# Patient Record
Sex: FEMALE | Race: WHITE | Employment: UNEMPLOYED | URBAN - METROPOLITAN AREA
[De-identification: names, ages, dates, MRNs, and addresses within clinical notes are randomized per-mention and may not be internally consistent; named-entity substitution may affect disease eponyms.]

---

## 2018-01-27 PROBLEM — F41.9 ANXIETY: Chronic | Status: ACTIVE | Noted: 2018-01-27

## 2018-01-27 PROBLEM — R00.2 PALPITATIONS: Status: ACTIVE | Noted: 2018-01-27

## 2018-01-27 PROBLEM — F32.A DEPRESSION: Chronic | Status: ACTIVE | Noted: 2018-01-27

## 2018-01-27 PROBLEM — R07.9 CHEST PAIN: Status: ACTIVE | Noted: 2018-01-27

## 2018-03-16 ENCOUNTER — CLINICAL DOCUMENTATION (OUTPATIENT)
Dept: CARDIOLOGY CLINIC | Age: 48
End: 2018-03-16

## 2018-03-19 ENCOUNTER — TELEPHONE (OUTPATIENT)
Dept: CARDIOLOGY CLINIC | Age: 48
End: 2018-03-19

## 2018-03-19 DIAGNOSIS — R07.89 ATYPICAL CHEST PAIN: ICD-10-CM

## 2018-03-19 DIAGNOSIS — F41.9 ANXIETY: Chronic | ICD-10-CM

## 2018-03-19 DIAGNOSIS — R00.2 PALPITATIONS: ICD-10-CM

## 2018-03-19 NOTE — TELEPHONE ENCOUNTER
Please let the patient know that I reviewed her 30 day event recorder. She was in sinus rhythm throughout the exam. Her average heart rate was 71 and she had a normal variation in her heart rhythm and rate. She had no pathologic arrhythmias. She did have multiple symptoms but they did not correlate with the speed of her heart or any irregularities. Once again I think that she is very sensitive to what goes on inside her body but this does not mean that there are any pathologies causing her symptoms. No other cardiac workup is being planned at this time. Thanks! DAB    Contacted patient with event monitor results and Dr. Jessa Coates comments. Patient informed me that she has just been diagnosed with Shogren's and she is menopausal and that these do affect her heart. She will keep her planned office visit on 3/23/2018 with Dr. Tanner Miller.

## 2018-03-23 ENCOUNTER — OFFICE VISIT (OUTPATIENT)
Dept: CARDIOLOGY CLINIC | Age: 48
End: 2018-03-23
Payer: COMMERCIAL

## 2018-03-23 VITALS
HEIGHT: 60 IN | HEART RATE: 81 BPM | RESPIRATION RATE: 16 BRPM | BODY MASS INDEX: 36.99 KG/M2 | SYSTOLIC BLOOD PRESSURE: 120 MMHG | DIASTOLIC BLOOD PRESSURE: 82 MMHG | WEIGHT: 188.4 LBS

## 2018-03-23 DIAGNOSIS — R00.2 PALPITATIONS: Primary | ICD-10-CM

## 2018-03-23 PROCEDURE — 99214 OFFICE O/P EST MOD 30 MIN: CPT | Performed by: INTERNAL MEDICINE

## 2018-03-23 PROCEDURE — 1036F TOBACCO NON-USER: CPT | Performed by: INTERNAL MEDICINE

## 2018-03-23 PROCEDURE — G8417 CALC BMI ABV UP PARAM F/U: HCPCS | Performed by: INTERNAL MEDICINE

## 2018-03-23 PROCEDURE — G8427 DOCREV CUR MEDS BY ELIG CLIN: HCPCS | Performed by: INTERNAL MEDICINE

## 2018-03-23 PROCEDURE — 93000 ELECTROCARDIOGRAM COMPLETE: CPT | Performed by: INTERNAL MEDICINE

## 2018-03-23 PROCEDURE — G8484 FLU IMMUNIZE NO ADMIN: HCPCS | Performed by: INTERNAL MEDICINE

## 2018-03-23 RX ORDER — HYDROXYCHLOROQUINE SULFATE 200 MG/1
TABLET, FILM COATED ORAL DAILY
Status: ON HOLD | COMMUNITY
End: 2018-04-18

## 2018-03-26 NOTE — PROGRESS NOTES
Symptoms of palpitations occurred during sinus rhythm. 10. Echocardiogram, 10/27/2017, normal left ventricular size, wall motion and systolic function with ejection fraction of 65%. Physiologic mitral insufficiency. Normal study. 6. Family history:  Multiple family members have diabetes. There is no immediate family member who has early coronary artery disease. 12. 30-day event recorder from 02/12-03/14/2018. No atrial fibrillation, ventricular tachycardia or heart block. Patient did have one episode of possible SVT at a rate of 92 at which time she complained of a skipped beat. She did have multiple other symptoms of chest pain, palpitations, dyspnea, etc when she was in sinus rhythm with mild sinus tachycardia. The monitor was essentially normal for a woman of her age. Review of Systems:  HEENT: negative for acute visual and auditory problems  Constitutional: Did have a syncopal episode brought on by stress, diarrhea, nausea and vomiting at which point she stood up and then passed out briefly - this appeared to be a vasovagal event  Respiratory: negative for cough and hemoptysis  Cardiovascular: Intermittent chest pain and dyspnea without relation to arrhythmias  Gastrointestinal: Does have epigastric pain, nausea and vomiting  Genitourinary: negative for dysuria and hematuria  Derm: negative for rash and skin lesion(s)  Neurological: negative for seizures and tremors  Endocrine: negative for diabetic symptoms including polydipsia and polyuria  Musculoskeletal: negative for CTD  Psychiatric:  Patient does admit to anxiety and depression     The remainder of the review of systems is negative except as noted above. On exam today, she is a well-nourished white female who is awake, alert and oriented. She does seem tearful and anxious. Her pulse is 81 and regular. Her blood pressure is 120/82. She weighs 188 pounds. BMI is 36.8. HEENT:  Normocephalic and atraumatic. EOMI.   Sclerae are

## 2018-03-28 ENCOUNTER — HOSPITAL ENCOUNTER (OUTPATIENT)
Age: 48
Discharge: HOME | End: 2018-03-30

## 2018-03-28 PROCEDURE — 88304 TISSUE EXAM BY PATHOLOGIST: CPT

## 2018-03-31 ENCOUNTER — APPOINTMENT (OUTPATIENT)
Dept: GENERAL RADIOLOGY | Age: 48
End: 2018-03-31
Payer: COMMERCIAL

## 2018-03-31 ENCOUNTER — APPOINTMENT (OUTPATIENT)
Dept: CT IMAGING | Age: 48
End: 2018-03-31
Payer: COMMERCIAL

## 2018-03-31 ENCOUNTER — HOSPITAL ENCOUNTER (EMERGENCY)
Age: 48
Discharge: HOME | End: 2018-04-01
Attending: EMERGENCY MEDICINE
Payer: COMMERCIAL

## 2018-03-31 DIAGNOSIS — G89.18 POST-OPERATIVE PAIN: Primary | ICD-10-CM

## 2018-03-31 LAB
ALBUMIN SERPL-MCNC: 3.9 G/DL (ref 3.5–5.2)
ALP BLD-CCNC: 71 U/L (ref 35–104)
ALT SERPL-CCNC: 79 U/L (ref 0–32)
ANION GAP SERPL CALCULATED.3IONS-SCNC: 11 MMOL/L (ref 7–16)
AST SERPL-CCNC: 62 U/L (ref 0–31)
BACTERIA: NORMAL /HPF
BASOPHILS ABSOLUTE: 0.02 E9/L (ref 0–0.2)
BASOPHILS RELATIVE PERCENT: 0.3 % (ref 0–2)
BILIRUB SERPL-MCNC: 0.3 MG/DL (ref 0–1.2)
BILIRUBIN URINE: NEGATIVE
BLOOD, URINE: NEGATIVE
BUN BLDV-MCNC: 9 MG/DL (ref 6–20)
CALCIUM SERPL-MCNC: 9.1 MG/DL (ref 8.6–10.2)
CHLORIDE BLD-SCNC: 100 MMOL/L (ref 98–107)
CLARITY: CLEAR
CO2: 28 MMOL/L (ref 22–29)
COLOR: YELLOW
CREAT SERPL-MCNC: 0.7 MG/DL (ref 0.5–1)
EOSINOPHILS ABSOLUTE: 0.1 E9/L (ref 0.05–0.5)
EOSINOPHILS RELATIVE PERCENT: 1.5 % (ref 0–6)
GFR AFRICAN AMERICAN: >60
GFR NON-AFRICAN AMERICAN: >60 ML/MIN/1.73
GLUCOSE BLD-MCNC: 113 MG/DL (ref 74–109)
GLUCOSE URINE: NEGATIVE MG/DL
HCT VFR BLD CALC: 38.4 % (ref 34–48)
HEMOGLOBIN: 12.7 G/DL (ref 11.5–15.5)
IMMATURE GRANULOCYTES #: 0.02 E9/L
IMMATURE GRANULOCYTES %: 0.3 % (ref 0–5)
KETONES, URINE: NEGATIVE MG/DL
LACTIC ACID: 1.1 MMOL/L (ref 0.5–2.2)
LEUKOCYTE ESTERASE, URINE: NEGATIVE
LIPASE: 67 U/L (ref 13–60)
LYMPHOCYTES ABSOLUTE: 2 E9/L (ref 1.5–4)
LYMPHOCYTES RELATIVE PERCENT: 30.4 % (ref 20–42)
MCH RBC QN AUTO: 28 PG (ref 26–35)
MCHC RBC AUTO-ENTMCNC: 33.1 % (ref 32–34.5)
MCV RBC AUTO: 84.6 FL (ref 80–99.9)
MONOCYTES ABSOLUTE: 0.5 E9/L (ref 0.1–0.95)
MONOCYTES RELATIVE PERCENT: 7.6 % (ref 2–12)
NEUTROPHILS ABSOLUTE: 3.93 E9/L (ref 1.8–7.3)
NEUTROPHILS RELATIVE PERCENT: 59.9 % (ref 43–80)
NITRITE, URINE: NEGATIVE
PDW BLD-RTO: 14.8 FL (ref 11.5–15)
PH UA: 6.5 (ref 5–9)
PLATELET # BLD: 298 E9/L (ref 130–450)
PMV BLD AUTO: 9 FL (ref 7–12)
POTASSIUM SERPL-SCNC: 3.5 MMOL/L (ref 3.5–5)
PROTEIN UA: NEGATIVE MG/DL
RBC # BLD: 4.54 E12/L (ref 3.5–5.5)
RBC UA: NORMAL /HPF (ref 0–2)
SODIUM BLD-SCNC: 139 MMOL/L (ref 132–146)
SPECIFIC GRAVITY UA: <=1.005 (ref 1–1.03)
TOTAL PROTEIN: 6.6 G/DL (ref 6.4–8.3)
UROBILINOGEN, URINE: 0.2 E.U./DL
WBC # BLD: 6.6 E9/L (ref 4.5–11.5)
WBC UA: NORMAL /HPF (ref 0–5)

## 2018-03-31 PROCEDURE — 83690 ASSAY OF LIPASE: CPT

## 2018-03-31 PROCEDURE — 99284 EMERGENCY DEPT VISIT MOD MDM: CPT

## 2018-03-31 PROCEDURE — 71045 X-RAY EXAM CHEST 1 VIEW: CPT

## 2018-03-31 PROCEDURE — 80053 COMPREHEN METABOLIC PANEL: CPT

## 2018-03-31 PROCEDURE — 96375 TX/PRO/DX INJ NEW DRUG ADDON: CPT

## 2018-03-31 PROCEDURE — 6360000002 HC RX W HCPCS: Performed by: EMERGENCY MEDICINE

## 2018-03-31 PROCEDURE — 2580000003 HC RX 258: Performed by: EMERGENCY MEDICINE

## 2018-03-31 PROCEDURE — 96374 THER/PROPH/DIAG INJ IV PUSH: CPT

## 2018-03-31 PROCEDURE — 85025 COMPLETE CBC W/AUTO DIFF WBC: CPT

## 2018-03-31 PROCEDURE — 83605 ASSAY OF LACTIC ACID: CPT

## 2018-03-31 PROCEDURE — 6360000004 HC RX CONTRAST MEDICATION: Performed by: RADIOLOGY

## 2018-03-31 PROCEDURE — 81001 URINALYSIS AUTO W/SCOPE: CPT

## 2018-03-31 PROCEDURE — 87040 BLOOD CULTURE FOR BACTERIA: CPT

## 2018-03-31 PROCEDURE — 74177 CT ABD & PELVIS W/CONTRAST: CPT

## 2018-03-31 RX ORDER — ONDANSETRON 2 MG/ML
8 INJECTION INTRAMUSCULAR; INTRAVENOUS ONCE
Status: COMPLETED | OUTPATIENT
Start: 2018-03-31 | End: 2018-03-31

## 2018-03-31 RX ORDER — 0.9 % SODIUM CHLORIDE 0.9 %
1000 INTRAVENOUS SOLUTION INTRAVENOUS ONCE
Status: COMPLETED | OUTPATIENT
Start: 2018-03-31 | End: 2018-03-31

## 2018-03-31 RX ORDER — MORPHINE SULFATE 4 MG/ML
4 INJECTION, SOLUTION INTRAMUSCULAR; INTRAVENOUS ONCE
Status: COMPLETED | OUTPATIENT
Start: 2018-03-31 | End: 2018-03-31

## 2018-03-31 RX ADMIN — SODIUM CHLORIDE 1000 ML: 9 INJECTION, SOLUTION INTRAVENOUS at 21:05

## 2018-03-31 RX ADMIN — MORPHINE SULFATE 4 MG: 4 INJECTION, SOLUTION INTRAMUSCULAR; INTRAVENOUS at 23:05

## 2018-03-31 RX ADMIN — IOPAMIDOL 110 ML: 755 INJECTION, SOLUTION INTRAVENOUS at 21:55

## 2018-03-31 RX ADMIN — ONDANSETRON 8 MG: 2 INJECTION, SOLUTION INTRAMUSCULAR; INTRAVENOUS at 21:06

## 2018-03-31 ASSESSMENT — ENCOUNTER SYMPTOMS
ABDOMINAL DISTENTION: 0
EYE DISCHARGE: 0
SORE THROAT: 0
VOMITING: 0
SINUS PRESSURE: 0
WHEEZING: 0
ABDOMINAL PAIN: 1
COUGH: 0
CONSTIPATION: 0
SHORTNESS OF BREATH: 1
BACK PAIN: 0
EYE REDNESS: 0
NAUSEA: 1
EYE PAIN: 0
DIARRHEA: 0
BLOOD IN STOOL: 0

## 2018-03-31 ASSESSMENT — PAIN DESCRIPTION - PAIN TYPE: TYPE: ACUTE PAIN

## 2018-03-31 ASSESSMENT — PAIN DESCRIPTION - FREQUENCY: FREQUENCY: INTERMITTENT

## 2018-03-31 ASSESSMENT — PAIN SCALES - GENERAL
PAINLEVEL_OUTOF10: 8
PAINLEVEL_OUTOF10: 8

## 2018-03-31 ASSESSMENT — PAIN DESCRIPTION - DESCRIPTORS: DESCRIPTORS: ACHING

## 2018-03-31 ASSESSMENT — PAIN DESCRIPTION - LOCATION: LOCATION: ABDOMEN

## 2018-04-01 VITALS
TEMPERATURE: 97.4 F | SYSTOLIC BLOOD PRESSURE: 133 MMHG | HEIGHT: 60 IN | DIASTOLIC BLOOD PRESSURE: 76 MMHG | OXYGEN SATURATION: 100 % | BODY MASS INDEX: 36.91 KG/M2 | WEIGHT: 188 LBS | RESPIRATION RATE: 18 BRPM | HEART RATE: 76 BPM

## 2018-04-01 RX ORDER — ONDANSETRON 4 MG/1
8 TABLET, ORALLY DISINTEGRATING ORAL EVERY 8 HOURS PRN
Qty: 24 TABLET | Refills: 0 | Status: ON HOLD | OUTPATIENT
Start: 2018-04-01 | End: 2018-04-18

## 2018-04-01 ASSESSMENT — PAIN SCALES - GENERAL: PAINLEVEL_OUTOF10: 6

## 2018-04-06 LAB
BLOOD CULTURE, ROUTINE: NORMAL
CULTURE, BLOOD 2: NORMAL

## 2018-04-17 PROBLEM — R10.11 RIGHT UPPER QUADRANT PAIN: Status: ACTIVE | Noted: 2018-04-17

## 2018-04-18 ENCOUNTER — HOSPITAL ENCOUNTER (INPATIENT)
Age: 48
LOS: 1 days | Discharge: HOME | DRG: 861 | End: 2018-04-19
Attending: SURGERY | Admitting: SURGERY
Payer: COMMERCIAL

## 2018-04-18 LAB
ALBUMIN SERPL-MCNC: 3.7 G/DL (ref 3.5–5.2)
ALP BLD-CCNC: 79 U/L (ref 35–104)
ALT SERPL-CCNC: 12 U/L (ref 0–32)
AMYLASE: 48 U/L (ref 20–100)
ANION GAP SERPL CALCULATED.3IONS-SCNC: 10 MMOL/L (ref 7–16)
AST SERPL-CCNC: 10 U/L (ref 0–31)
BASOPHILS ABSOLUTE: 0.02 E9/L (ref 0–0.2)
BASOPHILS RELATIVE PERCENT: 0.4 % (ref 0–2)
BILIRUB SERPL-MCNC: 0.3 MG/DL (ref 0–1.2)
BUN BLDV-MCNC: 8 MG/DL (ref 6–20)
CALCIUM IONIZED: 1.25 MMOL/L (ref 1.15–1.33)
CALCIUM SERPL-MCNC: 9 MG/DL (ref 8.6–10.2)
CHLORIDE BLD-SCNC: 107 MMOL/L (ref 98–107)
CO2: 24 MMOL/L (ref 22–29)
CREAT SERPL-MCNC: 0.7 MG/DL (ref 0.5–1)
D DIMER: 380 NG/ML DDU
EOSINOPHILS ABSOLUTE: 0.1 E9/L (ref 0.05–0.5)
EOSINOPHILS RELATIVE PERCENT: 2.1 % (ref 0–6)
GFR AFRICAN AMERICAN: >60
GFR NON-AFRICAN AMERICAN: >60 ML/MIN/1.73
GLUCOSE BLD-MCNC: 97 MG/DL (ref 74–109)
HCT VFR BLD CALC: 35.1 % (ref 34–48)
HEMOGLOBIN: 11.6 G/DL (ref 11.5–15.5)
IMMATURE GRANULOCYTES #: 0.01 E9/L
IMMATURE GRANULOCYTES %: 0.2 % (ref 0–5)
INR BLD: 1.1
LACTIC ACID: 0.6 MMOL/L (ref 0.5–2.2)
LIPASE: 49 U/L (ref 13–60)
LYMPHOCYTES ABSOLUTE: 1.47 E9/L (ref 1.5–4)
LYMPHOCYTES RELATIVE PERCENT: 31.1 % (ref 20–42)
MAGNESIUM: 1.9 MG/DL (ref 1.6–2.6)
MCH RBC QN AUTO: 28 PG (ref 26–35)
MCHC RBC AUTO-ENTMCNC: 33 % (ref 32–34.5)
MCV RBC AUTO: 84.6 FL (ref 80–99.9)
METER GLUCOSE: 95 MG/DL (ref 70–110)
MONOCYTES ABSOLUTE: 0.37 E9/L (ref 0.1–0.95)
MONOCYTES RELATIVE PERCENT: 7.8 % (ref 2–12)
NEUTROPHILS ABSOLUTE: 2.75 E9/L (ref 1.8–7.3)
NEUTROPHILS RELATIVE PERCENT: 58.4 % (ref 43–80)
PDW BLD-RTO: 14.5 FL (ref 11.5–15)
PHOSPHORUS: 2.9 MG/DL (ref 2.5–4.5)
PLATELET # BLD: 295 E9/L (ref 130–450)
PMV BLD AUTO: 8.8 FL (ref 7–12)
POTASSIUM REFLEX MAGNESIUM: 3.8 MMOL/L (ref 3.5–5)
PROTHROMBIN TIME: 12.3 SEC (ref 9.3–12.4)
RBC # BLD: 4.15 E12/L (ref 3.5–5.5)
SODIUM BLD-SCNC: 141 MMOL/L (ref 132–146)
TOTAL PROTEIN: 6.6 G/DL (ref 6.4–8.3)
WBC # BLD: 4.7 E9/L (ref 4.5–11.5)

## 2018-04-18 PROCEDURE — 83605 ASSAY OF LACTIC ACID: CPT

## 2018-04-18 PROCEDURE — 96374 THER/PROPH/DIAG INJ IV PUSH: CPT

## 2018-04-18 PROCEDURE — C9113 INJ PANTOPRAZOLE SODIUM, VIA: HCPCS | Performed by: STUDENT IN AN ORGANIZED HEALTH CARE EDUCATION/TRAINING PROGRAM

## 2018-04-18 PROCEDURE — 6370000000 HC RX 637 (ALT 250 FOR IP): Performed by: STUDENT IN AN ORGANIZED HEALTH CARE EDUCATION/TRAINING PROGRAM

## 2018-04-18 PROCEDURE — 83735 ASSAY OF MAGNESIUM: CPT

## 2018-04-18 PROCEDURE — 36415 COLL VENOUS BLD VENIPUNCTURE: CPT

## 2018-04-18 PROCEDURE — 82150 ASSAY OF AMYLASE: CPT

## 2018-04-18 PROCEDURE — 84100 ASSAY OF PHOSPHORUS: CPT

## 2018-04-18 PROCEDURE — 6360000002 HC RX W HCPCS: Performed by: STUDENT IN AN ORGANIZED HEALTH CARE EDUCATION/TRAINING PROGRAM

## 2018-04-18 PROCEDURE — 82962 GLUCOSE BLOOD TEST: CPT

## 2018-04-18 PROCEDURE — 85610 PROTHROMBIN TIME: CPT

## 2018-04-18 PROCEDURE — 82330 ASSAY OF CALCIUM: CPT

## 2018-04-18 PROCEDURE — 1200000000 HC SEMI PRIVATE

## 2018-04-18 PROCEDURE — G0378 HOSPITAL OBSERVATION PER HR: HCPCS

## 2018-04-18 PROCEDURE — 2580000003 HC RX 258: Performed by: STUDENT IN AN ORGANIZED HEALTH CARE EDUCATION/TRAINING PROGRAM

## 2018-04-18 PROCEDURE — 85378 FIBRIN DEGRADE SEMIQUANT: CPT

## 2018-04-18 PROCEDURE — 80053 COMPREHEN METABOLIC PANEL: CPT

## 2018-04-18 PROCEDURE — 83690 ASSAY OF LIPASE: CPT

## 2018-04-18 PROCEDURE — 85025 COMPLETE CBC W/AUTO DIFF WBC: CPT

## 2018-04-18 RX ORDER — 0.9 % SODIUM CHLORIDE 0.9 %
10 VIAL (ML) INJECTION DAILY
Status: DISCONTINUED | OUTPATIENT
Start: 2018-04-18 | End: 2018-04-19 | Stop reason: HOSPADM

## 2018-04-18 RX ORDER — HYDROCODONE BITARTRATE AND ACETAMINOPHEN 5; 325 MG/1; MG/1
1 TABLET ORAL EVERY 6 HOURS PRN
Status: DISCONTINUED | OUTPATIENT
Start: 2018-04-18 | End: 2018-04-19 | Stop reason: HOSPADM

## 2018-04-18 RX ORDER — ALBUTEROL SULFATE 90 UG/1
2 AEROSOL, METERED RESPIRATORY (INHALATION) EVERY 6 HOURS PRN
Status: DISCONTINUED | OUTPATIENT
Start: 2018-04-18 | End: 2018-04-19 | Stop reason: HOSPADM

## 2018-04-18 RX ORDER — SODIUM CHLORIDE 0.9 % (FLUSH) 0.9 %
10 SYRINGE (ML) INJECTION PRN
Status: DISCONTINUED | OUTPATIENT
Start: 2018-04-18 | End: 2018-04-19 | Stop reason: HOSPADM

## 2018-04-18 RX ORDER — SODIUM CHLORIDE 9 MG/ML
INJECTION, SOLUTION INTRAVENOUS CONTINUOUS
Status: DISCONTINUED | OUTPATIENT
Start: 2018-04-18 | End: 2018-04-19 | Stop reason: HOSPADM

## 2018-04-18 RX ORDER — SODIUM CHLORIDE 0.9 % (FLUSH) 0.9 %
10 SYRINGE (ML) INJECTION EVERY 12 HOURS SCHEDULED
Status: DISCONTINUED | OUTPATIENT
Start: 2018-04-18 | End: 2018-04-19 | Stop reason: HOSPADM

## 2018-04-18 RX ORDER — ALPRAZOLAM 1 MG/1
0.5 TABLET ORAL 3 TIMES DAILY PRN
Status: ON HOLD | COMMUNITY
End: 2021-09-29 | Stop reason: HOSPADM

## 2018-04-18 RX ORDER — ALPRAZOLAM 0.25 MG/1
0.5 TABLET ORAL 3 TIMES DAILY PRN
Status: DISCONTINUED | OUTPATIENT
Start: 2018-04-18 | End: 2018-04-19 | Stop reason: HOSPADM

## 2018-04-18 RX ORDER — ONDANSETRON 2 MG/ML
4 INJECTION INTRAMUSCULAR; INTRAVENOUS EVERY 6 HOURS PRN
Status: DISCONTINUED | OUTPATIENT
Start: 2018-04-18 | End: 2018-04-19 | Stop reason: HOSPADM

## 2018-04-18 RX ORDER — PANTOPRAZOLE SODIUM 40 MG/10ML
40 INJECTION, POWDER, LYOPHILIZED, FOR SOLUTION INTRAVENOUS DAILY
Status: DISCONTINUED | OUTPATIENT
Start: 2018-04-18 | End: 2018-04-19 | Stop reason: HOSPADM

## 2018-04-18 RX ORDER — HYDROCODONE BITARTRATE AND ACETAMINOPHEN 5; 325 MG/1; MG/1
1 TABLET ORAL EVERY 6 HOURS PRN
Status: ON HOLD | COMMUNITY
End: 2018-07-31

## 2018-04-18 RX ORDER — FLUTICASONE FUROATE AND VILANTEROL 200; 25 UG/1; UG/1
1 POWDER RESPIRATORY (INHALATION) DAILY
Status: DISCONTINUED | OUTPATIENT
Start: 2018-04-18 | End: 2018-04-19 | Stop reason: HOSPADM

## 2018-04-18 RX ORDER — ACETAMINOPHEN 325 MG/1
650 TABLET ORAL EVERY 4 HOURS PRN
Status: DISCONTINUED | OUTPATIENT
Start: 2018-04-18 | End: 2018-04-19 | Stop reason: HOSPADM

## 2018-04-18 RX ORDER — CHOLECALCIFEROL (VITAMIN D3) 50 MCG
5000 TABLET ORAL DAILY
Status: DISCONTINUED | OUTPATIENT
Start: 2018-04-18 | End: 2018-04-19 | Stop reason: HOSPADM

## 2018-04-18 RX ADMIN — Medication 10 ML: at 11:39

## 2018-04-18 RX ADMIN — HYDROCODONE BITARTRATE AND ACETAMINOPHEN 1 TABLET: 5; 325 TABLET ORAL at 18:47

## 2018-04-18 RX ADMIN — SODIUM CHLORIDE: 9 INJECTION, SOLUTION INTRAVENOUS at 22:23

## 2018-04-18 RX ADMIN — Medication 10 ML: at 11:41

## 2018-04-18 RX ADMIN — HYDROCODONE BITARTRATE AND ACETAMINOPHEN 1 TABLET: 5; 325 TABLET ORAL at 11:41

## 2018-04-18 RX ADMIN — ACETAMINOPHEN 650 MG: 325 TABLET ORAL at 22:21

## 2018-04-18 RX ADMIN — PANTOPRAZOLE SODIUM 40 MG: 40 INJECTION, POWDER, FOR SOLUTION INTRAVENOUS at 11:39

## 2018-04-18 RX ADMIN — SODIUM CHLORIDE: 9 INJECTION, SOLUTION INTRAVENOUS at 22:21

## 2018-04-18 RX ADMIN — SODIUM CHLORIDE: 9 INJECTION, SOLUTION INTRAVENOUS at 11:39

## 2018-04-18 ASSESSMENT — PAIN DESCRIPTION - PAIN TYPE
TYPE: ACUTE PAIN

## 2018-04-18 ASSESSMENT — PAIN DESCRIPTION - LOCATION
LOCATION: ABDOMEN

## 2018-04-18 ASSESSMENT — PAIN SCALES - GENERAL
PAINLEVEL_OUTOF10: 6
PAINLEVEL_OUTOF10: 4
PAINLEVEL_OUTOF10: 3
PAINLEVEL_OUTOF10: 5
PAINLEVEL_OUTOF10: 0
PAINLEVEL_OUTOF10: 6
PAINLEVEL_OUTOF10: 8

## 2018-04-18 ASSESSMENT — PAIN DESCRIPTION - FREQUENCY
FREQUENCY: INTERMITTENT

## 2018-04-18 ASSESSMENT — PAIN DESCRIPTION - ONSET
ONSET: ON-GOING

## 2018-04-18 ASSESSMENT — PAIN DESCRIPTION - ORIENTATION
ORIENTATION: RIGHT;UPPER
ORIENTATION: OTHER (COMMENT)
ORIENTATION: RIGHT
ORIENTATION: RIGHT;UPPER

## 2018-04-18 ASSESSMENT — PAIN DESCRIPTION - DESCRIPTORS
DESCRIPTORS: DISCOMFORT;ACHING
DESCRIPTORS: DISCOMFORT;SHARP;SHOOTING
DESCRIPTORS: SHARP;DISCOMFORT;SHOOTING
DESCRIPTORS: DISCOMFORT;SHARP;SHOOTING

## 2018-04-19 VITALS
WEIGHT: 186.8 LBS | TEMPERATURE: 98.5 F | BODY MASS INDEX: 36.48 KG/M2 | RESPIRATION RATE: 16 BRPM | DIASTOLIC BLOOD PRESSURE: 67 MMHG | SYSTOLIC BLOOD PRESSURE: 110 MMHG | OXYGEN SATURATION: 97 % | HEART RATE: 80 BPM

## 2018-04-19 PROCEDURE — G0378 HOSPITAL OBSERVATION PER HR: HCPCS

## 2018-04-19 PROCEDURE — 6370000000 HC RX 637 (ALT 250 FOR IP): Performed by: STUDENT IN AN ORGANIZED HEALTH CARE EDUCATION/TRAINING PROGRAM

## 2018-04-19 PROCEDURE — 96376 TX/PRO/DX INJ SAME DRUG ADON: CPT

## 2018-04-19 PROCEDURE — 6360000002 HC RX W HCPCS: Performed by: STUDENT IN AN ORGANIZED HEALTH CARE EDUCATION/TRAINING PROGRAM

## 2018-04-19 PROCEDURE — 2580000003 HC RX 258: Performed by: STUDENT IN AN ORGANIZED HEALTH CARE EDUCATION/TRAINING PROGRAM

## 2018-04-19 PROCEDURE — C9113 INJ PANTOPRAZOLE SODIUM, VIA: HCPCS | Performed by: STUDENT IN AN ORGANIZED HEALTH CARE EDUCATION/TRAINING PROGRAM

## 2018-04-19 RX ADMIN — SODIUM CHLORIDE: 9 INJECTION, SOLUTION INTRAVENOUS at 08:30

## 2018-04-19 RX ADMIN — CHOLECALCIFEROL TAB 50 MCG (2000 UNIT) 5000 UNITS: 50 TAB at 08:30

## 2018-04-19 RX ADMIN — PANTOPRAZOLE SODIUM 40 MG: 40 INJECTION, POWDER, FOR SOLUTION INTRAVENOUS at 08:30

## 2018-04-19 RX ADMIN — Medication 10 ML: at 08:30

## 2018-04-19 RX ADMIN — ACETAMINOPHEN 650 MG: 325 TABLET ORAL at 06:16

## 2018-04-19 ASSESSMENT — PAIN DESCRIPTION - DESCRIPTORS
DESCRIPTORS: ACHING;TENDER;CRAMPING
DESCRIPTORS: HEADACHE

## 2018-04-19 ASSESSMENT — PAIN SCALES - GENERAL
PAINLEVEL_OUTOF10: 5
PAINLEVEL_OUTOF10: 7
PAINLEVEL_OUTOF10: 0
PAINLEVEL_OUTOF10: 4

## 2018-04-19 ASSESSMENT — PAIN DESCRIPTION - PAIN TYPE
TYPE: ACUTE PAIN

## 2018-04-19 ASSESSMENT — PAIN DESCRIPTION - FREQUENCY
FREQUENCY: CONTINUOUS
FREQUENCY: CONTINUOUS

## 2018-04-19 ASSESSMENT — PAIN DESCRIPTION - LOCATION
LOCATION: ABDOMEN
LOCATION: HEAD
LOCATION: ABDOMEN

## 2018-04-19 ASSESSMENT — PAIN DESCRIPTION - ONSET
ONSET: ON-GOING
ONSET: ON-GOING

## 2018-04-19 ASSESSMENT — PAIN DESCRIPTION - PROGRESSION: CLINICAL_PROGRESSION: NOT CHANGED

## 2018-04-29 ENCOUNTER — HOSPITAL ENCOUNTER (EMERGENCY)
Age: 48
Discharge: HOME | End: 2018-04-30
Attending: EMERGENCY MEDICINE
Payer: COMMERCIAL

## 2018-04-29 DIAGNOSIS — R07.89 ATYPICAL CHEST PAIN: Primary | ICD-10-CM

## 2018-04-29 LAB
BASOPHILS ABSOLUTE: 0.03 E9/L (ref 0–0.2)
BASOPHILS RELATIVE PERCENT: 0.4 % (ref 0–2)
EKG ATRIAL RATE: 68 BPM
EKG P AXIS: 71 DEGREES
EKG P-R INTERVAL: 162 MS
EKG Q-T INTERVAL: 390 MS
EKG QRS DURATION: 76 MS
EKG QTC CALCULATION (BAZETT): 414 MS
EKG R AXIS: 31 DEGREES
EKG T AXIS: 21 DEGREES
EKG VENTRICULAR RATE: 68 BPM
EOSINOPHILS ABSOLUTE: 0.06 E9/L (ref 0.05–0.5)
EOSINOPHILS RELATIVE PERCENT: 0.9 % (ref 0–6)
HCT VFR BLD CALC: 37 % (ref 34–48)
HEMOGLOBIN: 12.3 G/DL (ref 11.5–15.5)
IMMATURE GRANULOCYTES #: 0.03 E9/L
IMMATURE GRANULOCYTES %: 0.4 % (ref 0–5)
LYMPHOCYTES ABSOLUTE: 1.63 E9/L (ref 1.5–4)
LYMPHOCYTES RELATIVE PERCENT: 24 % (ref 20–42)
MCH RBC QN AUTO: 28.2 PG (ref 26–35)
MCHC RBC AUTO-ENTMCNC: 33.2 % (ref 32–34.5)
MCV RBC AUTO: 84.9 FL (ref 80–99.9)
MONOCYTES ABSOLUTE: 0.54 E9/L (ref 0.1–0.95)
MONOCYTES RELATIVE PERCENT: 8 % (ref 2–12)
NEUTROPHILS ABSOLUTE: 4.5 E9/L (ref 1.8–7.3)
NEUTROPHILS RELATIVE PERCENT: 66.3 % (ref 43–80)
PDW BLD-RTO: 14.1 FL (ref 11.5–15)
PLATELET # BLD: 263 E9/L (ref 130–450)
PMV BLD AUTO: 9.1 FL (ref 7–12)
RBC # BLD: 4.36 E12/L (ref 3.5–5.5)
WBC # BLD: 6.8 E9/L (ref 4.5–11.5)

## 2018-04-29 PROCEDURE — 80053 COMPREHEN METABOLIC PANEL: CPT

## 2018-04-29 PROCEDURE — 96374 THER/PROPH/DIAG INJ IV PUSH: CPT

## 2018-04-29 PROCEDURE — 6360000002 HC RX W HCPCS: Performed by: EMERGENCY MEDICINE

## 2018-04-29 PROCEDURE — 2500000003 HC RX 250 WO HCPCS: Performed by: EMERGENCY MEDICINE

## 2018-04-29 PROCEDURE — 85025 COMPLETE CBC W/AUTO DIFF WBC: CPT

## 2018-04-29 PROCEDURE — 2580000003 HC RX 258: Performed by: EMERGENCY MEDICINE

## 2018-04-29 PROCEDURE — 6360000002 HC RX W HCPCS

## 2018-04-29 PROCEDURE — 85378 FIBRIN DEGRADE SEMIQUANT: CPT

## 2018-04-29 PROCEDURE — S0028 INJECTION, FAMOTIDINE, 20 MG: HCPCS | Performed by: EMERGENCY MEDICINE

## 2018-04-29 PROCEDURE — 85610 PROTHROMBIN TIME: CPT

## 2018-04-29 PROCEDURE — 94760 N-INVAS EAR/PLS OXIMETRY 1: CPT

## 2018-04-29 PROCEDURE — 6370000000 HC RX 637 (ALT 250 FOR IP): Performed by: EMERGENCY MEDICINE

## 2018-04-29 PROCEDURE — 83690 ASSAY OF LIPASE: CPT

## 2018-04-29 PROCEDURE — 84484 ASSAY OF TROPONIN QUANT: CPT

## 2018-04-29 PROCEDURE — 99285 EMERGENCY DEPT VISIT HI MDM: CPT

## 2018-04-29 RX ORDER — 0.9 % SODIUM CHLORIDE 0.9 %
1000 INTRAVENOUS SOLUTION INTRAVENOUS ONCE
Status: COMPLETED | OUTPATIENT
Start: 2018-04-29 | End: 2018-04-29

## 2018-04-29 RX ORDER — MORPHINE SULFATE 2 MG/ML
INJECTION, SOLUTION INTRAMUSCULAR; INTRAVENOUS
Status: COMPLETED
Start: 2018-04-29 | End: 2018-04-29

## 2018-04-29 RX ORDER — MORPHINE SULFATE 4 MG/ML
4 INJECTION, SOLUTION INTRAMUSCULAR; INTRAVENOUS ONCE
Status: DISCONTINUED | OUTPATIENT
Start: 2018-04-29 | End: 2018-04-30 | Stop reason: HOSPADM

## 2018-04-29 RX ORDER — ONDANSETRON 2 MG/ML
4 INJECTION INTRAMUSCULAR; INTRAVENOUS ONCE
Status: COMPLETED | OUTPATIENT
Start: 2018-04-29 | End: 2018-04-29

## 2018-04-29 RX ADMIN — ONDANSETRON 4 MG: 2 INJECTION INTRAMUSCULAR; INTRAVENOUS at 23:27

## 2018-04-29 RX ADMIN — MORPHINE SULFATE 4 MG: 2 INJECTION, SOLUTION INTRAMUSCULAR; INTRAVENOUS at 23:27

## 2018-04-29 RX ADMIN — FAMOTIDINE 20 MG: 10 INJECTION INTRAVENOUS at 23:27

## 2018-04-29 RX ADMIN — Medication 30 ML: at 23:27

## 2018-04-29 RX ADMIN — SODIUM CHLORIDE 1000 ML: 9 INJECTION, SOLUTION INTRAVENOUS at 23:26

## 2018-04-29 ASSESSMENT — PAIN SCALES - GENERAL
PAINLEVEL_OUTOF10: 5
PAINLEVEL_OUTOF10: 4

## 2018-04-29 ASSESSMENT — PAIN DESCRIPTION - PAIN TYPE: TYPE: ACUTE PAIN

## 2018-04-29 ASSESSMENT — PAIN DESCRIPTION - LOCATION: LOCATION: CHEST

## 2018-04-30 ENCOUNTER — APPOINTMENT (OUTPATIENT)
Dept: CT IMAGING | Age: 48
End: 2018-04-30
Payer: COMMERCIAL

## 2018-04-30 VITALS
SYSTOLIC BLOOD PRESSURE: 106 MMHG | BODY MASS INDEX: 35.12 KG/M2 | HEART RATE: 59 BPM | DIASTOLIC BLOOD PRESSURE: 57 MMHG | RESPIRATION RATE: 18 BRPM | HEIGHT: 61 IN | TEMPERATURE: 98.2 F | OXYGEN SATURATION: 95 % | WEIGHT: 186 LBS

## 2018-04-30 LAB
ALBUMIN SERPL-MCNC: 3.8 G/DL (ref 3.5–5.2)
ALP BLD-CCNC: 76 U/L (ref 35–104)
ALT SERPL-CCNC: 7 U/L (ref 0–32)
ANION GAP SERPL CALCULATED.3IONS-SCNC: 12 MMOL/L (ref 7–16)
AST SERPL-CCNC: 8 U/L (ref 0–31)
BILIRUB SERPL-MCNC: 0.3 MG/DL (ref 0–1.2)
BUN BLDV-MCNC: 10 MG/DL (ref 6–20)
CALCIUM SERPL-MCNC: 9.1 MG/DL (ref 8.6–10.2)
CHLORIDE BLD-SCNC: 101 MMOL/L (ref 98–107)
CO2: 25 MMOL/L (ref 22–29)
CREAT SERPL-MCNC: 0.8 MG/DL (ref 0.5–1)
D DIMER: 297 NG/ML DDU
GFR AFRICAN AMERICAN: >60
GFR NON-AFRICAN AMERICAN: >60 ML/MIN/1.73
GLUCOSE BLD-MCNC: 102 MG/DL (ref 74–109)
INR BLD: 1.1
LIPASE: 51 U/L (ref 13–60)
POTASSIUM SERPL-SCNC: 3.8 MMOL/L (ref 3.5–5)
PROTHROMBIN TIME: 12.2 SEC (ref 9.3–12.4)
SODIUM BLD-SCNC: 138 MMOL/L (ref 132–146)
TOTAL PROTEIN: 6.6 G/DL (ref 6.4–8.3)
TROPONIN: <0.01 NG/ML (ref 0–0.03)

## 2018-04-30 PROCEDURE — 6360000002 HC RX W HCPCS: Performed by: EMERGENCY MEDICINE

## 2018-04-30 PROCEDURE — 96375 TX/PRO/DX INJ NEW DRUG ADDON: CPT

## 2018-04-30 PROCEDURE — 6360000004 HC RX CONTRAST MEDICATION: Performed by: RADIOLOGY

## 2018-04-30 PROCEDURE — 71275 CT ANGIOGRAPHY CHEST: CPT

## 2018-04-30 RX ORDER — DIPHENHYDRAMINE HYDROCHLORIDE 50 MG/ML
50 INJECTION INTRAMUSCULAR; INTRAVENOUS ONCE
Status: COMPLETED | OUTPATIENT
Start: 2018-04-30 | End: 2018-04-30

## 2018-04-30 RX ORDER — FAMOTIDINE 20 MG/1
20 TABLET, FILM COATED ORAL 2 TIMES DAILY
Qty: 14 TABLET | Refills: 0 | Status: ON HOLD | OUTPATIENT
Start: 2018-04-30 | End: 2018-07-31

## 2018-04-30 RX ORDER — METHYLPREDNISOLONE SODIUM SUCCINATE 125 MG/2ML
125 INJECTION, POWDER, LYOPHILIZED, FOR SOLUTION INTRAMUSCULAR; INTRAVENOUS ONCE
Status: COMPLETED | OUTPATIENT
Start: 2018-04-30 | End: 2018-04-30

## 2018-04-30 RX ADMIN — DIPHENHYDRAMINE HYDROCHLORIDE 50 MG: 50 INJECTION, SOLUTION INTRAMUSCULAR; INTRAVENOUS at 00:24

## 2018-04-30 RX ADMIN — IOPAMIDOL 80 ML: 755 INJECTION, SOLUTION INTRAVENOUS at 01:13

## 2018-04-30 RX ADMIN — METHYLPREDNISOLONE SODIUM SUCCINATE 125 MG: 125 INJECTION, POWDER, FOR SOLUTION INTRAMUSCULAR; INTRAVENOUS at 00:24

## 2018-06-19 ENCOUNTER — HOSPITAL ENCOUNTER (EMERGENCY)
Age: 48
Discharge: HOME | End: 2018-06-19
Attending: EMERGENCY MEDICINE
Payer: COMMERCIAL

## 2018-06-19 ENCOUNTER — APPOINTMENT (OUTPATIENT)
Dept: GENERAL RADIOLOGY | Age: 48
End: 2018-06-19
Payer: COMMERCIAL

## 2018-06-19 VITALS
HEIGHT: 65 IN | BODY MASS INDEX: 30.66 KG/M2 | DIASTOLIC BLOOD PRESSURE: 82 MMHG | OXYGEN SATURATION: 100 % | WEIGHT: 184 LBS | TEMPERATURE: 98.1 F | RESPIRATION RATE: 16 BRPM | HEART RATE: 74 BPM | SYSTOLIC BLOOD PRESSURE: 122 MMHG

## 2018-06-19 DIAGNOSIS — F41.1 ANXIETY STATE: ICD-10-CM

## 2018-06-19 DIAGNOSIS — R07.9 CHEST PAIN, UNSPECIFIED TYPE: Primary | ICD-10-CM

## 2018-06-19 LAB
ANION GAP SERPL CALCULATED.3IONS-SCNC: 11 MMOL/L (ref 7–16)
BASOPHILS ABSOLUTE: 0.03 E9/L (ref 0–0.2)
BASOPHILS RELATIVE PERCENT: 0.5 % (ref 0–2)
BUN BLDV-MCNC: 8 MG/DL (ref 6–20)
CALCIUM SERPL-MCNC: 9.7 MG/DL (ref 8.6–10.2)
CHLORIDE BLD-SCNC: 103 MMOL/L (ref 98–107)
CO2: 25 MMOL/L (ref 22–29)
CREAT SERPL-MCNC: 0.7 MG/DL (ref 0.5–1)
EKG ATRIAL RATE: 69 BPM
EKG P AXIS: 50 DEGREES
EKG P-R INTERVAL: 132 MS
EKG Q-T INTERVAL: 392 MS
EKG QRS DURATION: 78 MS
EKG QTC CALCULATION (BAZETT): 420 MS
EKG R AXIS: 19 DEGREES
EKG T AXIS: 30 DEGREES
EKG VENTRICULAR RATE: 69 BPM
EOSINOPHILS ABSOLUTE: 0.05 E9/L (ref 0.05–0.5)
EOSINOPHILS RELATIVE PERCENT: 0.8 % (ref 0–6)
GFR AFRICAN AMERICAN: >60
GFR NON-AFRICAN AMERICAN: >60 ML/MIN/1.73
GLUCOSE BLD-MCNC: 93 MG/DL (ref 74–109)
HCT VFR BLD CALC: 37.3 % (ref 34–48)
HEMOGLOBIN: 12.6 G/DL (ref 11.5–15.5)
IMMATURE GRANULOCYTES #: 0.02 E9/L
IMMATURE GRANULOCYTES %: 0.3 % (ref 0–5)
LYMPHOCYTES ABSOLUTE: 1.52 E9/L (ref 1.5–4)
LYMPHOCYTES RELATIVE PERCENT: 24.1 % (ref 20–42)
MCH RBC QN AUTO: 28.3 PG (ref 26–35)
MCHC RBC AUTO-ENTMCNC: 33.8 % (ref 32–34.5)
MCV RBC AUTO: 83.6 FL (ref 80–99.9)
MONOCYTES ABSOLUTE: 0.52 E9/L (ref 0.1–0.95)
MONOCYTES RELATIVE PERCENT: 8.3 % (ref 2–12)
NEUTROPHILS ABSOLUTE: 4.16 E9/L (ref 1.8–7.3)
NEUTROPHILS RELATIVE PERCENT: 66 % (ref 43–80)
PDW BLD-RTO: 14 FL (ref 11.5–15)
PLATELET # BLD: 281 E9/L (ref 130–450)
PMV BLD AUTO: 8.6 FL (ref 7–12)
POTASSIUM SERPL-SCNC: 3.7 MMOL/L (ref 3.5–5)
PRO-BNP: 109 PG/ML (ref 0–125)
RBC # BLD: 4.46 E12/L (ref 3.5–5.5)
SODIUM BLD-SCNC: 139 MMOL/L (ref 132–146)
TROPONIN: <0.01 NG/ML (ref 0–0.03)
WBC # BLD: 6.3 E9/L (ref 4.5–11.5)

## 2018-06-19 PROCEDURE — 99285 EMERGENCY DEPT VISIT HI MDM: CPT

## 2018-06-19 PROCEDURE — 83880 ASSAY OF NATRIURETIC PEPTIDE: CPT

## 2018-06-19 PROCEDURE — 85025 COMPLETE CBC W/AUTO DIFF WBC: CPT

## 2018-06-19 PROCEDURE — 6360000002 HC RX W HCPCS: Performed by: EMERGENCY MEDICINE

## 2018-06-19 PROCEDURE — 71045 X-RAY EXAM CHEST 1 VIEW: CPT

## 2018-06-19 PROCEDURE — 6370000000 HC RX 637 (ALT 250 FOR IP): Performed by: EMERGENCY MEDICINE

## 2018-06-19 PROCEDURE — 84484 ASSAY OF TROPONIN QUANT: CPT

## 2018-06-19 PROCEDURE — 80048 BASIC METABOLIC PNL TOTAL CA: CPT

## 2018-06-19 PROCEDURE — 96374 THER/PROPH/DIAG INJ IV PUSH: CPT

## 2018-06-19 RX ORDER — LORAZEPAM 1 MG/1
0.5 TABLET ORAL EVERY 6 HOURS PRN
Qty: 4 TABLET | Refills: 0 | Status: SHIPPED | OUTPATIENT
Start: 2018-06-19 | End: 2018-06-20

## 2018-06-19 RX ORDER — SODIUM CHLORIDE 0.9 % (FLUSH) 0.9 %
10 SYRINGE (ML) INJECTION PRN
Status: DISCONTINUED | OUTPATIENT
Start: 2018-06-19 | End: 2018-06-19 | Stop reason: HOSPADM

## 2018-06-19 RX ORDER — LORAZEPAM 2 MG/ML
0.5 INJECTION INTRAMUSCULAR ONCE
Status: COMPLETED | OUTPATIENT
Start: 2018-06-19 | End: 2018-06-19

## 2018-06-19 RX ORDER — ASPIRIN 81 MG/1
324 TABLET, CHEWABLE ORAL ONCE
Status: COMPLETED | OUTPATIENT
Start: 2018-06-19 | End: 2018-06-19

## 2018-06-19 RX ADMIN — LORAZEPAM 0.5 MG: 2 INJECTION INTRAMUSCULAR; INTRAVENOUS at 16:08

## 2018-06-19 RX ADMIN — ASPIRIN 81 MG 324 MG: 81 TABLET ORAL at 16:08

## 2018-06-19 ASSESSMENT — ENCOUNTER SYMPTOMS
SHORTNESS OF BREATH: 0
COUGH: 0
NAUSEA: 1
BACK PAIN: 0
HEARTBURN: 0
BLOOD IN STOOL: 0
ABDOMINAL PAIN: 0
VOMITING: 0

## 2018-07-30 ENCOUNTER — APPOINTMENT (OUTPATIENT)
Dept: CT IMAGING | Age: 48
End: 2018-07-30
Payer: COMMERCIAL

## 2018-07-30 ENCOUNTER — HOSPITAL ENCOUNTER (OUTPATIENT)
Age: 48
Setting detail: OBSERVATION
Discharge: HOME | End: 2018-07-31
Attending: EMERGENCY MEDICINE | Admitting: SURGERY
Payer: COMMERCIAL

## 2018-07-30 DIAGNOSIS — R10.31 RIGHT LOWER QUADRANT ABDOMINAL PAIN: Primary | ICD-10-CM

## 2018-07-30 PROBLEM — R10.9 ABDOMINAL PAIN: Status: ACTIVE | Noted: 2018-07-30

## 2018-07-30 LAB
ALBUMIN SERPL-MCNC: 4 G/DL (ref 3.5–5.2)
ALP BLD-CCNC: 77 U/L (ref 35–104)
ALT SERPL-CCNC: 9 U/L (ref 0–32)
ANION GAP SERPL CALCULATED.3IONS-SCNC: 12 MMOL/L (ref 7–16)
AST SERPL-CCNC: 10 U/L (ref 0–31)
BASOPHILS ABSOLUTE: 0.03 E9/L (ref 0–0.2)
BASOPHILS RELATIVE PERCENT: 0.5 % (ref 0–2)
BILIRUB SERPL-MCNC: 0.5 MG/DL (ref 0–1.2)
BILIRUBIN URINE: NEGATIVE
BLOOD, URINE: NEGATIVE
BUN BLDV-MCNC: 10 MG/DL (ref 6–20)
CALCIUM SERPL-MCNC: 9 MG/DL (ref 8.6–10.2)
CHLORIDE BLD-SCNC: 107 MMOL/L (ref 98–107)
CLARITY: CLEAR
CO2: 24 MMOL/L (ref 22–29)
COLOR: YELLOW
CREAT SERPL-MCNC: 0.6 MG/DL (ref 0.5–1)
EOSINOPHILS ABSOLUTE: 0.04 E9/L (ref 0.05–0.5)
EOSINOPHILS RELATIVE PERCENT: 0.6 % (ref 0–6)
GFR AFRICAN AMERICAN: >60
GFR NON-AFRICAN AMERICAN: >60 ML/MIN/1.73
GLUCOSE BLD-MCNC: 100 MG/DL (ref 74–109)
GLUCOSE URINE: NEGATIVE MG/DL
HCT VFR BLD CALC: 38.6 % (ref 34–48)
HEMOGLOBIN: 13.2 G/DL (ref 11.5–15.5)
IMMATURE GRANULOCYTES #: 0.03 E9/L
IMMATURE GRANULOCYTES %: 0.5 % (ref 0–5)
KETONES, URINE: 40 MG/DL
LEUKOCYTE ESTERASE, URINE: NEGATIVE
LYMPHOCYTES ABSOLUTE: 1.23 E9/L (ref 1.5–4)
LYMPHOCYTES RELATIVE PERCENT: 19.2 % (ref 20–42)
MCH RBC QN AUTO: 28.6 PG (ref 26–35)
MCHC RBC AUTO-ENTMCNC: 34.2 % (ref 32–34.5)
MCV RBC AUTO: 83.7 FL (ref 80–99.9)
MONOCYTES ABSOLUTE: 0.56 E9/L (ref 0.1–0.95)
MONOCYTES RELATIVE PERCENT: 8.8 % (ref 2–12)
NEUTROPHILS ABSOLUTE: 4.51 E9/L (ref 1.8–7.3)
NEUTROPHILS RELATIVE PERCENT: 70.4 % (ref 43–80)
NITRITE, URINE: NEGATIVE
PDW BLD-RTO: 13.6 FL (ref 11.5–15)
PH UA: 6.5 (ref 5–9)
PLATELET # BLD: 291 E9/L (ref 130–450)
PMV BLD AUTO: 8.6 FL (ref 7–12)
POTASSIUM SERPL-SCNC: 4.5 MMOL/L (ref 3.5–5)
PREGNANCY TEST URINE, POC: NORMAL
PROTEIN UA: NEGATIVE MG/DL
RBC # BLD: 4.61 E12/L (ref 3.5–5.5)
SODIUM BLD-SCNC: 143 MMOL/L (ref 132–146)
SPECIFIC GRAVITY UA: 1.01 (ref 1–1.03)
TOTAL PROTEIN: 7.3 G/DL (ref 6.4–8.3)
UROBILINOGEN, URINE: 0.2 E.U./DL
WBC # BLD: 6.4 E9/L (ref 4.5–11.5)

## 2018-07-30 PROCEDURE — G0378 HOSPITAL OBSERVATION PER HR: HCPCS

## 2018-07-30 PROCEDURE — 36415 COLL VENOUS BLD VENIPUNCTURE: CPT

## 2018-07-30 PROCEDURE — 99285 EMERGENCY DEPT VISIT HI MDM: CPT

## 2018-07-30 PROCEDURE — 6360000004 HC RX CONTRAST MEDICATION: Performed by: RADIOLOGY

## 2018-07-30 PROCEDURE — 81003 URINALYSIS AUTO W/O SCOPE: CPT

## 2018-07-30 PROCEDURE — 96374 THER/PROPH/DIAG INJ IV PUSH: CPT

## 2018-07-30 PROCEDURE — 6360000002 HC RX W HCPCS: Performed by: EMERGENCY MEDICINE

## 2018-07-30 PROCEDURE — 80053 COMPREHEN METABOLIC PANEL: CPT

## 2018-07-30 PROCEDURE — 2580000003 HC RX 258: Performed by: EMERGENCY MEDICINE

## 2018-07-30 PROCEDURE — 74177 CT ABD & PELVIS W/CONTRAST: CPT

## 2018-07-30 PROCEDURE — 85025 COMPLETE CBC W/AUTO DIFF WBC: CPT

## 2018-07-30 RX ORDER — 0.9 % SODIUM CHLORIDE 0.9 %
1000 INTRAVENOUS SOLUTION INTRAVENOUS ONCE
Status: COMPLETED | OUTPATIENT
Start: 2018-07-30 | End: 2018-07-30

## 2018-07-30 RX ORDER — MORPHINE SULFATE 2 MG/ML
2 INJECTION, SOLUTION INTRAMUSCULAR; INTRAVENOUS ONCE
Status: COMPLETED | OUTPATIENT
Start: 2018-07-30 | End: 2018-07-30

## 2018-07-30 RX ADMIN — MORPHINE SULFATE 2 MG: 2 INJECTION, SOLUTION INTRAMUSCULAR; INTRAVENOUS at 18:57

## 2018-07-30 RX ADMIN — SODIUM CHLORIDE 1000 ML: 9 INJECTION, SOLUTION INTRAVENOUS at 18:51

## 2018-07-30 RX ADMIN — IOPAMIDOL 110 ML: 755 INJECTION, SOLUTION INTRAVENOUS at 20:31

## 2018-07-30 ASSESSMENT — ENCOUNTER SYMPTOMS
CHEST TIGHTNESS: 0
COUGH: 0
VOMITING: 0
DIARRHEA: 0
ABDOMINAL PAIN: 1
RHINORRHEA: 0
NAUSEA: 0
CONSTIPATION: 0
SHORTNESS OF BREATH: 0
SINUS PRESSURE: 0
RECTAL PAIN: 1
ANAL BLEEDING: 0

## 2018-07-30 ASSESSMENT — PAIN SCALES - GENERAL
PAINLEVEL_OUTOF10: 7
PAINLEVEL_OUTOF10: 6

## 2018-07-30 ASSESSMENT — PAIN DESCRIPTION - LOCATION: LOCATION: ABDOMEN

## 2018-07-30 ASSESSMENT — PAIN DESCRIPTION - ORIENTATION: ORIENTATION: RIGHT

## 2018-07-30 ASSESSMENT — PAIN DESCRIPTION - DESCRIPTORS: DESCRIPTORS: DULL;BURNING

## 2018-07-30 ASSESSMENT — PAIN DESCRIPTION - PAIN TYPE: TYPE: ACUTE PAIN

## 2018-07-30 NOTE — ED NOTES
DR Slim Núñez INFORMED OF POC PREG AND PATIENT WANTING TO WAIVE PREGNANCY TESTING.        Ingrid Choi RN  07/30/18 9749

## 2018-07-31 VITALS
SYSTOLIC BLOOD PRESSURE: 119 MMHG | BODY MASS INDEX: 32.94 KG/M2 | WEIGHT: 179 LBS | OXYGEN SATURATION: 100 % | DIASTOLIC BLOOD PRESSURE: 70 MMHG | HEIGHT: 62 IN | RESPIRATION RATE: 16 BRPM | TEMPERATURE: 97.7 F | HEART RATE: 70 BPM

## 2018-07-31 LAB
ALBUMIN SERPL-MCNC: 3.5 G/DL (ref 3.5–5.2)
ALP BLD-CCNC: 99 U/L (ref 35–104)
ALT SERPL-CCNC: 84 U/L (ref 0–32)
ANION GAP SERPL CALCULATED.3IONS-SCNC: 15 MMOL/L (ref 7–16)
AST SERPL-CCNC: 152 U/L (ref 0–31)
BASOPHILS ABSOLUTE: 0.01 E9/L (ref 0–0.2)
BASOPHILS RELATIVE PERCENT: 0.1 % (ref 0–2)
BILIRUB SERPL-MCNC: 0.9 MG/DL (ref 0–1.2)
BUN BLDV-MCNC: 8 MG/DL (ref 6–20)
CALCIUM SERPL-MCNC: 8.4 MG/DL (ref 8.6–10.2)
CHLORIDE BLD-SCNC: 112 MMOL/L (ref 98–107)
CO2: 19 MMOL/L (ref 22–29)
CREAT SERPL-MCNC: 0.7 MG/DL (ref 0.5–1)
EOSINOPHILS ABSOLUTE: 0.06 E9/L (ref 0.05–0.5)
EOSINOPHILS RELATIVE PERCENT: 0.8 % (ref 0–6)
GFR AFRICAN AMERICAN: >60
GFR NON-AFRICAN AMERICAN: >60 ML/MIN/1.73
GLUCOSE BLD-MCNC: 94 MG/DL (ref 74–109)
HCT VFR BLD CALC: 34.8 % (ref 34–48)
HEMOGLOBIN: 11.7 G/DL (ref 11.5–15.5)
IMMATURE GRANULOCYTES #: 0.03 E9/L
IMMATURE GRANULOCYTES %: 0.4 % (ref 0–5)
LYMPHOCYTES ABSOLUTE: 1.6 E9/L (ref 1.5–4)
LYMPHOCYTES RELATIVE PERCENT: 22.3 % (ref 20–42)
MCH RBC QN AUTO: 28.3 PG (ref 26–35)
MCHC RBC AUTO-ENTMCNC: 33.6 % (ref 32–34.5)
MCV RBC AUTO: 84.3 FL (ref 80–99.9)
MONOCYTES ABSOLUTE: 0.79 E9/L (ref 0.1–0.95)
MONOCYTES RELATIVE PERCENT: 11 % (ref 2–12)
NEUTROPHILS ABSOLUTE: 4.69 E9/L (ref 1.8–7.3)
NEUTROPHILS RELATIVE PERCENT: 65.4 % (ref 43–80)
PDW BLD-RTO: 13.6 FL (ref 11.5–15)
PLATELET # BLD: 279 E9/L (ref 130–450)
PMV BLD AUTO: 8.9 FL (ref 7–12)
POTASSIUM REFLEX MAGNESIUM: 3.9 MMOL/L (ref 3.5–5)
RBC # BLD: 4.13 E12/L (ref 3.5–5.5)
SODIUM BLD-SCNC: 146 MMOL/L (ref 132–146)
TOTAL PROTEIN: 6.2 G/DL (ref 6.4–8.3)
WBC # BLD: 7.2 E9/L (ref 4.5–11.5)

## 2018-07-31 PROCEDURE — 36415 COLL VENOUS BLD VENIPUNCTURE: CPT

## 2018-07-31 PROCEDURE — 85025 COMPLETE CBC W/AUTO DIFF WBC: CPT

## 2018-07-31 PROCEDURE — 6360000002 HC RX W HCPCS

## 2018-07-31 PROCEDURE — G0378 HOSPITAL OBSERVATION PER HR: HCPCS

## 2018-07-31 PROCEDURE — 6360000002 HC RX W HCPCS: Performed by: STUDENT IN AN ORGANIZED HEALTH CARE EDUCATION/TRAINING PROGRAM

## 2018-07-31 PROCEDURE — 96376 TX/PRO/DX INJ SAME DRUG ADON: CPT

## 2018-07-31 PROCEDURE — 6370000000 HC RX 637 (ALT 250 FOR IP): Performed by: STUDENT IN AN ORGANIZED HEALTH CARE EDUCATION/TRAINING PROGRAM

## 2018-07-31 PROCEDURE — 80053 COMPREHEN METABOLIC PANEL: CPT

## 2018-07-31 PROCEDURE — 2580000003 HC RX 258: Performed by: STUDENT IN AN ORGANIZED HEALTH CARE EDUCATION/TRAINING PROGRAM

## 2018-07-31 RX ORDER — SODIUM CHLORIDE 0.9 % (FLUSH) 0.9 %
10 SYRINGE (ML) INJECTION PRN
Status: DISCONTINUED | OUTPATIENT
Start: 2018-07-31 | End: 2018-07-31 | Stop reason: HOSPADM

## 2018-07-31 RX ORDER — ALPRAZOLAM 0.25 MG/1
0.5 TABLET ORAL 3 TIMES DAILY PRN
Status: DISCONTINUED | OUTPATIENT
Start: 2018-07-31 | End: 2018-07-31 | Stop reason: HOSPADM

## 2018-07-31 RX ORDER — ACETAMINOPHEN 325 MG/1
650 TABLET ORAL EVERY 4 HOURS PRN
Status: DISCONTINUED | OUTPATIENT
Start: 2018-07-31 | End: 2018-07-31 | Stop reason: HOSPADM

## 2018-07-31 RX ORDER — SODIUM CHLORIDE, SODIUM LACTATE, POTASSIUM CHLORIDE, CALCIUM CHLORIDE 600; 310; 30; 20 MG/100ML; MG/100ML; MG/100ML; MG/100ML
INJECTION, SOLUTION INTRAVENOUS CONTINUOUS
Status: DISCONTINUED | OUTPATIENT
Start: 2018-07-31 | End: 2018-07-31

## 2018-07-31 RX ORDER — FLUTICASONE FUROATE AND VILANTEROL 200; 25 UG/1; UG/1
1 POWDER RESPIRATORY (INHALATION) DAILY
Status: DISCONTINUED | OUTPATIENT
Start: 2018-07-31 | End: 2018-07-31 | Stop reason: HOSPADM

## 2018-07-31 RX ORDER — OXYCODONE HYDROCHLORIDE 5 MG/1
5 TABLET ORAL EVERY 4 HOURS PRN
Status: DISCONTINUED | OUTPATIENT
Start: 2018-07-31 | End: 2018-07-31

## 2018-07-31 RX ORDER — CETIRIZINE HYDROCHLORIDE 10 MG/1
10 TABLET ORAL DAILY
Status: DISCONTINUED | OUTPATIENT
Start: 2018-07-31 | End: 2018-07-31 | Stop reason: HOSPADM

## 2018-07-31 RX ORDER — MORPHINE SULFATE 2 MG/ML
2 INJECTION, SOLUTION INTRAMUSCULAR; INTRAVENOUS
Status: DISCONTINUED | OUTPATIENT
Start: 2018-07-31 | End: 2018-07-31 | Stop reason: SDUPTHER

## 2018-07-31 RX ORDER — SODIUM CHLORIDE 0.9 % (FLUSH) 0.9 %
10 SYRINGE (ML) INJECTION EVERY 12 HOURS SCHEDULED
Status: DISCONTINUED | OUTPATIENT
Start: 2018-07-31 | End: 2018-07-31 | Stop reason: HOSPADM

## 2018-07-31 RX ORDER — ONDANSETRON 4 MG/1
4 TABLET, ORALLY DISINTEGRATING ORAL EVERY 8 HOURS PRN
Status: DISCONTINUED | OUTPATIENT
Start: 2018-07-31 | End: 2018-07-31 | Stop reason: HOSPADM

## 2018-07-31 RX ORDER — MORPHINE SULFATE 2 MG/ML
INJECTION, SOLUTION INTRAMUSCULAR; INTRAVENOUS
Status: COMPLETED
Start: 2018-07-31 | End: 2018-07-31

## 2018-07-31 RX ORDER — MONTELUKAST SODIUM 10 MG/1
10 TABLET ORAL DAILY
Status: DISCONTINUED | OUTPATIENT
Start: 2018-07-31 | End: 2018-07-31 | Stop reason: HOSPADM

## 2018-07-31 RX ORDER — MORPHINE SULFATE 2 MG/ML
2 INJECTION, SOLUTION INTRAMUSCULAR; INTRAVENOUS
Status: DISCONTINUED | OUTPATIENT
Start: 2018-07-31 | End: 2018-07-31

## 2018-07-31 RX ORDER — CHOLECALCIFEROL (VITAMIN D3) 50 MCG
5000 TABLET ORAL DAILY
Status: DISCONTINUED | OUTPATIENT
Start: 2018-07-31 | End: 2018-07-31 | Stop reason: HOSPADM

## 2018-07-31 RX ORDER — ALBUTEROL SULFATE 90 UG/1
2 AEROSOL, METERED RESPIRATORY (INHALATION) EVERY 6 HOURS PRN
Status: DISCONTINUED | OUTPATIENT
Start: 2018-07-31 | End: 2018-07-31 | Stop reason: HOSPADM

## 2018-07-31 RX ADMIN — CETIRIZINE HYDROCHLORIDE 10 MG: 10 TABLET, FILM COATED ORAL at 09:54

## 2018-07-31 RX ADMIN — Medication 10 ML: at 06:32

## 2018-07-31 RX ADMIN — MORPHINE SULFATE 2 MG: 2 INJECTION, SOLUTION INTRAMUSCULAR; INTRAVENOUS at 01:22

## 2018-07-31 RX ADMIN — ACETAMINOPHEN 650 MG: 325 TABLET, FILM COATED ORAL at 09:54

## 2018-07-31 RX ADMIN — MORPHINE SULFATE 2 MG: 2 INJECTION, SOLUTION INTRAMUSCULAR; INTRAVENOUS at 06:32

## 2018-07-31 RX ADMIN — MONTELUKAST SODIUM 10 MG: 10 TABLET, FILM COATED ORAL at 09:54

## 2018-07-31 RX ADMIN — CHOLECALCIFEROL TAB 50 MCG (2000 UNIT) 5000 UNITS: 50 TAB at 09:54

## 2018-07-31 RX ADMIN — SODIUM CHLORIDE, POTASSIUM CHLORIDE, SODIUM LACTATE AND CALCIUM CHLORIDE: 600; 310; 30; 20 INJECTION, SOLUTION INTRAVENOUS at 01:54

## 2018-07-31 ASSESSMENT — PAIN DESCRIPTION - PROGRESSION
CLINICAL_PROGRESSION: NOT CHANGED
CLINICAL_PROGRESSION: NOT CHANGED

## 2018-07-31 ASSESSMENT — PAIN DESCRIPTION - PAIN TYPE
TYPE: ACUTE PAIN
TYPE: ACUTE PAIN

## 2018-07-31 ASSESSMENT — PAIN DESCRIPTION - LOCATION
LOCATION: ABDOMEN
LOCATION: ABDOMEN

## 2018-07-31 ASSESSMENT — PAIN DESCRIPTION - ORIENTATION
ORIENTATION: MID
ORIENTATION: MID;RIGHT

## 2018-07-31 ASSESSMENT — PAIN SCALES - GENERAL
PAINLEVEL_OUTOF10: 4
PAINLEVEL_OUTOF10: 8
PAINLEVEL_OUTOF10: 4
PAINLEVEL_OUTOF10: 7

## 2018-07-31 ASSESSMENT — PAIN DESCRIPTION - ONSET
ONSET: ON-GOING
ONSET: ON-GOING

## 2018-07-31 ASSESSMENT — PAIN DESCRIPTION - DESCRIPTORS
DESCRIPTORS: STABBING
DESCRIPTORS: STABBING;TENDER

## 2018-07-31 ASSESSMENT — PAIN DESCRIPTION - FREQUENCY
FREQUENCY: INTERMITTENT
FREQUENCY: INTERMITTENT

## 2018-07-31 NOTE — ED NOTES
Patient still in ED awaiting transport. Apologized to patient for delay. Patient requesting pain medication.       Chente Crawford RN  07/31/18 0513

## 2018-07-31 NOTE — ED PROVIDER NOTES
07/30/18   CBC Auto Differential   Result Value Ref Range    WBC 6.4 4.5 - 11.5 E9/L    RBC 4.61 3.50 - 5.50 E12/L    Hemoglobin 13.2 11.5 - 15.5 g/dL    Hematocrit 38.6 34.0 - 48.0 %    MCV 83.7 80.0 - 99.9 fL    MCH 28.6 26.0 - 35.0 pg    MCHC 34.2 32.0 - 34.5 %    RDW 13.6 11.5 - 15.0 fL    Platelets 190 589 - 528 E9/L    MPV 8.6 7.0 - 12.0 fL    Neutrophils % 70.4 43.0 - 80.0 %    Immature Granulocytes % 0.5 0.0 - 5.0 %    Lymphocytes % 19.2 (L) 20.0 - 42.0 %    Monocytes % 8.8 2.0 - 12.0 %    Eosinophils % 0.6 0.0 - 6.0 %    Basophils % 0.5 0.0 - 2.0 %    Neutrophils # 4.51 1.80 - 7.30 E9/L    Immature Granulocytes # 0.03 E9/L    Lymphocytes # 1.23 (L) 1.50 - 4.00 E9/L    Monocytes # 0.56 0.10 - 0.95 E9/L    Eosinophils # 0.04 (L) 0.05 - 0.50 E9/L    Basophils # 0.03 0.00 - 0.20 E9/L   Comprehensive Metabolic Panel   Result Value Ref Range    Sodium 143 132 - 146 mmol/L    Potassium 4.5 3.5 - 5.0 mmol/L    Chloride 107 98 - 107 mmol/L    CO2 24 22 - 29 mmol/L    Anion Gap 12 7 - 16 mmol/L    Glucose 100 74 - 109 mg/dL    BUN 10 6 - 20 mg/dL    CREATININE 0.6 0.5 - 1.0 mg/dL    GFR Non-African American >60 >=60 mL/min/1.73    GFR African American >60     Calcium 9.0 8.6 - 10.2 mg/dL    Total Protein 7.3 6.4 - 8.3 g/dL    Alb 4.0 3.5 - 5.2 g/dL    Total Bilirubin 0.5 0.0 - 1.2 mg/dL    Alkaline Phosphatase 77 35 - 104 U/L    ALT 9 0 - 32 U/L    AST 10 0 - 31 U/L   Urinalysis   Result Value Ref Range    Color, UA Yellow Straw/Yellow    Clarity, UA Clear Clear    Glucose, Ur Negative Negative mg/dL    Bilirubin Urine Negative Negative    Ketones, Urine 40 (A) Negative mg/dL    Specific Gravity, UA 1.010 1.005 - 1.030    Blood, Urine Negative Negative    pH, UA 6.5 5.0 - 9.0    Protein, UA Negative Negative mg/dL    Urobilinogen, Urine 0.2 <2.0 E.U./dL    Nitrite, Urine Negative Negative    Leukocyte Esterase, Urine Negative Negative   POC Pregnancy Urine Qual   Result Value Ref Range    Preg Test, Ur stating mild improvement in pain with pain medication. Continuing to have pain with palpation in RLQ. 9:45PM Notified patient regarding general surgery consult. At this time she is crying on the phone. When asked if she was okay, she states she is having family issues and did not want to discuss further. I explained the plan further after she put the phone down. Vital Signs:   Vitals:    07/30/18 1809 07/30/18 2220   BP: (!) 143/78 123/65   Pulse: 77 70   Resp: 20 17   Temp: 97.6 °F (36.4 °C)    TempSrc: Temporal    SpO2: 99% 97%     Card/Pulm:  Rhythm: normal rate. Heart Sounds: Normal S1, S2 and no murmurs, gallops, or rubs. clear to auscultation, no wheezes or rales and unlabored breathing. Capillary Refill: normal.  Radial Pulse:  present 1+. Skin:  Dry and Warm. Consultations:             General surgery - will admit for observation    Counseling: The emergency provider has spoken with the patient and discussed todays results, in addition to providing specific details for the plan of care and counseling regarding the diagnosis and prognosis. Questions are answered at this time and they are agreeable with the plan.     --------------------------------- IMPRESSION AND DISPOSITION ---------------------------------    IMPRESSION  1. Right lower quadrant abdominal pain        DISPOSITION  Disposition: Admit to med/surg floor  Patient condition is fair    NOTE: This report was transcribed using voice recognition software.  Every effort was made to ensure accuracy; however, inadvertent computerized transcription errors may be present     Michaelene Castleman, DO  Resident  07/30/18 5836

## 2018-07-31 NOTE — H&P
GENERAL SURGERY  HISTORY AND PHYSICAL  7/30/2018    Chief Complaint   Patient presents with    Abdominal Pain     sent by  to rule out appendicitis       HPI  Mikhail Hawkins is a 41 y.o. female who presents for evaluation of RLQ abdominal pain. She states this pain started on Saturday and continued to get worse and is associated with nausea, vomiting and diarrhea. She states she has chronic RLQ abdominal pain but this is worse and she did not have the previously associated symptoms. She had a lap stephanie by Dr. Heather Garcia in [facility] and has some post op pain complications which are now resolved. She states this pain is not like that previous pain. She has not eugenie able to tolerate much of a diet due to the emesis. She has a family history of Crohns disease and she denies previous c scope. CT scan showed terminal ileitis. Past Medical History:   Diagnosis Date    ARDS (adult respiratory distress syndrome) ([facility] [address]     Asthma     Colitis     Depression        Past Surgical History:   Procedure Laterality Date    CHOLECYSTECTOMY, LAPAROSCOPIC  03/2018       Prior to Admission medications    Medication Sig Start Date End Date Taking? Authorizing Provider   famotidine (PEPCID) 20 MG tablet Take 1 tablet by mouth 2 times daily for 7 days 4/30/18 5/7/18  Luly Rollins MD   HYDROcodone-acetaminophen (NORCO) 5-325 MG per tablet Take 1 tablet by mouth every 6 hours as needed for Pain. Simeon Balbuena Historical Provider, MD   ALPRAZolam [employer]) 0.5 MG tablet Take 0.5 mg by mouth 3 times daily as needed for Sleep. Simeon Balbuena     Historical Provider, MD   Cholecalciferol (VITAMIN D) 2000 units CAPS capsule Take 5,000 Units by mouth daily     Historical Provider, MD   diphenhydrAMINE (BENADRYL ALLERGY) 25 MG capsule Take 25 mg by mouth every 6 hours as needed for Itching    Historical Provider, MD   Fluticasone Furoate-Vilanterol (BREO ELLIPTA) 200-25 MCG/INH AEPB Inhale 1 puff into the lungs daily    Historical Provider, MD   loratadine no peritoneal signs       LABS:  CBC  Recent Labs      18   1850   WBC  6.4   HGB  13.2   HCT  38.6   PLT  291     BMP  Recent Labs      18   1850   NA  143   K  4.5   CL  107   CO2  24   BUN  10   CREATININE  0.6   CALCIUM  9.0     Liver Function  Recent Labs      18   1850   BILITOT  0.5   AST  10   ALT  9   ALKPHOS  77   PROT  7.3   LABALBU  4.0     No results for input(s): LACTATE in the last 72 hours. No results for input(s): INR, PTT in the last 72 hours. Invalid input(s): PT    RADIOLOGY    Ct Abdomen Pelvis W Iv Contrast Additional Contrast? None    Result Date: 2018  Patient MRN:  [id number] : 1970 Age: 41 years Gender: Female Order Date:  2018 6:45 PM EXAM: CT ABDOMEN PELVIS W IV CONTRAST NUMBER OF IMAGES \ views:  0 INDICATION: Right lower abdominal pain COMPARISON: CT abdomen and pelvis  and CT abdomen and pelvis May 12, 2015 TECHNIQUE: Axial computerized tomography sections of the abdomen with sagittal and coronal MPR reconstructions were obtained from the lower chest to the pelvic floor. Reformatted coronal and sagittal images were provided for review. Low-dose CT  acquisition technique included one of following options; 1 . Automated exposure control, 2. Adjustment of MA and or KV according to patient's size or 3. Use of iterative reconstruction. FINDINGS: 110 mL of Isovue 370 were administered for the IV contrast opacification. CHEST: The lung bases are unremarkable . LIVER: The liver is unremarkable   GALLBLADDER AND BILIARY TREE: The gallbladder has been surgically removed. There is no biliary ectasia. SPLEEN:unremarkable. ADRENALS:  unremarkable. PANCREAS:unremarkable. KIDNEYS/BLADDER: Uniform cortical enhancement and nonobstructive contrast excretion are documented bilaterally.  Ureters are symmetric in course and caliber, and the minimally opacified bladder base and bladder walls are likewise normal. APPENDIX:  Normal BOWEL:  The mid to distal ileum is fluid-filled, but shows intact mural enhancement and is not abnormally dilated. This is nonspecific, but can be associated with enteritis. The pelvic colon is mildly tortuous, but no acute findings are noted. PELVIS: Uterus is slightly deviated to the right in the pelvis. No acute adnexal pathology is identified. LYMPHATICS: Small bowel mesenteric lymph nodes are not enlarged, but are numerous, and may be reactive. A few enlarged periaortic lymph nodes are noted in the left common iliac and left periaortic region, with short axis diameter measurements up to 12 mm. VASCULAR: Unremarkable SKELETAL: Disc space narrowing is noted at the L3-4 level, and is associated with hypertrophic vertebral articular flaring. Pars defects are noted at the L3 level bilaterally, and were previously identified, and are associated with subtle grade 1 anterolisthesis of about 4.6 mm.     1. There is prominent fluid filled mid to distal ileum which may represent enteritis, which is nonspecific. 2. There is no evidence of urinary obstruction, bowel obstruction, or bowel perforation. 3. Previously documented disc space narrowing and bilateral pars defects with grade 1 anterolisthesis of L3 on L4 are again documented.          ASSESSMENT:  41 y.o. female with RLQ abdominal pain, enteritis, R/o appendicitis     PLAN:  NPO  IVF  Pain/nausea control  Monitor abdominal exam  Admit for obs  Possible need for c scope  Discussed with Dr. Mohan Hutchison    Electronically signed by Charla Gomez DO on 7/30/18 at 11:22 PM

## 2018-07-31 NOTE — PLAN OF CARE
Problem: Pain:  Goal: Control of acute pain  Control of acute pain   Outcome: Met This Shift      Problem: Falls - Risk of:  Goal: Will remain free from falls  Will remain free from falls   Outcome: Met This Shift      Problem:  Activity:  Goal: Risk for activity intolerance will decrease  Risk for activity intolerance will decrease  Outcome: Met This Shift      Problem: Fluid Volume:  Goal: Maintenance of adequate hydration will improve  Maintenance of adequate hydration will improve  Outcome: Met This Shift      Problem: Physical Regulation:  Goal: Complications related to the disease process, condition or treatment will be avoided or minimized  Complications related to the disease process, condition or treatment will be avoided or minimized  Outcome: Met This Shift

## 2018-07-31 NOTE — PROGRESS NOTES
Per [employer], it states that pt has arrived to unit and on our board. Check pt's room and no one arrived to unit. Contacting Transport now.  States the transfer was completed by mistake

## 2018-08-15 ENCOUNTER — HOSPITAL ENCOUNTER (EMERGENCY)
Age: 48
Discharge: HOME | DRG: 141 | End: 2018-08-15
Attending: EMERGENCY MEDICINE
Payer: COMMERCIAL

## 2018-08-15 ENCOUNTER — APPOINTMENT (OUTPATIENT)
Dept: CT IMAGING | Age: 48
DRG: 141 | End: 2018-08-15
Payer: COMMERCIAL

## 2018-08-15 VITALS
HEART RATE: 74 BPM | BODY MASS INDEX: 34.36 KG/M2 | OXYGEN SATURATION: 98 % | TEMPERATURE: 97.7 F | DIASTOLIC BLOOD PRESSURE: 90 MMHG | SYSTOLIC BLOOD PRESSURE: 153 MMHG | WEIGHT: 182 LBS | HEIGHT: 61 IN | RESPIRATION RATE: 16 BRPM

## 2018-08-15 DIAGNOSIS — Z86.59 HISTORY OF ANXIETY DISORDER: ICD-10-CM

## 2018-08-15 DIAGNOSIS — R07.9 CHRONIC CHEST PAIN: ICD-10-CM

## 2018-08-15 DIAGNOSIS — R05.9 COUGH: ICD-10-CM

## 2018-08-15 DIAGNOSIS — G89.29 CHRONIC CHEST PAIN: ICD-10-CM

## 2018-08-15 DIAGNOSIS — J06.9 ACUTE UPPER RESPIRATORY INFECTION: Primary | ICD-10-CM

## 2018-08-15 DIAGNOSIS — E87.6 HYPOKALEMIA: ICD-10-CM

## 2018-08-15 LAB
ANION GAP SERPL CALCULATED.3IONS-SCNC: 15 MMOL/L (ref 7–16)
BASOPHILS ABSOLUTE: 0.03 E9/L (ref 0–0.2)
BASOPHILS RELATIVE PERCENT: 0.3 % (ref 0–2)
BUN BLDV-MCNC: 15 MG/DL (ref 6–20)
CALCIUM SERPL-MCNC: 8.9 MG/DL (ref 8.6–10.2)
CHLORIDE BLD-SCNC: 104 MMOL/L (ref 98–107)
CHP ED QC CHECK: YES
CO2: 24 MMOL/L (ref 22–29)
CREAT SERPL-MCNC: 0.8 MG/DL (ref 0.5–1)
EOSINOPHILS ABSOLUTE: 0.04 E9/L (ref 0.05–0.5)
EOSINOPHILS RELATIVE PERCENT: 0.4 % (ref 0–6)
GFR AFRICAN AMERICAN: >60
GFR NON-AFRICAN AMERICAN: >60 ML/MIN/1.73
GLUCOSE BLD-MCNC: 94 MG/DL (ref 74–109)
HCT VFR BLD CALC: 39.5 % (ref 34–48)
HEMOGLOBIN: 13.1 G/DL (ref 11.5–15.5)
IMMATURE GRANULOCYTES #: 0.07 E9/L
IMMATURE GRANULOCYTES %: 0.7 % (ref 0–5)
INFLUENZA A BY PCR: NOT DETECTED
INFLUENZA B BY PCR: NOT DETECTED
LACTIC ACID: 2.7 MMOL/L (ref 0.5–2.2)
LYMPHOCYTES ABSOLUTE: 4.64 E9/L (ref 1.5–4)
LYMPHOCYTES RELATIVE PERCENT: 43.3 % (ref 20–42)
MCH RBC QN AUTO: 28 PG (ref 26–35)
MCHC RBC AUTO-ENTMCNC: 33.2 % (ref 32–34.5)
MCV RBC AUTO: 84.4 FL (ref 80–99.9)
MONOCYTES ABSOLUTE: 1.17 E9/L (ref 0.1–0.95)
MONOCYTES RELATIVE PERCENT: 10.9 % (ref 2–12)
NEUTROPHILS ABSOLUTE: 4.77 E9/L (ref 1.8–7.3)
NEUTROPHILS RELATIVE PERCENT: 44.4 % (ref 43–80)
PDW BLD-RTO: 13.7 FL (ref 11.5–15)
PLATELET # BLD: 360 E9/L (ref 130–450)
PMV BLD AUTO: 8.6 FL (ref 7–12)
POTASSIUM SERPL-SCNC: 2.8 MMOL/L (ref 3.5–5)
PREGNANCY TEST URINE, POC: NEGATIVE
RBC # BLD: 4.68 E12/L (ref 3.5–5.5)
SODIUM BLD-SCNC: 143 MMOL/L (ref 132–146)
STREP GRP A PCR: NEGATIVE
TROPONIN: <0.01 NG/ML (ref 0–0.03)
WBC # BLD: 10.7 E9/L (ref 4.5–11.5)

## 2018-08-15 PROCEDURE — 71275 CT ANGIOGRAPHY CHEST: CPT

## 2018-08-15 PROCEDURE — 87502 INFLUENZA DNA AMP PROBE: CPT

## 2018-08-15 PROCEDURE — 2580000003 HC RX 258: Performed by: EMERGENCY MEDICINE

## 2018-08-15 PROCEDURE — 6360000004 HC RX CONTRAST MEDICATION: Performed by: RADIOLOGY

## 2018-08-15 PROCEDURE — 87880 STREP A ASSAY W/OPTIC: CPT

## 2018-08-15 PROCEDURE — 96376 TX/PRO/DX INJ SAME DRUG ADON: CPT

## 2018-08-15 PROCEDURE — 6370000000 HC RX 637 (ALT 250 FOR IP): Performed by: EMERGENCY MEDICINE

## 2018-08-15 PROCEDURE — 84484 ASSAY OF TROPONIN QUANT: CPT

## 2018-08-15 PROCEDURE — 94664 DEMO&/EVAL PT USE INHALER: CPT

## 2018-08-15 PROCEDURE — 99285 EMERGENCY DEPT VISIT HI MDM: CPT

## 2018-08-15 PROCEDURE — 6360000002 HC RX W HCPCS: Performed by: EMERGENCY MEDICINE

## 2018-08-15 PROCEDURE — 85025 COMPLETE CBC W/AUTO DIFF WBC: CPT

## 2018-08-15 PROCEDURE — 96375 TX/PRO/DX INJ NEW DRUG ADDON: CPT

## 2018-08-15 PROCEDURE — 83605 ASSAY OF LACTIC ACID: CPT

## 2018-08-15 PROCEDURE — 96374 THER/PROPH/DIAG INJ IV PUSH: CPT

## 2018-08-15 PROCEDURE — 80048 BASIC METABOLIC PNL TOTAL CA: CPT

## 2018-08-15 RX ORDER — METOPROLOL SUCCINATE 25 MG/1
25 TABLET, EXTENDED RELEASE ORAL DAILY
Status: ON HOLD | COMMUNITY
End: 2018-08-20 | Stop reason: HOSPADM

## 2018-08-15 RX ORDER — POTASSIUM CHLORIDE 20 MEQ/1
40 TABLET, EXTENDED RELEASE ORAL ONCE
Status: COMPLETED | OUTPATIENT
Start: 2018-08-15 | End: 2018-08-15

## 2018-08-15 RX ORDER — BENZONATATE 100 MG/1
100 CAPSULE ORAL 3 TIMES DAILY PRN
Qty: 21 CAPSULE | Refills: 0 | Status: SHIPPED | OUTPATIENT
Start: 2018-08-15 | End: 2018-08-22

## 2018-08-15 RX ORDER — IPRATROPIUM BROMIDE AND ALBUTEROL SULFATE 2.5; .5 MG/3ML; MG/3ML
1 SOLUTION RESPIRATORY (INHALATION)
Status: COMPLETED | OUTPATIENT
Start: 2018-08-15 | End: 2018-08-15

## 2018-08-15 RX ORDER — 0.9 % SODIUM CHLORIDE 0.9 %
1000 INTRAVENOUS SOLUTION INTRAVENOUS ONCE
Status: COMPLETED | OUTPATIENT
Start: 2018-08-15 | End: 2018-08-15

## 2018-08-15 RX ORDER — LORAZEPAM 2 MG/ML
1 INJECTION INTRAMUSCULAR ONCE
Status: COMPLETED | OUTPATIENT
Start: 2018-08-15 | End: 2018-08-15

## 2018-08-15 RX ORDER — PREDNISONE 20 MG/1
20 TABLET ORAL 3 TIMES DAILY
Status: ON HOLD | COMMUNITY
End: 2018-08-16 | Stop reason: CLARIF

## 2018-08-15 RX ORDER — POTASSIUM CHLORIDE 20 MEQ/1
20 TABLET, EXTENDED RELEASE ORAL DAILY
Qty: 5 TABLET | Refills: 0 | Status: ON HOLD | OUTPATIENT
Start: 2018-08-15 | End: 2018-08-20 | Stop reason: HOSPADM

## 2018-08-15 RX ORDER — NITROGLYCERIN 0.4 MG/1
0.4 TABLET SUBLINGUAL EVERY 5 MIN PRN
Status: ON HOLD | COMMUNITY
End: 2018-08-20 | Stop reason: HOSPADM

## 2018-08-15 RX ORDER — METHYLPREDNISOLONE SODIUM SUCCINATE 125 MG/2ML
125 INJECTION, POWDER, LYOPHILIZED, FOR SOLUTION INTRAMUSCULAR; INTRAVENOUS ONCE
Status: COMPLETED | OUTPATIENT
Start: 2018-08-15 | End: 2018-08-15

## 2018-08-15 RX ORDER — GUAIFENESIN 100 MG/5ML
200 SOLUTION ORAL ONCE
Status: COMPLETED | OUTPATIENT
Start: 2018-08-15 | End: 2018-08-15

## 2018-08-15 RX ORDER — OMEPRAZOLE 20 MG/1
40 CAPSULE, DELAYED RELEASE ORAL DAILY
Status: ON HOLD | COMMUNITY
End: 2018-08-16 | Stop reason: CLARIF

## 2018-08-15 RX ADMIN — IOPAMIDOL 70 ML: 755 INJECTION, SOLUTION INTRAVENOUS at 16:05

## 2018-08-15 RX ADMIN — IPRATROPIUM BROMIDE AND ALBUTEROL SULFATE 1 AMPULE: .5; 3 SOLUTION RESPIRATORY (INHALATION) at 13:55

## 2018-08-15 RX ADMIN — IPRATROPIUM BROMIDE AND ALBUTEROL SULFATE 1 AMPULE: .5; 3 SOLUTION RESPIRATORY (INHALATION) at 14:00

## 2018-08-15 RX ADMIN — LORAZEPAM 1 MG: 2 INJECTION INTRAMUSCULAR; INTRAVENOUS at 14:40

## 2018-08-15 RX ADMIN — IPRATROPIUM BROMIDE AND ALBUTEROL SULFATE 1 AMPULE: .5; 3 SOLUTION RESPIRATORY (INHALATION) at 13:49

## 2018-08-15 RX ADMIN — LORAZEPAM 1 MG: 2 INJECTION INTRAMUSCULAR; INTRAVENOUS at 14:08

## 2018-08-15 RX ADMIN — POTASSIUM CHLORIDE 40 MEQ: 20 TABLET, EXTENDED RELEASE ORAL at 15:45

## 2018-08-15 RX ADMIN — SODIUM CHLORIDE 1000 ML: 9 INJECTION, SOLUTION INTRAVENOUS at 15:48

## 2018-08-15 RX ADMIN — SODIUM CHLORIDE 1000 ML: 9 INJECTION, SOLUTION INTRAVENOUS at 13:54

## 2018-08-15 RX ADMIN — METHYLPREDNISOLONE SODIUM SUCCINATE 125 MG: 125 INJECTION, POWDER, FOR SOLUTION INTRAMUSCULAR; INTRAVENOUS at 13:54

## 2018-08-15 RX ADMIN — GUAIFENESIN 200 MG: 200 SOLUTION ORAL at 14:40

## 2018-08-15 ASSESSMENT — ENCOUNTER SYMPTOMS
EYE DISCHARGE: 0
SINUS PRESSURE: 0
ABDOMINAL DISTENTION: 0
WHEEZING: 0
DIARRHEA: 0
CHEST TIGHTNESS: 1
EYE REDNESS: 0
BACK PAIN: 0
SHORTNESS OF BREATH: 1
VOMITING: 0
NAUSEA: 0
SORE THROAT: 0
COUGH: 1
EYE PAIN: 0

## 2018-08-15 ASSESSMENT — PAIN SCALES - GENERAL: PAINLEVEL_OUTOF10: 10

## 2018-08-15 NOTE — ED PROVIDER NOTES
51-year-old female with previous medical history of asthma, anxiety, depression, lupus, Sjogren syndrome presenting to the emergency Department with her parents for primary complaint of dyspnea. He states proximal 6 days ago she developed an upper respiratory tract infection, which she empirically treated with amoxicillin that she had left over from a previous URI. She states approximately 3 days ago she began to develop dyspnea, she was evaluated by a physician and was started on Levaquin, and 60 mg prednisone taper, however she states she had to stop Levaquin due to an allergic reaction. Today she was evaluated by her PCP, who suggested the patient presented to the emergency department to be evaluated for possible PE. Patient notes subjective fevers, chills. Denies any nausea, vomiting. Does note moderate chest pain with coughing. Denies any abdominal pain, changes in urinary or bowel habits. Denies any bilateral lower extremity swelling or edema. The history is provided by the patient. Review of Systems   Constitutional: Negative for chills and fever. HENT: Negative for ear pain, sinus pressure and sore throat. Eyes: Negative for pain, discharge and redness. Respiratory: Positive for cough, chest tightness and shortness of breath. Negative for wheezing. Gastrointestinal: Negative for abdominal distention, diarrhea, nausea and vomiting. Genitourinary: Negative for dysuria and frequency. Musculoskeletal: Negative for arthralgias and back pain. Skin: Negative for rash and wound. Neurological: Negative for weakness and headaches. Hematological: Negative for adenopathy. All other systems reviewed and are negative. Physical Exam   Constitutional: She is oriented to person, place, and time. She appears well-developed and well-nourished. HENT:   Head: Normocephalic and atraumatic.    Right Ear: External ear normal.   Left Ear: External ear normal.   Nose: Nose normal. follow-up with her PCP. Additionally she is requesting contact information for another rheumatologist. That information was given.             --------------------------------------------- PAST HISTORY ---------------------------------------------  Past Medical History:  has a past medical history of ARDS (adult respiratory distress syndrome) ([facility] [address] Asthma; Colitis; and Depression. Past Surgical History:  has a past surgical history that includes Cholecystectomy, laparoscopic (03/2018). Social History:  reports that she has never smoked. She has never used smokeless tobacco. She reports that she does not drink alcohol or use drugs. Family History: family history includes Celiac Disease in her sister; Crohn's Disease in her brother; Diabetes in her father, mother, and sister; High Blood Pressure in her brother, mother, and sister; Uterine Cancer in her maternal grandmother. The patients home medications have been reviewed. Allergies: Fish-derived products; Aspirin; Cefdinir; Cephalosporins; Ciprofloxacin; Codeine; Fish allergy; Levaquin [levofloxacin in d5w]; Metronidazole; Other;  Wellbutrin [bupropion]; and Pce [erythromycin]    -------------------------------------------------- RESULTS -------------------------------------------------  Labs:  Results for orders placed or performed during the hospital encounter of 08/15/18   Rapid influenza A/B antigens   Result Value Ref Range    Influenza A by PCR Not Detected Not Detected    Influenza B by PCR Not Detected Not Detected   Strep Screen Group A Throat   Result Value Ref Range    Strep Grp A PCR Negative Negative   CBC Auto Differential   Result Value Ref Range    WBC 10.7 4.5 - 11.5 E9/L    RBC 4.68 3.50 - 5.50 E12/L    Hemoglobin 13.1 11.5 - 15.5 g/dL    Hematocrit 39.5 34.0 - 48.0 %    MCV 84.4 80.0 - 99.9 fL    MCH 28.0 26.0 - 35.0 pg    MCHC 33.2 32.0 - 34.5 %    RDW 13.7 11.5 - 15.0 fL    Platelets 082 376 - 572 E9/L    MPV 8.6 7.0 - 12.0 fL

## 2018-08-16 ENCOUNTER — APPOINTMENT (OUTPATIENT)
Dept: GENERAL RADIOLOGY | Age: 48
DRG: 141 | End: 2018-08-16
Attending: FAMILY MEDICINE
Payer: COMMERCIAL

## 2018-08-16 ENCOUNTER — HOSPITAL ENCOUNTER (INPATIENT)
Age: 48
LOS: 4 days | Discharge: HOME | DRG: 141 | End: 2018-08-20
Attending: FAMILY MEDICINE | Admitting: FAMILY MEDICINE
Payer: COMMERCIAL

## 2018-08-16 PROBLEM — J45.901 ACUTE ASTHMA EXACERBATION: Status: ACTIVE | Noted: 2018-08-16

## 2018-08-16 LAB
ALBUMIN SERPL-MCNC: 3.5 G/DL (ref 3.5–5.2)
ALP BLD-CCNC: 64 U/L (ref 35–104)
ALT SERPL-CCNC: 9 U/L (ref 0–32)
ANION GAP SERPL CALCULATED.3IONS-SCNC: 8 MMOL/L (ref 7–16)
AST SERPL-CCNC: 7 U/L (ref 0–31)
B.E.: 0.1 MMOL/L (ref -3–3)
BILIRUB SERPL-MCNC: <0.2 MG/DL (ref 0–1.2)
BUN BLDV-MCNC: 15 MG/DL (ref 6–20)
C-REACTIVE PROTEIN: 0.9 MG/DL (ref 0–0.4)
CALCIUM SERPL-MCNC: 9.2 MG/DL (ref 8.6–10.2)
CHLORIDE BLD-SCNC: 109 MMOL/L (ref 98–107)
CK MB: <1 NG/ML (ref 0–4.3)
CK MB: <1 NG/ML (ref 0–4.3)
CO2: 25 MMOL/L (ref 22–29)
COHB: 0.4 % (ref 0–1.5)
CREAT SERPL-MCNC: 0.7 MG/DL (ref 0.5–1)
CRITICAL: ABNORMAL
DATE ANALYZED: ABNORMAL
DATE OF COLLECTION: ABNORMAL
EKG ATRIAL RATE: 64 BPM
EKG P AXIS: 50 DEGREES
EKG P-R INTERVAL: 144 MS
EKG Q-T INTERVAL: 418 MS
EKG QRS DURATION: 94 MS
EKG QTC CALCULATION (BAZETT): 431 MS
EKG R AXIS: 8 DEGREES
EKG T AXIS: 12 DEGREES
EKG VENTRICULAR RATE: 64 BPM
GFR AFRICAN AMERICAN: >60
GFR NON-AFRICAN AMERICAN: >60 ML/MIN/1.73
GLUCOSE BLD-MCNC: 91 MG/DL (ref 74–109)
HCO3: 21.4 MMOL/L (ref 22–26)
HCT VFR BLD CALC: 35.8 % (ref 34–48)
HEMOGLOBIN: 12 G/DL (ref 11.5–15.5)
HHB: 0.7 % (ref 0–5)
LAB: ABNORMAL
LV EF: 65 %
LVEF MODALITY: NORMAL
Lab: 1406
MAGNESIUM: 1.9 MG/DL (ref 1.6–2.6)
MCH RBC QN AUTO: 28.6 PG (ref 26–35)
MCHC RBC AUTO-ENTMCNC: 33.5 % (ref 32–34.5)
MCV RBC AUTO: 85.4 FL (ref 80–99.9)
METHB: 0.3 % (ref 0–1.5)
MODE: ABNORMAL
O2 CONTENT: 19.3 ML/DL
O2 SATURATION: 99.3 % (ref 92–98.5)
O2HB: 98.6 % (ref 94–97)
OPERATOR ID: ABNORMAL
PATIENT TEMP: 37 C
PCO2: 26.2 MMHG (ref 35–45)
PDW BLD-RTO: 13.8 FL (ref 11.5–15)
PH BLOOD GAS: 7.53 (ref 7.35–7.45)
PLATELET # BLD: 366 E9/L (ref 130–450)
PMV BLD AUTO: 8.7 FL (ref 7–12)
PO2: 198.4 MMHG (ref 60–100)
POTASSIUM REFLEX MAGNESIUM: 3.9 MMOL/L (ref 3.5–5)
PRO-BNP: 750 PG/ML (ref 0–125)
PROCALCITONIN: <0.02 NG/ML (ref 0–0.08)
RBC # BLD: 4.19 E12/L (ref 3.5–5.5)
SEDIMENTATION RATE, ERYTHROCYTE: 10 MM/HR (ref 0–20)
SODIUM BLD-SCNC: 142 MMOL/L (ref 132–146)
SOURCE, BLOOD GAS: ABNORMAL
THB: 13.6 G/DL (ref 11.5–16.5)
TIME ANALYZED: 1409
TOTAL PROTEIN: 6.2 G/DL (ref 6.4–8.3)
TROPONIN: <0.01 NG/ML (ref 0–0.03)
TROPONIN: <0.01 NG/ML (ref 0–0.03)
TSH SERPL DL<=0.05 MIU/L-ACNC: 0.74 UIU/ML (ref 0.27–4.2)
WBC # BLD: 16.5 E9/L (ref 4.5–11.5)

## 2018-08-16 PROCEDURE — 94664 DEMO&/EVAL PT USE INHALER: CPT

## 2018-08-16 PROCEDURE — 83880 ASSAY OF NATRIURETIC PEPTIDE: CPT

## 2018-08-16 PROCEDURE — 6370000000 HC RX 637 (ALT 250 FOR IP): Performed by: FAMILY MEDICINE

## 2018-08-16 PROCEDURE — 6370000000 HC RX 637 (ALT 250 FOR IP): Performed by: INTERNAL MEDICINE

## 2018-08-16 PROCEDURE — 87503 INFLUENZA DNA AMP PROB ADDL: CPT

## 2018-08-16 PROCEDURE — 71045 X-RAY EXAM CHEST 1 VIEW: CPT

## 2018-08-16 PROCEDURE — 99254 IP/OBS CNSLTJ NEW/EST MOD 60: CPT | Performed by: INTERNAL MEDICINE

## 2018-08-16 PROCEDURE — 93306 TTE W/DOPPLER COMPLETE: CPT

## 2018-08-16 PROCEDURE — 86140 C-REACTIVE PROTEIN: CPT

## 2018-08-16 PROCEDURE — 84484 ASSAY OF TROPONIN QUANT: CPT

## 2018-08-16 PROCEDURE — 2580000003 HC RX 258

## 2018-08-16 PROCEDURE — 84443 ASSAY THYROID STIM HORMONE: CPT

## 2018-08-16 PROCEDURE — 6360000002 HC RX W HCPCS: Performed by: FAMILY MEDICINE

## 2018-08-16 PROCEDURE — 87798 DETECT AGENT NOS DNA AMP: CPT

## 2018-08-16 PROCEDURE — 87040 BLOOD CULTURE FOR BACTERIA: CPT

## 2018-08-16 PROCEDURE — 1200000000 HC SEMI PRIVATE

## 2018-08-16 PROCEDURE — 94660 CPAP INITIATION&MGMT: CPT

## 2018-08-16 PROCEDURE — 87486 CHLMYD PNEUM DNA AMP PROBE: CPT

## 2018-08-16 PROCEDURE — 36415 COLL VENOUS BLD VENIPUNCTURE: CPT

## 2018-08-16 PROCEDURE — 83735 ASSAY OF MAGNESIUM: CPT

## 2018-08-16 PROCEDURE — 94760 N-INVAS EAR/PLS OXIMETRY 1: CPT

## 2018-08-16 PROCEDURE — 6360000002 HC RX W HCPCS

## 2018-08-16 PROCEDURE — 84145 PROCALCITONIN (PCT): CPT

## 2018-08-16 PROCEDURE — 71046 X-RAY EXAM CHEST 2 VIEWS: CPT

## 2018-08-16 PROCEDURE — 87502 INFLUENZA DNA AMP PROBE: CPT

## 2018-08-16 PROCEDURE — 93005 ELECTROCARDIOGRAM TRACING: CPT | Performed by: FAMILY MEDICINE

## 2018-08-16 PROCEDURE — 94640 AIRWAY INHALATION TREATMENT: CPT

## 2018-08-16 PROCEDURE — 85651 RBC SED RATE NONAUTOMATED: CPT

## 2018-08-16 PROCEDURE — 87581 M.PNEUMON DNA AMP PROBE: CPT

## 2018-08-16 PROCEDURE — 85027 COMPLETE CBC AUTOMATED: CPT

## 2018-08-16 PROCEDURE — 6370000000 HC RX 637 (ALT 250 FOR IP)

## 2018-08-16 PROCEDURE — 80053 COMPREHEN METABOLIC PANEL: CPT

## 2018-08-16 PROCEDURE — APPSS45 APP SPLIT SHARED TIME 31-45 MINUTES: Performed by: NURSE PRACTITIONER

## 2018-08-16 PROCEDURE — 99291 CRITICAL CARE FIRST HOUR: CPT | Performed by: INTERNAL MEDICINE

## 2018-08-16 PROCEDURE — 87450 HC DIRECT STREP B ANTIGEN: CPT

## 2018-08-16 PROCEDURE — 82553 CREATINE MB FRACTION: CPT

## 2018-08-16 PROCEDURE — 82805 BLOOD GASES W/O2 SATURATION: CPT

## 2018-08-16 PROCEDURE — 2580000003 HC RX 258: Performed by: FAMILY MEDICINE

## 2018-08-16 RX ORDER — MONTELUKAST SODIUM 10 MG/1
10 TABLET ORAL DAILY
Status: DISCONTINUED | OUTPATIENT
Start: 2018-08-16 | End: 2018-08-20 | Stop reason: HOSPADM

## 2018-08-16 RX ORDER — DAPSONE 25 MG/1
25 TABLET ORAL DAILY
Status: DISCONTINUED | OUTPATIENT
Start: 2018-08-17 | End: 2018-08-20

## 2018-08-16 RX ORDER — SODIUM CHLORIDE AND POTASSIUM CHLORIDE .9; .15 G/100ML; G/100ML
SOLUTION INTRAVENOUS CONTINUOUS
Status: DISCONTINUED | OUTPATIENT
Start: 2018-08-16 | End: 2018-08-17

## 2018-08-16 RX ORDER — BENZONATATE 100 MG/1
100 CAPSULE ORAL 3 TIMES DAILY PRN
Status: DISCONTINUED | OUTPATIENT
Start: 2018-08-16 | End: 2018-08-16

## 2018-08-16 RX ORDER — IPRATROPIUM BROMIDE AND ALBUTEROL SULFATE 2.5; .5 MG/3ML; MG/3ML
1 SOLUTION RESPIRATORY (INHALATION)
Status: DISCONTINUED | OUTPATIENT
Start: 2018-08-16 | End: 2018-08-20 | Stop reason: HOSPADM

## 2018-08-16 RX ORDER — ASPIRIN 81 MG/1
81 TABLET, CHEWABLE ORAL DAILY
Status: DISCONTINUED | OUTPATIENT
Start: 2018-08-17 | End: 2018-08-20 | Stop reason: HOSPADM

## 2018-08-16 RX ORDER — PREDNISONE 20 MG/1
20 TABLET ORAL DAILY
Status: ON HOLD | COMMUNITY
End: 2018-08-20

## 2018-08-16 RX ORDER — SODIUM CHLORIDE 0.9 % (FLUSH) 0.9 %
10 SYRINGE (ML) INJECTION EVERY 12 HOURS SCHEDULED
Status: DISCONTINUED | OUTPATIENT
Start: 2018-08-16 | End: 2018-08-20 | Stop reason: HOSPADM

## 2018-08-16 RX ORDER — SODIUM CHLORIDE 0.9 % (FLUSH) 0.9 %
10 SYRINGE (ML) INJECTION PRN
Status: DISCONTINUED | OUTPATIENT
Start: 2018-08-16 | End: 2018-08-20 | Stop reason: HOSPADM

## 2018-08-16 RX ORDER — DIPHENHYDRAMINE HCL 25 MG
25 TABLET ORAL EVERY 6 HOURS PRN
Status: DISCONTINUED | OUTPATIENT
Start: 2018-08-16 | End: 2018-08-20 | Stop reason: HOSPADM

## 2018-08-16 RX ORDER — FLUTICASONE FUROATE AND VILANTEROL 200; 25 UG/1; UG/1
1 POWDER RESPIRATORY (INHALATION) DAILY
Status: DISCONTINUED | OUTPATIENT
Start: 2018-08-16 | End: 2018-08-20 | Stop reason: HOSPADM

## 2018-08-16 RX ORDER — DOXYCYCLINE HYCLATE 100 MG/1
100 CAPSULE ORAL EVERY 12 HOURS SCHEDULED
Status: DISCONTINUED | OUTPATIENT
Start: 2018-08-16 | End: 2018-08-17

## 2018-08-16 RX ORDER — ACETAMINOPHEN 325 MG/1
650 TABLET ORAL EVERY 4 HOURS PRN
Status: DISCONTINUED | OUTPATIENT
Start: 2018-08-16 | End: 2018-08-16 | Stop reason: SDUPTHER

## 2018-08-16 RX ORDER — CETIRIZINE HYDROCHLORIDE 10 MG/1
10 TABLET ORAL DAILY
Status: DISCONTINUED | OUTPATIENT
Start: 2018-08-16 | End: 2018-08-20 | Stop reason: HOSPADM

## 2018-08-16 RX ORDER — METOPROLOL SUCCINATE 25 MG/1
12.5 TABLET, EXTENDED RELEASE ORAL DAILY
Status: DISCONTINUED | OUTPATIENT
Start: 2018-08-16 | End: 2018-08-16

## 2018-08-16 RX ORDER — ONDANSETRON 2 MG/ML
4 INJECTION INTRAMUSCULAR; INTRAVENOUS EVERY 6 HOURS PRN
Status: DISCONTINUED | OUTPATIENT
Start: 2018-08-16 | End: 2018-08-16 | Stop reason: SDUPTHER

## 2018-08-16 RX ORDER — ALPRAZOLAM 0.25 MG/1
0.5 TABLET ORAL 3 TIMES DAILY PRN
Status: DISCONTINUED | OUTPATIENT
Start: 2018-08-16 | End: 2018-08-20 | Stop reason: HOSPADM

## 2018-08-16 RX ORDER — BENZONATATE 100 MG/1
200 CAPSULE ORAL 3 TIMES DAILY
Status: DISCONTINUED | OUTPATIENT
Start: 2018-08-16 | End: 2018-08-20 | Stop reason: HOSPADM

## 2018-08-16 RX ORDER — ACETAMINOPHEN 325 MG/1
650 TABLET ORAL EVERY 4 HOURS PRN
Status: DISCONTINUED | OUTPATIENT
Start: 2018-08-16 | End: 2018-08-20 | Stop reason: HOSPADM

## 2018-08-16 RX ORDER — ONDANSETRON 2 MG/ML
4 INJECTION INTRAMUSCULAR; INTRAVENOUS EVERY 6 HOURS PRN
Status: DISCONTINUED | OUTPATIENT
Start: 2018-08-16 | End: 2018-08-20 | Stop reason: HOSPADM

## 2018-08-16 RX ORDER — NITROGLYCERIN 0.4 MG/1
0.4 TABLET SUBLINGUAL EVERY 5 MIN PRN
Status: DISCONTINUED | OUTPATIENT
Start: 2018-08-16 | End: 2018-08-20 | Stop reason: HOSPADM

## 2018-08-16 RX ORDER — PANTOPRAZOLE SODIUM 40 MG/1
40 TABLET, DELAYED RELEASE ORAL
Status: DISCONTINUED | OUTPATIENT
Start: 2018-08-17 | End: 2018-08-20 | Stop reason: HOSPADM

## 2018-08-16 RX ORDER — METHYLPREDNISOLONE SODIUM SUCCINATE 40 MG/ML
40 INJECTION, POWDER, LYOPHILIZED, FOR SOLUTION INTRAMUSCULAR; INTRAVENOUS EVERY 6 HOURS
Status: DISCONTINUED | OUTPATIENT
Start: 2018-08-16 | End: 2018-08-18

## 2018-08-16 RX ADMIN — METHYLPREDNISOLONE SODIUM SUCCINATE 40 MG: 40 INJECTION, POWDER, LYOPHILIZED, FOR SOLUTION INTRAMUSCULAR; INTRAVENOUS at 21:37

## 2018-08-16 RX ADMIN — METHYLPREDNISOLONE SODIUM SUCCINATE 40 MG: 40 INJECTION, POWDER, LYOPHILIZED, FOR SOLUTION INTRAMUSCULAR; INTRAVENOUS at 16:37

## 2018-08-16 RX ADMIN — RACEPINEPHRINE HYDROCHLORIDE 11.25 MG: 11.25 SOLUTION RESPIRATORY (INHALATION) at 14:24

## 2018-08-16 RX ADMIN — IPRATROPIUM BROMIDE AND ALBUTEROL SULFATE 1 AMPULE: .5; 3 SOLUTION RESPIRATORY (INHALATION) at 20:42

## 2018-08-16 RX ADMIN — Medication 10 ML: at 16:35

## 2018-08-16 RX ADMIN — DOXYCYCLINE HYCLATE 100 MG: 100 CAPSULE, GELATIN COATED ORAL at 21:37

## 2018-08-16 RX ADMIN — VITAMIN D, TAB 1000IU (100/BT) 5000 UNITS: 25 TAB at 16:50

## 2018-08-16 RX ADMIN — POTASSIUM CHLORIDE AND SODIUM CHLORIDE: 900; 150 INJECTION, SOLUTION INTRAVENOUS at 16:34

## 2018-08-16 RX ADMIN — IPRATROPIUM BROMIDE AND ALBUTEROL SULFATE 1 AMPULE: .5; 3 SOLUTION RESPIRATORY (INHALATION) at 14:05

## 2018-08-16 RX ADMIN — MONTELUKAST SODIUM 10 MG: 10 TABLET, FILM COATED ORAL at 18:51

## 2018-08-16 RX ADMIN — CETIRIZINE HYDROCHLORIDE 10 MG: 10 TABLET, FILM COATED ORAL at 16:41

## 2018-08-16 RX ADMIN — BENZONATATE 200 MG: 100 CAPSULE ORAL at 21:41

## 2018-08-16 RX ADMIN — ENOXAPARIN SODIUM 40 MG: 40 INJECTION, SOLUTION INTRAVENOUS; SUBCUTANEOUS at 16:38

## 2018-08-16 ASSESSMENT — PAIN DESCRIPTION - PAIN TYPE: TYPE: ACUTE PAIN

## 2018-08-16 ASSESSMENT — PAIN DESCRIPTION - LOCATION: LOCATION: ABDOMEN;CHEST;BACK

## 2018-08-16 ASSESSMENT — PAIN SCALES - GENERAL: PAINLEVEL_OUTOF10: 7

## 2018-08-16 ASSESSMENT — PAIN DESCRIPTION - ORIENTATION: ORIENTATION: RIGHT

## 2018-08-16 ASSESSMENT — PAIN DESCRIPTION - DESCRIPTORS: DESCRIPTORS: ACHING;THROBBING;SHOOTING

## 2018-08-16 ASSESSMENT — PAIN DESCRIPTION - ONSET: ONSET: ON-GOING

## 2018-08-16 NOTE — CONSULTS
(TYLENOL) tablet 650 mg, 650 mg, Oral, Q4H PRN  [START ON 8/17/2018] aspirin chewable tablet 81 mg, 81 mg, Oral, Daily  nitroGLYCERIN (NITROSTAT) SL tablet 0.4 mg, 0.4 mg, Sublingual, Q5 Min PRN  doxycycline hyclate (VIBRAMYCIN) capsule 100 mg, 100 mg, Oral, 2 times per day  0.9% NaCl with KCl 20 mEq infusion, , Intravenous, Continuous  methylPREDNISolone sodium (SOLU-MEDROL) injection 40 mg, 40 mg, Intravenous, Q6H  perflutren lipid microspheres (DEFINITY) injection 1.65 mg, 1.5 mL, Intravenous, ONCE PRN    Allergies:  Fish-derived products; Nsaids; Cefdinir; Cephalosporins; Ciprofloxacin; Codeine; Fish allergy; Levaquin [levofloxacin in d5w]; Metronidazole; Other; Wellbutrin [bupropion]; and Pce [erythromycin]    Social History: Activity: The patient is currently unemployed seen that work was exacerbating her lupus and had to quit in December 2017. She lives at home with her father, mother, sister. She also stopped exercising in December 2017. Tobacco: Lifelong nonsmoker  Illicit: Denies use  Alcohol: Denies use      Family History: Mother: Alive 68years of age history of diabetes hypertension  Father: Alive 68years of age history of diabetes and hypertension  Sister: Alive 37years of age history of celiac disease hypertension and diabetes        REVIEW OF SYSTEMS:     · Constitutional: Denies fevers, chills, night sweats, and positive for fatigue positive for dyspnea on exertion  · HEENT: Denies , nose bleeds, and blurred vision,oral pain, abscess or lesion. Positive for headaches  · Musculoskeletal: Denies falls, pain to BLE with ambulation and edema to BLE. · Neurological: Positive for dizziness and lightheadedness, numbness and tingling  · Cardiovascular: Positive for chest pain, palpitations, and feelings of heart racing.    · Respiratory: Denies orthopnea and PND positive for increasing dyspnea on exertion and harsh nonproductive cough  · Gastrointestinal: Denies heartburn, /vomiting, diarrhea and constipation, black/bloody, and tarry stools. Positive for nausea  · Genitourinary: Denies dysuria and hematuria  · Hematologic: Denies excessive bruising or bleeding  · Lymphatic: Denies lumps and bumps to neck, axilla, breast, and groin  · Endocrine: Denies excessive thirst. Denies intolerance to hot and cold. · Psychiatric: Denies  depression. Positive for anxiety related to current state of health    PHYSICAL EXAM:   /70   Pulse 83   Temp 98.9 °F (37.2 °C) (Oral)   Resp 18   Ht 5' 1\" (1.549 m)   Wt 186 lb 14.4 oz (84.8 kg)   SpO2 98%   BMI 35.31 kg/m²   CONST:  Well developed,  female, examined lying in bed well nourished who appears stated age. Awake, alert, cooperative, no apparent distress  HEENT:   Head- Normocephalic, atraumatic   Eyes- Conjunctivae pink, anicteric  Throat- Oral mucosa pink and moist  Neck-  No stridor, trachea midline, no jugular venous distention. No adenopathy   CHEST: Chest symmetrical and non-tender to palpation. No accessory muscle use or intercostal retractions  RESPIRATORY: Lung sounds - decreased throughout fields   CARDIOVASCULAR:     No carotid bruit  Heart Inspection- shows no noted pulsations  Heart Palpation- no heaves or thrills; PMI is non-displaced   Heart Ausculation- Regular rate and rhythm, 1/6 systolic murmur. No s3, s4 or rub   PV: No lower extremity edema. No varicosities. Pedal pulses palpable, no clubbing or cyanosis   ABDOMEN: Soft, non-tender to light palpation. Bowel sounds present. No palpable masses no organomegaly; no abdominal bruit  MS: Good muscle strength and tone. No atrophy or abnormal movements. : Deferred  SKIN: Warm and dry no statis dermatitis or ulcers   NEURO / PSYCH: Oriented to person, place and time. Speech clear and appropriate. Follows all commands.  Pleasant affect     DATA:    ECG: Normal sinus rhythm  Tele strips: Sinus rhythm, sinus bradycardia with rates ranging from 55-62  Diagnostic:  Chest x-ray pending    No monitoring (including recent 14 day monitor at [facility], results pending), normal TSH, on metoprolol  3. Shortness of breath / URI symptoms / known asthma  4. Lupus / Sjogren's syndrome   5. Anxiety  6. GERD -- on PPI    Plan:  1. Echocardiogram  2. Monitor telemetry (no new arrhythmias thus far)  3. Follow-up results of cardiac monitoring from [facility]  4. Care per pulmonary and rheumatology  5. Case discussed with the patient and her mother today    Thank you for allowing me to participate in your patient's care. Please feel free to contact me if you have any questions or concerns.     Valery Uribe MD  [facility]) Cardiology

## 2018-08-16 NOTE — PROGRESS NOTES
Vijay Devon was ordered [place] 200-25 MCG/INH which is a nonformulary medication. The patient has indicated that the home supply of this medication will be brought in to the hospital for inpatient use. If the medication has not been administered by 1400 on the following day from the time the order was placed, a pharmacist will follow-up with the nurse of the patient to assess the capability of the patient to bring in the medication. If it is determined that the patient cannot supply the medication and it is not available to be dispensed from the pharmacy, a call will be placed to the ordering provider to discuss alternative options.

## 2018-08-16 NOTE — H&P
39yo female hx of asthma, anxiety, depression, recently dx with sjogrens and lupus, recently in the er with abd pain, ct showed enterisit, pt seen by a dr due to cough, wheeze and sob, placed on levaquin, steroids, developed side effects, stopped med, became worse with weakness, arthralgias, nausea, heart rate ranging 40-100s, constant cough, wheezing, chest pain,   Pt seen in er again, potassium 2.8, cta negative and sent home  I directly admitted her for further work up and tx with her resp distress, chest pain. In the hospital while in xray rrt called due to worsening sob, placed on bipap, given meds    Past Medical History:   Diagnosis Date    ARDS (adult respiratory distress syndrome) ([facility] [address]     Asthma     Colitis     Depression    sle, sjogrens  Enteritis  Luis/tachy  anxiety    Past Surgical History:   Procedure Laterality Date    CHOLECYSTECTOMY, LAPAROSCOPIC  03/2018       Allergies   Allergen Reactions    Fish-Derived Products Anaphylaxis     anaphylaxis    Nsaids      Vomiting    Cefdinir      Rash, Blisters. Rash, Blisters.  Cephalosporins     Ciprofloxacin     Codeine Nausea Only    Fish Allergy     Levaquin [Levofloxacin In D5w] Hives    Metronidazole Hives     throat closing, hives  throat closing, hives    Other      Allergic to Midrin    Wellbutrin [Bupropion]     Pce [Erythromycin] Rash       No current facility-administered medications on file prior to encounter. Current Outpatient Prescriptions on File Prior to Encounter   Medication Sig Dispense Refill    aspirin 81 MG tablet Take 81 mg by mouth daily      nitroGLYCERIN (NITROSTAT) 0.4 MG SL tablet Place 0.4 mg under the tongue every 5 minutes as needed for Chest pain up to max of 3 total doses. If no relief after 1 dose, call 911.       metoprolol succinate (TOPROL XL) 25 MG extended release tablet Take 25 mg by mouth daily      benzonatate (TESSALON PERLES) 100 MG capsule Take 1 capsule by mouth 3 times daily as needed for Cough 21 capsule 0    potassium chloride (KLOR-CON M) 20 MEQ extended release tablet Take 1 tablet by mouth daily for 5 doses 5 tablet 0    ALPRAZolam (XANAX) 0.5 MG tablet Take 0.5 mg by mouth 3 times daily as needed for Sleep. [place] [facility] Cholecalciferol (VITAMIN D) 2000 units CAPS capsule Take 5,000 Units by mouth daily       diphenhydrAMINE (BENADRYL ALLERGY) 25 MG capsule Take 25 mg by mouth every 6 hours as needed for Itching      Fluticasone Furoate-Vilanterol (BREO ELLIPTA) 200-25 MCG/INH AEPB Inhale 1 puff into the lungs daily      loratadine (CLARITIN) 10 MG capsule Take 10 mg by mouth as needed       albuterol (PROVENTIL HFA;VENTOLIN HFA) 108 (90 BASE) MCG/ACT inhaler Inhale 2 puffs into the lungs every 6 hours as needed for Wheezing      Montelukast Sodium (SINGULAIR PO) Take 10 mg by mouth daily          Family History   Problem Relation Age of Onset    High Blood Pressure Mother     Diabetes Mother     Diabetes Father     High Blood Pressure Sister     Diabetes Sister     Celiac Disease Sister     High Blood Pressure Brother     Crohn's Disease Brother     Uterine Cancer Maternal Grandmother      Social History     Social History    Marital status: Single     Spouse name: N/A    Number of children: N/A    Years of education: N/A     Occupational History    Not on file. Social History Main Topics    Smoking status: Never Smoker    Smokeless tobacco: Never Used    Alcohol use No    Drug use: No    Sexual activity: No     Other Topics Concern    Not on file     Social History Narrative    No narrative on file       Blood pressure 124/72, pulse (!) 49, temperature 98 °F (36.7 °C), temperature source Oral, resp. rate 19, height 5' 1\" (1.549 m), weight 186 lb 14.4 oz (84.8 kg), SpO2 100 %.   awake, alert, obese  Pale  Lying in bed with bipap on, comfortable at present  Ears clear  Perrl, eomi  Nasal mucus clear  Erythem,a throat  No jvd, bruits, no adenopathy  Heart royce, regular  Lungs with wheeze, rhonchi inspiratory, upper  abd obese, soft, good bs, tender rt side  No edema , effusions, rashes, tremors  Plus 1 pulses, no neuro deficits    A; acute resp distress  Acute exaccerbation asthma  Bronchitis  Chest pain  Obesity  Enteritis  sle  Anxiety, depression  Recent hypokalemia  Palpitations  Luis/tachy syndrome  Allergic rhinnitis  See orders

## 2018-08-16 NOTE — PROGRESS NOTES
Speech Language Pathology  Speech Therapy Daily Treatment Note    Attempted to see patient for a Bedside Swallow eval, however she is off of the unit for a procedure/testing.     Gena Chau M.A., ADRIAN/SLP  SP [id number]

## 2018-08-17 LAB
ANION GAP SERPL CALCULATED.3IONS-SCNC: 11 MMOL/L (ref 7–16)
BILIRUBIN URINE: NEGATIVE
BLOOD, URINE: NEGATIVE
BUN BLDV-MCNC: 12 MG/DL (ref 6–20)
C4 COMPLEMENT: 13 MG/DL (ref 10–40)
CA 125: 12.4 U/ML (ref 0–35)
CALCIUM SERPL-MCNC: 8.7 MG/DL (ref 8.6–10.2)
CHLORIDE BLD-SCNC: 107 MMOL/L (ref 98–107)
CLARITY: CLEAR
CO2: 22 MMOL/L (ref 22–29)
COLOR: YELLOW
CREAT SERPL-MCNC: 0.6 MG/DL (ref 0.5–1)
CREATININE URINE: 108 MG/DL (ref 29–226)
FILM ARRAY ADENOVIRUS: ABNORMAL
FILM ARRAY BORDETELLA PERTUSSIS: ABNORMAL
FILM ARRAY CHLAMYDOPHILIA PNEUMONIAE: ABNORMAL
FILM ARRAY CORONAVIRUS 229E: ABNORMAL
FILM ARRAY CORONAVIRUS HKU1: ABNORMAL
FILM ARRAY CORONAVIRUS NL63: ABNORMAL
FILM ARRAY CORONAVIRUS OC43: ABNORMAL
FILM ARRAY INFLUENZA A VIRUS 09H1: ABNORMAL
FILM ARRAY INFLUENZA A VIRUS H1: ABNORMAL
FILM ARRAY INFLUENZA A VIRUS H3: ABNORMAL
FILM ARRAY INFLUENZA A VIRUS: ABNORMAL
FILM ARRAY INFLUENZA B: ABNORMAL
FILM ARRAY METAPNEUMOVIRUS: ABNORMAL
FILM ARRAY MYCOPLASMA PNEUMONIAE: ABNORMAL
FILM ARRAY PARAINFLUENZA VIRUS 1: ABNORMAL
FILM ARRAY PARAINFLUENZA VIRUS 2: ABNORMAL
FILM ARRAY PARAINFLUENZA VIRUS 3: ABNORMAL
FILM ARRAY PARAINFLUENZA VIRUS 4: ABNORMAL
FILM ARRAY RESPIRATORY SYNCITIAL VIRUS: ABNORMAL
GFR AFRICAN AMERICAN: >60
GFR NON-AFRICAN AMERICAN: >60 ML/MIN/1.73
GLUCOSE BLD-MCNC: 166 MG/DL (ref 74–109)
GLUCOSE URINE: NEGATIVE MG/DL
KETONES, URINE: NEGATIVE MG/DL
L. PNEUMOPHILA SEROGP 1 UR AG: NORMAL
LEUKOCYTE ESTERASE, URINE: NEGATIVE
NITRITE, URINE: NEGATIVE
ORGANISM: ABNORMAL
PH UA: 6 (ref 5–9)
POTASSIUM REFLEX MAGNESIUM: 3.7 MMOL/L (ref 3.5–5)
PROTEIN PROTEIN: 12 MG/DL (ref 0–12)
PROTEIN UA: NEGATIVE MG/DL
PROTEIN/CREAT RATIO: 0.1
PROTEIN/CREAT RATIO: 0.1 (ref 0–0.2)
SODIUM BLD-SCNC: 140 MMOL/L (ref 132–146)
SPECIFIC GRAVITY UA: 1.02 (ref 1–1.03)
STREP PNEUMONIAE ANTIGEN, URINE: NORMAL
UROBILINOGEN, URINE: 0.2 E.U./DL

## 2018-08-17 PROCEDURE — G8997 SWALLOW GOAL STATUS: HCPCS

## 2018-08-17 PROCEDURE — 80048 BASIC METABOLIC PNL TOTAL CA: CPT

## 2018-08-17 PROCEDURE — 86160 COMPLEMENT ANTIGEN: CPT

## 2018-08-17 PROCEDURE — 6360000002 HC RX W HCPCS: Performed by: FAMILY MEDICINE

## 2018-08-17 PROCEDURE — 86225 DNA ANTIBODY NATIVE: CPT

## 2018-08-17 PROCEDURE — 36415 COLL VENOUS BLD VENIPUNCTURE: CPT

## 2018-08-17 PROCEDURE — 93010 ELECTROCARDIOGRAM REPORT: CPT | Performed by: INTERNAL MEDICINE

## 2018-08-17 PROCEDURE — 6370000000 HC RX 637 (ALT 250 FOR IP): Performed by: FAMILY MEDICINE

## 2018-08-17 PROCEDURE — 92610 EVALUATE SWALLOWING FUNCTION: CPT

## 2018-08-17 PROCEDURE — 2580000003 HC RX 258: Performed by: FAMILY MEDICINE

## 2018-08-17 PROCEDURE — 99232 SBSQ HOSP IP/OBS MODERATE 35: CPT | Performed by: INTERNAL MEDICINE

## 2018-08-17 PROCEDURE — 6370000000 HC RX 637 (ALT 250 FOR IP): Performed by: INTERNAL MEDICINE

## 2018-08-17 PROCEDURE — 86304 IMMUNOASSAY TUMOR CA 125: CPT

## 2018-08-17 PROCEDURE — 94640 AIRWAY INHALATION TREATMENT: CPT

## 2018-08-17 PROCEDURE — 86256 FLUORESCENT ANTIBODY TITER: CPT

## 2018-08-17 PROCEDURE — 82570 ASSAY OF URINE CREATININE: CPT

## 2018-08-17 PROCEDURE — 2700000000 HC OXYGEN THERAPY PER DAY

## 2018-08-17 PROCEDURE — 1200000000 HC SEMI PRIVATE

## 2018-08-17 PROCEDURE — 81003 URINALYSIS AUTO W/O SCOPE: CPT

## 2018-08-17 PROCEDURE — G8996 SWALLOW CURRENT STATUS: HCPCS

## 2018-08-17 PROCEDURE — 82955 ASSAY OF G6PD ENZYME: CPT

## 2018-08-17 PROCEDURE — 94660 CPAP INITIATION&MGMT: CPT

## 2018-08-17 PROCEDURE — 84156 ASSAY OF PROTEIN URINE: CPT

## 2018-08-17 RX ORDER — POTASSIUM CHLORIDE AND SODIUM CHLORIDE 450; 150 MG/100ML; MG/100ML
INJECTION, SOLUTION INTRAVENOUS CONTINUOUS
Status: DISCONTINUED | OUTPATIENT
Start: 2018-08-17 | End: 2018-08-20

## 2018-08-17 RX ADMIN — BENZONATATE 200 MG: 100 CAPSULE ORAL at 11:16

## 2018-08-17 RX ADMIN — DIPHENHYDRAMINE HCL 25 MG: 25 TABLET ORAL at 04:45

## 2018-08-17 RX ADMIN — METHYLPREDNISOLONE SODIUM SUCCINATE 40 MG: 40 INJECTION, POWDER, LYOPHILIZED, FOR SOLUTION INTRAMUSCULAR; INTRAVENOUS at 21:03

## 2018-08-17 RX ADMIN — IPRATROPIUM BROMIDE AND ALBUTEROL SULFATE 1 AMPULE: .5; 3 SOLUTION RESPIRATORY (INHALATION) at 00:25

## 2018-08-17 RX ADMIN — SODIUM CHLORIDE AND POTASSIUM CHLORIDE: 4.5; 1.49 INJECTION, SOLUTION INTRAVENOUS at 11:17

## 2018-08-17 RX ADMIN — BENZONATATE 200 MG: 100 CAPSULE ORAL at 21:03

## 2018-08-17 RX ADMIN — IPRATROPIUM BROMIDE AND ALBUTEROL SULFATE 1 AMPULE: .5; 3 SOLUTION RESPIRATORY (INHALATION) at 19:53

## 2018-08-17 RX ADMIN — METHYLPREDNISOLONE SODIUM SUCCINATE 40 MG: 40 INJECTION, POWDER, LYOPHILIZED, FOR SOLUTION INTRAMUSCULAR; INTRAVENOUS at 18:53

## 2018-08-17 RX ADMIN — METHYLPREDNISOLONE SODIUM SUCCINATE 40 MG: 40 INJECTION, POWDER, LYOPHILIZED, FOR SOLUTION INTRAMUSCULAR; INTRAVENOUS at 11:16

## 2018-08-17 RX ADMIN — ASPIRIN 81 MG 81 MG: 81 TABLET ORAL at 11:15

## 2018-08-17 RX ADMIN — DOXYCYCLINE HYCLATE 100 MG: 100 CAPSULE, GELATIN COATED ORAL at 11:16

## 2018-08-17 RX ADMIN — CETIRIZINE HYDROCHLORIDE 10 MG: 10 TABLET, FILM COATED ORAL at 11:17

## 2018-08-17 RX ADMIN — BENZONATATE 200 MG: 100 CAPSULE ORAL at 16:36

## 2018-08-17 RX ADMIN — ONDANSETRON HYDROCHLORIDE 4 MG: 2 INJECTION, SOLUTION INTRAMUSCULAR; INTRAVENOUS at 04:40

## 2018-08-17 RX ADMIN — IPRATROPIUM BROMIDE AND ALBUTEROL SULFATE 1 AMPULE: .5; 3 SOLUTION RESPIRATORY (INHALATION) at 07:51

## 2018-08-17 RX ADMIN — ENOXAPARIN SODIUM 40 MG: 40 INJECTION, SOLUTION INTRAVENOUS; SUBCUTANEOUS at 11:15

## 2018-08-17 RX ADMIN — DAPSONE 25 MG: 25 TABLET ORAL at 11:16

## 2018-08-17 RX ADMIN — IPRATROPIUM BROMIDE AND ALBUTEROL SULFATE 1 AMPULE: .5; 3 SOLUTION RESPIRATORY (INHALATION) at 16:47

## 2018-08-17 RX ADMIN — PANTOPRAZOLE SODIUM 40 MG: 40 TABLET, DELAYED RELEASE ORAL at 06:57

## 2018-08-17 RX ADMIN — SODIUM CHLORIDE AND POTASSIUM CHLORIDE: 4.5; 1.49 INJECTION, SOLUTION INTRAVENOUS at 21:03

## 2018-08-17 RX ADMIN — MONTELUKAST SODIUM 10 MG: 10 TABLET, FILM COATED ORAL at 11:16

## 2018-08-17 RX ADMIN — METHYLPREDNISOLONE SODIUM SUCCINATE 40 MG: 40 INJECTION, POWDER, LYOPHILIZED, FOR SOLUTION INTRAMUSCULAR; INTRAVENOUS at 04:30

## 2018-08-17 RX ADMIN — FLUTICASONE FUROATE AND VILANTEROL 1 PUFF: 200; 25 POWDER RESPIRATORY (INHALATION) at 14:32

## 2018-08-17 RX ADMIN — Medication 10 ML: at 21:04

## 2018-08-17 RX ADMIN — VITAMIN D, TAB 1000IU (100/BT) 5000 UNITS: 25 TAB at 11:17

## 2018-08-17 RX ADMIN — IPRATROPIUM BROMIDE AND ALBUTEROL SULFATE 1 AMPULE: .5; 3 SOLUTION RESPIRATORY (INHALATION) at 11:32

## 2018-08-17 RX ADMIN — POTASSIUM CHLORIDE AND SODIUM CHLORIDE: 900; 150 INJECTION, SOLUTION INTRAVENOUS at 04:48

## 2018-08-17 ASSESSMENT — PAIN SCALES - GENERAL: PAINLEVEL_OUTOF10: 5

## 2018-08-17 ASSESSMENT — PAIN DESCRIPTION - DESCRIPTORS: DESCRIPTORS: SORE;TENDER;TIGHTNESS

## 2018-08-17 ASSESSMENT — PAIN DESCRIPTION - PAIN TYPE: TYPE: ACUTE PAIN

## 2018-08-17 ASSESSMENT — PAIN DESCRIPTION - LOCATION: LOCATION: ABDOMEN

## 2018-08-17 NOTE — PROGRESS NOTES
Pt c/o difficulty initiating void overnight/this AM, suprapubic tightness, and burning with void. Per pt \"felt blocked\". Bladder scan showed 224cc.  to put call out.

## 2018-08-17 NOTE — PLAN OF CARE
Problem: Nutrition  Goal: Optimal nutrition therapy  Outcome: Ongoing  Nutrition Problem: Inadequate oral intake  Intervention: Food and/or Nutrient Delivery: Continue current diet, Start ONS (Ensure HP BID)  Nutritional Goals: Consume >75% meals/ONS

## 2018-08-17 NOTE — PATIENT CARE CONFERENCE
[facility] Quality Flow/Interdisciplinary Rounds Progress Note        Quality Flow Rounds held on August 17, 2018    Disciplines Attending:  Bedside Nurse, ,  and Nursing Unit Leadership    Elena Marley was admitted on 8/16/2018 12:02 PM    Anticipated Discharge Date:  Expected Discharge Date: 08/17/18    Disposition:    Javier Score:  Javier Scale Score: 21    Readmission Score:         Discussed patient goal for the day, patient clinical progression, and barriers to discharge.   The following Goal(s) of the Day/Commitment(s) have been identified:  monitor labs, monitor SpO2      Vidal Crook  August 17, 2018

## 2018-08-17 NOTE — PROGRESS NOTES
Reclined in bed                   RESULTS     Oral Stage:   []  Normal   [x]  Functional  []  Abnormal     [x]  Dentition: ([x]  natural  []  missing teeth  []  edentulous  []  partials  [] dentures )    []  Inadequate labial seal resulting anterior labial spillage from ([] right  [] left  [] midline )    []  Decreased mastication due to ([]  poor/missing dentition  []  decreased lingual control  []  vertical munch  []  cognitive function)     []  Delayed A-P transit due to ([]  decreased initiation   [] reduced lingual strength   []  cognitive function )    []  Oral residuals []  right buccal cavity  []  left buccal cavity []  sub lingually   []  on tongue base   []  throughout oral cavity     []  on superior tongue       []  on palate               []  on velum              []  anterior sulcus    Comments:      Pharyngeal Stage:  []  Normal   []  Functional      []  Abnormal   (CNT at bedside)    []  No signs of aspiration were noted during this evaluation however, silent aspiration cannot be ruled out at bedside.   If silent aspiration is suspected, a Videofluoroscopic Study of Swallowing (MBS) is recommended and requires a physician order.    []  Throat clearing present after presentation of ([]  thin  []  nectar  []  honey  []  pureed  []  solid)    []  Immediate wet cough was noted after presentation of ([]  thin  []  nectar  []  honey  []  pureed  []  Solid)    []  Latent wet cough was noted after presentation of ([]  thin  []  nectar  []  honey  []  pureed  []  solid)    []  Wet respirations were noted after presentation of ([]  thin  []  nectar  []  honey  []  pureed  []  solid)    []  Wet/gurgly vocal quality was noted after presentation of ([]  thin  []  nectar  []  honey  []  pureed  []  Solid)    [] Multiple swallows were noted after presentation of ([]  thin  []  nectar  []  honey  []  pureed  [] solid)    []  Consistent O2  desaturation after the swallow    []  Eye watering/flushing of skin    [x] Congested cough throughout evaluation    [] Delayed initiation of the pharyngeal swallow noted    []  Absent swallow                    []  Prognosis for improvements is   []  This plan will be re-evaluated and revised if warranted. []  Patient stated goals:   []  Treatment goals discussed with []  patient/  []  family. []  The []  patient/ []  family understand the diagnosis, prognosis and plan of care. [x]The admitting diagnosis and active problem list, as listed below have been reviewed prior to initiation of this evaluation.      ADMITTING DIAGNOSIS: Chest pain [R07.9]  Acute asthma exacerbation [J45.901]     ACTIVE PROBLEM LIST:   Patient Active Problem List   Diagnosis    Atypical chest pain    Moderate persistent asthma without complication    Palpitations    Anxiety    Depression    Chest pain    Right upper quadrant pain    Abdominal pain    Acute asthma exacerbation    Shortness of breath     Lety Winkler MA, CCC/SLP  SP [phone]

## 2018-08-17 NOTE — PROGRESS NOTES
Interventions:   Continue current diet, Start ONS (Ensure HP BID)  Continued Inpatient Monitoring, Education Completed, Coordination of Care (Reviewed Low-fat nutrition therapy)    Nutrition Evaluation:   · Evaluation: Goals set   · Goals: Consume >75% meals/ONS    · Monitoring: Meal Intake, Supplement Intake, Diet Tolerance, Skin Integrity, Fluid Balance, Weight, Comparative Standards, Pertinent Labs, Nausea or Vomiting    See Adult Nutrition Doc Flowsheet for more detail.      Electronically signed by Augustus Kawasaki, MS, RD, LD on 8/17/18 at 3:18 PM    Contact Number: [phone]

## 2018-08-17 NOTE — PROGRESS NOTES
INPATIENT CARDIOLOGY FOLLOW-UP    Name: Liliya Sarah    Age: 41 y.o. Date of Service: 8/17/2018    Chief Complaint: Follow-up for SOB, chest pain, palpitations    Interim History:  Liliya Sarah is a 41 y.o. female who presented on 8/16/18 for further evaluation of SOB, wheezing, productive cough, and reported fever/chills (afebrile this admission). +atypical chest pain and palpitations (prior and recent evaluation) -- currently with no such complaints. She is concerned that lupus is affecting her heart. SB/SR thus far. Review of Systems:   Cardiac: As per HPI  General: No fever, chills  Pulmonary: As per HPI  HEENT: No visual disturbances, difficult swallowing  GI: No nausea, vomiting  Endocrine: No thyroid disease or DM  Musculoskeletal: SOTELO x 4, no focal motor deficits  Skin: Intact, no rashes  Neuro/Psych: No headache or seizures    Problem List:  Patient Active Problem List   Diagnosis    Atypical chest pain    Moderate persistent asthma without complication    Palpitations    Anxiety    Depression    Chest pain    Right upper quadrant pain    Abdominal pain    Acute asthma exacerbation    Shortness of breath       Allergies: Allergies   Allergen Reactions    Fish-Derived Products Anaphylaxis     anaphylaxis    Nsaids      Vomiting    Cefdinir      Rash, Blisters. Rash, Blisters.     Cephalosporins     Ciprofloxacin     Codeine Nausea Only    Fish Allergy     Levaquin [Levofloxacin In D5w] Hives    Metronidazole Hives     throat closing, hives  throat closing, hives    Other      Allergic to Midrin    Wellbutrin [Bupropion]     Pce [Erythromycin] Rash       Current Medications:  Current Facility-Administered Medications   Medication Dose Route Frequency Provider Last Rate Last Dose    0.45 % NaCl with KCl 20 mEq infusion   Intravenous Continuous Maria Eugenia Emiliana, DO        ALPRAZolam Eva Suraj) tablet 0.5 mg  0.5 mg Oral TID PRN Maria Eugenia Emiliana, DO        vitamin D 1.5 mL Intravenous ONCE PRN SUZANNE Rivera CNP        benzonatate (TESSALON) capsule 200 mg  200 mg Oral TID Lannette Dopp, DO   200 mg at 08/16/18 2141    dapsone tablet 25 mg  25 mg Oral Daily Madisyn Hale MD          0.45 % NaCl with KCl 20 mEq         Physical Exam:  BP (!) 122/59   Pulse 52   Temp 97.3 °F (36.3 °C) (Axillary)   Resp 18   Ht 5' 1\" (1.549 m)   Wt 188 lb (85.3 kg)   SpO2 98%   BMI 35.52 kg/m²   Wt Readings from Last 3 Encounters:   08/17/18 188 lb (85.3 kg)   08/15/18 182 lb (82.6 kg)   07/31/18 179 lb (81.2 kg)     Appearance: Awake, alert, no acute respiratory distress  Skin: Intact, no rash  Head: Normocephalic, atraumatic  Eyes: EOMI, no conjunctival erythema  ENMT: MMM, no rhinorrhea  Neck: Supple, no carotid bruits  Lungs: Clear to auscultation bilaterally. No wheezes, rales, or rhonchi. Cardiac: Regular rate and rhythm, +S1S2, no murmurs apparent  Abdomen: Soft, +bowel sounds  Extremities: Moves all extremities x 4, no lower extremity edema  Neurologic: No focal motor deficits apparent, normal mood and affect    Intake/Output:    Intake/Output Summary (Last 24 hours) at 08/17/18 0849  Last data filed at 08/17/18 0659   Gross per 24 hour   Intake             1642 ml   Output                0 ml   Net             1642 ml     No intake/output data recorded.     Laboratory Tests:  Recent Labs      08/15/18   1344  08/16/18   1300   NA  143  142   K  2.8*  3.9   CL  104  109*   CO2  24  25   BUN  15  15   CREATININE  0.8  0.7   GLUCOSE  94  91   CALCIUM  8.9  9.2     Lab Results   Component Value Date    MG 1.9 08/16/2018     Recent Labs      08/16/18   1300   ALKPHOS  64   ALT  9   AST  7   PROT  6.2*   BILITOT  <0.2   LABALBU  3.5     Recent Labs      08/15/18   1344  08/16/18   1300   WBC  10.7  16.5*   RBC  4.68  4.19   HGB  13.1  12.0   HCT  39.5  35.8   MCV  84.4  85.4   MCH  28.0  28.6   MCHC  33.2  33.5   RDW  13.7  13.8   PLT  360  366   MPV  8.6  8.7 Lab Results   Component Value Date    CKTOTAL 23 09/01/2016    CKMB <1.0 08/16/2018    TROPONINI <0.01 08/16/2018    TROPONINI <0.01 08/16/2018    TROPONINI <0.01 08/15/2018     Lab Results   Component Value Date    INR 1.1 04/29/2018    INR 1.1 04/18/2018    INR 1.2 01/27/2018    PROTIME 12.2 04/29/2018    PROTIME 12.3 04/18/2018    PROTIME 13.1 (H) 01/27/2018     Lab Results   Component Value Date    TSH 0.740 08/16/2018     Recent Labs      08/16/18   1300   PROBNP  750*       Cardiac Tests:  ECG: SR, NSSTT changes     Telemetry findings reviewed: SB/SR     Exercise nuclear stress test: 9/1/16  1. No evidence of exercise stress-induced myocardial ischemia.       2. No focal wall motion abnormalities are demonstrated.       3. The left ventricular ejection fraction is 70%.    Echocardiogram: 10/27/17 (Dr. Vamshi Fine)   Normal left ventricle size and systolic function.   EF 93%   Normal LV segmental wall motion.   Mild asymmetric septal hypertrophy. Normal right ventricle structure and function.   Physiologic and/or trace mitral regurgitation is present. Physiologic and/or trace tricuspid regurgitation.     Echocardiogram: 8/16/18 (Dr. Papi Obrien)   Normal left ventricular systolic function.   Ejection fraction is visually estimated at 65%.   Normal right ventricular size and function (TAPSE 2.2 cm).   Normal left ventricular diastolic filling pattern for age.  Yvonne Adele evidence of hemodynamically significant valve disease   PASP is estimated at 24 mmHg (normal estimated PASP).  The inferior vena cava is normal in size with normal respiratory variation. Impression:  1. Atypical chest pain -- currently CP free, negative troponin x 3, no acute STT changes on EKG, negative stress test in 9/2016, normal ventricular function on echocardiogram  2. Palpitations -- SB/SRr, prior cardiac monitoring (including recent 14 day monitor at [facility], results pending), normal TSH, on metoprolol PTA (stopped this admission)  3.

## 2018-08-17 NOTE — CONSULTS
Dr. Zayda Erazo / Rheumatology consultation    Patient:  Aurelia Robertson 41 y.o. female   MRN: [id number]       Date of Service: 8/16/2018      Chief Complaint:  sle hx    History of Present Illness   The patient is a 41 y.o. female    - hx taken from the patient and records     known hx of +SLE +APS markers-- discussed at length with primary rheumatologist already -- Dr. Roberto Munoz, who verified +markers  Life long asthma, allergies, hives (NON scarring)  -- LONG Hx of asthma, nebulizers -- never been on anti eosinophil asthma treatments or followed up with pulmonary doctor in past  +BUSH, cough, wheezing. preceeded by URI sympomts 1.5 weeks ago-- sore throat/fever, -- treated by amoxil and 60mg prednisone taper +levaquin -- levaquin caused hives and tendonopathy then seen in ER,sent in direct admit then cxr earlier today +RRT bc of acute respiratory distress +bronchospasm and then Bipap. -- now breathing better off bipap, (feels sob ) currently--pulse ox fine on NC o2 -- no use of home o2  -- hx of anxiety also. Hx of arthalgias-- but no change on all high dose steroids recently,. No synovitis, no current major mucosal lesions  No serositis on imaging, no hx of blood clots (despite +APS ab), no hx of glomerulonephritis  Long hx of adult onset photosensitivity. On steroids, but Never been on dmards (advised to defer taking prescribed hydroxychloroquine --bc of childhood preexisting eye macular issues)      No acute rash, no synovitis on exam, hx of suspicious mucosal palate lesions, photosensitive in past    Hx of allergies, hives, asthma -- likely separate from sle  Recent gall bladder taken out, ?enteritis -- recent work up stable--no current bowel issues-- doubt related to sle  Low ESR.    CTA didn't show   Any infilrates, like might find in pneumonitis  -- bronchospastic process -- NOT usual presentation for lupus lung  No PE seen    Treating for acute bronchitis and bronchospasm, post viral Inhale 2 puffs into the lungs every 6 hours as needed for Wheezing   Yes Historical Provider, MD   Montelukast Sodium (SINGULAIR PO) Take 10 mg by mouth daily    Yes Historical Provider, MD       Allergies:  Fish-derived products; Nsaids; Cefdinir; Cephalosporins; Ciprofloxacin; Codeine; Fish allergy; Levaquin [levofloxacin in d5w]; Metronidazole; Other; Wellbutrin [bupropion]; and Pce [erythromycin]    Social History:   TOBACCO:   reports that she has never smoked. She has never used smokeless tobacco.  ETOH:   reports that she does not drink alcohol. OCCUPATION:      Family History:   Family History   Problem Relation Age of Onset    High Blood Pressure Mother     Diabetes Mother     Diabetes Father     High Blood Pressure Sister     Diabetes Sister     Celiac Disease Sister     High Blood Pressure Brother     Crohn's Disease Brother     Uterine Cancer Maternal Grandmother         REVIEW OF SYSTEMS:  Constitutional: ? fever NO unexplained significant weight loss  Vision: No new sudden loss of vision, or dipoplia,  ENT:  No new malar rash,+recent new oral ulcers  Respiratory: + cough, no pleuritic chest pain  Cardiology: No angina,  no known pericarditis --painfull breathing lying down and better sitting up  Gastroenterology: No hx of crohns, UC colitis,  no new diarrhea. No blood in stool. Genitourinary: No dysuria,no hematuria  Musculoskeletal: +pain--no new swollen joints or hx of active gout. Neurology: no focal weakness in extremities, no new slurred speech or stroke like symptoms  Endocrinology: no active uncontrolled thyroid or uncontrolled Diabetes known. Hematology: no increased bleeding, no known wbc or platelet problems known  Skin: .  No photosensitivity, no known psoriasis   Psychiatric: stable    Physical Exam       VS: BP (!) 119/56   Pulse 92   Temp 97.8 °F (36.6 °C) (Oral)   Resp 20   Ht 5' 1\" (1.549 m)   Wt 186 lb 14.4 oz (84.8 kg)   SpO2 97%   BMI 35.31 kg/m²     General

## 2018-08-17 NOTE — PROGRESS NOTES
edema    Assessment & Plan  Acute resp failure, positive rhinovirus  Acute exac asthma  Acute bronchitis  abd pain, enteritis  Lupus, sjogrens  Anxiety, depression  Iv fluids, meds, steroids, cultures  Mae Nieto,   8/17/2018

## 2018-08-17 NOTE — CARE COORDINATION
Met with Annamarie Gonzalez to discuss plan of care. She states  \" I have had asthma all my life and just diagnosed with lupus in March. \"  She states \" if was always frustrating until diagnosed with lupus because  was always told she had anxiety when went to emergency room and etc.\"   She is aware she is receiving IV Solumedrol to help improve her wheezing and breathing. She states she has a NIV at home through [facility] and her PCP set this up for her over a year ago. She states her support is her parents and she resides with them. She states her father is starting to decline and fall a lot. Her mother Linnette Mortimer is mostly her support system ( pt mentions she is nearly [de-identified] yr old).

## 2018-08-17 NOTE — PROGRESS NOTES
Department of Internal Medicine  Pulmonary/Critical Care Medicine  Consultant Progress Note  CC: shortness of breath    SUBJECTIVE:  I personally saw, examined, cared for the patient today. Labs, Radiographs, Medications reviewed. OBJECTIVE: Patient states she is feeling better today, although still on 5L nasal cannula. She was on BiPAP intermittently yesterday and she reports that she was having episodes of apnea and \"turning blue\" which prompted the use of BiPAP. She denies ever being hospitalized for asthma in the past and states that usually a single nebulized treatment as an outpatient will relieve any dyspnea she has.     Medications    Current Facility-Administered Medications: 0.45 % NaCl with KCl 20 mEq infusion, , Intravenous, Continuous  ALPRAZolam (XANAX) tablet 0.5 mg, 0.5 mg, Oral, TID PRN  vitamin D (CHOLECALCIFEROL) tablet 5,000 Units, 5,000 Units, Oral, Daily  diphenhydrAMINE (BENADRYL) tablet 25 mg, 25 mg, Oral, Q6H PRN  Fluticasone Furoate-Vilanterol 200-25 MCG/INH AEPB 1 puff, 1 puff, Inhalation, Daily  cetirizine (ZYRTEC) tablet 10 mg, 10 mg, Oral, Daily  montelukast (SINGULAIR) tablet 10 mg, 10 mg, Oral, Daily  pantoprazole (PROTONIX) tablet 40 mg, 40 mg, Oral, QAM AC  sodium chloride flush 0.9 % injection 10 mL, 10 mL, Intravenous, 2 times per day  sodium chloride flush 0.9 % injection 10 mL, 10 mL, Intravenous, PRN  magnesium hydroxide (MILK OF MAGNESIA) 400 MG/5ML suspension 30 mL, 30 mL, Oral, Daily PRN  ondansetron (ZOFRAN) injection 4 mg, 4 mg, Intravenous, Q6H PRN  enoxaparin (LOVENOX) injection 40 mg, 40 mg, Subcutaneous, Daily  ipratropium-albuterol (DUONEB) nebulizer solution 1 ampule, 1 ampule, Inhalation, Q4H WA  acetaminophen (TYLENOL) tablet 650 mg, 650 mg, Oral, Q4H PRN  aspirin chewable tablet 81 mg, 81 mg, Oral, Daily  nitroGLYCERIN (NITROSTAT) SL tablet 0.4 mg, 0.4 mg, Sublingual, Q5 Min PRN  doxycycline hyclate (VIBRAMYCIN) capsule 100 mg, 100 mg, Oral, 2 times per 08/17/2018    CALCIUM 8.7 08/17/2018    GFRAA >60 08/17/2018    LABGLOM >60 08/17/2018    GLUCOSE 166 08/17/2018    GLUCOSE 93 04/13/2011       ASSESSMENT AND PLAN      1. Acute bronchitis. 2.  Exacerbation of asthma  3. History of lupus, not on disease modifying agent. Plan:   Film array positive for rhinovirus  Antibiotics not needed, procalcitonin of <0.02   Cont. Aerosols  IV steroids will be tapered with symptom improvement      Patient was seen and examined with Dr. Pawel Mcmullen. Marino Gonzalez,  PGY2  8/17/2018  11:11 AM    I personally saw, examined, and cared for the patient. Labs, medications, radiographs reviewed. I agree with history exam and plans detailed in Resident note.   Jose Perez D.O.

## 2018-08-17 NOTE — PROGRESS NOTES
Spoke with Parveen Prince from Dr Crowe [facility] office re difficulty urinating. Waiting for call back.   Rip Cavanaugh 8/17/2018 11:31 AM

## 2018-08-17 NOTE — CONSULTS
tubs or saunas. She denies  known exposure to anyone with tuberculosis. PAST MEDICAL HISTORY:  Significant for asthma, colitis, history of ARDS. PAST SURGICAL HISTORY:  History of cholecystectomy. FAMILY HISTORY:  Mother and father, both with history of diabetes. Celiac  disease in sister. Crohn's disease in brother. REVIEW OF SYSTEMS:  NEUROLOGIC:  The patient reports history of migraines. CARDIAC:  Denies coronary artery disease or dysrhythmia. She is, however,  on low-dose of Lopressor. PULMONARY:  History of asthma. She reports onset in childhood. She  reports reliable triggers include seasonal allergies, hot and humid  weather, and strong perfumes as well as dust and smoke. GI:  Status post cholecystectomy. :  Denies nephrolithiasis or renal insufficiency. The remainder of 10-point review of systems is otherwise noncontributory. PHYSICAL EXAMINATION:  VITAL SIGNS:  Pulse is 49, respiratory rate is 19, blood pressure is  124/72, temperature is 98 degrees. GENERAL:  Well-nourished female, currently in no acute distress. NEUROLOGIC:  Awake, alert, and oriented x3. Sensory and motor function are  symmetrically intact. CARDIAC:  Heart is bradycardic, but regular without S3, S4, or murmur. PULMONARY:  Auscultation of the lungs finds end-expiratory wheezing  diffusely across both lung fields. There are no rhonchi or crackles noted. ABDOMEN:  Soft. Positive bowel sounds. No guarding or rebound is  appreciated. There is no palpable organomegaly noted on exam.  EXTREMITIES:  Distal pulses are intact and symmetric. There is no digital  clubbing noted on exam.  LYMPHATICS:  There is no cervical, clavicular, or axillary adenopathy noted  on exam.  ENT:  The patient is Mallampati class I.  IMMUNOLOGIC:  The patient with history of lupus. She follows with Dr. Janeen Jurado, out of [facility]. She is on no disease modifying agent.     LABORATORY FINDINGS:  Chest x-ray demonstrates normal chest.  Arterial  blood gas reveals a pH of 7.530, PaCO2 of 26.2, PaO2 of 198.4, bicarbonate  21.4 with an O2 sat of 99.3% on 4 liters by nasal cannula. Chemistries:   Sodium 142, potassium 3.9, chloride 109, bicarbonate 25, BUN is 15,  creatinine 0.7, glucose is 91. Lactic acid mildly elevated at 2.7. ProBNP  mildly elevated at 750. Troponin is less than 0.01. Transaminases are are  unremarkable. CBC:  White count 16.5, hemoglobin 12.0, hematocrit 35.8,  and platelets 159,914. IMPRESSION:  1. Acute bronchitis. 2.  Exacerbation of asthma. 3.  History of lupus, not on disease modifying agent. PLAN:  1. Obtain sputum for Gram stain and culture, urine for Legionella as well  as strep antigen and also respiratory panel FilmArray. 2.  Antibiotics pending culture. 3.  Tessalon. We will increase dose to 200 mg t.i.d.  4.  Aerosols. 5.  IV corticosteroids, taper if symptoms improve. We will follow along with you in the care of the patient. Once again, we  thank you for the opportunity to be involved in the care of your patient. If I can provide you with any further information, please feel free to  contact me directly.     Sincerely,        Ines Miranda DO    D: 08/16/2018 18:50:14       T: 08/16/2018 23:08:42     [email or web address]  Job#: [id number]     Doc#: [id number]    CC:

## 2018-08-17 NOTE — PROGRESS NOTES
08/17/18 0011   NIV Type   Equipment Type V60   Mode BIPAP   Mask Type Full face mask   Mask Size Small   Settings/Measurements   Comfort Level Good   Using Accessory Muscles No   IPAP 10 cmH20   EPAP 5 cmH2O   Rate Ordered 12   Resp 23   SpO2 98   FiO2  40 %   Vt Exhaled 583 mL   Mask Leak (lpm) 27 lpm   Skin Protection for O2 Device Yes   Date: 8/17/2018    Time: 12:12 AM    Patient Placed On BIPAP/CPAP/ Non-Invasive Ventilation? Yes    If no must comment. Facial area red/color change? No           If YES are Blister/Lesion present? No   If yes must notify nursing staff  BIPAP/CPAP skin barrier?   Yes    Skin barrier type:Liquicel       Comments:        Abida Mittal

## 2018-08-18 PROCEDURE — 6370000000 HC RX 637 (ALT 250 FOR IP): Performed by: INTERNAL MEDICINE

## 2018-08-18 PROCEDURE — 94150 VITAL CAPACITY TEST: CPT

## 2018-08-18 PROCEDURE — 6370000000 HC RX 637 (ALT 250 FOR IP): Performed by: FAMILY MEDICINE

## 2018-08-18 PROCEDURE — 6360000002 HC RX W HCPCS: Performed by: FAMILY MEDICINE

## 2018-08-18 PROCEDURE — 2580000003 HC RX 258: Performed by: FAMILY MEDICINE

## 2018-08-18 PROCEDURE — 2700000000 HC OXYGEN THERAPY PER DAY

## 2018-08-18 PROCEDURE — 1200000000 HC SEMI PRIVATE

## 2018-08-18 PROCEDURE — 94660 CPAP INITIATION&MGMT: CPT

## 2018-08-18 PROCEDURE — 94640 AIRWAY INHALATION TREATMENT: CPT

## 2018-08-18 PROCEDURE — 2580000003 HC RX 258

## 2018-08-18 RX ORDER — METHYLPREDNISOLONE SODIUM SUCCINATE 40 MG/ML
40 INJECTION, POWDER, LYOPHILIZED, FOR SOLUTION INTRAMUSCULAR; INTRAVENOUS EVERY 12 HOURS
Status: DISCONTINUED | OUTPATIENT
Start: 2018-08-19 | End: 2018-08-19

## 2018-08-18 RX ADMIN — ENOXAPARIN SODIUM 40 MG: 40 INJECTION, SOLUTION INTRAVENOUS; SUBCUTANEOUS at 09:23

## 2018-08-18 RX ADMIN — METHYLPREDNISOLONE SODIUM SUCCINATE 40 MG: 40 INJECTION, POWDER, LYOPHILIZED, FOR SOLUTION INTRAMUSCULAR; INTRAVENOUS at 09:24

## 2018-08-18 RX ADMIN — VITAMIN D, TAB 1000IU (100/BT) 5000 UNITS: 25 TAB at 09:23

## 2018-08-18 RX ADMIN — IPRATROPIUM BROMIDE AND ALBUTEROL SULFATE 1 AMPULE: .5; 3 SOLUTION RESPIRATORY (INHALATION) at 16:03

## 2018-08-18 RX ADMIN — PANTOPRAZOLE SODIUM 40 MG: 40 TABLET, DELAYED RELEASE ORAL at 05:07

## 2018-08-18 RX ADMIN — DAPSONE 25 MG: 25 TABLET ORAL at 11:07

## 2018-08-18 RX ADMIN — Medication 10 ML: at 20:49

## 2018-08-18 RX ADMIN — IPRATROPIUM BROMIDE AND ALBUTEROL SULFATE 1 AMPULE: .5; 3 SOLUTION RESPIRATORY (INHALATION) at 03:26

## 2018-08-18 RX ADMIN — FLUTICASONE FUROATE AND VILANTEROL 1 PUFF: 200; 25 POWDER RESPIRATORY (INHALATION) at 15:29

## 2018-08-18 RX ADMIN — BENZONATATE 200 MG: 100 CAPSULE ORAL at 13:02

## 2018-08-18 RX ADMIN — WATER 1 ML: 1 INJECTION INTRAMUSCULAR; INTRAVENOUS; SUBCUTANEOUS at 09:24

## 2018-08-18 RX ADMIN — METHYLPREDNISOLONE SODIUM SUCCINATE 40 MG: 40 INJECTION, POWDER, LYOPHILIZED, FOR SOLUTION INTRAMUSCULAR; INTRAVENOUS at 15:31

## 2018-08-18 RX ADMIN — SODIUM CHLORIDE AND POTASSIUM CHLORIDE: 4.5; 1.49 INJECTION, SOLUTION INTRAVENOUS at 11:03

## 2018-08-18 RX ADMIN — ASPIRIN 81 MG 81 MG: 81 TABLET ORAL at 09:23

## 2018-08-18 RX ADMIN — METHYLPREDNISOLONE SODIUM SUCCINATE 40 MG: 40 INJECTION, POWDER, LYOPHILIZED, FOR SOLUTION INTRAMUSCULAR; INTRAVENOUS at 20:49

## 2018-08-18 RX ADMIN — ACETAMINOPHEN 650 MG: 325 TABLET ORAL at 18:59

## 2018-08-18 RX ADMIN — IPRATROPIUM BROMIDE AND ALBUTEROL SULFATE 1 AMPULE: .5; 3 SOLUTION RESPIRATORY (INHALATION) at 20:18

## 2018-08-18 RX ADMIN — IPRATROPIUM BROMIDE AND ALBUTEROL SULFATE 1 AMPULE: .5; 3 SOLUTION RESPIRATORY (INHALATION) at 11:41

## 2018-08-18 RX ADMIN — BENZONATATE 200 MG: 100 CAPSULE ORAL at 20:49

## 2018-08-18 RX ADMIN — ALPRAZOLAM 0.5 MG: 0.25 TABLET ORAL at 05:06

## 2018-08-18 RX ADMIN — BENZONATATE 200 MG: 100 CAPSULE ORAL at 09:23

## 2018-08-18 RX ADMIN — CETIRIZINE HYDROCHLORIDE 10 MG: 10 TABLET, FILM COATED ORAL at 09:23

## 2018-08-18 RX ADMIN — METHYLPREDNISOLONE SODIUM SUCCINATE 40 MG: 40 INJECTION, POWDER, LYOPHILIZED, FOR SOLUTION INTRAMUSCULAR; INTRAVENOUS at 03:28

## 2018-08-18 RX ADMIN — MONTELUKAST SODIUM 10 MG: 10 TABLET, FILM COATED ORAL at 09:23

## 2018-08-18 RX ADMIN — IPRATROPIUM BROMIDE AND ALBUTEROL SULFATE 1 AMPULE: .5; 3 SOLUTION RESPIRATORY (INHALATION) at 08:28

## 2018-08-18 RX ADMIN — Medication 10 ML: at 09:24

## 2018-08-18 ASSESSMENT — PAIN SCALES - GENERAL
PAINLEVEL_OUTOF10: 0
PAINLEVEL_OUTOF10: 0

## 2018-08-18 NOTE — PLAN OF CARE
Problem: Falls - Risk of:  Goal: Will remain free from falls  Will remain free from falls   Outcome: Met This Shift    Goal: Absence of physical injury  Absence of physical injury   Outcome: Met This Shift      Problem: Breathing Pattern - Ineffective:  Goal: Ability to achieve and maintain a regular respiratory rate will improve  Ability to achieve and maintain a regular respiratory rate will improve   Outcome: Met This Shift      Problem: Pain:  Goal: Pain level will decrease  Pain level will decrease   Outcome: Met This Shift

## 2018-08-18 NOTE — PLAN OF CARE
Problem: Falls - Risk of:  Goal: Will remain free from falls  Will remain free from falls   Outcome: Met This Shift    Goal: Absence of physical injury  Absence of physical injury   Outcome: Met This Shift      Problem: Breathing Pattern - Ineffective:  Goal: Ability to achieve and maintain a regular respiratory rate will improve  Ability to achieve and maintain a regular respiratory rate will improve   Outcome: Ongoing

## 2018-08-19 LAB
ALBUMIN SERPL-MCNC: 3.5 G/DL (ref 3.5–5.2)
ALP BLD-CCNC: 54 U/L (ref 35–104)
ALT SERPL-CCNC: 10 U/L (ref 0–32)
ANION GAP SERPL CALCULATED.3IONS-SCNC: 13 MMOL/L (ref 7–16)
ANISOCYTOSIS: ABNORMAL
AST SERPL-CCNC: 6 U/L (ref 0–31)
BASOPHILS ABSOLUTE: 0 E9/L (ref 0–0.2)
BASOPHILS RELATIVE PERCENT: 0 % (ref 0–2)
BILIRUB SERPL-MCNC: <0.2 MG/DL (ref 0–1.2)
BUN BLDV-MCNC: 16 MG/DL (ref 6–20)
CALCIUM SERPL-MCNC: 8.9 MG/DL (ref 8.6–10.2)
CHLORIDE BLD-SCNC: 104 MMOL/L (ref 98–107)
CO2: 22 MMOL/L (ref 22–29)
CREAT SERPL-MCNC: 0.6 MG/DL (ref 0.5–1)
EOSINOPHILS ABSOLUTE: 0 E9/L (ref 0.05–0.5)
EOSINOPHILS RELATIVE PERCENT: 0 % (ref 0–6)
GFR AFRICAN AMERICAN: >60
GFR NON-AFRICAN AMERICAN: >60 ML/MIN/1.73
GLUCOSE BLD-MCNC: 168 MG/DL (ref 74–109)
HCT VFR BLD CALC: 36.1 % (ref 34–48)
HEMOGLOBIN: 12.1 G/DL (ref 11.5–15.5)
HYPERSEGMENTED NEUTROPHILS: ABNORMAL
LYMPHOCYTES ABSOLUTE: 0.76 E9/L (ref 1.5–4)
LYMPHOCYTES RELATIVE PERCENT: 4.3 % (ref 20–42)
MCH RBC QN AUTO: 28.8 PG (ref 26–35)
MCHC RBC AUTO-ENTMCNC: 33.5 % (ref 32–34.5)
MCV RBC AUTO: 86 FL (ref 80–99.9)
MONOCYTES ABSOLUTE: 1.91 E9/L (ref 0.1–0.95)
MONOCYTES RELATIVE PERCENT: 9.6 % (ref 2–12)
NEUTROPHILS ABSOLUTE: 16.43 E9/L (ref 1.8–7.3)
NEUTROPHILS RELATIVE PERCENT: 86.1 % (ref 43–80)
NUCLEATED RED BLOOD CELLS: 0 /100 WBC
PDW BLD-RTO: 14.6 FL (ref 11.5–15)
PLATELET # BLD: 310 E9/L (ref 130–450)
PMV BLD AUTO: 8.4 FL (ref 7–12)
POLYCHROMASIA: ABNORMAL
POTASSIUM SERPL-SCNC: 3.7 MMOL/L (ref 3.5–5)
RBC # BLD: 4.2 E12/L (ref 3.5–5.5)
SODIUM BLD-SCNC: 139 MMOL/L (ref 132–146)
TOTAL PROTEIN: 6 G/DL (ref 6.4–8.3)
WBC # BLD: 19.1 E9/L (ref 4.5–11.5)

## 2018-08-19 PROCEDURE — 6370000000 HC RX 637 (ALT 250 FOR IP): Performed by: INTERNAL MEDICINE

## 2018-08-19 PROCEDURE — 6360000002 HC RX W HCPCS: Performed by: INTERNAL MEDICINE

## 2018-08-19 PROCEDURE — 6360000002 HC RX W HCPCS: Performed by: FAMILY MEDICINE

## 2018-08-19 PROCEDURE — 94660 CPAP INITIATION&MGMT: CPT

## 2018-08-19 PROCEDURE — 36415 COLL VENOUS BLD VENIPUNCTURE: CPT

## 2018-08-19 PROCEDURE — 2580000003 HC RX 258: Performed by: FAMILY MEDICINE

## 2018-08-19 PROCEDURE — 94640 AIRWAY INHALATION TREATMENT: CPT

## 2018-08-19 PROCEDURE — 85025 COMPLETE CBC W/AUTO DIFF WBC: CPT

## 2018-08-19 PROCEDURE — 80053 COMPREHEN METABOLIC PANEL: CPT

## 2018-08-19 PROCEDURE — 6370000000 HC RX 637 (ALT 250 FOR IP): Performed by: FAMILY MEDICINE

## 2018-08-19 PROCEDURE — 1200000000 HC SEMI PRIVATE

## 2018-08-19 PROCEDURE — 2700000000 HC OXYGEN THERAPY PER DAY

## 2018-08-19 RX ORDER — PREDNISONE 20 MG/1
40 TABLET ORAL DAILY
Status: DISCONTINUED | OUTPATIENT
Start: 2018-08-20 | End: 2018-08-20 | Stop reason: HOSPADM

## 2018-08-19 RX ORDER — KETOROLAC TROMETHAMINE 30 MG/ML
60 INJECTION, SOLUTION INTRAMUSCULAR; INTRAVENOUS ONCE
Status: COMPLETED | OUTPATIENT
Start: 2018-08-19 | End: 2018-08-19

## 2018-08-19 RX ORDER — PREDNISONE 10 MG/1
10 TABLET ORAL DAILY
Status: DISCONTINUED | OUTPATIENT
Start: 2018-08-29 | End: 2018-08-20 | Stop reason: HOSPADM

## 2018-08-19 RX ORDER — PREDNISONE 20 MG/1
20 TABLET ORAL DAILY
Status: DISCONTINUED | OUTPATIENT
Start: 2018-08-26 | End: 2018-08-20 | Stop reason: HOSPADM

## 2018-08-19 RX ADMIN — ACETAMINOPHEN 650 MG: 325 TABLET ORAL at 21:08

## 2018-08-19 RX ADMIN — IPRATROPIUM BROMIDE AND ALBUTEROL SULFATE 1 AMPULE: .5; 3 SOLUTION RESPIRATORY (INHALATION) at 19:36

## 2018-08-19 RX ADMIN — CETIRIZINE HYDROCHLORIDE 10 MG: 10 TABLET, FILM COATED ORAL at 09:35

## 2018-08-19 RX ADMIN — MONTELUKAST SODIUM 10 MG: 10 TABLET, FILM COATED ORAL at 09:34

## 2018-08-19 RX ADMIN — METHYLPREDNISOLONE SODIUM SUCCINATE 40 MG: 40 INJECTION, POWDER, FOR SOLUTION INTRAMUSCULAR; INTRAVENOUS at 09:35

## 2018-08-19 RX ADMIN — KETOROLAC TROMETHAMINE 60 MG: 30 INJECTION, SOLUTION INTRAMUSCULAR at 17:32

## 2018-08-19 RX ADMIN — ENOXAPARIN SODIUM 40 MG: 40 INJECTION, SOLUTION INTRAVENOUS; SUBCUTANEOUS at 09:35

## 2018-08-19 RX ADMIN — ONDANSETRON HYDROCHLORIDE 4 MG: 2 INJECTION, SOLUTION INTRAMUSCULAR; INTRAVENOUS at 14:29

## 2018-08-19 RX ADMIN — IPRATROPIUM BROMIDE AND ALBUTEROL SULFATE 1 AMPULE: .5; 3 SOLUTION RESPIRATORY (INHALATION) at 11:49

## 2018-08-19 RX ADMIN — SODIUM CHLORIDE AND POTASSIUM CHLORIDE: 4.5; 1.49 INJECTION, SOLUTION INTRAVENOUS at 14:15

## 2018-08-19 RX ADMIN — VITAMIN D, TAB 1000IU (100/BT) 5000 UNITS: 25 TAB at 09:35

## 2018-08-19 RX ADMIN — SODIUM CHLORIDE AND POTASSIUM CHLORIDE: 4.5; 1.49 INJECTION, SOLUTION INTRAVENOUS at 01:46

## 2018-08-19 RX ADMIN — NYSTATIN 500000 UNITS: 100000 SUSPENSION ORAL at 16:04

## 2018-08-19 RX ADMIN — ASPIRIN 81 MG 81 MG: 81 TABLET ORAL at 09:36

## 2018-08-19 RX ADMIN — Medication 10 ML: at 21:09

## 2018-08-19 RX ADMIN — ACETAMINOPHEN 650 MG: 325 TABLET ORAL at 07:30

## 2018-08-19 RX ADMIN — BENZONATATE 200 MG: 100 CAPSULE ORAL at 07:30

## 2018-08-19 RX ADMIN — IPRATROPIUM BROMIDE AND ALBUTEROL SULFATE 1 AMPULE: .5; 3 SOLUTION RESPIRATORY (INHALATION) at 04:04

## 2018-08-19 RX ADMIN — BENZONATATE 200 MG: 100 CAPSULE ORAL at 21:08

## 2018-08-19 RX ADMIN — IPRATROPIUM BROMIDE AND ALBUTEROL SULFATE 1 AMPULE: .5; 3 SOLUTION RESPIRATORY (INHALATION) at 08:08

## 2018-08-19 RX ADMIN — IPRATROPIUM BROMIDE AND ALBUTEROL SULFATE 1 AMPULE: .5; 3 SOLUTION RESPIRATORY (INHALATION) at 15:47

## 2018-08-19 RX ADMIN — NYSTATIN 500000 UNITS: 100000 SUSPENSION ORAL at 21:09

## 2018-08-19 RX ADMIN — DAPSONE 25 MG: 25 TABLET ORAL at 09:35

## 2018-08-19 RX ADMIN — BENZONATATE 200 MG: 100 CAPSULE ORAL at 13:22

## 2018-08-19 RX ADMIN — Medication 10 ML: at 09:35

## 2018-08-19 RX ADMIN — FLUTICASONE FUROATE AND VILANTEROL 1 PUFF: 200; 25 POWDER RESPIRATORY (INHALATION) at 07:30

## 2018-08-19 RX ADMIN — PANTOPRAZOLE SODIUM 40 MG: 40 TABLET, DELAYED RELEASE ORAL at 06:18

## 2018-08-19 RX ADMIN — ACETAMINOPHEN 650 MG: 325 TABLET ORAL at 14:29

## 2018-08-19 ASSESSMENT — PAIN DESCRIPTION - ONSET
ONSET: ON-GOING
ONSET: ON-GOING

## 2018-08-19 ASSESSMENT — PAIN DESCRIPTION - FREQUENCY
FREQUENCY: CONTINUOUS
FREQUENCY: CONTINUOUS

## 2018-08-19 ASSESSMENT — PAIN DESCRIPTION - PAIN TYPE
TYPE: ACUTE PAIN

## 2018-08-19 ASSESSMENT — PAIN SCALES - GENERAL
PAINLEVEL_OUTOF10: 4
PAINLEVEL_OUTOF10: 5
PAINLEVEL_OUTOF10: 4
PAINLEVEL_OUTOF10: 4
PAINLEVEL_OUTOF10: 3

## 2018-08-19 ASSESSMENT — PAIN DESCRIPTION - DIRECTION: RADIATING_TOWARDS: SHOULDER

## 2018-08-19 ASSESSMENT — ENCOUNTER SYMPTOMS
VOMITING: 0
NAUSEA: 0
SHORTNESS OF BREATH: 0

## 2018-08-19 ASSESSMENT — PAIN DESCRIPTION - DESCRIPTORS
DESCRIPTORS: DULL;ACHING
DESCRIPTORS: ACHING
DESCRIPTORS: PRESSURE

## 2018-08-19 ASSESSMENT — PAIN DESCRIPTION - LOCATION
LOCATION: WRIST
LOCATION: EAR
LOCATION: BACK

## 2018-08-19 ASSESSMENT — PAIN DESCRIPTION - PROGRESSION
CLINICAL_PROGRESSION: NOT CHANGED
CLINICAL_PROGRESSION: GRADUALLY IMPROVING

## 2018-08-19 ASSESSMENT — PAIN DESCRIPTION - ORIENTATION
ORIENTATION: MID
ORIENTATION: RIGHT
ORIENTATION: LEFT

## 2018-08-19 NOTE — PROGRESS NOTES
RANGE: Resp  Av.2  Min: 18  Max: 20  PULSE RANGE: Pulse  Av  Min: 62  Max: 84  BLOOD PRESSURE RANGE:  Systolic (24URC), IHQ:890 , Min:120 , MOR:029   ; Diastolic (03GLN), VGH:90, Min:66, Max:78    PULSE OXIMETRY RANGE: SpO2  Av.6 %  Min: 97 %  Max: 100 %  24HR INTAKE/OUTPUT:    Intake/Output Summary (Last 24 hours) at 18  Last data filed at 18 194   Gross per 24 hour   Intake             1382 ml   Output             1600 ml   Net             -218 ml     CONSTITUTIONAL:  awake, alert, cooperative, no apparent distress, and appears stated age  NECK:  Supple, symmetrical, trachea midline, no adenopathy, thyroid symmetric, not enlarged and no tenderness, skin normal  HEMATOLOGIC/LYMPHATICS:  no cervical lymphadenopathy and no supraclavicular lymphadenopathy  BACK:  Symmetric, no curvature, spinous processes are non-tender on palpation, paraspinous muscles are non-tender on palpation, no costal vertebral tenderness  LUNGS:  no increased work of breathing, good air exchange and wheeze diffuse  CARDIOVASCULAR:  Normal apical impulse, regular rate and rhythm, normal S1 and S2, no S3 or S4, and no murmur noted  ABDOMEN:  No scars, normal bowel sounds, soft, non-distended, non-tender, no masses palpated, no hepatosplenomegally  Data    CBC with Differential:    Lab Results   Component Value Date    WBC 16.5 2018    RBC 4.19 2018    HGB 12.0 2018    HCT 35.8 2018     2018    MCV 85.4 2018    MCH 28.6 2018    MCHC 33.5 2018    RDW 13.8 2018    SEGSPCT 69 2013    LYMPHOPCT 43.3 08/15/2018    MONOPCT 10.9 08/15/2018    BASOPCT 0.3 08/15/2018    MONOSABS 1.17 08/15/2018    LYMPHSABS 4.64 08/15/2018    EOSABS 0.04 08/15/2018    BASOSABS 0.03 08/15/2018     BMP:    Lab Results   Component Value Date     2018    K 3.7 2018     2018    CO2 22 2018    BUN 12 2018    LABALBU 3.5 2018

## 2018-08-19 NOTE — PROGRESS NOTES
Sublingual, Q5 Min PRN  perflutren lipid microspheres (DEFINITY) injection 1.65 mg, 1.5 mL, Intravenous, ONCE PRN  benzonatate (TESSALON) capsule 200 mg, 200 mg, Oral, TID  dapsone tablet 25 mg, 25 mg, Oral, Daily  Physical    TEMPERATURE:  Current - Temp: 97.8 °F (36.6 °C);  Max - Temp  Av.6 °F (36.4 °C)  Min: 97.4 °F (36.3 °C)  Max: 97.8 °F (36.6 °C)  RESPIRATIONS RANGE: Resp  Av  Min: 18  Max: 18  PULSE RANGE: Pulse  Av  Min: 84  Max: 84  BLOOD PRESSURE RANGE:  Systolic (41AVQ), TMC:275 , Min:120 , THR:165   ; Diastolic (20YHM), QDC:51, Min:66, Max:88    PULSE OXIMETRY RANGE: SpO2  Av %  Min: 96 %  Max: 98 %  24HR INTAKE/OUTPUT:      Intake/Output Summary (Last 24 hours) at 18 1352  Last data filed at 18 1334   Gross per 24 hour   Intake             1019 ml   Output             2525 ml   Net            -1506 ml     CONSTITUTIONAL:  awake, alert, cooperative, no apparent distress, and appears stated age  NECK:  Supple, symmetrical, trachea midline, no adenopathy, thyroid symmetric, not enlarged and no tenderness, skin normal  HEMATOLOGIC/LYMPHATICS:  no cervical lymphadenopathy and no supraclavicular lymphadenopathy  BACK:  Symmetric, no curvature, spinous processes are non-tender on palpation, paraspinous muscles are non-tender on palpation, no costal vertebral tenderness  LUNGS:  no increased work of breathing, good air exchange and wheeze diffuse  CARDIOVASCULAR:  Normal apical impulse, regular rate and rhythm, normal S1 and S2, no S3 or S4, and no murmur noted  ABDOMEN:  No scars, normal bowel sounds, soft, non-distended, non-tender, no masses palpated, no hepatosplenomegally  Data    CBC with Differential:    Lab Results   Component Value Date    WBC 19.1 2018    RBC 4.20 2018    HGB 12.1 2018    HCT 36.1 2018     2018    MCV 86.0 2018    MCH 28.8 2018    MCHC 33.5 2018    RDW 14.6 2018    NRBC 0.0 2018

## 2018-08-19 NOTE — PROGRESS NOTES
Tona Favre is a 41 y.o. female patient.     Current Facility-Administered Medications   Medication Dose Route Frequency Provider Last Rate Last Dose    sterile water injection             methylPREDNISolone sodium (SOLU-MEDROL) injection 40 mg  40 mg Intravenous Q12H Kevin Sosa, DO   40 mg at 08/19/18 0935    0.45 % NaCl with KCl 20 mEq infusion   Intravenous Continuous Pauline Arreaga DO 75 mL/hr at 08/19/18 0146      ALPRAZolam (XANAX) tablet 0.5 mg  0.5 mg Oral TID PRN Pauline Arreaga DO   0.5 mg at 08/18/18 2559    vitamin D (CHOLECALCIFEROL) tablet 5,000 Units  5,000 Units Oral Daily Pauline Arreaga DO   5,000 Units at 08/19/18 0935    diphenhydrAMINE (BENADRYL) tablet 25 mg  25 mg Oral Q6H PRN Pauline Arreaga DO   25 mg at 08/17/18 0445    Fluticasone Furoate-Vilanterol 200-25 MCG/INH AEPB 1 puff  1 puff Inhalation Daily Pauline Arreaga DO   1 puff at 08/19/18 0730    cetirizine (ZYRTEC) tablet 10 mg  10 mg Oral Daily Pauline Arreaga DO   10 mg at 08/19/18 0935    montelukast (SINGULAIR) tablet 10 mg  10 mg Oral Daily Pauline Arreaga, DO   10 mg at 08/19/18 0934    pantoprazole (PROTONIX) tablet 40 mg  40 mg Oral QAM RICK Cotto, DO   40 mg at 08/19/18 8805    sodium chloride flush 0.9 % injection 10 mL  10 mL Intravenous 2 times per day Pauline Arreaga DO   10 mL at 08/19/18 0935    sodium chloride flush 0.9 % injection 10 mL  10 mL Intravenous PRN Pauline Arreaga DO   10 mL at 08/16/18 1635    magnesium hydroxide (MILK OF MAGNESIA) 400 MG/5ML suspension 30 mL  30 mL Oral Daily PRN Pauline Arreaga DO        ondansetron [facility]) injection 4 mg  4 mg Intravenous Q6H PRN Pauline Arreaga, DO   4 mg at 08/17/18 0440    enoxaparin (LOVENOX) injection 40 mg  40 mg Subcutaneous Daily Pauline Arreaga DO   40 mg at 08/19/18 0935    ipratropium-albuterol (DUONEB) nebulizer solution 1 ampule  1 ampule Inhalation Q4H WA Pauline Arreaga DO   1 ampule at 08/19/18 5211    exam.    Lungs:  Normal effort. Heart: Normal rate. Abdomen: Abdomen is soft. Bowel sounds are normal.     Extremities: Normal range of motion. Pulses: Distal pulses are intact. Neurological: Patient is alert and oriented to person, place and time. Pupils:  Pupils are equal, round, and reactive to light. Skin:  Warm. Assessment:    Condition: In stable condition. (Acute resp failure, positive rhinovirus, RESOLVING  Acute exac asthma, ON SOLUMEDROL AND DUONEB, DOING MUCH BETTER  Acute bronchitis, VIRAL  abd pain, enteritis, WITH WEIGHT LOSS, UNDER CONTROL, WILL NEED WORK UP AS AN OUT PATIENT  Lupus, sjogrens  Anxiety, depression, UNDER CONTROL  PLAN: CONTINUE AEROSOL TX AND SOLUMEDROL. ).        Laura Jenkins MD  8/19/2018

## 2018-08-20 VITALS
SYSTOLIC BLOOD PRESSURE: 134 MMHG | DIASTOLIC BLOOD PRESSURE: 70 MMHG | HEIGHT: 61 IN | RESPIRATION RATE: 18 BRPM | BODY MASS INDEX: 36.71 KG/M2 | HEART RATE: 88 BPM | WEIGHT: 194.44 LBS | OXYGEN SATURATION: 98 % | TEMPERATURE: 97.8 F

## 2018-08-20 LAB
ALBUMIN SERPL-MCNC: 3.1 G/DL (ref 3.5–5.2)
ALP BLD-CCNC: 44 U/L (ref 35–104)
ALT SERPL-CCNC: 26 U/L (ref 0–32)
ANION GAP SERPL CALCULATED.3IONS-SCNC: 9 MMOL/L (ref 7–16)
ANTI DNA DOUBLE STRANDED: POSITIVE
ANTI DNA TITER: ABNORMAL
AST SERPL-CCNC: 19 U/L (ref 0–31)
BASOPHILS ABSOLUTE: 0.01 E9/L (ref 0–0.2)
BASOPHILS RELATIVE PERCENT: 0.1 % (ref 0–2)
BILIRUB SERPL-MCNC: <0.2 MG/DL (ref 0–1.2)
BUN BLDV-MCNC: 16 MG/DL (ref 6–20)
CALCIUM SERPL-MCNC: 8.5 MG/DL (ref 8.6–10.2)
CHLORIDE BLD-SCNC: 106 MMOL/L (ref 98–107)
CO2: 24 MMOL/L (ref 22–29)
CREAT SERPL-MCNC: 0.7 MG/DL (ref 0.5–1)
EOSINOPHILS ABSOLUTE: 0 E9/L (ref 0.05–0.5)
EOSINOPHILS RELATIVE PERCENT: 0 % (ref 0–6)
GFR AFRICAN AMERICAN: >60
GFR NON-AFRICAN AMERICAN: >60 ML/MIN/1.73
GLUCOSE BLD-MCNC: 108 MG/DL (ref 74–109)
HCT VFR BLD CALC: 33.9 % (ref 34–48)
HEMOGLOBIN: 11.2 G/DL (ref 11.5–15.5)
IMMATURE GRANULOCYTES #: 0.62 E9/L
IMMATURE GRANULOCYTES %: 4.3 % (ref 0–5)
LYMPHOCYTES ABSOLUTE: 1.81 E9/L (ref 1.5–4)
LYMPHOCYTES RELATIVE PERCENT: 12.5 % (ref 20–42)
MCH RBC QN AUTO: 28.6 PG (ref 26–35)
MCHC RBC AUTO-ENTMCNC: 33 % (ref 32–34.5)
MCV RBC AUTO: 86.7 FL (ref 80–99.9)
MONOCYTES ABSOLUTE: 1.1 E9/L (ref 0.1–0.95)
MONOCYTES RELATIVE PERCENT: 7.6 % (ref 2–12)
NEUTROPHILS ABSOLUTE: 10.91 E9/L (ref 1.8–7.3)
NEUTROPHILS RELATIVE PERCENT: 75.5 % (ref 43–80)
PDW BLD-RTO: 14.7 FL (ref 11.5–15)
PLATELET # BLD: 285 E9/L (ref 130–450)
PMV BLD AUTO: 8.8 FL (ref 7–12)
POTASSIUM SERPL-SCNC: 4.2 MMOL/L (ref 3.5–5)
RBC # BLD: 3.91 E12/L (ref 3.5–5.5)
SODIUM BLD-SCNC: 139 MMOL/L (ref 132–146)
TOTAL PROTEIN: 5.2 G/DL (ref 6.4–8.3)
WBC # BLD: 14.5 E9/L (ref 4.5–11.5)

## 2018-08-20 PROCEDURE — 94640 AIRWAY INHALATION TREATMENT: CPT

## 2018-08-20 PROCEDURE — 36415 COLL VENOUS BLD VENIPUNCTURE: CPT

## 2018-08-20 PROCEDURE — 6370000000 HC RX 637 (ALT 250 FOR IP): Performed by: FAMILY MEDICINE

## 2018-08-20 PROCEDURE — 94660 CPAP INITIATION&MGMT: CPT

## 2018-08-20 PROCEDURE — 2580000003 HC RX 258: Performed by: FAMILY MEDICINE

## 2018-08-20 PROCEDURE — 85025 COMPLETE CBC W/AUTO DIFF WBC: CPT

## 2018-08-20 PROCEDURE — 6360000002 HC RX W HCPCS: Performed by: FAMILY MEDICINE

## 2018-08-20 PROCEDURE — 6370000000 HC RX 637 (ALT 250 FOR IP): Performed by: INTERNAL MEDICINE

## 2018-08-20 PROCEDURE — 80053 COMPREHEN METABOLIC PANEL: CPT

## 2018-08-20 RX ORDER — CETIRIZINE HYDROCHLORIDE 10 MG/1
10 TABLET ORAL DAILY
Qty: 30 TABLET | Refills: 1 | Status: SHIPPED
Start: 2018-08-21 | End: 2021-02-24

## 2018-08-20 RX ORDER — DAPSONE 25 MG/1
25 TABLET ORAL DAILY
Qty: 14 TABLET | Refills: 0 | Status: SHIPPED | OUTPATIENT
Start: 2018-08-21 | End: 2018-09-20

## 2018-08-20 RX ORDER — PANTOPRAZOLE SODIUM 40 MG/1
40 TABLET, DELAYED RELEASE ORAL
Qty: 30 TABLET | Refills: 3 | Status: SHIPPED | OUTPATIENT
Start: 2018-08-21 | End: 2018-11-29

## 2018-08-20 RX ORDER — PREDNISONE 10 MG/1
TABLET ORAL
Qty: 30 TABLET | Refills: 0 | Status: SHIPPED | OUTPATIENT
Start: 2018-08-20 | End: 2018-10-09

## 2018-08-20 RX ORDER — DAPSONE 25 MG/1
50 TABLET ORAL DAILY
Status: DISCONTINUED | OUTPATIENT
Start: 2018-08-21 | End: 2018-08-20 | Stop reason: HOSPADM

## 2018-08-20 RX ADMIN — ACETAMINOPHEN 650 MG: 325 TABLET ORAL at 01:27

## 2018-08-20 RX ADMIN — SODIUM CHLORIDE AND POTASSIUM CHLORIDE: 4.5; 1.49 INJECTION, SOLUTION INTRAVENOUS at 01:27

## 2018-08-20 RX ADMIN — BENZONATATE 200 MG: 100 CAPSULE ORAL at 08:44

## 2018-08-20 RX ADMIN — FLUTICASONE FUROATE AND VILANTEROL 1 PUFF: 200; 25 POWDER RESPIRATORY (INHALATION) at 08:43

## 2018-08-20 RX ADMIN — IPRATROPIUM BROMIDE AND ALBUTEROL SULFATE 1 AMPULE: .5; 3 SOLUTION RESPIRATORY (INHALATION) at 12:18

## 2018-08-20 RX ADMIN — NYSTATIN 500000 UNITS: 100000 SUSPENSION ORAL at 18:57

## 2018-08-20 RX ADMIN — VITAMIN D, TAB 1000IU (100/BT) 5000 UNITS: 25 TAB at 08:43

## 2018-08-20 RX ADMIN — CETIRIZINE HYDROCHLORIDE 10 MG: 10 TABLET, FILM COATED ORAL at 08:44

## 2018-08-20 RX ADMIN — IPRATROPIUM BROMIDE AND ALBUTEROL SULFATE 1 AMPULE: .5; 3 SOLUTION RESPIRATORY (INHALATION) at 08:32

## 2018-08-20 RX ADMIN — ENOXAPARIN SODIUM 40 MG: 40 INJECTION, SOLUTION INTRAVENOUS; SUBCUTANEOUS at 08:44

## 2018-08-20 RX ADMIN — ALPRAZOLAM 0.5 MG: 0.25 TABLET ORAL at 08:38

## 2018-08-20 RX ADMIN — IPRATROPIUM BROMIDE AND ALBUTEROL SULFATE 1 AMPULE: .5; 3 SOLUTION RESPIRATORY (INHALATION) at 16:10

## 2018-08-20 RX ADMIN — BENZONATATE 200 MG: 100 CAPSULE ORAL at 14:08

## 2018-08-20 RX ADMIN — NYSTATIN 500000 UNITS: 100000 SUSPENSION ORAL at 08:45

## 2018-08-20 RX ADMIN — Medication 10 ML: at 08:45

## 2018-08-20 RX ADMIN — PREDNISONE 40 MG: 20 TABLET ORAL at 08:44

## 2018-08-20 RX ADMIN — DAPSONE 25 MG: 25 TABLET ORAL at 08:44

## 2018-08-20 RX ADMIN — NYSTATIN 500000 UNITS: 100000 SUSPENSION ORAL at 14:09

## 2018-08-20 RX ADMIN — PANTOPRAZOLE SODIUM 40 MG: 40 TABLET, DELAYED RELEASE ORAL at 05:47

## 2018-08-20 RX ADMIN — ASPIRIN 81 MG 81 MG: 81 TABLET ORAL at 08:44

## 2018-08-20 RX ADMIN — MONTELUKAST SODIUM 10 MG: 10 TABLET, FILM COATED ORAL at 08:44

## 2018-08-20 ASSESSMENT — PAIN SCALES - GENERAL: PAINLEVEL_OUTOF10: 4

## 2018-08-20 NOTE — PROGRESS NOTES
Went to floor -- just DC home  Noted that pulmonary treating for bronchospastic asthma -- likely NOT related to lupus  On dmard--I had started dapsone on Thursday night    Already on steroids-- slow taper    +dsDNA but NOT many systemic features of SLE    Urine P/C normal, no GN  No serositis  No acute rash (other than long hx of hives)

## 2018-08-20 NOTE — PROGRESS NOTES
Pulmonary Subsequent Hospital F/U note    Patient is being followed for: acute bronchitis    Interval HPI:  Lying in bed on room air  Ambulated in the hallway with normal saturations  Reports wheezing  +cough    ROS:  Denies fever, night sweats  Denies hemoptysis  Denies chest pain, edema, palpitations  Denies nausea    Exam:  /70   Pulse 88   Temp 97.8 °F (36.6 °C) (Oral)   Resp 18   Ht 5' 1\" (1.549 m)   Wt 194 lb 7 oz (88.2 kg)   SpO2 98%   BMI 36.74 kg/m²    General: Patient is resting comfortably, no distress, breathing is not labored  HEENT: PERRL, EOMI, MMM, no oral lesions  Neck: supple no adenopathy  CV: RRR without murmur  Lungs: Clear to auscultation bilaterally without wheezing or crackles. Symmetric Air Entry. Abd: soft, ND, NT, bowel sounds normal  Ext: warm, no edema    Data:    Oximetry:  SpO2 Readings from Last 1 Encounters:   08/20/18 98%       Imaging personally reviewed by myself:      Pertinent labs reviewed and noted:  Lab Results   Component Value Date    WBC 14.5 08/20/2018    HGB 11.2 08/20/2018    HCT 33.9 08/20/2018    MCV 86.7 08/20/2018    MCH 28.6 08/20/2018    MCHC 33.0 08/20/2018    RDW 14.7 08/20/2018     08/20/2018    MPV 8.8 08/20/2018     Lab Results   Component Value Date     08/20/2018    K 4.2 08/20/2018    K 3.7 08/17/2018     08/20/2018    CO2 24 08/20/2018    BUN 16 08/20/2018    CREATININE 0.7 08/20/2018    LABALBU 3.1 08/20/2018    LABALBU 4.3 04/13/2011    CALCIUM 8.5 08/20/2018    GFRAA >60 08/20/2018    LABGLOM >60 08/20/2018     Lab Results   Component Value Date    PROTIME 12.2 04/29/2018    INR 1.1 04/29/2018       Assessment:  1. Acute bronchitis d/t rhinovirus  2. Asthma with acute exacerbation  3. Lupus    Plan:  1. Changed to oral prednisone  2. Clinically improved, no wheezing today  3. Okay for d/c on Breo 200/25 once daily and pred taper from a pulmonary standpoint. 4. I personally participated in the discharge med rec  5.  F/u outpatient 4 weeks with Jonathon Silverman MD  8/20/2018 1:13 PM

## 2018-08-20 NOTE — PROGRESS NOTES
The patient wants to try to wear the BiPAP tonight, but was not ready at this time. Respiratory therapist will try back later.

## 2018-08-20 NOTE — PROGRESS NOTES
Oral Daily PRN Jeneane Major, DO        ondansetron [facility]) injection 4 mg  4 mg Intravenous Q6H PRN Harvinder Kruse, DO   4 mg at 08/19/18 1429    enoxaparin (LOVENOX) injection 40 mg  40 mg Subcutaneous Daily Harvinder Major, DO   40 mg at 08/19/18 0935    ipratropium-albuterol (DUONEB) nebulizer solution 1 ampule  1 ampule Inhalation Q4H WA Harvinder Major, DO   1 ampule at 08/19/18 1936    acetaminophen (TYLENOL) tablet 650 mg  650 mg Oral Q4H PRN Harvinder Major, DO   650 mg at 08/20/18 0127    aspirin chewable tablet 81 mg  81 mg Oral Daily Mendez Cotto, DO   81 mg at 08/19/18 1765    nitroGLYCERIN (NITROSTAT) SL tablet 0.4 mg  0.4 mg Sublingual Q5 Min PRN Harvinder Major, DO        perflutren lipid microspheres (DEFINITY) injection 1.65 mg  1.5 mL Intravenous ONCE PRN SUZANNE Brunson - CNP        benzonatate (TESSALON) capsule 200 mg  200 mg Oral TID Domenico Eisenberg, DO   200 mg at 08/19/18 2108    dapsone tablet 25 mg  25 mg Oral Daily Marisol Dickerson MD   25 mg at 08/19/18 0935     Allergies   Allergen Reactions    Fish-Derived Products Anaphylaxis     anaphylaxis    Nsaids      Vomiting    Cefdinir      Rash, Blisters. Rash, Blisters.  Cephalosporins     Ciprofloxacin     Codeine Nausea Only    Fish Allergy     Levaquin [Levofloxacin In D5w] Hives    Metronidazole Hives     throat closing, hives  throat closing, hives    Other      Allergic to Midrin    Wellbutrin [Bupropion]     Pce [Erythromycin] Rash     Active Problems:    Chest pain    Acute asthma exacerbation    Shortness of breath  Resolved Problems:    * No resolved hospital problems. *    Blood pressure 120/70, pulse 60, temperature 97.9 °F (36.6 °C), temperature source Oral, resp. rate 18, height 5' 1\" (1.549 m), weight 194 lb 7 oz (88.2 kg), SpO2 96 %.     Subjectiveas noted, refusing bipap q hs, on po steroids  Objective Blood pressure 120/70, pulse 60, temperature 97.9 °F (36.6 °C), temperature source Oral, resp. rate 18, height 5' 1\" (1.549 m), weight 194 lb 7 oz (88.2 kg), SpO2 96 %.   AWAKE, ALERT  COMFORTABLE  CLEAR NASAL DRAINAGE  HEART RRR  LUNGS CTAB  ABD OBESE, SOFT, NO PAIN  NO EDEMA OR CYANOSIS    Assessment & Plan  ACUTE RESP FAILURE  ACUTE BRONCHITIS  ACUTE EXAC ASTHMA  LUPUS  ENETERIS  ANXIETY  URI, RHINOVIRUS  BRADYCARDIA, SSS STABILIZED  PT ON PO, WILL DC HOME  Ronak Staton,   8/20/2018

## 2018-08-21 LAB
BLOOD CULTURE, ROUTINE: NORMAL
CULTURE, BLOOD 2: NORMAL
G-6-PD, QUANT: 12.9 U/G HB (ref 9.9–16.6)

## 2018-08-23 ENCOUNTER — TELEPHONE (OUTPATIENT)
Dept: CARDIOLOGY CLINIC | Age: 48
End: 2018-08-23

## 2018-09-01 NOTE — DISCHARGE SUMMARY
[id number] [address]                                 DISCHARGE SUMMARY    PATIENT NAME: Ruben Robert                :        1970  MED REC NO:   [id number]                            ROOM:       [id number]  ACCOUNT NO:   [de-identified]                           ADMIT DATE: 2018  PROVIDER:     Tawnya Koehler DO                  [facility], [place], operated by [facility] DATE: 2018    DATE OF ADMISSION:  2018    DATE OF DISCHARGE:  2018    HISTORY OF CHIEF COMPLAINT:  She is a 30-year-old female. She has a  history of asthma, allergic rhinitis, severe anxiety, and depression. She  has recently been diagnosed with Sjogren's and lupus, was in the emergency  room a few weeks ago. She had a CT of the abdomen, which showed enteritis  on the right side, inflammation and fluid around the bowel. She had been  seen due to cough, wheezing, and shortness of breath. She was placed on  Levaquin and steroids, developed side effects, and she stopped her  medications, became worse with weakness, arthralgia, nausea, heart rate  racing from 40s to 100s, extremely weak, numbness of the face, cough, and  constantly with chest pain and wheezing. She was seen in the emergency  room. By the ER, potassium was 2.8. At that time, a CTA was done of the  chest, which was negative for pulmonary embolus. They gave her potassium  and sent her home. She continued with her symptomatology. Was seen by  Rheumatology, who felt that she needed admitted that she may have a lupus  exacerbation, and she was directly admitted for further workup and  treatment. She was admitted with acute respiratory distress, exacerbation  of asthma, bronchitis, chest pain, obesity, enteritis, systemic lupus,  anxiety, depression, recent hypokalemia, palpitations, royce-tachy  syndrome, and allergic rhinitis.   Consultation with Cardiology,  Rheumatology, and Pulmonology were

## 2018-10-04 ENCOUNTER — TELEPHONE (OUTPATIENT)
Dept: CARDIOLOGY CLINIC | Age: 48
End: 2018-10-04

## 2018-10-06 ENCOUNTER — HOSPITAL ENCOUNTER (EMERGENCY)
Age: 48
Discharge: HOME | End: 2018-10-06
Attending: EMERGENCY MEDICINE
Payer: COMMERCIAL

## 2018-10-06 VITALS
SYSTOLIC BLOOD PRESSURE: 136 MMHG | HEART RATE: 88 BPM | TEMPERATURE: 98.2 F | WEIGHT: 186 LBS | DIASTOLIC BLOOD PRESSURE: 87 MMHG | BODY MASS INDEX: 35.12 KG/M2 | OXYGEN SATURATION: 100 % | HEIGHT: 61 IN | RESPIRATION RATE: 16 BRPM

## 2018-10-06 DIAGNOSIS — R00.2 PALPITATIONS: Primary | ICD-10-CM

## 2018-10-06 LAB
ANION GAP SERPL CALCULATED.3IONS-SCNC: 10 MMOL/L (ref 7–16)
BASOPHILS ABSOLUTE: 0.02 E9/L (ref 0–0.2)
BASOPHILS RELATIVE PERCENT: 0.3 % (ref 0–2)
BUN BLDV-MCNC: 16 MG/DL (ref 6–20)
CALCIUM SERPL-MCNC: 9.8 MG/DL (ref 8.6–10.2)
CHLORIDE BLD-SCNC: 103 MMOL/L (ref 98–107)
CK MB: <1 NG/ML (ref 0–4.3)
CO2: 26 MMOL/L (ref 22–29)
CREAT SERPL-MCNC: 0.7 MG/DL (ref 0.5–1)
EKG ATRIAL RATE: 90 BPM
EKG P AXIS: 41 DEGREES
EKG P-R INTERVAL: 148 MS
EKG Q-T INTERVAL: 358 MS
EKG QRS DURATION: 72 MS
EKG QTC CALCULATION (BAZETT): 437 MS
EKG R AXIS: 15 DEGREES
EKG T AXIS: 0 DEGREES
EKG VENTRICULAR RATE: 90 BPM
EOSINOPHILS ABSOLUTE: 0.11 E9/L (ref 0.05–0.5)
EOSINOPHILS RELATIVE PERCENT: 1.5 % (ref 0–6)
GFR AFRICAN AMERICAN: >60
GFR NON-AFRICAN AMERICAN: >60 ML/MIN/1.73
GLUCOSE BLD-MCNC: 113 MG/DL (ref 74–109)
HCT VFR BLD CALC: 37.9 % (ref 34–48)
HEMOGLOBIN: 12.9 G/DL (ref 11.5–15.5)
IMMATURE GRANULOCYTES #: 0.03 E9/L
IMMATURE GRANULOCYTES %: 0.4 % (ref 0–5)
LYMPHOCYTES ABSOLUTE: 1.67 E9/L (ref 1.5–4)
LYMPHOCYTES RELATIVE PERCENT: 22.7 % (ref 20–42)
MAGNESIUM: 1.8 MG/DL (ref 1.6–2.6)
MCH RBC QN AUTO: 29.1 PG (ref 26–35)
MCHC RBC AUTO-ENTMCNC: 34 % (ref 32–34.5)
MCV RBC AUTO: 85.4 FL (ref 80–99.9)
MONOCYTES ABSOLUTE: 0.62 E9/L (ref 0.1–0.95)
MONOCYTES RELATIVE PERCENT: 8.4 % (ref 2–12)
NEUTROPHILS ABSOLUTE: 4.92 E9/L (ref 1.8–7.3)
NEUTROPHILS RELATIVE PERCENT: 66.7 % (ref 43–80)
PDW BLD-RTO: 13.9 FL (ref 11.5–15)
PLATELET # BLD: 268 E9/L (ref 130–450)
PMV BLD AUTO: 9 FL (ref 7–12)
POTASSIUM SERPL-SCNC: 3.6 MMOL/L (ref 3.5–5)
RBC # BLD: 4.44 E12/L (ref 3.5–5.5)
SODIUM BLD-SCNC: 139 MMOL/L (ref 132–146)
TOTAL CK: 41 U/L (ref 20–180)
TROPONIN: <0.01 NG/ML (ref 0–0.03)
WBC # BLD: 7.4 E9/L (ref 4.5–11.5)

## 2018-10-06 PROCEDURE — 84484 ASSAY OF TROPONIN QUANT: CPT

## 2018-10-06 PROCEDURE — 96361 HYDRATE IV INFUSION ADD-ON: CPT

## 2018-10-06 PROCEDURE — 80048 BASIC METABOLIC PNL TOTAL CA: CPT

## 2018-10-06 PROCEDURE — 96360 HYDRATION IV INFUSION INIT: CPT

## 2018-10-06 PROCEDURE — 2580000003 HC RX 258: Performed by: EMERGENCY MEDICINE

## 2018-10-06 PROCEDURE — 85025 COMPLETE CBC W/AUTO DIFF WBC: CPT

## 2018-10-06 PROCEDURE — 82550 ASSAY OF CK (CPK): CPT

## 2018-10-06 PROCEDURE — 82553 CREATINE MB FRACTION: CPT

## 2018-10-06 PROCEDURE — 99284 EMERGENCY DEPT VISIT MOD MDM: CPT

## 2018-10-06 PROCEDURE — 83735 ASSAY OF MAGNESIUM: CPT

## 2018-10-06 RX ORDER — 0.9 % SODIUM CHLORIDE 0.9 %
1000 INTRAVENOUS SOLUTION INTRAVENOUS ONCE
Status: COMPLETED | OUTPATIENT
Start: 2018-10-06 | End: 2018-10-06

## 2018-10-06 RX ADMIN — SODIUM CHLORIDE 1000 ML: 9 INJECTION, SOLUTION INTRAVENOUS at 04:46

## 2018-10-06 NOTE — ED PROVIDER NOTES
HPI:  10/6/18,   Time: 3:45 AM         Gideon Arboleda is a 41 y.o. female presenting to the ED for palpitations, beginning over last few hours ago. The complaint has been persistent, moderate in severity, and worsened by changing position  In supine position  /seems to be better while she is sitting up, no chest pain or shortness of breath no fever or chills and no cough, no syncope or convulsions or vertigo    ROS:   Pertinent positives and negatives are stated within HPI, all other systems reviewed and are negative.  --------------------------------------------- PAST HISTORY ---------------------------------------------  Past Medical History:  has a past medical history of ARDS (adult respiratory distress syndrome) ([facility] [address] Asthma; Colitis; and Depression. Past Surgical History:  has a past surgical history that includes Cholecystectomy, laparoscopic (03/2018). Social History:  reports that she has never smoked. She has never used smokeless tobacco. She reports that she does not drink alcohol or use drugs. Family History: family history includes Celiac Disease in her sister; Crohn's Disease in her brother; Diabetes in her father, mother, and sister; High Blood Pressure in her brother, mother, and sister; Uterine Cancer in her maternal grandmother. The patients home medications have been reviewed. Allergies: Fish-derived products; Nsaids; Cefdinir; Cephalosporins; Ciprofloxacin; Codeine; Fish allergy; Levaquin [levofloxacin in d5w]; Metronidazole; Other;  Wellbutrin [bupropion]; and Pce [erythromycin]    -------------------------------------------------- RESULTS -------------------------------------------------  All laboratory and radiology results have been personally reviewed by myself   LABS:  Results for orders placed or performed during the hospital encounter of 10/06/18   CBC Auto Differential   Result Value Ref Range    WBC 7.4 4.5 - 11.5 E9/L    RBC 4.44 3.50 - 5.50 E12/L    Hemoglobin

## 2018-10-08 ENCOUNTER — TELEPHONE (OUTPATIENT)
Dept: ADMINISTRATIVE | Age: 48
End: 2018-10-08

## 2018-10-09 ENCOUNTER — APPOINTMENT (OUTPATIENT)
Dept: GENERAL RADIOLOGY | Age: 48
End: 2018-10-09
Payer: COMMERCIAL

## 2018-10-09 ENCOUNTER — HOSPITAL ENCOUNTER (OUTPATIENT)
Age: 48
Setting detail: OBSERVATION
Discharge: HOME | End: 2018-10-10
Attending: EMERGENCY MEDICINE | Admitting: FAMILY MEDICINE
Payer: COMMERCIAL

## 2018-10-09 DIAGNOSIS — R07.9 CHEST PAIN, UNSPECIFIED TYPE: Primary | ICD-10-CM

## 2018-10-09 LAB
ALBUMIN SERPL-MCNC: 4.2 G/DL (ref 3.5–5.2)
ALP BLD-CCNC: 84 U/L (ref 35–104)
ALT SERPL-CCNC: 11 U/L (ref 0–32)
ANION GAP SERPL CALCULATED.3IONS-SCNC: 11 MMOL/L (ref 7–16)
AST SERPL-CCNC: 12 U/L (ref 0–31)
BASOPHILS ABSOLUTE: 0.03 E9/L (ref 0–0.2)
BASOPHILS RELATIVE PERCENT: 0.4 % (ref 0–2)
BILIRUB SERPL-MCNC: 0.5 MG/DL (ref 0–1.2)
BILIRUBIN DIRECT: <0.2 MG/DL (ref 0–0.3)
BILIRUBIN, INDIRECT: NORMAL MG/DL (ref 0–1)
BUN BLDV-MCNC: 9 MG/DL (ref 6–20)
CALCIUM SERPL-MCNC: 9.7 MG/DL (ref 8.6–10.2)
CHLORIDE BLD-SCNC: 106 MMOL/L (ref 98–107)
CO2: 26 MMOL/L (ref 22–29)
CREAT SERPL-MCNC: 0.7 MG/DL (ref 0.5–1)
EKG ATRIAL RATE: 104 BPM
EKG P AXIS: 66 DEGREES
EKG P-R INTERVAL: 138 MS
EKG Q-T INTERVAL: 334 MS
EKG QRS DURATION: 74 MS
EKG QTC CALCULATION (BAZETT): 439 MS
EKG R AXIS: 30 DEGREES
EKG T AXIS: -6 DEGREES
EKG VENTRICULAR RATE: 104 BPM
EOSINOPHILS ABSOLUTE: 0.05 E9/L (ref 0.05–0.5)
EOSINOPHILS RELATIVE PERCENT: 0.7 % (ref 0–6)
GFR AFRICAN AMERICAN: >60
GFR NON-AFRICAN AMERICAN: >60 ML/MIN/1.73
GLUCOSE BLD-MCNC: 127 MG/DL (ref 74–109)
HCT VFR BLD CALC: 40.4 % (ref 34–48)
HEMOGLOBIN: 13.7 G/DL (ref 11.5–15.5)
IMMATURE GRANULOCYTES #: 0.02 E9/L
IMMATURE GRANULOCYTES %: 0.3 % (ref 0–5)
LACTIC ACID: 0.8 MMOL/L (ref 0.5–2.2)
LIPASE: 44 U/L (ref 13–60)
LYMPHOCYTES ABSOLUTE: 1.53 E9/L (ref 1.5–4)
LYMPHOCYTES RELATIVE PERCENT: 20.3 % (ref 20–42)
MCH RBC QN AUTO: 29.1 PG (ref 26–35)
MCHC RBC AUTO-ENTMCNC: 33.9 % (ref 32–34.5)
MCV RBC AUTO: 85.8 FL (ref 80–99.9)
MONOCYTES ABSOLUTE: 0.52 E9/L (ref 0.1–0.95)
MONOCYTES RELATIVE PERCENT: 6.9 % (ref 2–12)
NEUTROPHILS ABSOLUTE: 5.39 E9/L (ref 1.8–7.3)
NEUTROPHILS RELATIVE PERCENT: 71.4 % (ref 43–80)
PDW BLD-RTO: 13.8 FL (ref 11.5–15)
PLATELET # BLD: 280 E9/L (ref 130–450)
PMV BLD AUTO: 9.1 FL (ref 7–12)
POTASSIUM SERPL-SCNC: 3.6 MMOL/L (ref 3.5–5)
RBC # BLD: 4.71 E12/L (ref 3.5–5.5)
SODIUM BLD-SCNC: 143 MMOL/L (ref 132–146)
TOTAL PROTEIN: 7.3 G/DL (ref 6.4–8.3)
TROPONIN: <0.01 NG/ML (ref 0–0.03)
WBC # BLD: 7.5 E9/L (ref 4.5–11.5)

## 2018-10-09 PROCEDURE — 71045 X-RAY EXAM CHEST 1 VIEW: CPT

## 2018-10-09 PROCEDURE — 83690 ASSAY OF LIPASE: CPT

## 2018-10-09 PROCEDURE — 2580000003 HC RX 258: Performed by: PHYSICIAN ASSISTANT

## 2018-10-09 PROCEDURE — G0378 HOSPITAL OBSERVATION PER HR: HCPCS

## 2018-10-09 PROCEDURE — 99285 EMERGENCY DEPT VISIT HI MDM: CPT

## 2018-10-09 PROCEDURE — 6360000002 HC RX W HCPCS: Performed by: PHYSICIAN ASSISTANT

## 2018-10-09 PROCEDURE — 85025 COMPLETE CBC W/AUTO DIFF WBC: CPT

## 2018-10-09 PROCEDURE — 84484 ASSAY OF TROPONIN QUANT: CPT

## 2018-10-09 PROCEDURE — 80048 BASIC METABOLIC PNL TOTAL CA: CPT

## 2018-10-09 PROCEDURE — 93005 ELECTROCARDIOGRAM TRACING: CPT | Performed by: PHYSICIAN ASSISTANT

## 2018-10-09 PROCEDURE — 96374 THER/PROPH/DIAG INJ IV PUSH: CPT

## 2018-10-09 PROCEDURE — 83605 ASSAY OF LACTIC ACID: CPT

## 2018-10-09 PROCEDURE — 80076 HEPATIC FUNCTION PANEL: CPT

## 2018-10-09 PROCEDURE — 6370000000 HC RX 637 (ALT 250 FOR IP): Performed by: EMERGENCY MEDICINE

## 2018-10-09 RX ORDER — ACETAMINOPHEN 325 MG/1
650 TABLET ORAL EVERY 4 HOURS PRN
Status: DISCONTINUED | OUTPATIENT
Start: 2018-10-09 | End: 2018-10-10 | Stop reason: HOSPADM

## 2018-10-09 RX ORDER — CETIRIZINE HYDROCHLORIDE 10 MG/1
10 TABLET ORAL DAILY
Status: DISCONTINUED | OUTPATIENT
Start: 2018-10-10 | End: 2018-10-10 | Stop reason: HOSPADM

## 2018-10-09 RX ORDER — IPRATROPIUM BROMIDE AND ALBUTEROL SULFATE 2.5; .5 MG/3ML; MG/3ML
1 SOLUTION RESPIRATORY (INHALATION) EVERY 6 HOURS PRN
Status: DISCONTINUED | OUTPATIENT
Start: 2018-10-09 | End: 2018-10-10 | Stop reason: HOSPADM

## 2018-10-09 RX ORDER — PROMETHAZINE HYDROCHLORIDE 12.5 MG/1
12.5 TABLET ORAL EVERY 6 HOURS PRN
COMMUNITY
End: 2018-11-29

## 2018-10-09 RX ORDER — ONDANSETRON 2 MG/ML
4 INJECTION INTRAMUSCULAR; INTRAVENOUS ONCE
Status: COMPLETED | OUTPATIENT
Start: 2018-10-09 | End: 2018-10-09

## 2018-10-09 RX ORDER — IPRATROPIUM BROMIDE AND ALBUTEROL SULFATE 2.5; .5 MG/3ML; MG/3ML
1 SOLUTION RESPIRATORY (INHALATION) EVERY 6 HOURS PRN
COMMUNITY

## 2018-10-09 RX ORDER — TRAMADOL HYDROCHLORIDE 50 MG/1
50 TABLET ORAL EVERY 6 HOURS PRN
Status: DISCONTINUED | OUTPATIENT
Start: 2018-10-09 | End: 2018-10-10 | Stop reason: HOSPADM

## 2018-10-09 RX ORDER — MONTELUKAST SODIUM 10 MG/1
10 TABLET ORAL DAILY
Status: DISCONTINUED | OUTPATIENT
Start: 2018-10-10 | End: 2018-10-10 | Stop reason: HOSPADM

## 2018-10-09 RX ORDER — FOLIC ACID 1 MG/1
1 TABLET ORAL DAILY
Status: DISCONTINUED | OUTPATIENT
Start: 2018-10-10 | End: 2018-10-10 | Stop reason: HOSPADM

## 2018-10-09 RX ORDER — PROMETHAZINE HYDROCHLORIDE 25 MG/1
12.5 TABLET ORAL EVERY 6 HOURS PRN
Status: DISCONTINUED | OUTPATIENT
Start: 2018-10-09 | End: 2018-10-10 | Stop reason: HOSPADM

## 2018-10-09 RX ORDER — CHOLECALCIFEROL (VITAMIN D3) 50 MCG
5000 TABLET ORAL DAILY
Status: DISCONTINUED | OUTPATIENT
Start: 2018-10-10 | End: 2018-10-10 | Stop reason: HOSPADM

## 2018-10-09 RX ORDER — AMMONIUM LACTATE 12 G/100G
LOTION TOPICAL DAILY PRN
Status: DISCONTINUED | OUTPATIENT
Start: 2018-10-09 | End: 2018-10-10 | Stop reason: HOSPADM

## 2018-10-09 RX ORDER — AMMONIUM LACTATE 12 G/100G
CREAM TOPICAL PRN
COMMUNITY
End: 2021-02-24

## 2018-10-09 RX ORDER — ASPIRIN 81 MG/1
324 TABLET, CHEWABLE ORAL ONCE
Status: COMPLETED | OUTPATIENT
Start: 2018-10-09 | End: 2018-10-09

## 2018-10-09 RX ORDER — FOLIC ACID 1 MG/1
1 TABLET ORAL DAILY
COMMUNITY
End: 2019-07-01

## 2018-10-09 RX ORDER — FLUTICASONE FUROATE AND VILANTEROL 200; 25 UG/1; UG/1
1 POWDER RESPIRATORY (INHALATION) DAILY
Status: DISCONTINUED | OUTPATIENT
Start: 2018-10-10 | End: 2018-10-10 | Stop reason: HOSPADM

## 2018-10-09 RX ORDER — BENZONATATE 100 MG/1
100 CAPSULE ORAL EVERY 4 HOURS PRN
Status: DISCONTINUED | OUTPATIENT
Start: 2018-10-09 | End: 2018-10-10 | Stop reason: HOSPADM

## 2018-10-09 RX ORDER — PANTOPRAZOLE SODIUM 40 MG/1
40 TABLET, DELAYED RELEASE ORAL
Status: DISCONTINUED | OUTPATIENT
Start: 2018-10-10 | End: 2018-10-10 | Stop reason: HOSPADM

## 2018-10-09 RX ORDER — ALPRAZOLAM 0.25 MG/1
0.5 TABLET ORAL 3 TIMES DAILY PRN
Status: DISCONTINUED | OUTPATIENT
Start: 2018-10-09 | End: 2018-10-10 | Stop reason: HOSPADM

## 2018-10-09 RX ORDER — ASPIRIN 81 MG/1
81 TABLET ORAL DAILY
Status: DISCONTINUED | OUTPATIENT
Start: 2018-10-10 | End: 2018-10-10 | Stop reason: HOSPADM

## 2018-10-09 RX ORDER — 0.9 % SODIUM CHLORIDE 0.9 %
500 INTRAVENOUS SOLUTION INTRAVENOUS ONCE
Status: COMPLETED | OUTPATIENT
Start: 2018-10-09 | End: 2018-10-09

## 2018-10-09 RX ADMIN — ASPIRIN 324 MG: 81 TABLET, CHEWABLE ORAL at 20:35

## 2018-10-09 RX ADMIN — ONDANSETRON 4 MG: 2 INJECTION INTRAMUSCULAR; INTRAVENOUS at 19:00

## 2018-10-09 RX ADMIN — SODIUM CHLORIDE 500 ML: 9 INJECTION, SOLUTION INTRAVENOUS at 18:59

## 2018-10-09 RX ADMIN — NITROGLYCERIN 1 INCH: 20 OINTMENT TOPICAL at 20:37

## 2018-10-09 ASSESSMENT — PAIN DESCRIPTION - PAIN TYPE: TYPE: ACUTE PAIN

## 2018-10-09 ASSESSMENT — PAIN DESCRIPTION - LOCATION: LOCATION: CHEST

## 2018-10-09 ASSESSMENT — PAIN SCALES - GENERAL
PAINLEVEL_OUTOF10: 7
PAINLEVEL_OUTOF10: 7

## 2018-10-10 ENCOUNTER — APPOINTMENT (OUTPATIENT)
Dept: NUCLEAR MEDICINE | Age: 48
End: 2018-10-10
Payer: COMMERCIAL

## 2018-10-10 VITALS
RESPIRATION RATE: 15 BRPM | BODY MASS INDEX: 37.38 KG/M2 | HEIGHT: 61 IN | TEMPERATURE: 97.7 F | WEIGHT: 198 LBS | OXYGEN SATURATION: 96 % | DIASTOLIC BLOOD PRESSURE: 74 MMHG | SYSTOLIC BLOOD PRESSURE: 132 MMHG | HEART RATE: 61 BPM

## 2018-10-10 LAB
ANION GAP SERPL CALCULATED.3IONS-SCNC: 9 MMOL/L (ref 7–16)
BACTERIA: ABNORMAL /HPF
BILIRUBIN URINE: ABNORMAL
BLOOD, URINE: ABNORMAL
BUN BLDV-MCNC: 9 MG/DL (ref 6–20)
CALCIUM SERPL-MCNC: 8.9 MG/DL (ref 8.6–10.2)
CHLORIDE BLD-SCNC: 107 MMOL/L (ref 98–107)
CLARITY: ABNORMAL
CO2: 26 MMOL/L (ref 22–29)
COLOR: YELLOW
CREAT SERPL-MCNC: 0.7 MG/DL (ref 0.5–1)
CRYSTALS, UA: ABNORMAL
EPITHELIAL CELLS, UA: ABNORMAL /HPF
FILM ARRAY ADENOVIRUS: NORMAL
FILM ARRAY BORDETELLA PERTUSSIS: NORMAL
FILM ARRAY CHLAMYDOPHILIA PNEUMONIAE: NORMAL
FILM ARRAY CORONAVIRUS 229E: NORMAL
FILM ARRAY CORONAVIRUS HKU1: NORMAL
FILM ARRAY CORONAVIRUS NL63: NORMAL
FILM ARRAY CORONAVIRUS OC43: NORMAL
FILM ARRAY INFLUENZA A VIRUS 09H1: NORMAL
FILM ARRAY INFLUENZA A VIRUS H1: NORMAL
FILM ARRAY INFLUENZA A VIRUS H3: NORMAL
FILM ARRAY INFLUENZA A VIRUS: NORMAL
FILM ARRAY INFLUENZA B: NORMAL
FILM ARRAY METAPNEUMOVIRUS: NORMAL
FILM ARRAY MYCOPLASMA PNEUMONIAE: NORMAL
FILM ARRAY PARAINFLUENZA VIRUS 1: NORMAL
FILM ARRAY PARAINFLUENZA VIRUS 2: NORMAL
FILM ARRAY PARAINFLUENZA VIRUS 3: NORMAL
FILM ARRAY PARAINFLUENZA VIRUS 4: NORMAL
FILM ARRAY RESPIRATORY SYNCITIAL VIRUS: NORMAL
FILM ARRAY RHINOVIRUS/ENTEROVIRUS: NORMAL
GFR AFRICAN AMERICAN: >60
GFR NON-AFRICAN AMERICAN: >60 ML/MIN/1.73
GLUCOSE BLD-MCNC: 90 MG/DL (ref 74–109)
GLUCOSE URINE: NEGATIVE MG/DL
HCG(URINE) PREGNANCY TEST: NEGATIVE
KETONES, URINE: 40 MG/DL
LEUKOCYTE ESTERASE, URINE: ABNORMAL
LV EF: 70 %
LVEF MODALITY: NORMAL
NITRITE, URINE: NEGATIVE
PH UA: 5.5 (ref 5–9)
POTASSIUM SERPL-SCNC: 3.7 MMOL/L (ref 3.5–5)
PROTEIN UA: NEGATIVE MG/DL
RBC UA: ABNORMAL /HPF (ref 0–2)
SODIUM BLD-SCNC: 142 MMOL/L (ref 132–146)
SPECIFIC GRAVITY UA: >=1.03 (ref 1–1.03)
TROPONIN: <0.01 NG/ML (ref 0–0.03)
TROPONIN: <0.01 NG/ML (ref 0–0.03)
UROBILINOGEN, URINE: 0.2 E.U./DL
WBC UA: ABNORMAL /HPF (ref 0–5)

## 2018-10-10 PROCEDURE — 3430000000 HC RX DIAGNOSTIC RADIOPHARMACEUTICAL: Performed by: RADIOLOGY

## 2018-10-10 PROCEDURE — A9500 TC99M SESTAMIBI: HCPCS | Performed by: RADIOLOGY

## 2018-10-10 PROCEDURE — 2580000003 HC RX 258: Performed by: INTERNAL MEDICINE

## 2018-10-10 PROCEDURE — 99244 OFF/OP CNSLTJ NEW/EST MOD 40: CPT | Performed by: INTERNAL MEDICINE

## 2018-10-10 PROCEDURE — 6360000002 HC RX W HCPCS: Performed by: INTERNAL MEDICINE

## 2018-10-10 PROCEDURE — 93016 CV STRESS TEST SUPVJ ONLY: CPT | Performed by: INTERNAL MEDICINE

## 2018-10-10 PROCEDURE — 93017 CV STRESS TEST TRACING ONLY: CPT

## 2018-10-10 PROCEDURE — 36415 COLL VENOUS BLD VENIPUNCTURE: CPT

## 2018-10-10 PROCEDURE — 87486 CHLMYD PNEUM DNA AMP PROBE: CPT

## 2018-10-10 PROCEDURE — 6370000000 HC RX 637 (ALT 250 FOR IP): Performed by: INTERNAL MEDICINE

## 2018-10-10 PROCEDURE — 80048 BASIC METABOLIC PNL TOTAL CA: CPT

## 2018-10-10 PROCEDURE — 78452 HT MUSCLE IMAGE SPECT MULT: CPT

## 2018-10-10 PROCEDURE — 87798 DETECT AGENT NOS DNA AMP: CPT

## 2018-10-10 PROCEDURE — 81025 URINE PREGNANCY TEST: CPT

## 2018-10-10 PROCEDURE — 87633 RESP VIRUS 12-25 TARGETS: CPT

## 2018-10-10 PROCEDURE — 93018 CV STRESS TEST I&R ONLY: CPT | Performed by: INTERNAL MEDICINE

## 2018-10-10 PROCEDURE — G0378 HOSPITAL OBSERVATION PER HR: HCPCS

## 2018-10-10 PROCEDURE — 84484 ASSAY OF TROPONIN QUANT: CPT

## 2018-10-10 PROCEDURE — 81001 URINALYSIS AUTO W/SCOPE: CPT

## 2018-10-10 PROCEDURE — 87581 M.PNEUMON DNA AMP PROBE: CPT

## 2018-10-10 RX ORDER — SODIUM CHLORIDE 9 MG/ML
INJECTION, SOLUTION INTRAVENOUS CONTINUOUS
Status: DISCONTINUED | OUTPATIENT
Start: 2018-10-10 | End: 2018-10-10 | Stop reason: HOSPADM

## 2018-10-10 RX ORDER — ONDANSETRON 4 MG/1
4 TABLET, FILM COATED ORAL ONCE
Status: COMPLETED | OUTPATIENT
Start: 2018-10-10 | End: 2018-10-10

## 2018-10-10 RX ADMIN — TRAMADOL HYDROCHLORIDE 50 MG: 50 TABLET, FILM COATED ORAL at 01:50

## 2018-10-10 RX ADMIN — ASPIRIN 81 MG: 81 TABLET, COATED ORAL at 09:06

## 2018-10-10 RX ADMIN — ONDANSETRON HYDROCHLORIDE 4 MG: 4 TABLET, FILM COATED ORAL at 09:06

## 2018-10-10 RX ADMIN — ACETAMINOPHEN 650 MG: 325 TABLET ORAL at 17:33

## 2018-10-10 RX ADMIN — TRAMADOL HYDROCHLORIDE 50 MG: 50 TABLET, FILM COATED ORAL at 09:06

## 2018-10-10 RX ADMIN — SODIUM CHLORIDE: 9 INJECTION, SOLUTION INTRAVENOUS at 08:08

## 2018-10-10 RX ADMIN — ALPRAZOLAM 0.5 MG: 0.25 TABLET ORAL at 13:33

## 2018-10-10 RX ADMIN — Medication 30 MILLICURIE: at 12:20

## 2018-10-10 RX ADMIN — REGADENOSON 0.4 MG: 0.08 INJECTION, SOLUTION INTRAVENOUS at 12:47

## 2018-10-10 RX ADMIN — Medication 10 MILLICURIE: at 12:20

## 2018-10-10 RX ADMIN — PANTOPRAZOLE SODIUM 40 MG: 40 TABLET, DELAYED RELEASE ORAL at 09:07

## 2018-10-10 RX ADMIN — PROMETHAZINE HYDROCHLORIDE 12.5 MG: 25 TABLET ORAL at 13:34

## 2018-10-10 ASSESSMENT — PAIN DESCRIPTION - LOCATION
LOCATION: HEAD
LOCATION: CHEST
LOCATION: HEAD

## 2018-10-10 ASSESSMENT — PAIN DESCRIPTION - PROGRESSION
CLINICAL_PROGRESSION: NOT CHANGED
CLINICAL_PROGRESSION: GRADUALLY WORSENING

## 2018-10-10 ASSESSMENT — PAIN DESCRIPTION - DESCRIPTORS
DESCRIPTORS: HEADACHE
DESCRIPTORS: ACHING;CONSTANT;DISCOMFORT
DESCRIPTORS: ACHING;DISCOMFORT;PRESSURE

## 2018-10-10 ASSESSMENT — PAIN DESCRIPTION - ORIENTATION
ORIENTATION: MID
ORIENTATION: MID

## 2018-10-10 ASSESSMENT — PAIN DESCRIPTION - FREQUENCY
FREQUENCY: INTERMITTENT
FREQUENCY: INTERMITTENT

## 2018-10-10 ASSESSMENT — PAIN SCALES - GENERAL
PAINLEVEL_OUTOF10: 10
PAINLEVEL_OUTOF10: 0
PAINLEVEL_OUTOF10: 7
PAINLEVEL_OUTOF10: 1
PAINLEVEL_OUTOF10: 4
PAINLEVEL_OUTOF10: 5

## 2018-10-10 ASSESSMENT — PAIN DESCRIPTION - ONSET
ONSET: GRADUAL
ONSET: GRADUAL

## 2018-10-10 ASSESSMENT — PAIN DESCRIPTION - PAIN TYPE
TYPE: ACUTE PAIN

## 2018-10-10 NOTE — CONSULTS
CHIEF COMPLAINT: Chest pain and palpitations    HISTORY OF PRESENT ILLNESS: Patient is a 41 y.o. female seen at the request of Roger Wallis DO and followed at our office by Dr. Neha Khoury. Patient complains of chest pain. Onset was several months months ago, with worsening course since that time. The patient describes the pain as intermittent, occurring with increasing frequency, precordial and pressure like in nature, radiates to the left arm, left neck/jaw and upper back. Patient rates pain as a moderate in intensity. Associated symptoms are chest pressure/discomfort and dyspnea. Aggravating factors are walking. Alleviating factors are: rest.     Past Medical History:    1. History of ARDS. 2. History of urticaria with possible autoimmune disease. 3. History of asthma. 4. History of colitis. 5. Gastroesophageal reflux disease. 6. Anxiety and depression. 7. No documented history of hypertension, diabetes, myocardial infarction or stroke.    8. Exercise nuclear myocardial perfusion study, 09/01/2016, 7 minutes, Jaron protocol, greater than 85% maximum predicted heart rate.  No electrocardiographic changes.  No ischemia. Ejection fraction 70%.  Low risk study.    9. 24 hour Holter monitor, 08/08/2017, sinus rhythm with rates varying from .  Average heart rate 68.  No pathologic arrhythmias.  Symptoms of palpitations occurred during sinus rhythm. 10. Echocardiogram, 10/27/2017, normal left ventricular size, wall motion and systolic function with ejection fraction of 65%.  Physiologic mitral insufficiency.  Normal study.    11.  Family history:  Multiple family members have diabetes. Isaiah Moses is no immediate family member who has early coronary artery disease.    12. 30-day event recorder from 02/12-03/14/2018.  No atrial fibrillation, ventricular tachycardia or heart block.  Patient did have one episode of possible SVT at a rate of 92 at which time she complained of a skipped beat. Sharmila Lewis did have multiple other symptoms of chest pain, palpitations, dyspnea, etc when she was in sinus rhythm with mild sinus tachycardia.  The monitor was essentially normal for a woman of her age. 13. Cholecystectomy  14. Lupus    Past Medical History:   Diagnosis Date    ARDS (adult respiratory distress syndrome) ([facility] [address]     Asthma     Colitis     Depression        Patient Active Problem List   Diagnosis    Atypical chest pain    Moderate persistent asthma without complication    Palpitations    Anxiety    Depression    Chest pain    Right upper quadrant pain    Abdominal pain    Acute asthma exacerbation    Shortness of breath       Allergies   Allergen Reactions    Fish-Derived Products Anaphylaxis     anaphylaxis    Nsaids      Vomiting    Cefdinir      Rash, Blisters. Rash, Blisters.     Cephalosporins     Ciprofloxacin     Codeine Nausea Only    Fish Allergy     Levaquin [Levofloxacin In D5w] Hives    Metronidazole Hives     throat closing, hives  throat closing, hives    Other      Allergic to Midrin    Wellbutrin [Bupropion]     Pce [Erythromycin] Rash       Current Facility-Administered Medications   Medication Dose Route Frequency Provider Last Rate Last Dose    0.9 % sodium chloride infusion   Intravenous Continuous [place]  mL/hr at 10/10/18 0808      ALPRAZolam (XANAX) tablet 0.5 mg  0.5 mg Oral TID PRN Glen Ashby MD        ammonium lactate (LAC-HYDRIN) 12 % lotion   Topical Daily PRN Glen Ashby MD        aspirin EC tablet 81 mg  81 mg Oral Daily Glen Ashby MD   81 mg at 69/86/64 4285    folic acid (FOLVITE) tablet 1 mg  1 mg Oral Daily Keyonna Saini MD        Fluticasone Furoate-Vilanterol 200-25 MCG/INH AEPB 1 puff  1 puff Inhalation Daily Glen Ashby MD        vitamin D tablet 5,000 Units  5,000 Units Oral Daily Glen Ashby MD        cetirizine (ZYRTEC) tablet 10 mg  10 mg Oral Daily Glen Ashby MD        ipratropium-albuterol (Hughes Ku) nebulizer solution 3 mL  1 vial Inhalation Q6H PRN Miguel Hsu MD        montelukast (SINGULAIR) tablet 10 mg  10 mg Oral Daily Miguel Hsu MD        pantoprazole (PROTONIX) tablet 40 mg  40 mg Oral QAM AC Keyonna Saini MD   40 mg at 10/10/18 0907    promethazine (PHENERGAN) tablet 12.5 mg  12.5 mg Oral Q6H PRN Miguel Hsu MD        traMADol (ULTRAM) tablet 50 mg  50 mg Oral Q6H PRN Miguel Hsu MD   50 mg at 10/10/18 0906    benzonatate (TESSALON) capsule 100 mg  100 mg Oral Q4H PRN Miguel Hsu MD        acetaminophen (TYLENOL) tablet 650 mg  650 mg Oral Q4H PRN Miguel Hsu MD           Social History     Social History    Marital status: Single     Spouse name: N/A    Number of children: N/A    Years of education: N/A     Occupational History    Not on file. Social History Main Topics    Smoking status: Never Smoker    Smokeless tobacco: Never Used    Alcohol use No    Drug use: No    Sexual activity: No     Other Topics Concern    Not on file     Social History Narrative    No narrative on file       Family History   Problem Relation Age of Onset    High Blood Pressure Mother     Diabetes Mother     Diabetes Father     High Blood Pressure Sister     Diabetes Sister     Celiac Disease Sister     High Blood Pressure Brother     Crohn's Disease Brother     Uterine Cancer Maternal Grandmother        Review of Systems:   Heart: as above   Lungs: as above   Eyes: denies changes in vision or discharge. Ears: denies changes in hearing or pain. Nose: denies epistaxis or masses   Throat: denies sore throat or trouble swallowing. Neuro: denies numbness, tingling, tremors. Skin: denies rashes or itching. : denies hematuria, dysuria   GI: denies vomiting, diarrhea   Psych: denies mood changed, anxiety, depression. all others negative.     Physical Exam   /70 Comment: manual  Pulse 72   Temp 98.1 °F (36.7 °C) (Oral)   Resp 16   Ht 5' 1\" (1.549 m)   Wt 198 32 08/31/2016    CKMB <1.0 10/06/2018    CKMB <1.0 08/16/2018    CKMB <1.0 08/16/2018    TROPONINI <0.01 10/10/2018    TROPONINI <0.01 10/10/2018    TROPONINI <0.01 10/09/2018      PT/INR:    Lab Results   Component Value Date    PROTIME 12.2 04/29/2018    INR 1.1 04/29/2018     TSH:    Lab Results   Component Value Date    TSH 0.740 08/16/2018     Rhythm Strip: normal sinus rhythm. EKG:  nonspecific ST and T waves changes, sinus tachycardia. Echo Summary 8/16/18:   Normal left ventricular systolic function.   Ejection fraction is visually estimated at 65%.   Normal right ventricular size and function (TAPSE 2.2 cm).   Normal left ventricular diastolic filling pattern for age.  Lazara Bridges evidence of hemodynamically significant valve disease   PASP is estimated at 24 mmHg (normal estimated PASP).  The inferior vena cava is normal in size with normal respiratory variation. ASSESSMENT AND PLAN:  Patient Active Problem List   Diagnosis    Atypical chest pain    Moderate persistent asthma without complication    Palpitations    Anxiety    Depression    Chest pain    Right upper quadrant pain    Abdominal pain    Acute asthma exacerbation    Shortness of breath     1. Chest pain: EKG and CE okay. Stress ordered. Recent echo okay, see above. 2. Hx of Lupus    3. Hx of Urticaria with possible autoimmune disease. 4. History of asthma. 5. History of colitis. Lilly Saldana D.O.   Cardiologist  Cardiology, [address] [facility]

## 2018-10-10 NOTE — H&P
Take 0.5 mg by mouth 3 times daily as needed for Sleep. .  Cholecalciferol (VITAMIN D) 2000 units CAPS capsule, Take 5,000 Units by mouth daily   diphenhydrAMINE (BENADRYL ALLERGY) 25 MG capsule, Take 25 mg by mouth every 6 hours as needed for Itching  Fluticasone Furoate-Vilanterol (BREO ELLIPTA) 200-25 MCG/INH AEPB, Inhale 1 puff into the lungs daily  Montelukast Sodium (SINGULAIR PO), Take 10 mg by mouth daily     Allergies:  Fish-derived products; Nsaids; Cefdinir; Cephalosporins; Ciprofloxacin; Codeine; Fish allergy; Levaquin [levofloxacin in d5w]; Metronidazole; Other; Wellbutrin [bupropion]; and Pce [erythromycin]    Social History:   TOBACCO:   reports that she has never smoked. She has never used smokeless tobacco.  ETOH:   reports that she does not drink alcohol. Family History:   Family History   Problem Relation Age of Onset    High Blood Pressure Mother     Diabetes Mother     Diabetes Father     High Blood Pressure Sister     Diabetes Sister     Celiac Disease Sister     High Blood Pressure Brother     Crohn's Disease Brother     Uterine Cancer Maternal Grandmother        REVIEW OF SYSTEMS:  CONSTITUTIONAL:  Neg   Recent weight changes,fatigue,fever,chills or night sweats  EYES:  Neg  blurriness,tearing,itching or acute change in vision  NOSE:  Neg  rhinorrhea,sneezing,itching,allergy or epistaxis  MOUTH/THROAT:  Neg  bleeding gums,hoarseness or sore throat. RESPIRATORY:As Above  CARDIOVASCULAR   Neg :As Above  GASTROINTESTINAL:  As Above  [place] fog\"  SKIN :  Neg  skin or hair changes,and has no itching,rashes,sores. PHYSICAL EXAM:  BP (!) 90/52   Pulse 57   Temp 98.3 °F (36.8 °C) (Oral)   Resp 16   Ht 5' 1\" (1.549 m)   Wt 198 lb (89.8 kg)   SpO2 94%   BMI 37.41 kg/m²   General appearance: alert, appears stated age, cooperative and no distress  Head: Normocephalic, without obvious abnormality, atraumatic  Eyes: conjunctivae/corneas clear. PERRL, EOM's intact.

## 2018-10-10 NOTE — ED NOTES
SARAIAR faxed to 201. Spoke to [employer] to confirm fax.   Room still dirty     Gracy Fuentes RN  10/09/18 5089

## 2018-10-12 ENCOUNTER — TELEPHONE (OUTPATIENT)
Dept: CARDIOLOGY CLINIC | Age: 48
End: 2018-10-12

## 2018-10-12 DIAGNOSIS — R07.9 CHEST PAIN, UNSPECIFIED TYPE: Primary | ICD-10-CM

## 2018-10-12 DIAGNOSIS — I47.1 SVT (SUPRAVENTRICULAR TACHYCARDIA) (HCC): ICD-10-CM

## 2018-10-12 DIAGNOSIS — R00.2 PALPITATIONS: ICD-10-CM

## 2018-10-12 NOTE — TELEPHONE ENCOUNTER
Patient showed up on hospital follow-up list for Dr. Adeel Cooney. Patient had requested to change to Dr. Keenan Hogan prior to recent hospital admission. However, was consulted by Dr. Rashel Reich. Left message for patient to call the office to assess whether she will follow with Dr. Rashel Reich or whether she is still requesting Dr. Keenan Hogan.

## 2018-10-18 NOTE — TELEPHONE ENCOUNTER
Patient still wishes to follow with Dr. Ambrose Hickman. Please review Dr. Sheryl Madden previous notes when assessing follow-up. Also recently seen in the hospital by Dr. Bebeto Burrell, appears to have a normal echo and stress.

## 2018-10-18 NOTE — TELEPHONE ENCOUNTER
Patient notified of test results and Dr. Kaz Espinal recommendations. Order placed for stress. Referral placed to EP.

## 2018-10-19 ENCOUNTER — TELEPHONE (OUTPATIENT)
Dept: ADMINISTRATIVE | Age: 48
End: 2018-10-19

## 2018-10-23 ENCOUNTER — TELEPHONE (OUTPATIENT)
Dept: CARDIOLOGY | Age: 48
End: 2018-10-23

## 2018-11-15 ENCOUNTER — APPOINTMENT (OUTPATIENT)
Dept: GENERAL RADIOLOGY | Age: 48
End: 2018-11-15
Payer: COMMERCIAL

## 2018-11-15 ENCOUNTER — HOSPITAL ENCOUNTER (EMERGENCY)
Age: 48
Discharge: HOME | End: 2018-11-15
Attending: EMERGENCY MEDICINE
Payer: COMMERCIAL

## 2018-11-15 VITALS
OXYGEN SATURATION: 97 % | WEIGHT: 187 LBS | RESPIRATION RATE: 16 BRPM | DIASTOLIC BLOOD PRESSURE: 65 MMHG | HEART RATE: 84 BPM | HEIGHT: 61 IN | TEMPERATURE: 97.9 F | BODY MASS INDEX: 35.3 KG/M2 | SYSTOLIC BLOOD PRESSURE: 107 MMHG

## 2018-11-15 DIAGNOSIS — J45.901 MILD ASTHMA WITH EXACERBATION, UNSPECIFIED WHETHER PERSISTENT: Primary | ICD-10-CM

## 2018-11-15 LAB
ANION GAP SERPL CALCULATED.3IONS-SCNC: 10 MMOL/L (ref 7–16)
B.E.: -2.4 MMOL/L (ref -3–3)
BUN BLDV-MCNC: 13 MG/DL (ref 6–20)
CALCIUM SERPL-MCNC: 8.8 MG/DL (ref 8.6–10.2)
CHLORIDE BLD-SCNC: 107 MMOL/L (ref 98–107)
CHP ED QC CHECK: NORMAL
CO2: 24 MMOL/L (ref 22–29)
COHB: 0.4 % (ref 0–1.5)
CREAT SERPL-MCNC: 0.7 MG/DL (ref 0.5–1)
CRITICAL: NORMAL
DATE ANALYZED: NORMAL
DATE OF COLLECTION: NORMAL
EKG ATRIAL RATE: 85 BPM
EKG P AXIS: 52 DEGREES
EKG P-R INTERVAL: 146 MS
EKG Q-T INTERVAL: 382 MS
EKG QRS DURATION: 84 MS
EKG QTC CALCULATION (BAZETT): 454 MS
EKG R AXIS: 20 DEGREES
EKG T AXIS: 23 DEGREES
EKG VENTRICULAR RATE: 85 BPM
GFR AFRICAN AMERICAN: >60
GFR NON-AFRICAN AMERICAN: >60 ML/MIN/1.73
GLUCOSE BLD-MCNC: 124 MG/DL (ref 74–99)
HCO3: 22.4 MMOL/L (ref 22–26)
HCT VFR BLD CALC: 37.6 % (ref 34–48)
HEMOGLOBIN: 12.3 G/DL (ref 11.5–15.5)
HHB: 3.2 % (ref 0–5)
LAB: NORMAL
LACTIC ACID: 1.7 MMOL/L (ref 0.5–2.2)
Lab: NORMAL
MCH RBC QN AUTO: 28.7 PG (ref 26–35)
MCHC RBC AUTO-ENTMCNC: 32.7 % (ref 32–34.5)
MCV RBC AUTO: 87.6 FL (ref 80–99.9)
METHB: 0.1 % (ref 0–1.5)
MODE: NORMAL
O2 CONTENT: 16.7 ML/DL
O2 SATURATION: 96.8 % (ref 92–98.5)
O2HB: 96.3 % (ref 94–97)
OPERATOR ID: NORMAL
PATIENT TEMP: 37 C
PCO2: 38.4 MMHG (ref 35–45)
PDW BLD-RTO: 14 FL (ref 11.5–15)
PH BLOOD GAS: 7.38 (ref 7.35–7.45)
PLATELET # BLD: 285 E9/L (ref 130–450)
PMV BLD AUTO: 8.6 FL (ref 7–12)
PO2: 87.1 MMHG (ref 60–100)
POTASSIUM SERPL-SCNC: 3.6 MMOL/L (ref 3.5–5)
PREGNANCY TEST URINE, POC: NEGATIVE
RBC # BLD: 4.29 E12/L (ref 3.5–5.5)
SODIUM BLD-SCNC: 141 MMOL/L (ref 132–146)
SOURCE, BLOOD GAS: NORMAL
THB: 12.3 G/DL (ref 11.5–16.5)
TIME ANALYZED: 1221
WBC # BLD: 5 E9/L (ref 4.5–11.5)

## 2018-11-15 PROCEDURE — 96375 TX/PRO/DX INJ NEW DRUG ADDON: CPT

## 2018-11-15 PROCEDURE — 82805 BLOOD GASES W/O2 SATURATION: CPT

## 2018-11-15 PROCEDURE — 99285 EMERGENCY DEPT VISIT HI MDM: CPT

## 2018-11-15 PROCEDURE — 71046 X-RAY EXAM CHEST 2 VIEWS: CPT

## 2018-11-15 PROCEDURE — 80048 BASIC METABOLIC PNL TOTAL CA: CPT

## 2018-11-15 PROCEDURE — 94664 DEMO&/EVAL PT USE INHALER: CPT

## 2018-11-15 PROCEDURE — 6360000002 HC RX W HCPCS: Performed by: FAMILY MEDICINE

## 2018-11-15 PROCEDURE — 85027 COMPLETE CBC AUTOMATED: CPT

## 2018-11-15 PROCEDURE — 6370000000 HC RX 637 (ALT 250 FOR IP): Performed by: FAMILY MEDICINE

## 2018-11-15 PROCEDURE — 94640 AIRWAY INHALATION TREATMENT: CPT

## 2018-11-15 PROCEDURE — 36415 COLL VENOUS BLD VENIPUNCTURE: CPT

## 2018-11-15 PROCEDURE — 96374 THER/PROPH/DIAG INJ IV PUSH: CPT

## 2018-11-15 PROCEDURE — 83605 ASSAY OF LACTIC ACID: CPT

## 2018-11-15 RX ORDER — PREDNISONE 20 MG/1
40 TABLET ORAL DAILY
Qty: 10 TABLET | Refills: 0 | Status: SHIPPED | OUTPATIENT
Start: 2018-11-15 | End: 2018-11-20

## 2018-11-15 RX ORDER — BUDESONIDE 0.5 MG/2ML
500 INHALANT ORAL 2 TIMES DAILY
Status: DISCONTINUED | OUTPATIENT
Start: 2018-11-15 | End: 2018-11-15 | Stop reason: HOSPADM

## 2018-11-15 RX ORDER — LORAZEPAM 2 MG/ML
2 INJECTION INTRAMUSCULAR ONCE
Status: COMPLETED | OUTPATIENT
Start: 2018-11-15 | End: 2018-11-15

## 2018-11-15 RX ORDER — ALBUTEROL SULFATE 2.5 MG/3ML
2.5 SOLUTION RESPIRATORY (INHALATION) ONCE
Status: DISCONTINUED | OUTPATIENT
Start: 2018-11-15 | End: 2018-11-15

## 2018-11-15 RX ORDER — FAMOTIDINE 20 MG/1
20 TABLET, FILM COATED ORAL 2 TIMES DAILY
COMMUNITY
End: 2019-07-09

## 2018-11-15 RX ORDER — METHYLPREDNISOLONE SODIUM SUCCINATE 125 MG/2ML
60 INJECTION, POWDER, LYOPHILIZED, FOR SOLUTION INTRAMUSCULAR; INTRAVENOUS ONCE
Status: COMPLETED | OUTPATIENT
Start: 2018-11-15 | End: 2018-11-15

## 2018-11-15 RX ORDER — IPRATROPIUM BROMIDE AND ALBUTEROL SULFATE 2.5; .5 MG/3ML; MG/3ML
1 SOLUTION RESPIRATORY (INHALATION)
Status: DISCONTINUED | OUTPATIENT
Start: 2018-11-15 | End: 2018-11-15 | Stop reason: HOSPADM

## 2018-11-15 RX ADMIN — LIDOCAINE HYDROCHLORIDE 15 ML: 20 SOLUTION ORAL; TOPICAL at 10:43

## 2018-11-15 RX ADMIN — BUDESONIDE 500 MCG: 0.5 SUSPENSION RESPIRATORY (INHALATION) at 10:38

## 2018-11-15 RX ADMIN — LORAZEPAM 2 MG: 2 INJECTION INTRAMUSCULAR; INTRAVENOUS at 11:32

## 2018-11-15 RX ADMIN — IPRATROPIUM BROMIDE AND ALBUTEROL SULFATE 1 AMPULE: .5; 3 SOLUTION RESPIRATORY (INHALATION) at 10:30

## 2018-11-15 RX ADMIN — METHYLPREDNISOLONE SODIUM SUCCINATE 60 MG: 125 INJECTION, POWDER, FOR SOLUTION INTRAMUSCULAR; INTRAVENOUS at 11:30

## 2018-11-15 ASSESSMENT — ENCOUNTER SYMPTOMS
GASTROINTESTINAL NEGATIVE: 1
EYES NEGATIVE: 1
CHEST TIGHTNESS: 1
SHORTNESS OF BREATH: 1
COUGH: 0
STRIDOR: 1
WHEEZING: 1

## 2018-11-15 NOTE — ED NOTES
Attempted to call pt's mother to inform of discharge and pt's need of ride home. No answer, message left.      Gene Torres RN  11/15/18 1048

## 2018-11-15 NOTE — ED PROVIDER NOTES
hospital encounter of 11/15/18   CBC   Result Value Ref Range    WBC 5.0 4.5 - 11.5 E9/L    RBC 4.29 3.50 - 5.50 E12/L    Hemoglobin 12.3 11.5 - 15.5 g/dL    Hematocrit 37.6 34.0 - 48.0 %    MCV 87.6 80.0 - 99.9 fL    MCH 28.7 26.0 - 35.0 pg    MCHC 32.7 32.0 - 34.5 %    RDW 14.0 11.5 - 15.0 fL    Platelets 293 564 - 618 E9/L    MPV 8.6 7.0 - 12.0 fL   Basic Metabolic Panel   Result Value Ref Range    Sodium 141 132 - 146 mmol/L    Potassium 3.6 3.5 - 5.0 mmol/L    Chloride 107 98 - 107 mmol/L    CO2 24 22 - 29 mmol/L    Anion Gap 10 7 - 16 mmol/L    Glucose 124 (H) 74 - 99 mg/dL    BUN 13 6 - 20 mg/dL    CREATININE 0.7 0.5 - 1.0 mg/dL    GFR Non-African American >60 >=60 mL/min/1.73    GFR African American >60     Calcium 8.8 8.6 - 10.2 mg/dL   Lactic acid, plasma   Result Value Ref Range    Lactic Acid 1.7 0.5 - 2.2 mmol/L   Blood Gas, Arterial   Result Value Ref Range    Date Analyzed [id number]     Time Analyzed 2778     Source: Blood Arterial     pH, Blood Gas 7.383 7.350 - 7.450    PCO2 38.4 35.0 - 45.0 mmHg    PO2 87.1 60.0 - 100.0 mmHg    HCO3 22.4 22.0 - 26.0 mmol/L    B.E. -2.4 -3.0 - 3.0 mmol/L    O2 Sat 96.8 92.0 - 98.5 %    O2Hb 96.3 94.0 - 97.0 %    COHb 0.4 0.0 - 1.5 %    MetHb 0.1 0.0 - 1.5 %    O2 Content 16.7 mL/dL    HHb 3.2 0.0 - 5.0 %    tHb (est) 12.3 11.5 - 16.5 g/dL    Mode RA     Date Of Collection      Time Collected      Pt Temp 37.0 C     ID [id number]     Lab [id number]     Critical(s) Notified .  No Critical Values    POC Pregnancy Urine Qual   Result Value Ref Range    Preg Test, Ur negative     QC OK? ok    EKG 12 Lead   Result Value Ref Range    Ventricular Rate 85 BPM    Atrial Rate 85 BPM    P-R Interval 146 ms    QRS Duration 84 ms    Q-T Interval 382 ms    QTc Calculation (Bazett) 454 ms    P Axis 52 degrees    R Axis 20 degrees    T Axis 23 degrees       Radiology:  XR CHEST STANDARD (2 VW)   Final Result   NO ACUTE CARDIOPULMONARY PROCESS              -------------------------

## 2018-11-15 NOTE — ED NOTES
Pt in bed resting and breathing easily. No complaints. side rails up x 2. Call light within reach.      Dina March RN  11/15/18 6227

## 2018-11-29 ENCOUNTER — OFFICE VISIT (OUTPATIENT)
Dept: NON INVASIVE DIAGNOSTICS | Age: 48
End: 2018-11-29
Payer: COMMERCIAL

## 2018-11-29 VITALS
HEIGHT: 61 IN | DIASTOLIC BLOOD PRESSURE: 64 MMHG | HEART RATE: 65 BPM | BODY MASS INDEX: 36.06 KG/M2 | RESPIRATION RATE: 18 BRPM | SYSTOLIC BLOOD PRESSURE: 106 MMHG | WEIGHT: 191 LBS

## 2018-11-29 DIAGNOSIS — I47.1 SVT (SUPRAVENTRICULAR TACHYCARDIA) (HCC): Primary | ICD-10-CM

## 2018-11-29 PROCEDURE — 1036F TOBACCO NON-USER: CPT | Performed by: INTERNAL MEDICINE

## 2018-11-29 PROCEDURE — 93000 ELECTROCARDIOGRAM COMPLETE: CPT | Performed by: INTERNAL MEDICINE

## 2018-11-29 PROCEDURE — 99214 OFFICE O/P EST MOD 30 MIN: CPT | Performed by: INTERNAL MEDICINE

## 2018-11-29 PROCEDURE — G8417 CALC BMI ABV UP PARAM F/U: HCPCS | Performed by: INTERNAL MEDICINE

## 2018-11-29 PROCEDURE — G8484 FLU IMMUNIZE NO ADMIN: HCPCS | Performed by: INTERNAL MEDICINE

## 2018-11-29 PROCEDURE — G8427 DOCREV CUR MEDS BY ELIG CLIN: HCPCS | Performed by: INTERNAL MEDICINE

## 2018-11-29 RX ORDER — DILTIAZEM HYDROCHLORIDE 120 MG/1
120 CAPSULE, COATED, EXTENDED RELEASE ORAL DAILY
Qty: 30 CAPSULE | Refills: 3 | Status: SHIPPED | OUTPATIENT
Start: 2018-11-29 | End: 2019-01-31 | Stop reason: ALTCHOICE

## 2018-11-29 RX ORDER — ALBUTEROL SULFATE 90 UG/1
2 AEROSOL, METERED RESPIRATORY (INHALATION) EVERY 6 HOURS PRN
COMMUNITY
End: 2022-05-13 | Stop reason: ALTCHOICE

## 2018-12-14 ENCOUNTER — HOSPITAL ENCOUNTER (EMERGENCY)
Age: 48
Discharge: HOME | End: 2018-12-14
Attending: EMERGENCY MEDICINE
Payer: COMMERCIAL

## 2018-12-14 ENCOUNTER — APPOINTMENT (OUTPATIENT)
Dept: CT IMAGING | Age: 48
End: 2018-12-14
Payer: COMMERCIAL

## 2018-12-14 VITALS
OXYGEN SATURATION: 100 % | TEMPERATURE: 97.9 F | SYSTOLIC BLOOD PRESSURE: 103 MMHG | RESPIRATION RATE: 16 BRPM | DIASTOLIC BLOOD PRESSURE: 57 MMHG | HEART RATE: 81 BPM

## 2018-12-14 DIAGNOSIS — R52 BODY ACHES: ICD-10-CM

## 2018-12-14 DIAGNOSIS — M32.9 SYSTEMIC LUPUS ERYTHEMATOSUS, UNSPECIFIED SLE TYPE, UNSPECIFIED ORGAN INVOLVEMENT STATUS (HCC): Primary | ICD-10-CM

## 2018-12-14 LAB
ALBUMIN SERPL-MCNC: 3.7 G/DL (ref 3.5–5.2)
ALP BLD-CCNC: 75 U/L (ref 35–104)
ALT SERPL-CCNC: 5 U/L (ref 0–32)
ANION GAP SERPL CALCULATED.3IONS-SCNC: 8 MMOL/L (ref 7–16)
AST SERPL-CCNC: 7 U/L (ref 0–31)
BASOPHILS ABSOLUTE: 0.02 E9/L (ref 0–0.2)
BASOPHILS RELATIVE PERCENT: 0.2 % (ref 0–2)
BILIRUB SERPL-MCNC: 0.3 MG/DL (ref 0–1.2)
BILIRUBIN URINE: NEGATIVE
BLOOD, URINE: NEGATIVE
BUN BLDV-MCNC: 12 MG/DL (ref 6–20)
CALCIUM SERPL-MCNC: 8.6 MG/DL (ref 8.6–10.2)
CHLORIDE BLD-SCNC: 104 MMOL/L (ref 98–107)
CHP ED QC CHECK: YES
CLARITY: CLEAR
CO2: 27 MMOL/L (ref 22–29)
COLOR: YELLOW
CREAT SERPL-MCNC: 0.7 MG/DL (ref 0.5–1)
EOSINOPHILS ABSOLUTE: 0.11 E9/L (ref 0.05–0.5)
EOSINOPHILS RELATIVE PERCENT: 1.1 % (ref 0–6)
GFR AFRICAN AMERICAN: >60
GFR NON-AFRICAN AMERICAN: >60 ML/MIN/1.73
GLUCOSE BLD-MCNC: 103 MG/DL (ref 74–99)
GLUCOSE URINE: NEGATIVE MG/DL
HCT VFR BLD CALC: 36.8 % (ref 34–48)
HEMOGLOBIN: 11.9 G/DL (ref 11.5–15.5)
IMMATURE GRANULOCYTES #: 0.03 E9/L
IMMATURE GRANULOCYTES %: 0.3 % (ref 0–5)
KETONES, URINE: NEGATIVE MG/DL
LACTIC ACID: 0.5 MMOL/L (ref 0.5–2.2)
LEUKOCYTE ESTERASE, URINE: NEGATIVE
LYMPHOCYTES ABSOLUTE: 0.91 E9/L (ref 1.5–4)
LYMPHOCYTES RELATIVE PERCENT: 8.9 % (ref 20–42)
MCH RBC QN AUTO: 28 PG (ref 26–35)
MCHC RBC AUTO-ENTMCNC: 32.3 % (ref 32–34.5)
MCV RBC AUTO: 86.6 FL (ref 80–99.9)
MONOCYTES ABSOLUTE: 0.63 E9/L (ref 0.1–0.95)
MONOCYTES RELATIVE PERCENT: 6.2 % (ref 2–12)
NEUTROPHILS ABSOLUTE: 8.49 E9/L (ref 1.8–7.3)
NEUTROPHILS RELATIVE PERCENT: 83.3 % (ref 43–80)
NITRITE, URINE: NEGATIVE
PDW BLD-RTO: 13.2 FL (ref 11.5–15)
PH UA: 6 (ref 5–9)
PLATELET # BLD: 286 E9/L (ref 130–450)
PMV BLD AUTO: 8.5 FL (ref 7–12)
POTASSIUM SERPL-SCNC: 3.7 MMOL/L (ref 3.5–5)
PREGNANCY TEST URINE, POC: NEGATIVE
PROTEIN UA: NEGATIVE MG/DL
RBC # BLD: 4.25 E12/L (ref 3.5–5.5)
SODIUM BLD-SCNC: 139 MMOL/L (ref 132–146)
SPECIFIC GRAVITY UA: <=1.005 (ref 1–1.03)
TOTAL PROTEIN: 6.1 G/DL (ref 6.4–8.3)
UROBILINOGEN, URINE: 0.2 E.U./DL
WBC # BLD: 10.2 E9/L (ref 4.5–11.5)

## 2018-12-14 PROCEDURE — 96374 THER/PROPH/DIAG INJ IV PUSH: CPT

## 2018-12-14 PROCEDURE — 81003 URINALYSIS AUTO W/O SCOPE: CPT

## 2018-12-14 PROCEDURE — 96375 TX/PRO/DX INJ NEW DRUG ADDON: CPT

## 2018-12-14 PROCEDURE — 85025 COMPLETE CBC W/AUTO DIFF WBC: CPT

## 2018-12-14 PROCEDURE — 70450 CT HEAD/BRAIN W/O DYE: CPT

## 2018-12-14 PROCEDURE — 2580000003 HC RX 258: Performed by: STUDENT IN AN ORGANIZED HEALTH CARE EDUCATION/TRAINING PROGRAM

## 2018-12-14 PROCEDURE — 99284 EMERGENCY DEPT VISIT MOD MDM: CPT

## 2018-12-14 PROCEDURE — 80053 COMPREHEN METABOLIC PANEL: CPT

## 2018-12-14 PROCEDURE — 83605 ASSAY OF LACTIC ACID: CPT

## 2018-12-14 PROCEDURE — 6360000002 HC RX W HCPCS: Performed by: STUDENT IN AN ORGANIZED HEALTH CARE EDUCATION/TRAINING PROGRAM

## 2018-12-14 RX ORDER — METHYLPREDNISOLONE SODIUM SUCCINATE 125 MG/2ML
125 INJECTION, POWDER, LYOPHILIZED, FOR SOLUTION INTRAMUSCULAR; INTRAVENOUS ONCE
Status: DISCONTINUED | OUTPATIENT
Start: 2018-12-14 | End: 2018-12-14 | Stop reason: HOSPADM

## 2018-12-14 RX ORDER — 0.9 % SODIUM CHLORIDE 0.9 %
1000 INTRAVENOUS SOLUTION INTRAVENOUS ONCE
Status: COMPLETED | OUTPATIENT
Start: 2018-12-14 | End: 2018-12-14

## 2018-12-14 RX ORDER — PREDNISONE 10 MG/1
TABLET ORAL
Qty: 20 TABLET | Refills: 0 | Status: SHIPPED | OUTPATIENT
Start: 2018-12-14 | End: 2018-12-24

## 2018-12-14 RX ORDER — ONDANSETRON 2 MG/ML
4 INJECTION INTRAMUSCULAR; INTRAVENOUS ONCE
Status: COMPLETED | OUTPATIENT
Start: 2018-12-14 | End: 2018-12-14

## 2018-12-14 RX ORDER — KETOROLAC TROMETHAMINE 30 MG/ML
30 INJECTION, SOLUTION INTRAMUSCULAR; INTRAVENOUS ONCE
Status: COMPLETED | OUTPATIENT
Start: 2018-12-14 | End: 2018-12-14

## 2018-12-14 RX ADMIN — KETOROLAC TROMETHAMINE 30 MG: 30 INJECTION, SOLUTION INTRAMUSCULAR at 09:29

## 2018-12-14 RX ADMIN — ONDANSETRON 4 MG: 2 INJECTION INTRAMUSCULAR; INTRAVENOUS at 09:29

## 2018-12-14 RX ADMIN — SODIUM CHLORIDE 1000 ML: 9 INJECTION, SOLUTION INTRAVENOUS at 09:29

## 2018-12-14 ASSESSMENT — ENCOUNTER SYMPTOMS
RHINORRHEA: 1
NAUSEA: 0
SORE THROAT: 0
BLOOD IN STOOL: 0
VOMITING: 0
COUGH: 0
ABDOMINAL PAIN: 1
BACK PAIN: 0
CONSTIPATION: 0
SHORTNESS OF BREATH: 0
CHEST TIGHTNESS: 0
DIARRHEA: 0
WHEEZING: 0

## 2018-12-14 ASSESSMENT — PAIN DESCRIPTION - LOCATION: LOCATION: GENERALIZED

## 2018-12-14 ASSESSMENT — PAIN DESCRIPTION - DESCRIPTORS: DESCRIPTORS: TIRING

## 2018-12-14 ASSESSMENT — PAIN SCALES - GENERAL: PAINLEVEL_OUTOF10: 8

## 2018-12-14 ASSESSMENT — PAIN DESCRIPTION - ORIENTATION: ORIENTATION: OTHER (COMMENT)

## 2018-12-14 ASSESSMENT — PAIN DESCRIPTION - FREQUENCY: FREQUENCY: CONTINUOUS

## 2018-12-14 ASSESSMENT — PAIN DESCRIPTION - PAIN TYPE: TYPE: ACUTE PAIN

## 2018-12-14 NOTE — ED PROVIDER NOTES
Patient's 44-year-old female complaining of multiple body aches. She has history of lupus and recent tooth extraction. Patient also has history of anxiety and depression. Patient states that she is having pain in her head, abdomen, legs, arms. Patient says that most her pain is chronic and has been exacerbating. She had a tooth extraction one week ago. The patient had tooth extraction right upper molar. She is complaining of pain going up into her head. Patient says that she was written for hydrocodone to address pain issues by her dentist, but she has not been taking it. Patient states that she's been having intermittent low-grade fever between 99 and 100°F. Patient has been taking daily aspirin. She denies nausea/vomiting or changes in urination. Patient states that she sees a physician at [facility] for her lupus. Says that she was diagnosed in March. Prior to that states that she was being diagnosis Sjogren's. Patient is currently on methotrexate. Review of Systems   Constitutional: Positive for activity change, fatigue and fever. Negative for appetite change and diaphoresis. HENT: Positive for dental problem and rhinorrhea. Negative for congestion and sore throat. Eyes: Negative for visual disturbance. Respiratory: Negative for cough, chest tightness, shortness of breath and wheezing. Cardiovascular: Negative for chest pain, palpitations and leg swelling. Gastrointestinal: Positive for abdominal pain. Negative for blood in stool, constipation, diarrhea, nausea and vomiting. Endocrine: Negative for polyuria. Genitourinary: Negative for decreased urine volume, dysuria and vaginal discharge. Musculoskeletal: Positive for gait problem. Negative for back pain, neck pain and neck stiffness. Skin: Negative for rash. Neurological: Positive for weakness and headaches. Negative for syncope and light-headedness. Hematological: Negative for adenopathy.    Psychiatric/Behavioral: Negative for confusion. Physical Exam   Constitutional: She is oriented to person, place, and time. She appears well-developed and well-nourished. She appears distressed. Patient is laying in bed in moderate distress secondary to pain and discomfort. HENT:   Head: Normocephalic and atraumatic. Mouth/Throat: No oropharyngeal exudate. No erythema. Healing cavity for tooth extraction. No bleeding. Eyes: Pupils are equal, round, and reactive to light. Right eye exhibits no discharge. Left eye exhibits no discharge. No scleral icterus. Neck: Normal range of motion. Neck supple. No JVD present. No thyromegaly present. Cardiovascular: Normal rate and intact distal pulses. Exam reveals no gallop and no friction rub. No murmur heard. Pulmonary/Chest: Effort normal. No stridor. No respiratory distress. She has no wheezes. She has no rales. She exhibits no tenderness. Abdominal: Soft. She exhibits no distension and no mass. There is tenderness. There is no rebound and no guarding. No hernia. Diffuse abdominal tenderness. Chronic. Musculoskeletal: Normal range of motion. She exhibits tenderness. She exhibits no edema or deformity. Left thigh pain superior to knee. No deformity. No decreased range of motion. Strength is 5 x 5 all extremities. Lymphadenopathy:     She has no cervical adenopathy. Neurological: She is alert and oriented to person, place, and time. No cranial nerve deficit. Cranial nerves II through gross intact. No neuro deficits. Skin: Skin is warm and dry. Capillary refill takes less than 2 seconds. No rash noted. She is not diaphoretic. No erythema. No pallor. Psychiatric: She has a normal mood and affect. Her behavior is normal.   Nursing note and vitals reviewed.       Procedures    MDM              --------------------------------------------- PAST HISTORY ---------------------------------------------  Past Medical History:  has a past medical history of ARDS (adult providing specific details for the plan of care and counseling regarding the diagnosis and prognosis. Their questions are answered at this time and they are agreeable with the plan. I discussed at length with them reasons for immediate return here for re evaluation. They will followup with primary care by calling their office tomorrow. --------------------------------- ADDITIONAL PROVIDER NOTES ---------------------------------  At this time the patient is without objective evidence of an acute process requiring hospitalization or inpatient management. They have remained hemodynamically stable throughout their entire ED visit and are stable for discharge with outpatient follow-up. Discussed imaging and laboratory results the patient. Patient labs are negative for acute pathology. Patient most likely has lupus exacerbation. She was given steroids for the next week and instructed to follow-up with a rheumatologist. Patient had good results in the emergency department with fluids and nausea management. Instructed patient to return the emergency Department if symptoms worsen. Patient agreed with plan was discharged home. The plan has been discussed in detail and they are aware of the specific conditions for emergent return, as well as the importance of follow-up. Discharge Medication List as of 12/14/2018 12:18 PM      START taking these medications    Details   predniSONE (DELTASONE) 10 MG tablet Take 40mg po qd x 5 days  QS for 5 days, Disp-20 tablet, R-0Print             Diagnosis:  1. Systemic lupus erythematosus, unspecified SLE type, unspecified organ involvement status ([facility] [address]    2. Body aches        Disposition:  Patient's disposition: Discharge to home  Patient's condition is stable.          Nneka Tony, DO  Resident  12/14/18 9411

## 2018-12-23 ENCOUNTER — APPOINTMENT (OUTPATIENT)
Dept: CT IMAGING | Age: 48
End: 2018-12-23
Payer: COMMERCIAL

## 2018-12-23 ENCOUNTER — HOSPITAL ENCOUNTER (EMERGENCY)
Age: 48
Discharge: HOME | End: 2018-12-24
Attending: EMERGENCY MEDICINE
Payer: COMMERCIAL

## 2018-12-23 DIAGNOSIS — J06.9 ACUTE UPPER RESPIRATORY INFECTION: Primary | ICD-10-CM

## 2018-12-23 DIAGNOSIS — R09.1 PLEURISY: ICD-10-CM

## 2018-12-23 DIAGNOSIS — R21 RASH AND OTHER NONSPECIFIC SKIN ERUPTION: ICD-10-CM

## 2018-12-23 LAB
ANION GAP SERPL CALCULATED.3IONS-SCNC: 12 MMOL/L (ref 7–16)
APTT: 33.5 SEC (ref 24.5–35.1)
BUN BLDV-MCNC: 12 MG/DL (ref 6–20)
CALCIUM SERPL-MCNC: 9.4 MG/DL (ref 8.6–10.2)
CHLORIDE BLD-SCNC: 106 MMOL/L (ref 98–107)
CO2: 24 MMOL/L (ref 22–29)
CREAT SERPL-MCNC: 0.8 MG/DL (ref 0.5–1)
EKG ATRIAL RATE: 81 BPM
EKG P AXIS: 60 DEGREES
EKG P-R INTERVAL: 146 MS
EKG Q-T INTERVAL: 398 MS
EKG QRS DURATION: 80 MS
EKG QTC CALCULATION (BAZETT): 462 MS
EKG R AXIS: 14 DEGREES
EKG T AXIS: 26 DEGREES
EKG VENTRICULAR RATE: 81 BPM
GFR AFRICAN AMERICAN: >60
GFR NON-AFRICAN AMERICAN: >60 ML/MIN/1.73
GLUCOSE BLD-MCNC: 152 MG/DL (ref 74–99)
INR BLD: 1.1
POTASSIUM REFLEX MAGNESIUM: 4.4 MMOL/L (ref 3.5–5)
PROTHROMBIN TIME: 12.5 SEC (ref 9.3–12.4)
SODIUM BLD-SCNC: 142 MMOL/L (ref 132–146)
TROPONIN: <0.01 NG/ML (ref 0–0.03)

## 2018-12-23 PROCEDURE — 94664 DEMO&/EVAL PT USE INHALER: CPT

## 2018-12-23 PROCEDURE — 85025 COMPLETE CBC W/AUTO DIFF WBC: CPT

## 2018-12-23 PROCEDURE — 96375 TX/PRO/DX INJ NEW DRUG ADDON: CPT

## 2018-12-23 PROCEDURE — 85730 THROMBOPLASTIN TIME PARTIAL: CPT

## 2018-12-23 PROCEDURE — 93005 ELECTROCARDIOGRAM TRACING: CPT | Performed by: EMERGENCY MEDICINE

## 2018-12-23 PROCEDURE — 6370000000 HC RX 637 (ALT 250 FOR IP): Performed by: EMERGENCY MEDICINE

## 2018-12-23 PROCEDURE — 6360000004 HC RX CONTRAST MEDICATION: Performed by: RADIOLOGY

## 2018-12-23 PROCEDURE — 85610 PROTHROMBIN TIME: CPT

## 2018-12-23 PROCEDURE — 99284 EMERGENCY DEPT VISIT MOD MDM: CPT

## 2018-12-23 PROCEDURE — 80048 BASIC METABOLIC PNL TOTAL CA: CPT

## 2018-12-23 PROCEDURE — 71275 CT ANGIOGRAPHY CHEST: CPT

## 2018-12-23 PROCEDURE — 6360000002 HC RX W HCPCS: Performed by: EMERGENCY MEDICINE

## 2018-12-23 PROCEDURE — 96374 THER/PROPH/DIAG INJ IV PUSH: CPT

## 2018-12-23 PROCEDURE — 84484 ASSAY OF TROPONIN QUANT: CPT

## 2018-12-23 PROCEDURE — 2580000003 HC RX 258: Performed by: EMERGENCY MEDICINE

## 2018-12-23 RX ORDER — IPRATROPIUM BROMIDE AND ALBUTEROL SULFATE 2.5; .5 MG/3ML; MG/3ML
1 SOLUTION RESPIRATORY (INHALATION)
Status: DISCONTINUED | OUTPATIENT
Start: 2018-12-24 | End: 2018-12-24 | Stop reason: HOSPADM

## 2018-12-23 RX ORDER — MORPHINE SULFATE 2 MG/ML
4 INJECTION, SOLUTION INTRAMUSCULAR; INTRAVENOUS ONCE
Status: COMPLETED | OUTPATIENT
Start: 2018-12-23 | End: 2018-12-23

## 2018-12-23 RX ORDER — 0.9 % SODIUM CHLORIDE 0.9 %
1000 INTRAVENOUS SOLUTION INTRAVENOUS ONCE
Status: COMPLETED | OUTPATIENT
Start: 2018-12-23 | End: 2018-12-24

## 2018-12-23 RX ORDER — ONDANSETRON 2 MG/ML
4 INJECTION INTRAMUSCULAR; INTRAVENOUS EVERY 6 HOURS PRN
Status: DISCONTINUED | OUTPATIENT
Start: 2018-12-23 | End: 2018-12-24 | Stop reason: HOSPADM

## 2018-12-23 RX ADMIN — IPRATROPIUM BROMIDE AND ALBUTEROL SULFATE 1 AMPULE: .5; 3 SOLUTION RESPIRATORY (INHALATION) at 22:35

## 2018-12-23 RX ADMIN — IOPAMIDOL 70 ML: 755 INJECTION, SOLUTION INTRAVENOUS at 23:31

## 2018-12-23 RX ADMIN — SODIUM CHLORIDE 1000 ML: 9 INJECTION, SOLUTION INTRAVENOUS at 22:32

## 2018-12-23 RX ADMIN — MORPHINE SULFATE 4 MG: 2 INJECTION, SOLUTION INTRAMUSCULAR; INTRAVENOUS at 22:33

## 2018-12-23 RX ADMIN — ONDANSETRON 4 MG: 2 INJECTION INTRAMUSCULAR; INTRAVENOUS at 22:33

## 2018-12-23 ASSESSMENT — PAIN DESCRIPTION - PAIN TYPE: TYPE: ACUTE PAIN

## 2018-12-23 ASSESSMENT — PAIN SCALES - GENERAL: PAINLEVEL_OUTOF10: 9

## 2018-12-23 ASSESSMENT — PAIN DESCRIPTION - LOCATION: LOCATION: CHEST

## 2018-12-24 VITALS
DIASTOLIC BLOOD PRESSURE: 61 MMHG | WEIGHT: 191 LBS | HEIGHT: 61 IN | SYSTOLIC BLOOD PRESSURE: 110 MMHG | BODY MASS INDEX: 36.06 KG/M2 | OXYGEN SATURATION: 98 % | TEMPERATURE: 97.6 F | RESPIRATION RATE: 16 BRPM | HEART RATE: 74 BPM

## 2018-12-24 PROBLEM — R09.1 PLEURISY: Status: ACTIVE | Noted: 2018-12-24

## 2018-12-24 PROBLEM — R21 RASH AND OTHER NONSPECIFIC SKIN ERUPTION: Status: ACTIVE | Noted: 2018-12-24

## 2018-12-24 PROBLEM — J06.9 ACUTE UPPER RESPIRATORY INFECTION: Status: ACTIVE | Noted: 2018-12-24

## 2018-12-24 LAB
BASOPHILS ABSOLUTE: 0.01 E9/L (ref 0–0.2)
BASOPHILS RELATIVE PERCENT: 0.1 % (ref 0–2)
EOSINOPHILS ABSOLUTE: 0.01 E9/L (ref 0.05–0.5)
EOSINOPHILS RELATIVE PERCENT: 0.1 % (ref 0–6)
HCT VFR BLD CALC: 39 % (ref 34–48)
HEMOGLOBIN: 12.7 G/DL (ref 11.5–15.5)
IMMATURE GRANULOCYTES #: 0.02 E9/L
IMMATURE GRANULOCYTES %: 0.3 % (ref 0–5)
INFLUENZA A BY PCR: NOT DETECTED
INFLUENZA B BY PCR: NOT DETECTED
LYMPHOCYTES ABSOLUTE: 0.45 E9/L (ref 1.5–4)
LYMPHOCYTES RELATIVE PERCENT: 6.1 % (ref 20–42)
MCH RBC QN AUTO: 27.7 PG (ref 26–35)
MCHC RBC AUTO-ENTMCNC: 32.6 % (ref 32–34.5)
MCV RBC AUTO: 85.2 FL (ref 80–99.9)
MONOCYTES ABSOLUTE: 0.1 E9/L (ref 0.1–0.95)
MONOCYTES RELATIVE PERCENT: 1.4 % (ref 2–12)
NEUTROPHILS ABSOLUTE: 6.8 E9/L (ref 1.8–7.3)
NEUTROPHILS RELATIVE PERCENT: 92 % (ref 43–80)
PDW BLD-RTO: 13.2 FL (ref 11.5–15)
PLATELET # BLD: 305 E9/L (ref 130–450)
PMV BLD AUTO: 8.6 FL (ref 7–12)
RBC # BLD: 4.58 E12/L (ref 3.5–5.5)
WBC # BLD: 7.4 E9/L (ref 4.5–11.5)

## 2018-12-24 PROCEDURE — 87502 INFLUENZA DNA AMP PROBE: CPT

## 2018-12-24 RX ORDER — DEXTROMETHORPHAN POLISTIREX 30 MG/5ML
30 SUSPENSION ORAL 2 TIMES DAILY PRN
Qty: 1 BOTTLE | Refills: 0 | Status: SHIPPED | OUTPATIENT
Start: 2018-12-24 | End: 2019-01-03

## 2018-12-24 RX ORDER — VALACYCLOVIR HYDROCHLORIDE 1 G/1
1000 TABLET, FILM COATED ORAL 3 TIMES DAILY
Qty: 30 TABLET | Refills: 0 | Status: SHIPPED | OUTPATIENT
Start: 2018-12-24 | End: 2019-01-03

## 2018-12-24 RX ORDER — DOXYCYCLINE HYCLATE 100 MG
100 TABLET ORAL 2 TIMES DAILY
Qty: 20 TABLET | Refills: 0 | Status: SHIPPED | OUTPATIENT
Start: 2018-12-24 | End: 2019-01-03

## 2018-12-24 ASSESSMENT — PAIN SCALES - GENERAL
PAINLEVEL_OUTOF10: 6
PAINLEVEL_OUTOF10: 4

## 2018-12-24 ASSESSMENT — PAIN DESCRIPTION - PAIN TYPE: TYPE: ACUTE PAIN

## 2018-12-24 ASSESSMENT — PAIN DESCRIPTION - LOCATION: LOCATION: CHEST

## 2018-12-24 ASSESSMENT — PAIN DESCRIPTION - PROGRESSION: CLINICAL_PROGRESSION: GRADUALLY IMPROVING

## 2018-12-24 NOTE — ED PROVIDER NOTES
HPI:  12/23/18,   Time: 10:24 PM         Lori Bradley is a 41 y.o. female presenting to the ED for Chest pain and cough, beginning Over the last ago. The complaint has been persistent, moderate in severity, and worsened by nothing. Patient is a 63-year-old female with a history of lupus who presents to the emergency department complaining of coughing,  As well as substernal chest pain that is exacerbated by the cough as well as deep breathing, she just states she has a rash over her chest as well. Denies any itching of the rash, denies any recent sick contacts, denies any fevers or chills. ROS:   Pertinent positives and negatives are stated within HPI, all other systems reviewed and are negative.  --------------------------------------------- PAST HISTORY ---------------------------------------------  Past Medical History:  has a past medical history of ARDS (adult respiratory distress syndrome) ([facility] [address] Asthma; Colitis; and Depression. Past Surgical History:  has a past surgical history that includes Cholecystectomy, laparoscopic (03/2018). Social History:  reports that she has never smoked. She has never used smokeless tobacco. She reports that she does not drink alcohol or use drugs. Family History: family history includes Celiac Disease in her sister; Crohn's Disease in her brother; Diabetes in her father, mother, and sister; High Blood Pressure in her brother, mother, and sister; Uterine Cancer in her maternal grandmother. The patients home medications have been reviewed. Allergies: Fish-derived products; Nsaids; Cefdinir; Cephalosporins; Ciprofloxacin; Codeine; Fish allergy; Levaquin [levofloxacin in d5w]; Metronidazole; Other;  Wellbutrin [bupropion]; and Pce [erythromycin]    -------------------------------------------------- RESULTS -------------------------------------------------  All laboratory and radiology results have been personally reviewed by myself   LABS:  Results for orders placed or performed during the hospital encounter of 12/23/18   Rapid influenza A/B antigens   Result Value Ref Range    Influenza A by PCR Not Detected Not Detected    Influenza B by PCR Not Detected Not Detected   CBC Auto Differential   Result Value Ref Range    WBC 7.4 4.5 - 11.5 E9/L    RBC 4.58 3.50 - 5.50 E12/L    Hemoglobin 12.7 11.5 - 15.5 g/dL    Hematocrit 39.0 34.0 - 48.0 %    MCV 85.2 80.0 - 99.9 fL    MCH 27.7 26.0 - 35.0 pg    MCHC 32.6 32.0 - 34.5 %    RDW 13.2 11.5 - 15.0 fL    Platelets 128 765 - 616 E9/L    MPV 8.6 7.0 - 12.0 fL    Neutrophils % 92.0 (H) 43.0 - 80.0 %    Immature Granulocytes % 0.3 0.0 - 5.0 %    Lymphocytes % 6.1 (L) 20.0 - 42.0 %    Monocytes % 1.4 (L) 2.0 - 12.0 %    Eosinophils % 0.1 0.0 - 6.0 %    Basophils % 0.1 0.0 - 2.0 %    Neutrophils # 6.80 1.80 - 7.30 E9/L    Immature Granulocytes # 0.02 E9/L    Lymphocytes # 0.45 (L) 1.50 - 4.00 E9/L    Monocytes # 0.10 0.10 - 0.95 E9/L    Eosinophils # 0.01 (L) 0.05 - 0.50 E9/L    Basophils # 0.01 0.00 - 0.20 Q7/B   Basic Metabolic Panel w/ Reflex to MG   Result Value Ref Range    Sodium 142 132 - 146 mmol/L    Potassium reflex Magnesium 4.4 3.5 - 5.0 mmol/L    Chloride 106 98 - 107 mmol/L    CO2 24 22 - 29 mmol/L    Anion Gap 12 7 - 16 mmol/L    Glucose 152 (H) 74 - 99 mg/dL    BUN 12 6 - 20 mg/dL    CREATININE 0.8 0.5 - 1.0 mg/dL    GFR Non-African American >60 >=60 mL/min/1.73    GFR African American >60     Calcium 9.4 8.6 - 10.2 mg/dL   Troponin   Result Value Ref Range    Troponin <0.01 0.00 - 0.03 ng/mL   Protime-INR   Result Value Ref Range    Protime 12.5 (H) 9.3 - 12.4 sec    INR 1.1    APTT   Result Value Ref Range    aPTT 33.5 24.5 - 35.1 sec   EKG 12 Lead   Result Value Ref Range    Ventricular Rate 81 BPM    Atrial Rate 81 BPM    P-R Interval 146 ms    QRS Duration 80 ms    Q-T Interval 398 ms    QTc Calculation (Bazett) 462 ms    P Axis 60 degrees    R Axis 14 degrees    T Axis 26 degrees

## 2019-01-21 ENCOUNTER — TELEPHONE (OUTPATIENT)
Dept: CARDIOLOGY | Age: 49
End: 2019-01-21

## 2019-01-28 ENCOUNTER — TELEPHONE (OUTPATIENT)
Dept: NON INVASIVE DIAGNOSTICS | Age: 49
End: 2019-01-28

## 2019-01-31 ENCOUNTER — OFFICE VISIT (OUTPATIENT)
Dept: NON INVASIVE DIAGNOSTICS | Age: 49
End: 2019-01-31
Payer: COMMERCIAL

## 2019-01-31 VITALS
SYSTOLIC BLOOD PRESSURE: 110 MMHG | HEIGHT: 61 IN | DIASTOLIC BLOOD PRESSURE: 78 MMHG | WEIGHT: 197 LBS | BODY MASS INDEX: 37.19 KG/M2 | HEART RATE: 71 BPM | RESPIRATION RATE: 18 BRPM

## 2019-01-31 DIAGNOSIS — R00.2 PALPITATIONS: Primary | ICD-10-CM

## 2019-01-31 PROCEDURE — 1036F TOBACCO NON-USER: CPT | Performed by: INTERNAL MEDICINE

## 2019-01-31 PROCEDURE — G8417 CALC BMI ABV UP PARAM F/U: HCPCS | Performed by: INTERNAL MEDICINE

## 2019-01-31 PROCEDURE — 93000 ELECTROCARDIOGRAM COMPLETE: CPT | Performed by: INTERNAL MEDICINE

## 2019-01-31 PROCEDURE — G8484 FLU IMMUNIZE NO ADMIN: HCPCS | Performed by: INTERNAL MEDICINE

## 2019-01-31 PROCEDURE — 99214 OFFICE O/P EST MOD 30 MIN: CPT | Performed by: INTERNAL MEDICINE

## 2019-01-31 PROCEDURE — G8427 DOCREV CUR MEDS BY ELIG CLIN: HCPCS | Performed by: INTERNAL MEDICINE

## 2019-01-31 RX ORDER — DIPHENOXYLATE HYDROCHLORIDE AND ATROPINE SULFATE 2.5; .025 MG/1; MG/1
1 TABLET ORAL 4 TIMES DAILY PRN
Status: ON HOLD | COMMUNITY
End: 2019-11-13

## 2019-01-31 RX ORDER — PROMETHAZINE HYDROCHLORIDE 12.5 MG/1
12.5 TABLET ORAL EVERY 6 HOURS PRN
Status: ON HOLD | COMMUNITY
End: 2019-07-09 | Stop reason: ALTCHOICE

## 2019-01-31 RX ORDER — ATENOLOL 25 MG/1
25 TABLET ORAL
COMMUNITY
End: 2019-04-04 | Stop reason: SINTOL

## 2019-01-31 RX ORDER — SERTRALINE HYDROCHLORIDE 100 MG/1
100 TABLET, FILM COATED ORAL
COMMUNITY
End: 2019-07-09

## 2019-01-31 RX ORDER — TRAZODONE HYDROCHLORIDE 50 MG/1
50 TABLET ORAL NIGHTLY
COMMUNITY
End: 2019-07-01

## 2019-01-31 RX ORDER — RANITIDINE 150 MG/1
150 TABLET ORAL
COMMUNITY
End: 2019-07-01

## 2019-01-31 RX ORDER — NITROGLYCERIN 0.4 MG/1
0.4 TABLET SUBLINGUAL EVERY 5 MIN PRN
COMMUNITY
End: 2019-08-21

## 2019-03-06 DIAGNOSIS — R00.2 PALPITATIONS: ICD-10-CM

## 2019-03-08 ENCOUNTER — TELEPHONE (OUTPATIENT)
Dept: NON INVASIVE DIAGNOSTICS | Age: 49
End: 2019-03-08

## 2019-03-08 NOTE — TELEPHONE ENCOUNTER
----- Message from Emelyn Smith MD sent at 3/6/2019  2:54 PM EST -----  Please let her know that her symptoms correlated with NSR and sinus tachycardia.  Thanks.  ----- Message -----  From: Delisa Tapia  Sent: 3/6/2019  11:55 AM  To: Emelyn Smith MD

## 2019-03-27 ENCOUNTER — TELEPHONE (OUTPATIENT)
Dept: CARDIOLOGY | Age: 49
End: 2019-03-27

## 2019-04-02 NOTE — PROGRESS NOTES
[facility],[address] and [address] [facility] Electrophysiology  Outpatient Progress Note  PATIENT: Corrinne Morse  MEDICAL RECORD NUMBER: [id number]  DATE OF SERVICE:  4/4/2019  ATTENDING ELECTROPHYSIOLOGIST: Tigre Damico MD  REFERRING PHYSICIAN: No ref. provider found and Chandler Santana DO  CHIEF COMPLAINT: Palpitations    HPI: This is a 57 y.o. female with a history of   Patient Active Problem List   Diagnosis    Atypical chest pain    Moderate persistent asthma without complication    Palpitations    Anxiety    Depression    Chest pain    Right upper quadrant pain    Abdominal pain    Acute asthma exacerbation    Shortness of breath    Rash and other nonspecific skin eruption    Pleurisy    Acute upper respiratory infection   who presents to cardiac electrophysiology clinic for follow up of palpitations. She requested at the beginning of the visit to record the entire visit via her recorder due to \"lupus fog\". The patient underwent 30 days MCOT March 2019 and had multiple episodes of palpitations, dyspnea, chest pain and lightheadedness which all correlated with normal sinus rhythm and sinus tachycardia. She states since her last office visit, her PCP has stopped some of her medication and reports her symptoms have improved. She presents today in sinus rhythm. The patient denies any chest pain, dyspnea, dizziness, syncope, orthopnea or paroxysmal nocturnal dyspnea.  She still reports palpitations which mostly occurred at night when her heart rate went up to 140's bpm.    Patient Active Problem List    Diagnosis Date Noted    Anxiety 01/27/2018     Priority: Medium    Depression 01/27/2018     Priority: Low    Rash and other nonspecific skin eruption 12/24/2018    Pleurisy 12/24/2018    Acute upper respiratory infection 12/24/2018    Acute asthma exacerbation 08/16/2018    Shortness of breath     Abdominal pain 07/30/2018    Right upper quadrant pain 04/17/2018    Palpitations 01/27/2018    Chest pain 01/27/2018    Atypical chest pain     Moderate persistent asthma without complication        Past Medical History:   Diagnosis Date    ARDS (adult respiratory distress syndrome) ([facility] [address]     Asthma     Colitis     Depression        Family History   Problem Relation Age of Onset    High Blood Pressure Mother     Diabetes Mother     Diabetes Father     High Blood Pressure Sister     Diabetes Sister     Celiac Disease Sister     High Blood Pressure Brother     Crohn's Disease Brother     Uterine Cancer Maternal Grandmother        Social History     Tobacco Use    Smoking status: Never Smoker    Smokeless tobacco: Never Used   Substance Use Topics    Alcohol use: No       Current Outpatient Medications   Medication Sig Dispense Refill    busPIRone (BUSPAR) 15 MG tablet Take 7.5 mg by mouth daily      Multiple Vitamins-Minerals (MULTIVITAMIN ADULT PO) Take by mouth daily      sertraline (ZOLOFT) 100 MG tablet Take 100 mg by mouth      traZODone (DESYREL) 50 MG tablet Take 50 mg by mouth      diphenoxylate-atropine (LOMOTIL) 2.5-0.025 MG per tablet Take 1 tablet by mouth 4 times daily as needed for Diarrhea. Yolie Scott nitroGLYCERIN (NITROSTAT) 0.4 MG SL tablet Place 0.4 mg under the tongue every 5 minutes as needed for Chest pain up to max of 3 total doses. If no relief after 1 dose, call 911.  promethazine (PHENERGAN) 12.5 MG tablet Take 12.5 mg by mouth every 6 hours as needed for Nausea      albuterol sulfate  (90 Base) MCG/ACT inhaler Inhale 2 puffs into the lungs every 6 hours as needed for Wheezing      famotidine (PEPCID) 20 MG tablet Take 20 mg by mouth 2 times daily      methotrexate (RHEUMATREX) 2.5 MG chemo tablet Take 2.5 mg by mouth once a week      folic acid (FOLVITE) 1 MG tablet Take 1 mg by mouth daily      ammonium lactate (AMLACTIN) 12 % cream Apply topically as needed for Dry Skin Apply topically as needed.       ipratropium-albuterol (DUONEB) 0.5-2.5 (3) MG/3ML SOLN nebulizer solution Inhale 1 vial into the lungs every 6 hours as needed for Shortness of Breath      cetirizine (ZYRTEC) 10 MG tablet Take 1 tablet by mouth daily 30 tablet 1    Probiotic Product (PROBIOTIC-10) CAPS Take by mouth daily      aspirin 81 MG tablet Take 81 mg by mouth daily      ALPRAZolam (XANAX) 1 MG tablet Take 1 mg by mouth 3 times daily as needed for Sleep. Nile Dark Cholecalciferol (VITAMIN D) 2000 units CAPS capsule Take 5,000 Units by mouth daily       Fluticasone Furoate-Vilanterol (BREO ELLIPTA) 200-25 MCG/INH AEPB Inhale 1 puff into the lungs daily      Montelukast Sodium (SINGULAIR PO) Take 10 mg by mouth daily       atenolol (TENORMIN) 25 MG tablet Take 25 mg by mouth      ranitidine (ZANTAC) 150 MG tablet Take 150 mg by mouth      diphenhydrAMINE (BENADRYL ALLERGY) 25 MG capsule Take 25 mg by mouth every 6 hours as needed for Itching       No current facility-administered medications for this visit. Allergies   Allergen Reactions    Fish-Derived Products Anaphylaxis     anaphylaxis    Nsaids      Vomiting    Cefdinir      Rash, Blisters. Rash, Blisters.  Cephalosporins     Ciprofloxacin     Codeine Nausea Only    Fish Allergy     Levaquin [Levofloxacin In D5w] Hives    Metronidazole Hives     throat closing, hives  throat closing, hives    Other      Allergic to Midrin    Wellbutrin [Bupropion]     Pce [Erythromycin] Rash       ROS:   Constitutional: Negative for fever. Negative for activity change and for appetite change. HENT: Negative for epistaxis. Eyes: Negative for diploplia, blurred vision. Respiratory: Negative for cough, chest tightness, shortness of breath and wheezing. Cardiovascular: pertinent positives in HPI  Gastrointestinal: Negative for abdominal pain and blood in stool.    All other review of systems are negative     PHYSICAL EXAM:   Vitals:    04/04/19 0919   BP: 124/72   Resp: 18 Weight: 205 lb (93 kg)   Height: 5' 2.5\" (1.588 m)   Constitutional: Well-developed, no acute distress  Eyes: conjunctivae normal, no xanthelasma   Ears, Nose, Throat: oral mucosa moist, no cyanosis   CV: no JVD. Regular rate and rhythm. Normal S1S2 and no S3. No murmurs, rubs, or gallops. PMI is nondisplaced  Lungs: clear to auscultation bilaterally, normal respiratory effort without used of accessory muscles  Abdomen: soft, non-tender, bowel sounds present, no masses or hepatomegaly   Musculoskeletal: no digital clubbing, no edema   Skin: warm, no rashes     I have personally reviewed the laboratory, cardiac diagnostic and radiographic testing as outlined below:    Data:    No results for input(s): WBC, HGB, HCT, PLT in the last 72 hours. No results for input(s): NA, K, CL, CO2, BUN, CREATININE, CALCIUM in the last 72 hours. Invalid input(s): GLU, MAGNESIUM   Lab Results   Component Value Date    MG 1.8 10/06/2018     No results for input(s): TSH in the last 72 hours. No results for input(s): INR in the last 72 hours. CXR 11/15/18:   Exam: XR CHEST (2 VW)   Number of Images: 2 views   Indication:  Shortness of   Comparison: Prior study from 10/09/2018 is available       Findings: Examination of the chest in PA and lateral views shows that   both lungs are free of active disease. The heart is normal in size and   configuration. The trachea is in the midline. The mediastinum and both   hemidiaphragms are normal. The bony thorax is intact.            Impression   NO ACUTE CARDIOPULMONARY PROCESS            EKG 4/4/19: SR, rate: 67 bpm, QTc 406  no delta waves - Please see scan in Cardiology. Echocardiogram 8/16/18:   Type of Study      TTE procedure:Echo Complete W/Doppler & Color Flow.     Procedure Date  Date: 08/16/2018 Start: 03:13 PM    Study Location: Portable  Technical Quality: Adequate visualization    Indications:Dyspnea on exertion.     Patient Status: Routine    Height: 61 inches Weight: 187 pounds BSA: 1.84 m^2 BMI: 35.33 kg/m^2    HR: 58 bpm BP: 113/70 mmHg     Findings      Left Ventricle   Left ventricle size is normal.   Normal left ventricular wall thickness.   Normal left ventricular systolic function.   Ejection fraction is visually estimated at 65%.   Normal left ventricular diastolic filling pattern for age.      Right Ventricle   Normal right ventricular size and function (TAPSE 2.2 cm).      Left Atrium   Normal sized left atrium by volume index.      Right Atrium   Normal sized right atrium.      Mitral Valve   Physiologic and/or trace mitral regurgitation.   No evidence of hemodynamically significant mitral stenosis.      Tricuspid Valve   Physiologic and/or trace tricuspid regurgitation.   PASP is estimated at 24 mmHg (normal estimated PASP).     Aortic Valve   No evidence of hemodynamically significant aortic regurgitation or   stenosis.     Pulmonic Valve   Physiologic and/or trace pulmonic regurgitation.      Pericardial Effusion   No evidence of a hemodynamically significant pericardial effusion.      Aorta   Aortic root dimension within normal limits.   The inferior vena cava is normal in size with normal respiratory   variation.      Conclusions      Summary   Normal left ventricular systolic function.   Ejection fraction is visually estimated at 65%.   Normal right ventricular size and function (TAPSE 2.2 cm).   Normal left ventricular diastolic filling pattern for age.  Salud Span evidence of hemodynamically significant valve disease   PASP is estimated at 24 mmHg (normal estimated PASP).    The inferior vena cava is normal in size with normal respiratory   variation.      Signature      ----------------------------------------------------------------   Electronically signed by Yecenia Melara MD(Interpreting   physician) on 08/17/2018 06:22 AM   ----------------------------------------------------------------     M-Mode/2D Measurements & Calculations      LV Diastolic    LV Systolic Dimension: 2.7   AV Cusp Separation: 1.6 cmLA   Dimension: 4.3  cm                           Dimension: 3.4 cmAO Root   cm              LV Volume Diastolic: 89.0 ml Dimension: 2.4 cm   LV FS:37.2 %    LV Volume Systolic: 46.7 ml   LV PW           LV EDV/LV EDV Index: 92.7   Diastolic: 0.8  NA/28 OA/F^4HD ESV/LV ESV   cm              Index: 27.7 ml/15ml/ m^2     RV Diastolic Dimension: 3.6   LV PW Systolic: EF Calculated: 67 %          cm   1.2 cm          LV Mass Index: 57 l/min*m^2   Septum                                       LA/Aorta: 3.51   Diastolic: 0.8                               Ascending Aorta: 2.5 cm   cm              LVOT: 2 cm                   LA volume/Index: 55 ml   Septum                                       /77.08CX/N^9   Systolic: 1.3                                RA Area: 14.6 cm^2   cm   CO: 4.83 l/min   CI: 2.62   l/m*m^2   LV Mass: 105.33   g     Doppler Measurements & Calculations      MV Peak E-Wave:   AV Peak Velocity: 1.51 m/s    LVOT Peak Velocity: 1.18   1.18 m/s          AV Peak Gradient: 9.08 mmHg   m/s   MV Peak A-Wave:   AV Mean Velocity: 0.95 m/s    LVOT Mean Velocity: 0.71   0.78 m/s          AV Mean Gradient: 4.2 mmHg    m/s   MV E/A Ratio:     AV VTI: 33.8 cm               LVOT Peak Gradient: 5.6   1.52              AV Area (Continuity):2.46     mmHgLVOT Mean Gradient:   MV Peak Gradient: cm^2                          2.4 mmHg   7.9 mmHg                                        Estimated RVSP: 24.2 mmHg   MV Mean Gradient: LVOT VTI: 26.5 cm             Estimated RAP:3 mmHg   1.5 mmHg          IVRT: 87.7 msec   MV Mean Velocity: Estimated PASP: 24.23 mmHg   0.55 m/s          Pulm. Vein A Reversal         TR Velocity:2.3 m/s   MV Deceleration   Duration:92.3 msec            TR Gradient:21.23 mmHg   Time: 226.3 msec  Pulm. Vein D Velocity:0.85    PV Peak Velocity: 1.14 m/s   MV P1/2t: 68.4    m/sPulm.  Vein A Reversal      PV Peak Gradient: 5.2 mmHg   msec              Velocity:0.24 m/s             PV Mean Velocity: 0.76 m/s   MVA by PHT:3.22   Pulm. Vein S Velocity: 0.63   PV Mean Gradient: 2.7 mmHg   cm^2              m/s   MV Area   (continuity): 2.1   cm^2   MV E' Septal   Velocity: 0.08   m/s   MV E' Lateral   Velocity: 14 m/s    Stress Test 10/10/18:   EXAM: NM MYOCARDIAL SPECT REST EXERCISE OR RX   Number of Images: 10 views   INDICATION:  CHEST PAIN, ACUTE CORONARY SYNDROME SUSPECT    Procedure Type->Exercise    COMPARISON: None       TECHNIQUE:   11.2 mCi of Tc-99m MIBI was injected intravenously at rest and cardiac   SPECT images were performed. In addition 30.8 mCi of Tc-99m MIBI was   injected intravenously at maximum stress by using Lexiscan stress. Stress SPECT images and gated study were performed.        FINDINGS:   Perfusion images demonstrate no reversible perfusion defect. Wall motion is within normal limits. The end diastolic volume is 55 ml. The end systolic volume is 11 ml. The estimated ejection fraction is greater than 70  %.            Impression   1. No reversible perfusion defect   2. Ejection fraction is greater than 70  %.   3. No significant wall motion abnormality     Outpatient Monitor 3/14/18:        MCOT 3/6/19:      I have independently reviewed all of the ECGs and rhythm strips per above     Assessment/Plan: This is a 57 y.o. female with a history of   Patient Active Problem List   Diagnosis    Atypical chest pain    Moderate persistent asthma without complication    Palpitations    Anxiety    Depression    Chest pain    Right upper quadrant pain    Abdominal pain    Acute asthma exacerbation    Shortness of breath    Rash and other nonspecific skin eruption    Pleurisy    Acute upper respiratory infection    who presents with palpitations.     1. Palpitations  - Possible symptomatic sinus tachycardia or inappropriate sinus tachycardia versus anxiety/depression   - Advised to decrease caffeine intake and increase exercise. - Not responding well with low dose Calcium channel blocker. - Off Atenolol - which was prescribed by PCP and not helping.  - MCOT 3/2019 - symptoms correlated sinus rhythm and sinus tachycardia  - Will start on Toprol 25 mg daily to help with symptoms.  - Reassured. 2. PSVT  - Asymptomatic.  - Only single episode of 7 beats noted and do not explain the patient symptoms.  - Continue to monitor. 3. Asthma  - On Singulair and inhalers. 4. Anxiety and depression  - On Zoloft and Desyrel as well as Xanax as needed. - Follows with psychologist.     5. Connective tissue disease  - On Methotrexate and Folic      Recommendations:    1. Reassured. 2. Will start on Toprol 25 mg daily to help with symptoms. 3. Life style modifications. 4. Follow up in 6 months. Encourage to call for any questions or concern. I have spent a total of 30 minutes with the patient and the family reviewing the above stated recommendations. And a total of >50% of that time involved face-to-face time providing counseling and or coordination of care with the other providers. Thank you for allowing me to participate in your patient's care. Please call me if there are any questions or concerns.       Jayna Winters MD  Cardiac Electrophysiology  [address]  [facility] [place] [place] [facility]  9:37 AM  4/4/2019

## 2019-04-04 ENCOUNTER — OFFICE VISIT (OUTPATIENT)
Dept: NON INVASIVE DIAGNOSTICS | Age: 49
End: 2019-04-04
Payer: COMMERCIAL

## 2019-04-04 VITALS
RESPIRATION RATE: 18 BRPM | WEIGHT: 205 LBS | HEIGHT: 63 IN | SYSTOLIC BLOOD PRESSURE: 124 MMHG | DIASTOLIC BLOOD PRESSURE: 72 MMHG | BODY MASS INDEX: 36.32 KG/M2

## 2019-04-04 DIAGNOSIS — R00.2 PALPITATIONS: ICD-10-CM

## 2019-04-04 DIAGNOSIS — I47.1 SVT (SUPRAVENTRICULAR TACHYCARDIA) (HCC): Primary | ICD-10-CM

## 2019-04-04 PROCEDURE — 99214 OFFICE O/P EST MOD 30 MIN: CPT | Performed by: INTERNAL MEDICINE

## 2019-04-04 PROCEDURE — G8417 CALC BMI ABV UP PARAM F/U: HCPCS | Performed by: INTERNAL MEDICINE

## 2019-04-04 PROCEDURE — 1036F TOBACCO NON-USER: CPT | Performed by: INTERNAL MEDICINE

## 2019-04-04 PROCEDURE — G8427 DOCREV CUR MEDS BY ELIG CLIN: HCPCS | Performed by: INTERNAL MEDICINE

## 2019-04-04 PROCEDURE — 93000 ELECTROCARDIOGRAM COMPLETE: CPT | Performed by: INTERNAL MEDICINE

## 2019-04-04 RX ORDER — METOPROLOL SUCCINATE 25 MG/1
25 TABLET, EXTENDED RELEASE ORAL DAILY
Qty: 30 TABLET | Refills: 3 | Status: ON HOLD | OUTPATIENT
Start: 2019-04-04 | End: 2019-07-10 | Stop reason: HOSPADM

## 2019-04-04 RX ORDER — BUSPIRONE HYDROCHLORIDE 15 MG/1
7.5 TABLET ORAL DAILY
COMMUNITY
End: 2019-07-09

## 2019-05-05 ENCOUNTER — HOSPITAL ENCOUNTER (EMERGENCY)
Age: 49
Discharge: HOME | End: 2019-05-06
Payer: COMMERCIAL

## 2019-05-05 ENCOUNTER — APPOINTMENT (OUTPATIENT)
Dept: CT IMAGING | Age: 49
End: 2019-05-05
Payer: COMMERCIAL

## 2019-05-05 DIAGNOSIS — R52 BODY ACHES: Primary | ICD-10-CM

## 2019-05-05 LAB
ALBUMIN SERPL-MCNC: 3.8 G/DL (ref 3.5–5.2)
ALP BLD-CCNC: 81 U/L (ref 35–104)
ALT SERPL-CCNC: 8 U/L (ref 0–32)
ANION GAP SERPL CALCULATED.3IONS-SCNC: 10 MMOL/L (ref 7–16)
AST SERPL-CCNC: 9 U/L (ref 0–31)
BACTERIA: ABNORMAL /HPF
BASOPHILS ABSOLUTE: 0.02 E9/L (ref 0–0.2)
BASOPHILS RELATIVE PERCENT: 0.3 % (ref 0–2)
BILIRUB SERPL-MCNC: <0.2 MG/DL (ref 0–1.2)
BILIRUBIN URINE: NEGATIVE
BLOOD, URINE: ABNORMAL
BUN BLDV-MCNC: 15 MG/DL (ref 6–20)
CALCIUM SERPL-MCNC: 8.6 MG/DL (ref 8.6–10.2)
CHLORIDE BLD-SCNC: 103 MMOL/L (ref 98–107)
CLARITY: CLEAR
CO2: 27 MMOL/L (ref 22–29)
COLOR: YELLOW
CREAT SERPL-MCNC: 0.9 MG/DL (ref 0.5–1)
EOSINOPHILS ABSOLUTE: 0.11 E9/L (ref 0.05–0.5)
EOSINOPHILS RELATIVE PERCENT: 1.7 % (ref 0–6)
GFR AFRICAN AMERICAN: >60
GFR NON-AFRICAN AMERICAN: >60 ML/MIN/1.73
GLUCOSE BLD-MCNC: 89 MG/DL (ref 74–99)
GLUCOSE URINE: NEGATIVE MG/DL
HCG, URINE, POC: NEGATIVE
HCT VFR BLD CALC: 36.4 % (ref 34–48)
HEMOGLOBIN: 11.7 G/DL (ref 11.5–15.5)
IMMATURE GRANULOCYTES #: 0.04 E9/L
IMMATURE GRANULOCYTES %: 0.6 % (ref 0–5)
KETONES, URINE: NEGATIVE MG/DL
LACTIC ACID: 0.6 MMOL/L (ref 0.5–2.2)
LEUKOCYTE ESTERASE, URINE: NEGATIVE
LIPASE: 57 U/L (ref 13–60)
LYMPHOCYTES ABSOLUTE: 1.68 E9/L (ref 1.5–4)
LYMPHOCYTES RELATIVE PERCENT: 26 % (ref 20–42)
Lab: NORMAL
MCH RBC QN AUTO: 27.6 PG (ref 26–35)
MCHC RBC AUTO-ENTMCNC: 32.1 % (ref 32–34.5)
MCV RBC AUTO: 85.8 FL (ref 80–99.9)
MONOCYTES ABSOLUTE: 0.44 E9/L (ref 0.1–0.95)
MONOCYTES RELATIVE PERCENT: 6.8 % (ref 2–12)
NEGATIVE QC PASS/FAIL: NORMAL
NEUTROPHILS ABSOLUTE: 4.17 E9/L (ref 1.8–7.3)
NEUTROPHILS RELATIVE PERCENT: 64.6 % (ref 43–80)
NITRITE, URINE: NEGATIVE
PDW BLD-RTO: 15 FL (ref 11.5–15)
PH UA: 6.5 (ref 5–9)
PLATELET # BLD: 293 E9/L (ref 130–450)
PMV BLD AUTO: 8.5 FL (ref 7–12)
POSITIVE QC PASS/FAIL: NORMAL
POTASSIUM SERPL-SCNC: 3.7 MMOL/L (ref 3.5–5)
PROTEIN UA: NEGATIVE MG/DL
RBC # BLD: 4.24 E12/L (ref 3.5–5.5)
RBC UA: ABNORMAL /HPF (ref 0–2)
SODIUM BLD-SCNC: 140 MMOL/L (ref 132–146)
SPECIFIC GRAVITY UA: <=1.005 (ref 1–1.03)
TOTAL CK: 37 U/L (ref 20–180)
TOTAL PROTEIN: 6.5 G/DL (ref 6.4–8.3)
UROBILINOGEN, URINE: 0.2 E.U./DL
WBC # BLD: 6.5 E9/L (ref 4.5–11.5)
WBC UA: ABNORMAL /HPF (ref 0–5)

## 2019-05-05 PROCEDURE — 85025 COMPLETE CBC W/AUTO DIFF WBC: CPT

## 2019-05-05 PROCEDURE — 99285 EMERGENCY DEPT VISIT HI MDM: CPT

## 2019-05-05 PROCEDURE — 81001 URINALYSIS AUTO W/SCOPE: CPT

## 2019-05-05 PROCEDURE — 80053 COMPREHEN METABOLIC PANEL: CPT

## 2019-05-05 PROCEDURE — 82550 ASSAY OF CK (CPK): CPT

## 2019-05-05 PROCEDURE — 83605 ASSAY OF LACTIC ACID: CPT

## 2019-05-05 PROCEDURE — 6360000004 HC RX CONTRAST MEDICATION: Performed by: RADIOLOGY

## 2019-05-05 PROCEDURE — 71275 CT ANGIOGRAPHY CHEST: CPT

## 2019-05-05 PROCEDURE — 83690 ASSAY OF LIPASE: CPT

## 2019-05-05 PROCEDURE — 2580000003 HC RX 258: Performed by: RADIOLOGY

## 2019-05-05 PROCEDURE — 74176 CT ABD & PELVIS W/O CONTRAST: CPT

## 2019-05-05 RX ORDER — SODIUM CHLORIDE 0.9 % (FLUSH) 0.9 %
10 SYRINGE (ML) INJECTION 2 TIMES DAILY
Status: DISCONTINUED | OUTPATIENT
Start: 2019-05-05 | End: 2019-05-06 | Stop reason: HOSPADM

## 2019-05-05 RX ADMIN — IOPAMIDOL 80 ML: 755 INJECTION, SOLUTION INTRAVENOUS at 23:50

## 2019-05-05 RX ADMIN — Medication 10 ML: at 23:40

## 2019-05-05 ASSESSMENT — PAIN SCALES - GENERAL: PAINLEVEL_OUTOF10: 8

## 2019-05-05 ASSESSMENT — PAIN DESCRIPTION - PAIN TYPE: TYPE: ACUTE PAIN

## 2019-05-05 ASSESSMENT — PAIN DESCRIPTION - LOCATION: LOCATION: BACK;LEG

## 2019-05-06 VITALS
SYSTOLIC BLOOD PRESSURE: 161 MMHG | TEMPERATURE: 97.9 F | RESPIRATION RATE: 18 BRPM | WEIGHT: 202 LBS | DIASTOLIC BLOOD PRESSURE: 84 MMHG | OXYGEN SATURATION: 100 % | BODY MASS INDEX: 37.17 KG/M2 | HEART RATE: 73 BPM | HEIGHT: 62 IN

## 2019-05-06 NOTE — ED PROVIDER NOTES
Blood Pressure in her brother, mother, and sister; Uterine Cancer in her maternal grandmother. The patients home medications have been reviewed. Allergies: Fish-derived products; Nsaids; Cefdinir; Cephalosporins; Ciprofloxacin; Codeine; Fish allergy; Folic acid; Levaquin [levofloxacin in d5w]; Metronidazole; Other; Prednisone;  Wellbutrin [bupropion]; and Pce [erythromycin]    -------------------------------------------------- RESULTS -------------------------------------------------  All laboratory and radiology results have been personally reviewed by myself   LABS:  Results for orders placed or performed during the hospital encounter of 05/05/19   CBC Auto Differential   Result Value Ref Range    WBC 6.5 4.5 - 11.5 E9/L    RBC 4.24 3.50 - 5.50 E12/L    Hemoglobin 11.7 11.5 - 15.5 g/dL    Hematocrit 36.4 34.0 - 48.0 %    MCV 85.8 80.0 - 99.9 fL    MCH 27.6 26.0 - 35.0 pg    MCHC 32.1 32.0 - 34.5 %    RDW 15.0 11.5 - 15.0 fL    Platelets 121 500 - 277 E9/L    MPV 8.5 7.0 - 12.0 fL    Neutrophils % 64.6 43.0 - 80.0 %    Immature Granulocytes % 0.6 0.0 - 5.0 %    Lymphocytes % 26.0 20.0 - 42.0 %    Monocytes % 6.8 2.0 - 12.0 %    Eosinophils % 1.7 0.0 - 6.0 %    Basophils % 0.3 0.0 - 2.0 %    Neutrophils # 4.17 1.80 - 7.30 E9/L    Immature Granulocytes # 0.04 E9/L    Lymphocytes # 1.68 1.50 - 4.00 E9/L    Monocytes # 0.44 0.10 - 0.95 E9/L    Eosinophils # 0.11 0.05 - 0.50 E9/L    Basophils # 0.02 0.00 - 0.20 E9/L   Comprehensive Metabolic Panel   Result Value Ref Range    Sodium 140 132 - 146 mmol/L    Potassium 3.7 3.5 - 5.0 mmol/L    Chloride 103 98 - 107 mmol/L    CO2 27 22 - 29 mmol/L    Anion Gap 10 7 - 16 mmol/L    Glucose 89 74 - 99 mg/dL    BUN 15 6 - 20 mg/dL    CREATININE 0.9 0.5 - 1.0 mg/dL    GFR Non-African American >60 >=60 mL/min/1.73    GFR African American >60     Calcium 8.6 8.6 - 10.2 mg/dL    Total Protein 6.5 6.4 - 8.3 g/dL    Alb 3.8 3.5 - 5.2 g/dL    Total Bilirubin <0.2 0.0 - 1.2 mg/dL ---------------------------   The nursing notes within the ED encounter and vital signs as below have been reviewed. BP (!) 115/59   Pulse 73   Temp 97.9 °F (36.6 °C) (Oral)   Resp 16   Ht 5' 2\" (1.575 m)   Wt 202 lb (91.6 kg)   LMP 05/05/2019   SpO2 100%   BMI 36.95 kg/m²   Oxygen Saturation Interpretation: Normal      ---------------------------------------------------PHYSICAL EXAM--------------------------------------    Constitutional/General: Alert and oriented x3, well appearing, non toxic in NAD  Head: Normocephalic and atraumatic  Eyes: PERRL, EOMI, conjunctiva normal, sclera non icteric  Mouth: Oropharynx clear, handling secretions, no trismus, no asymmetry of the posterior oropharynx or uvular edema  Neck: Supple, full ROM, no stridor, no crepitus, no meningeal signs  Respiratory: Lungs clear to auscultation bilaterally, no wheezes, rales, or rhonchi. Not in respiratory distress  Cardiovascular:  Regular rate. Regular rhythm. No murmurs, gallops, or rubs. 2+ distal pulses  Chest: No chest wall tenderness  GI:  Abdomen Soft, Non tender, Non distended. +BS. No organomegaly, no palpable masses,  No rebound, guarding, or rigidity. Musculoskeletal: Moves all extremities x 4. Warm and well perfused, no clubbing, cyanosis, or edema. Capillary refill <3 seconds  Integument: skin warm and dry.  No rashes seen  Lymphatic: no lymphadenopathy noted  Neurologic: GCS 15, no focal deficits, symmetric strength 5/5 in the upper and lower extremities bilaterally  Psychiatric: Normal Affect      ------------------------------ ED COURSE/MEDICAL DECISION MAKING----------------------  Medications   sodium chloride flush 0.9 % injection 10 mL (10 mLs Intravenous Given 5/5/19 7440)   iopamidol (ISOVUE-370) 76 % injection 80 mL (80 mLs Intravenous Given 5/5/19 2350)             Medical Decision Making:    Patient with multiple complaints includes shortness of breath and thoracic back pain, with history of lupus only an aspirin a PE study was performed and was negative, I did not appreciate any of the lumps or rashes she complained of on exam. Patient was offered pain medication but reports aspirin is the only pain medication she can take. Workup here unremarkable. She is to follow up with her rheumatologist in  Treatment plan for her. She also felt her family doctor for her back pain she may need further testing for possible sciatica. Counseling: The emergency provider has spoken with the patient and discussed todays results, in addition to providing specific details for the plan of care and counseling regarding the diagnosis and prognosis. Questions are answered at this time and they are agreeable with the plan.      --------------------------------- IMPRESSION AND DISPOSITION ---------------------------------    IMPRESSION  1. Body aches    2. History of lupus        DISPOSITION  Disposition: Discharge to home  Patient condition is stable      NOTE: This report was transcribed using voice recognition software.  Every effort was made to ensure accuracy; however, inadvertent computerized transcription errors may be present         Para DO Isabel  Resident  05/06/19 9609

## 2019-05-06 NOTE — ED NOTES
Discharge instructions given with instructions for follow up as instructed, pt verbalizes understanding, discharged with steady gait      Ryan Surjit  05/06/19 0143

## 2019-07-01 ENCOUNTER — APPOINTMENT (OUTPATIENT)
Dept: CT IMAGING | Age: 49
End: 2019-07-01
Payer: COMMERCIAL

## 2019-07-01 ENCOUNTER — HOSPITAL ENCOUNTER (EMERGENCY)
Age: 49
Discharge: HOME | End: 2019-07-01
Attending: EMERGENCY MEDICINE
Payer: COMMERCIAL

## 2019-07-01 ENCOUNTER — APPOINTMENT (OUTPATIENT)
Dept: GENERAL RADIOLOGY | Age: 49
End: 2019-07-01
Payer: COMMERCIAL

## 2019-07-01 VITALS
RESPIRATION RATE: 18 BRPM | SYSTOLIC BLOOD PRESSURE: 102 MMHG | OXYGEN SATURATION: 99 % | DIASTOLIC BLOOD PRESSURE: 60 MMHG | BODY MASS INDEX: 37.73 KG/M2 | WEIGHT: 205 LBS | HEIGHT: 62 IN | HEART RATE: 66 BPM | TEMPERATURE: 97.6 F

## 2019-07-01 DIAGNOSIS — R07.9 CHEST PAIN, UNSPECIFIED TYPE: Primary | ICD-10-CM

## 2019-07-01 DIAGNOSIS — R10.84 GENERALIZED ABDOMINAL PAIN: ICD-10-CM

## 2019-07-01 LAB
ALBUMIN SERPL-MCNC: 3.8 G/DL (ref 3.5–5.2)
ALP BLD-CCNC: 80 U/L (ref 35–104)
ALT SERPL-CCNC: 8 U/L (ref 0–32)
ANION GAP SERPL CALCULATED.3IONS-SCNC: 9 MMOL/L (ref 7–16)
AST SERPL-CCNC: 11 U/L (ref 0–31)
BACTERIA: ABNORMAL /HPF
BASOPHILS ABSOLUTE: 0.02 E9/L (ref 0–0.2)
BASOPHILS RELATIVE PERCENT: 0.4 % (ref 0–2)
BILIRUB SERPL-MCNC: 0.3 MG/DL (ref 0–1.2)
BILIRUBIN URINE: NEGATIVE
BLOOD, URINE: NEGATIVE
BUN BLDV-MCNC: 10 MG/DL (ref 6–20)
CALCIUM SERPL-MCNC: 9.6 MG/DL (ref 8.6–10.2)
CHLORIDE BLD-SCNC: 103 MMOL/L (ref 98–107)
CLARITY: NORMAL
CO2: 27 MMOL/L (ref 22–29)
COLOR: YELLOW
CREAT SERPL-MCNC: 0.8 MG/DL (ref 0.5–1)
D DIMER: 352 NG/ML DDU
EKG ATRIAL RATE: 68 BPM
EKG P AXIS: 50 DEGREES
EKG P-R INTERVAL: 162 MS
EKG Q-T INTERVAL: 396 MS
EKG QRS DURATION: 84 MS
EKG QTC CALCULATION (BAZETT): 421 MS
EKG R AXIS: 27 DEGREES
EKG T AXIS: 16 DEGREES
EKG VENTRICULAR RATE: 68 BPM
EOSINOPHILS ABSOLUTE: 0.07 E9/L (ref 0.05–0.5)
EOSINOPHILS RELATIVE PERCENT: 1.2 % (ref 0–6)
EPITHELIAL CELLS, UA: ABNORMAL /HPF
GFR AFRICAN AMERICAN: >60
GFR NON-AFRICAN AMERICAN: >60 ML/MIN/1.73
GLUCOSE BLD-MCNC: 104 MG/DL (ref 74–99)
GLUCOSE URINE: NEGATIVE MG/DL
HCG(URINE) PREGNANCY TEST: NEGATIVE
HCT VFR BLD CALC: 38 % (ref 34–48)
HEMOGLOBIN: 12.4 G/DL (ref 11.5–15.5)
IMMATURE GRANULOCYTES #: 0.01 E9/L
IMMATURE GRANULOCYTES %: 0.2 % (ref 0–5)
INR BLD: 1.1
KETONES, URINE: NEGATIVE MG/DL
LEUKOCYTE ESTERASE, URINE: NEGATIVE
LIPASE: 37 U/L (ref 13–60)
LYMPHOCYTES ABSOLUTE: 1.29 E9/L (ref 1.5–4)
LYMPHOCYTES RELATIVE PERCENT: 22.8 % (ref 20–42)
MCH RBC QN AUTO: 27.4 PG (ref 26–35)
MCHC RBC AUTO-ENTMCNC: 32.6 % (ref 32–34.5)
MCV RBC AUTO: 84.1 FL (ref 80–99.9)
MONOCYTES ABSOLUTE: 0.5 E9/L (ref 0.1–0.95)
MONOCYTES RELATIVE PERCENT: 8.8 % (ref 2–12)
NEUTROPHILS ABSOLUTE: 3.76 E9/L (ref 1.8–7.3)
NEUTROPHILS RELATIVE PERCENT: 66.6 % (ref 43–80)
NITRITE, URINE: NEGATIVE
PDW BLD-RTO: 14.4 FL (ref 11.5–15)
PH UA: 5.5 (ref 5–9)
PLATELET # BLD: 239 E9/L (ref 130–450)
PMV BLD AUTO: 8.4 FL (ref 7–12)
POTASSIUM REFLEX MAGNESIUM: 4 MMOL/L (ref 3.5–5)
PROTEIN UA: NEGATIVE MG/DL
PROTHROMBIN TIME: 12.4 SEC (ref 9.3–12.4)
RBC # BLD: 4.52 E12/L (ref 3.5–5.5)
RBC UA: ABNORMAL /HPF (ref 0–2)
SODIUM BLD-SCNC: 139 MMOL/L (ref 132–146)
SPECIFIC GRAVITY UA: 1.02 (ref 1–1.03)
TOTAL PROTEIN: 6.5 G/DL (ref 6.4–8.3)
TROPONIN: <0.01 NG/ML (ref 0–0.03)
TROPONIN: <0.01 NG/ML (ref 0–0.03)
UROBILINOGEN, URINE: 0.2 E.U./DL
WBC # BLD: 5.7 E9/L (ref 4.5–11.5)
WBC UA: ABNORMAL /HPF (ref 0–5)

## 2019-07-01 PROCEDURE — 81025 URINE PREGNANCY TEST: CPT

## 2019-07-01 PROCEDURE — 85025 COMPLETE CBC W/AUTO DIFF WBC: CPT

## 2019-07-01 PROCEDURE — 81001 URINALYSIS AUTO W/SCOPE: CPT

## 2019-07-01 PROCEDURE — 80053 COMPREHEN METABOLIC PANEL: CPT

## 2019-07-01 PROCEDURE — 83690 ASSAY OF LIPASE: CPT

## 2019-07-01 PROCEDURE — 96374 THER/PROPH/DIAG INJ IV PUSH: CPT

## 2019-07-01 PROCEDURE — 99285 EMERGENCY DEPT VISIT HI MDM: CPT

## 2019-07-01 PROCEDURE — 93005 ELECTROCARDIOGRAM TRACING: CPT | Performed by: STUDENT IN AN ORGANIZED HEALTH CARE EDUCATION/TRAINING PROGRAM

## 2019-07-01 PROCEDURE — 85378 FIBRIN DEGRADE SEMIQUANT: CPT

## 2019-07-01 PROCEDURE — 74150 CT ABDOMEN W/O CONTRAST: CPT

## 2019-07-01 PROCEDURE — 84484 ASSAY OF TROPONIN QUANT: CPT

## 2019-07-01 PROCEDURE — 93010 ELECTROCARDIOGRAM REPORT: CPT | Performed by: INTERNAL MEDICINE

## 2019-07-01 PROCEDURE — 71046 X-RAY EXAM CHEST 2 VIEWS: CPT

## 2019-07-01 PROCEDURE — 96375 TX/PRO/DX INJ NEW DRUG ADDON: CPT

## 2019-07-01 PROCEDURE — 6360000004 HC RX CONTRAST MEDICATION: Performed by: RADIOLOGY

## 2019-07-01 PROCEDURE — 6370000000 HC RX 637 (ALT 250 FOR IP): Performed by: STUDENT IN AN ORGANIZED HEALTH CARE EDUCATION/TRAINING PROGRAM

## 2019-07-01 PROCEDURE — 6360000002 HC RX W HCPCS: Performed by: STUDENT IN AN ORGANIZED HEALTH CARE EDUCATION/TRAINING PROGRAM

## 2019-07-01 PROCEDURE — 71275 CT ANGIOGRAPHY CHEST: CPT

## 2019-07-01 PROCEDURE — 85610 PROTHROMBIN TIME: CPT

## 2019-07-01 RX ORDER — ONDANSETRON 2 MG/ML
4 INJECTION INTRAMUSCULAR; INTRAVENOUS EVERY 6 HOURS PRN
Status: DISCONTINUED | OUTPATIENT
Start: 2019-07-01 | End: 2019-07-01 | Stop reason: HOSPADM

## 2019-07-01 RX ORDER — SUCRALFATE 1 G/1
1 TABLET ORAL 4 TIMES DAILY
Qty: 28 TABLET | Refills: 0 | Status: ON HOLD | OUTPATIENT
Start: 2019-07-01 | End: 2021-01-30

## 2019-07-01 RX ORDER — SODIUM CHLORIDE 0.65 %
1 AEROSOL, SPRAY (ML) NASAL PRN
Refills: 2 | COMMUNITY
Start: 2019-04-29 | End: 2021-02-24

## 2019-07-01 RX ORDER — LORAZEPAM 0.5 MG/1
0.5 TABLET ORAL ONCE
Status: DISCONTINUED | OUTPATIENT
Start: 2019-07-01 | End: 2019-07-01

## 2019-07-01 RX ORDER — MORPHINE SULFATE 2 MG/ML
2 INJECTION, SOLUTION INTRAMUSCULAR; INTRAVENOUS ONCE
Status: COMPLETED | OUTPATIENT
Start: 2019-07-01 | End: 2019-07-01

## 2019-07-01 RX ORDER — ACETAMINOPHEN 325 MG/1
650 TABLET ORAL ONCE
Status: COMPLETED | OUTPATIENT
Start: 2019-07-01 | End: 2019-07-01

## 2019-07-01 RX ADMIN — ACETAMINOPHEN 650 MG: 325 TABLET ORAL at 15:14

## 2019-07-01 RX ADMIN — IOPAMIDOL 80 ML: 755 INJECTION, SOLUTION INTRAVENOUS at 12:02

## 2019-07-01 RX ADMIN — ASPIRIN 325 MG: 325 TABLET, DELAYED RELEASE ORAL at 06:41

## 2019-07-01 RX ADMIN — MORPHINE SULFATE 2 MG: 2 INJECTION, SOLUTION INTRAMUSCULAR; INTRAVENOUS at 08:49

## 2019-07-01 RX ADMIN — ONDANSETRON 4 MG: 2 INJECTION INTRAMUSCULAR; INTRAVENOUS at 10:25

## 2019-07-01 ASSESSMENT — PAIN SCALES - GENERAL
PAINLEVEL_OUTOF10: 8
PAINLEVEL_OUTOF10: 7
PAINLEVEL_OUTOF10: 8

## 2019-07-01 ASSESSMENT — ENCOUNTER SYMPTOMS
COUGH: 1
EYE PAIN: 0
ABDOMINAL PAIN: 0
NAUSEA: 0
SORE THROAT: 0
BACK PAIN: 0
COLOR CHANGE: 0
APNEA: 0
VOMITING: 0
EYE REDNESS: 0
TROUBLE SWALLOWING: 0
DIARRHEA: 0
SHORTNESS OF BREATH: 0
CHEST TIGHTNESS: 0

## 2019-07-01 ASSESSMENT — PAIN DESCRIPTION - PROGRESSION
CLINICAL_PROGRESSION: GRADUALLY WORSENING

## 2019-07-01 ASSESSMENT — PAIN DESCRIPTION - PAIN TYPE
TYPE: ACUTE PAIN

## 2019-07-01 ASSESSMENT — PAIN DESCRIPTION - LOCATION
LOCATION: CHEST
LOCATION: GENERALIZED
LOCATION: ABDOMEN

## 2019-07-01 ASSESSMENT — PAIN DESCRIPTION - DESCRIPTORS
DESCRIPTORS: ACHING;TIGHTNESS;NUMBNESS
DESCRIPTORS: ACHING;NUMBNESS;TIGHTNESS
DESCRIPTORS: ACHING

## 2019-07-01 ASSESSMENT — PAIN DESCRIPTION - ORIENTATION
ORIENTATION: UPPER
ORIENTATION: MID

## 2019-07-01 ASSESSMENT — PAIN DESCRIPTION - ONSET
ONSET: ON-GOING
ONSET: ON-GOING

## 2019-07-01 ASSESSMENT — PAIN DESCRIPTION - FREQUENCY
FREQUENCY: CONTINUOUS
FREQUENCY: CONTINUOUS

## 2019-07-01 NOTE — ED PROVIDER NOTES
Name: Sherry Dawn  : 1970  MRN: [id number]    Date: 2019    Benefits of immediately proceeding with Radiology exam outweigh the risks and therefore the following is being waived:      [x] Pregnancy test    [] Protocol for Iodine allergy    [] MRI questionnaire    [] BUN/Creatinine        MD Yash Hutson MD  19 7125       Yash Kumar MD  07/15/19 [address] [place] [address], MD  07/15/19 9604

## 2019-07-01 NOTE — ED NOTES
[address]      Pt Name: Melissa Damico  MRN: [id number]  [place] 1970  Date of evaluation: 7/1/2019  Provider: Kermit Marquez DO    CHIEF COMPLAINT       Chief Complaint   Patient presents with    Chest Pain     mid sternal through to back, with generalized numbness    Fatigue    Numbness     intermittent throughout different body parts - hx Lupus    Dizziness         HISTORY OF PRESENT ILLNESS   (Location/Symptom, Timing/Onset, Context/Setting, Quality, Duration, Modifying Factors, Severity)  Note limiting factors. Melissa Damico is a 57 y.o. female who presents to the emergency department with a history of lupus, asthma, and anxiety, who presents with 3 days of intermittent chest pain. The chest pain began gradually and feels like a dull ache in the center of her chest and epigastrium, which radiates to the middle of her back. The chest pain is intermittent, and eating seems to make it worse. The pain is nonexertional.  She also reports a small dry cough that has been going on for several weeks. She reports increased anxiety intermittent generalized numbness to both of her arms and legs. She also reports that over the last 2 weeks she has had intermittent urinary incontinence, which has been followed up by her PCP and she is awaiting referral to a urologist.  She denies any weakness, tingling, or focal neurologic symptoms. She denies any leg pain, fever chills, shortness of breath, nausea, vomiting or abdominal pain. She has no history of blood clots, she takes a daily aspirin, and is not on any other blood thinners. She does not smoke she has no recent travel no history of cancer. The history is provided by the patient. Nursing Notes were reviewed.     REVIEW OF SYSTEMS    (2-9 systems for level 4, 10 or more for level 5)     Review of Systems   Constitutional: Negative for activity change, chills, diaphoresis, fatigue and fever. HENT: Negative for sore throat and trouble swallowing. Eyes: Negative for pain and redness. Respiratory: Positive for cough. Negative for apnea, chest tightness and shortness of breath. Cardiovascular: Positive for chest pain. Negative for palpitations and leg swelling. Gastrointestinal: Negative for abdominal pain, diarrhea, nausea and vomiting. Genitourinary: Negative for dysuria. Intermittent urinary incontinence    Musculoskeletal: Negative for back pain, myalgias and neck pain. Skin: Negative for color change and rash. Neurological: Positive for tremors. Negative for seizures, syncope, facial asymmetry, speech difficulty, weakness, light-headedness, numbness and headaches. Psychiatric/Behavioral: Negative for confusion, decreased concentration and hallucinations. The patient is nervous/anxious. All other systems reviewed and are negative. Except as noted above the remainder of the review of systems was reviewed and negative. PAST MEDICAL HISTORY     Past Medical History:   Diagnosis Date    [facility]DS (adult [facility] distress syndrome) ([facility] [facility]     Asthma     Colitis     Depression     Essential tremor     Gallstones     Hyperlipidemia     Hypertension     Lupus [facility]systemic lupus erythematosus) ([facility]     Tachycardia          SURGICAL HISTORY       Past Surgical History:   Procedure Laterality Date    CHOLECYSTECTOMY, LAPAROSCOPIC  03/2018    EYE SURGERY      retina's reattached         CURRENT MEDICATIONS       Previous Medications    ALBUTEROL SULFATE  (90 BASE) MCG/ACT INHALER    Inhale 2 puffs into the lungs every 6 hours as needed for Wheezing    ALPRAZOLAM (XANAX) 1 MG TABLET    Take 1 mg by mouth 3 times daily as needed for Sleep. .    AMMONIUM LACTATE (AMLACTIN) 12 % CREAM    Apply topically as needed for Dry Skin Apply topically as needed.     ASPIRIN 81 MG TABLET    Take 81 mg by mouth daily    BUSPIRONE Sister     Celiac Disease Sister     High Blood Pressure Brother     Crohn's Disease Brother     Uterine Cancer Maternal Grandmother           SOCIAL HISTORY       Social History     Socioeconomic History    Marital status: Single     Spouse name: None    Number of children: None    Years of education: None    Highest education level: None   Occupational History    None   Social Needs    Financial resource strain: None    Food insecurity:     Worry: None     Inability: None    Transportation needs:     Medical: None     Non-medical: None   Tobacco Use    Smoking status: Former Smoker     Types: Cigarettes    Smokeless tobacco: Never Used    Tobacco comment: quit long time ago   Substance and Sexual Activity    Alcohol use: No    Drug use: No    Sexual activity: None   Lifestyle    Physical activity:     Days per week: None     Minutes per session: None    Stress: None   Relationships    Social connections:     Talks on phone: None     Gets together: None     Attends Mandaeism service: None     Active member of club or organization: None     Attends meetings of clubs or organizations: None     Relationship status: None    Intimate partner violence:     Fear of current or ex partner: None     Emotionally abused: None     Physically abused: None     Forced sexual activity: None   Other Topics Concern    None   Social History Narrative    None       SCREENINGS    [place] Coma Scale  Eye Opening: Spontaneous  Best Verbal Response: Oriented  Best Motor Response: Obeys commands  [place] Coma Scale Score: 15          PHYSICAL EXAM    (up to 7 for level 4, 8 or more for level 5)     ED Triage Vitals [07/01/19 0551]   BP Temp Temp Source Pulse Resp SpO2 Height Weight   131/84 97.8 °F (36.6 °C) Oral 69 20 99 % 5' 2\" (1.575 m) 205 lb (93 kg)       Physical Exam   Constitutional: She is oriented to person, place, and time. She appears well-developed and well-nourished.    HENT:   Head: Normocephalic and

## 2019-07-09 ENCOUNTER — APPOINTMENT (OUTPATIENT)
Dept: GENERAL RADIOLOGY | Age: 49
End: 2019-07-09
Payer: COMMERCIAL

## 2019-07-09 ENCOUNTER — HOSPITAL ENCOUNTER (OUTPATIENT)
Age: 49
Setting detail: OBSERVATION
Discharge: HOME | End: 2019-07-10
Attending: EMERGENCY MEDICINE | Admitting: FAMILY MEDICINE
Payer: COMMERCIAL

## 2019-07-09 ENCOUNTER — APPOINTMENT (OUTPATIENT)
Dept: CT IMAGING | Age: 49
End: 2019-07-09
Payer: COMMERCIAL

## 2019-07-09 DIAGNOSIS — R55 SYNCOPE AND COLLAPSE: Primary | ICD-10-CM

## 2019-07-09 DIAGNOSIS — M32.9 LUPUS (HCC): ICD-10-CM

## 2019-07-09 PROBLEM — R10.11 RIGHT UPPER QUADRANT PAIN: Status: RESOLVED | Noted: 2018-04-17 | Resolved: 2019-07-09

## 2019-07-09 PROBLEM — J06.9 ACUTE UPPER RESPIRATORY INFECTION: Status: RESOLVED | Noted: 2018-12-24 | Resolved: 2019-07-09

## 2019-07-09 PROBLEM — R10.9 ABDOMINAL PAIN: Status: RESOLVED | Noted: 2018-07-30 | Resolved: 2019-07-09

## 2019-07-09 PROBLEM — R09.1 PLEURISY: Status: RESOLVED | Noted: 2018-12-24 | Resolved: 2019-07-09

## 2019-07-09 PROBLEM — R00.2 PALPITATIONS: Status: RESOLVED | Noted: 2018-01-27 | Resolved: 2019-07-09

## 2019-07-09 PROBLEM — R21 RASH AND OTHER NONSPECIFIC SKIN ERUPTION: Status: RESOLVED | Noted: 2018-12-24 | Resolved: 2019-07-09

## 2019-07-09 PROBLEM — J45.901 ACUTE ASTHMA EXACERBATION: Status: RESOLVED | Noted: 2018-08-16 | Resolved: 2019-07-09

## 2019-07-09 LAB
ALBUMIN SERPL-MCNC: 4 G/DL (ref 3.5–5.2)
ALP BLD-CCNC: 88 U/L (ref 35–104)
ALT SERPL-CCNC: 9 U/L (ref 0–32)
ANION GAP SERPL CALCULATED.3IONS-SCNC: 11 MMOL/L (ref 7–16)
APTT: 35.7 SEC (ref 24.5–35.1)
AST SERPL-CCNC: 15 U/L (ref 0–31)
BASOPHILS ABSOLUTE: 0.02 E9/L (ref 0–0.2)
BASOPHILS RELATIVE PERCENT: 0.3 % (ref 0–2)
BILIRUB SERPL-MCNC: 0.4 MG/DL (ref 0–1.2)
BUN BLDV-MCNC: 11 MG/DL (ref 6–20)
CALCIUM SERPL-MCNC: 9.4 MG/DL (ref 8.6–10.2)
CHLORIDE BLD-SCNC: 104 MMOL/L (ref 98–107)
CO2: 25 MMOL/L (ref 22–29)
CREAT SERPL-MCNC: 0.7 MG/DL (ref 0.5–1)
D DIMER: 299 NG/ML DDU
EOSINOPHILS ABSOLUTE: 0.04 E9/L (ref 0.05–0.5)
EOSINOPHILS RELATIVE PERCENT: 0.6 % (ref 0–6)
GFR AFRICAN AMERICAN: >60
GFR NON-AFRICAN AMERICAN: >60 ML/MIN/1.73
GLUCOSE BLD-MCNC: 91 MG/DL (ref 74–99)
HCG QUALITATIVE: NEGATIVE
HCT VFR BLD CALC: 39.7 % (ref 34–48)
HEMOGLOBIN: 13 G/DL (ref 11.5–15.5)
IMMATURE GRANULOCYTES #: 0.06 E9/L
IMMATURE GRANULOCYTES %: 1 % (ref 0–5)
INR BLD: 1.1
LYMPHOCYTES ABSOLUTE: 0.95 E9/L (ref 1.5–4)
LYMPHOCYTES RELATIVE PERCENT: 15.4 % (ref 20–42)
MCH RBC QN AUTO: 27.2 PG (ref 26–35)
MCHC RBC AUTO-ENTMCNC: 32.7 % (ref 32–34.5)
MCV RBC AUTO: 83.1 FL (ref 80–99.9)
MONOCYTES ABSOLUTE: 0.46 E9/L (ref 0.1–0.95)
MONOCYTES RELATIVE PERCENT: 7.5 % (ref 2–12)
NEUTROPHILS ABSOLUTE: 4.63 E9/L (ref 1.8–7.3)
NEUTROPHILS RELATIVE PERCENT: 75.2 % (ref 43–80)
PDW BLD-RTO: 14 FL (ref 11.5–15)
PLATELET # BLD: 238 E9/L (ref 130–450)
PMV BLD AUTO: 9 FL (ref 7–12)
POTASSIUM REFLEX MAGNESIUM: 4.6 MMOL/L (ref 3.5–5)
PROTHROMBIN TIME: 12.9 SEC (ref 9.3–12.4)
RBC # BLD: 4.78 E12/L (ref 3.5–5.5)
SODIUM BLD-SCNC: 140 MMOL/L (ref 132–146)
TOTAL PROTEIN: 7.4 G/DL (ref 6.4–8.3)
TROPONIN: <0.01 NG/ML (ref 0–0.03)
WBC # BLD: 6.2 E9/L (ref 4.5–11.5)

## 2019-07-09 PROCEDURE — 70450 CT HEAD/BRAIN W/O DYE: CPT

## 2019-07-09 PROCEDURE — 84703 CHORIONIC GONADOTROPIN ASSAY: CPT

## 2019-07-09 PROCEDURE — 2580000003 HC RX 258: Performed by: FAMILY MEDICINE

## 2019-07-09 PROCEDURE — G0378 HOSPITAL OBSERVATION PER HR: HCPCS

## 2019-07-09 PROCEDURE — 71045 X-RAY EXAM CHEST 1 VIEW: CPT

## 2019-07-09 PROCEDURE — 80053 COMPREHEN METABOLIC PANEL: CPT

## 2019-07-09 PROCEDURE — 85025 COMPLETE CBC W/AUTO DIFF WBC: CPT

## 2019-07-09 PROCEDURE — 85610 PROTHROMBIN TIME: CPT

## 2019-07-09 PROCEDURE — 36415 COLL VENOUS BLD VENIPUNCTURE: CPT

## 2019-07-09 PROCEDURE — 6370000000 HC RX 637 (ALT 250 FOR IP): Performed by: FAMILY MEDICINE

## 2019-07-09 PROCEDURE — 85378 FIBRIN DEGRADE SEMIQUANT: CPT

## 2019-07-09 PROCEDURE — 84484 ASSAY OF TROPONIN QUANT: CPT

## 2019-07-09 PROCEDURE — 85730 THROMBOPLASTIN TIME PARTIAL: CPT

## 2019-07-09 PROCEDURE — 99285 EMERGENCY DEPT VISIT HI MDM: CPT

## 2019-07-09 PROCEDURE — 2580000003 HC RX 258: Performed by: EMERGENCY MEDICINE

## 2019-07-09 PROCEDURE — 93005 ELECTROCARDIOGRAM TRACING: CPT | Performed by: EMERGENCY MEDICINE

## 2019-07-09 RX ORDER — ACETAMINOPHEN 325 MG/1
650 TABLET ORAL EVERY 4 HOURS PRN
Status: DISCONTINUED | OUTPATIENT
Start: 2019-07-09 | End: 2019-07-10 | Stop reason: HOSPADM

## 2019-07-09 RX ORDER — SODIUM CHLORIDE 0.9 % (FLUSH) 0.9 %
10 SYRINGE (ML) INJECTION PRN
Status: DISCONTINUED | OUTPATIENT
Start: 2019-07-09 | End: 2019-07-10 | Stop reason: HOSPADM

## 2019-07-09 RX ORDER — NITROGLYCERIN 0.4 MG/1
0.4 TABLET SUBLINGUAL EVERY 5 MIN PRN
Status: DISCONTINUED | OUTPATIENT
Start: 2019-07-09 | End: 2019-07-10 | Stop reason: HOSPADM

## 2019-07-09 RX ORDER — CETIRIZINE HYDROCHLORIDE 10 MG/1
10 TABLET ORAL DAILY
Status: DISCONTINUED | OUTPATIENT
Start: 2019-07-10 | End: 2019-07-10 | Stop reason: HOSPADM

## 2019-07-09 RX ORDER — 0.9 % SODIUM CHLORIDE 0.9 %
1000 INTRAVENOUS SOLUTION INTRAVENOUS ONCE
Status: COMPLETED | OUTPATIENT
Start: 2019-07-09 | End: 2019-07-09

## 2019-07-09 RX ORDER — DIPHENOXYLATE HYDROCHLORIDE AND ATROPINE SULFATE 2.5; .025 MG/1; MG/1
1 TABLET ORAL 4 TIMES DAILY PRN
Status: DISCONTINUED | OUTPATIENT
Start: 2019-07-09 | End: 2019-07-10 | Stop reason: HOSPADM

## 2019-07-09 RX ORDER — DIPHENHYDRAMINE HCL 25 MG
25 TABLET ORAL EVERY 6 HOURS PRN
Status: DISCONTINUED | OUTPATIENT
Start: 2019-07-09 | End: 2019-07-10 | Stop reason: HOSPADM

## 2019-07-09 RX ORDER — ASPIRIN 81 MG/1
81 TABLET, CHEWABLE ORAL DAILY
Status: DISCONTINUED | OUTPATIENT
Start: 2019-07-10 | End: 2019-07-10 | Stop reason: HOSPADM

## 2019-07-09 RX ORDER — FLUTICASONE FUROATE AND VILANTEROL 200; 25 UG/1; UG/1
1 POWDER RESPIRATORY (INHALATION) DAILY
Status: DISCONTINUED | OUTPATIENT
Start: 2019-07-10 | End: 2019-07-09 | Stop reason: CLARIF

## 2019-07-09 RX ORDER — ONDANSETRON 2 MG/ML
4 INJECTION INTRAMUSCULAR; INTRAVENOUS EVERY 6 HOURS PRN
Status: DISCONTINUED | OUTPATIENT
Start: 2019-07-09 | End: 2019-07-10 | Stop reason: HOSPADM

## 2019-07-09 RX ORDER — SODIUM CHLORIDE 0.9 % (FLUSH) 0.9 %
10 SYRINGE (ML) INJECTION EVERY 12 HOURS SCHEDULED
Status: DISCONTINUED | OUTPATIENT
Start: 2019-07-09 | End: 2019-07-10 | Stop reason: HOSPADM

## 2019-07-09 RX ORDER — METOPROLOL SUCCINATE 25 MG/1
25 TABLET, EXTENDED RELEASE ORAL DAILY
Status: DISCONTINUED | OUTPATIENT
Start: 2019-07-10 | End: 2019-07-10

## 2019-07-09 RX ORDER — IPRATROPIUM BROMIDE AND ALBUTEROL SULFATE 2.5; .5 MG/3ML; MG/3ML
1 SOLUTION RESPIRATORY (INHALATION) EVERY 6 HOURS PRN
Status: DISCONTINUED | OUTPATIENT
Start: 2019-07-09 | End: 2019-07-10 | Stop reason: HOSPADM

## 2019-07-09 RX ORDER — ALPRAZOLAM 1 MG/1
1 TABLET ORAL 3 TIMES DAILY PRN
Status: DISCONTINUED | OUTPATIENT
Start: 2019-07-09 | End: 2019-07-10 | Stop reason: HOSPADM

## 2019-07-09 RX ORDER — SUCRALFATE 1 G/1
1 TABLET ORAL 4 TIMES DAILY
Status: DISCONTINUED | OUTPATIENT
Start: 2019-07-09 | End: 2019-07-10 | Stop reason: HOSPADM

## 2019-07-09 RX ORDER — MONTELUKAST SODIUM 10 MG/1
10 TABLET ORAL DAILY
Status: DISCONTINUED | OUTPATIENT
Start: 2019-07-10 | End: 2019-07-10 | Stop reason: HOSPADM

## 2019-07-09 RX ORDER — PROMETHAZINE HYDROCHLORIDE 25 MG/1
12.5 TABLET ORAL EVERY 6 HOURS PRN
Status: DISCONTINUED | OUTPATIENT
Start: 2019-07-09 | End: 2019-07-10 | Stop reason: HOSPADM

## 2019-07-09 RX ORDER — ALBUTEROL SULFATE 90 UG/1
2 AEROSOL, METERED RESPIRATORY (INHALATION) EVERY 6 HOURS PRN
Status: DISCONTINUED | OUTPATIENT
Start: 2019-07-09 | End: 2019-07-10 | Stop reason: HOSPADM

## 2019-07-09 RX ORDER — AMMONIUM LACTATE 12 G/100G
LOTION TOPICAL PRN
Status: DISCONTINUED | OUTPATIENT
Start: 2019-07-09 | End: 2019-07-10 | Stop reason: HOSPADM

## 2019-07-09 RX ADMIN — Medication 10 ML: at 23:07

## 2019-07-09 RX ADMIN — ACETAMINOPHEN 650 MG: 325 TABLET, FILM COATED ORAL at 23:14

## 2019-07-09 RX ADMIN — MOMETASONE FUROATE AND FORMOTEROL FUMARATE DIHYDRATE 2 PUFF: 200; 5 AEROSOL RESPIRATORY (INHALATION) at 23:07

## 2019-07-09 RX ADMIN — SODIUM CHLORIDE 1000 ML: 9 INJECTION, SOLUTION INTRAVENOUS at 19:16

## 2019-07-09 RX ADMIN — SUCRALFATE 1 G: 1 TABLET ORAL at 23:07

## 2019-07-09 ASSESSMENT — PAIN DESCRIPTION - FREQUENCY: FREQUENCY: CONTINUOUS

## 2019-07-09 ASSESSMENT — ENCOUNTER SYMPTOMS
NAUSEA: 1
DIARRHEA: 1
VOMITING: 1

## 2019-07-09 ASSESSMENT — PAIN SCALES - GENERAL
PAINLEVEL_OUTOF10: 6
PAINLEVEL_OUTOF10: 5

## 2019-07-09 ASSESSMENT — PAIN DESCRIPTION - PAIN TYPE: TYPE: ACUTE PAIN

## 2019-07-09 ASSESSMENT — PAIN DESCRIPTION - ONSET: ONSET: ON-GOING

## 2019-07-09 ASSESSMENT — PAIN DESCRIPTION - DESCRIPTORS: DESCRIPTORS: ACHING;CRAMPING;DISCOMFORT

## 2019-07-09 ASSESSMENT — PAIN DESCRIPTION - PROGRESSION: CLINICAL_PROGRESSION: GRADUALLY WORSENING

## 2019-07-09 ASSESSMENT — PAIN DESCRIPTION - LOCATION: LOCATION: GENERALIZED

## 2019-07-09 ASSESSMENT — PAIN - FUNCTIONAL ASSESSMENT: PAIN_FUNCTIONAL_ASSESSMENT: PREVENTS OR INTERFERES SOME ACTIVE ACTIVITIES AND ADLS

## 2019-07-10 VITALS
HEIGHT: 62 IN | DIASTOLIC BLOOD PRESSURE: 56 MMHG | BODY MASS INDEX: 38.35 KG/M2 | TEMPERATURE: 96.1 F | HEART RATE: 63 BPM | WEIGHT: 208.38 LBS | SYSTOLIC BLOOD PRESSURE: 113 MMHG | RESPIRATION RATE: 18 BRPM | OXYGEN SATURATION: 97 %

## 2019-07-10 LAB
ALBUMIN SERPL-MCNC: 3.6 G/DL (ref 3.5–5.2)
ALP BLD-CCNC: 75 U/L (ref 35–104)
ALT SERPL-CCNC: 10 U/L (ref 0–32)
ANION GAP SERPL CALCULATED.3IONS-SCNC: 14 MMOL/L (ref 7–16)
AST SERPL-CCNC: 16 U/L (ref 0–31)
BILIRUB SERPL-MCNC: 0.4 MG/DL (ref 0–1.2)
BILIRUBIN URINE: NEGATIVE
BLOOD, URINE: NEGATIVE
BUN BLDV-MCNC: 9 MG/DL (ref 6–20)
CALCIUM SERPL-MCNC: 8.4 MG/DL (ref 8.6–10.2)
CHLORIDE BLD-SCNC: 105 MMOL/L (ref 98–107)
CLARITY: CLEAR
CO2: 22 MMOL/L (ref 22–29)
COLOR: YELLOW
CREAT SERPL-MCNC: 0.6 MG/DL (ref 0.5–1)
EKG ATRIAL RATE: 57 BPM
EKG P AXIS: 25 DEGREES
EKG P-R INTERVAL: 160 MS
EKG Q-T INTERVAL: 440 MS
EKG QRS DURATION: 80 MS
EKG QTC CALCULATION (BAZETT): 428 MS
EKG R AXIS: 28 DEGREES
EKG T AXIS: 8 DEGREES
EKG VENTRICULAR RATE: 57 BPM
GFR AFRICAN AMERICAN: >60
GFR NON-AFRICAN AMERICAN: >60 ML/MIN/1.73
GLUCOSE BLD-MCNC: 104 MG/DL (ref 74–99)
GLUCOSE URINE: NEGATIVE MG/DL
HCT VFR BLD CALC: 37.1 % (ref 34–48)
HEMOGLOBIN: 12.3 G/DL (ref 11.5–15.5)
KETONES, URINE: 40 MG/DL
LEUKOCYTE ESTERASE, URINE: NEGATIVE
LV EF: 65 %
LVEF MODALITY: NORMAL
MAGNESIUM: 1.8 MG/DL (ref 1.6–2.6)
MCH RBC QN AUTO: 27.6 PG (ref 26–35)
MCHC RBC AUTO-ENTMCNC: 33.2 % (ref 32–34.5)
MCV RBC AUTO: 83.2 FL (ref 80–99.9)
NITRITE, URINE: NEGATIVE
PDW BLD-RTO: 14 FL (ref 11.5–15)
PH UA: 6.5 (ref 5–9)
PLATELET # BLD: 227 E9/L (ref 130–450)
PMV BLD AUTO: 9.1 FL (ref 7–12)
POTASSIUM REFLEX MAGNESIUM: 3.8 MMOL/L (ref 3.5–5)
PROTEIN UA: NEGATIVE MG/DL
RBC # BLD: 4.46 E12/L (ref 3.5–5.5)
SODIUM BLD-SCNC: 141 MMOL/L (ref 132–146)
SPECIFIC GRAVITY UA: 1.01 (ref 1–1.03)
TOTAL PROTEIN: 6.4 G/DL (ref 6.4–8.3)
TROPONIN: <0.01 NG/ML (ref 0–0.03)
TROPONIN: <0.01 NG/ML (ref 0–0.03)
UROBILINOGEN, URINE: 0.2 E.U./DL
WBC # BLD: 5.2 E9/L (ref 4.5–11.5)

## 2019-07-10 PROCEDURE — 36415 COLL VENOUS BLD VENIPUNCTURE: CPT

## 2019-07-10 PROCEDURE — 83735 ASSAY OF MAGNESIUM: CPT

## 2019-07-10 PROCEDURE — 6370000000 HC RX 637 (ALT 250 FOR IP): Performed by: FAMILY MEDICINE

## 2019-07-10 PROCEDURE — 2580000003 HC RX 258: Performed by: FAMILY MEDICINE

## 2019-07-10 PROCEDURE — 81003 URINALYSIS AUTO W/O SCOPE: CPT

## 2019-07-10 PROCEDURE — 6360000002 HC RX W HCPCS: Performed by: FAMILY MEDICINE

## 2019-07-10 PROCEDURE — 93306 TTE W/DOPPLER COMPLETE: CPT

## 2019-07-10 PROCEDURE — 84484 ASSAY OF TROPONIN QUANT: CPT

## 2019-07-10 PROCEDURE — 80053 COMPREHEN METABOLIC PANEL: CPT

## 2019-07-10 PROCEDURE — 99245 OFF/OP CONSLTJ NEW/EST HI 55: CPT | Performed by: INTERNAL MEDICINE

## 2019-07-10 PROCEDURE — 96372 THER/PROPH/DIAG INJ SC/IM: CPT

## 2019-07-10 PROCEDURE — 85027 COMPLETE CBC AUTOMATED: CPT

## 2019-07-10 PROCEDURE — G0378 HOSPITAL OBSERVATION PER HR: HCPCS

## 2019-07-10 PROCEDURE — 93010 ELECTROCARDIOGRAM REPORT: CPT | Performed by: INTERNAL MEDICINE

## 2019-07-10 RX ORDER — METOPROLOL SUCCINATE 25 MG/1
12.5 TABLET, EXTENDED RELEASE ORAL DAILY
Qty: 30 TABLET | Refills: 3 | Status: SHIPPED | OUTPATIENT
Start: 2019-07-11 | End: 2019-08-21 | Stop reason: ALTCHOICE

## 2019-07-10 RX ORDER — METOPROLOL SUCCINATE 25 MG/1
12.5 TABLET, EXTENDED RELEASE ORAL DAILY
Status: DISCONTINUED | OUTPATIENT
Start: 2019-07-11 | End: 2019-07-10 | Stop reason: HOSPADM

## 2019-07-10 RX ADMIN — ASPIRIN 81 MG 81 MG: 81 TABLET ORAL at 09:11

## 2019-07-10 RX ADMIN — MOMETASONE FUROATE AND FORMOTEROL FUMARATE DIHYDRATE 2 PUFF: 200; 5 AEROSOL RESPIRATORY (INHALATION) at 09:10

## 2019-07-10 RX ADMIN — Medication 10 ML: at 12:41

## 2019-07-10 RX ADMIN — CETIRIZINE HYDROCHLORIDE 10 MG: 10 TABLET, FILM COATED ORAL at 09:11

## 2019-07-10 RX ADMIN — SUCRALFATE 1 G: 1 TABLET ORAL at 16:57

## 2019-07-10 RX ADMIN — ALPRAZOLAM 1 MG: 1 TABLET ORAL at 04:16

## 2019-07-10 RX ADMIN — MONTELUKAST SODIUM 10 MG: 10 TABLET, FILM COATED ORAL at 09:11

## 2019-07-10 RX ADMIN — ENOXAPARIN SODIUM 40 MG: 40 INJECTION SUBCUTANEOUS at 09:10

## 2019-07-10 RX ADMIN — SUCRALFATE 1 G: 1 TABLET ORAL at 12:40

## 2019-07-10 RX ADMIN — ACETAMINOPHEN 650 MG: 325 TABLET, FILM COATED ORAL at 12:41

## 2019-07-10 ASSESSMENT — PAIN - FUNCTIONAL ASSESSMENT: PAIN_FUNCTIONAL_ASSESSMENT: ACTIVITIES ARE NOT PREVENTED

## 2019-07-10 ASSESSMENT — PAIN SCALES - GENERAL
PAINLEVEL_OUTOF10: 2
PAINLEVEL_OUTOF10: 0
PAINLEVEL_OUTOF10: 3

## 2019-07-10 ASSESSMENT — PAIN DESCRIPTION - LOCATION: LOCATION: BACK

## 2019-07-10 ASSESSMENT — PAIN DESCRIPTION - ONSET: ONSET: ON-GOING

## 2019-07-10 ASSESSMENT — PAIN DESCRIPTION - PROGRESSION: CLINICAL_PROGRESSION: NOT CHANGED

## 2019-07-10 ASSESSMENT — PAIN DESCRIPTION - PAIN TYPE: TYPE: CHRONIC PAIN

## 2019-07-10 ASSESSMENT — PAIN DESCRIPTION - DESCRIPTORS: DESCRIPTORS: ACHING;DULL;DISCOMFORT

## 2019-07-10 ASSESSMENT — PAIN DESCRIPTION - FREQUENCY: FREQUENCY: CONTINUOUS

## 2019-07-10 NOTE — PROGRESS NOTES
Called out to Dr Sharon Robledo for discharge as Dr Pastor Glover said she is good to go from EP standpoint.

## 2019-07-10 NOTE — H&P
Tosin Motley History and Physical      CHIEF COMPLAINT:  Nearly passing out and falling. History Obtained From:  Patient    HISTORY OF PRESENT ILLNESS:    The patient is a 57 y.o. female with significant past medical history of lupus, HTN, and HLD who presented to the ER yesterday with generalized body aches and tremors that had been getting worse the last few days. She also notes a near syncopal episode that occurred yesterday morning at home. The near syncope occurred after she had been having chest pain and after taking NTG and after arguing with her sister. She states her body felt weak and she fell to the ground. Pt is unsure if she hit her head or not. She also complained of CP that felt worse with exertion as well as nausea yesterday. She has an ongoing problem with N/V/D. Pt states that she is not on HTN medication but she noticed her blood pressure has been fluctuating. She takes ASA. Pt denies SOB, fever, HA, lightheadedness, abdominal pain, or hematochezia. She reports that the chest pain felt better after taking NTG and states that she continues to experience a sternal chest pain that radiates to her back and to both shoulders. Past Medical History:     has a past medical history of ARDS (adult respiratory distress syndrome) ([facility] [address] Asthma, Colitis, Depression, Essential tremor, Gallstones, Hyperlipidemia, Hypertension, Lupus (systemic lupus erythematosus) ([address] and Tachycardia. Past Surgical History:     has a past surgical history that includes Cholecystectomy, laparoscopic (03/2018) and eye surgery.     Medications Prior to Admission:    Medications Prior to Admission: DEEP SEA NASAL SPRAY 0.65 % nasal spray, 1 spray by Nasal route as needed  sucralfate (CARAFATE) 1 GM tablet, Take 1 tablet by mouth 4 times daily  metoprolol succinate (TOPROL XL) 25 MG extended release tablet, Take 1 tablet by mouth daily  Multiple Vitamins-Minerals (MULTIVITAMIN ADULT PO), Take by mouth experiencing  GENITAL/URINARY:  deferred  MUSCULOSKELETAL:  No edema, no cyanosis, no clubbing  NEUROLOGIC:  Awake, alert, oriented to name, place and time. Cranial nerves II-XII are grossly intact. Motor is 5 out of 5 bilaterally. Cerebellar finger to nose, heel to shin intact. Sensory is intact. Babinski down going, Romberg negative, and gait is normal.  SKIN:  no bruising or bleeding and no rashes    DATA:  EKG:  EKG with sinus royce: rate 57.    CBC with Differential:    Lab Results   Component Value Date    WBC 6.2 07/09/2019    RBC 4.78 07/09/2019    HGB 13.0 07/09/2019    HCT 39.7 07/09/2019     07/09/2019    MCV 83.1 07/09/2019    MCH 27.2 07/09/2019    MCHC 32.7 07/09/2019    RDW 14.0 07/09/2019    NRBC 0.0 08/19/2018    SEGSPCT 69 01/21/2013    LYMPHOPCT 15.4 07/09/2019    MONOPCT 7.5 07/09/2019    BASOPCT 0.3 07/09/2019    MONOSABS 0.46 07/09/2019    LYMPHSABS 0.95 07/09/2019    EOSABS 0.04 07/09/2019    BASOSABS 0.02 07/09/2019     CMP:    Lab Results   Component Value Date     07/09/2019    K 4.6 07/09/2019     07/09/2019    CO2 25 07/09/2019    BUN 11 07/09/2019    CREATININE 0.7 07/09/2019    GFRAA >60 07/09/2019    LABGLOM >60 07/09/2019    GLUCOSE 91 07/09/2019    GLUCOSE 93 04/13/2011    PROT 7.4 07/09/2019    LABALBU 4.0 07/09/2019    LABALBU 4.3 04/13/2011    CALCIUM 9.4 07/09/2019    BILITOT 0.4 07/09/2019    ALKPHOS 88 07/09/2019    AST 15 07/09/2019    ALT 9 07/09/2019     PT/INR:    Lab Results   Component Value Date    PROTIME 12.9 07/09/2019    INR 1.1 07/09/2019     Last 3 Troponin:    Lab Results   Component Value Date    TROPONINI <0.01 07/10/2019    TROPONINI <0.01 07/09/2019    TROPONINI <0.01 07/01/2019    CKTOTAL 37 05/05/2019    CKTOTAL 41 10/06/2018    CKTOTAL 23 09/01/2016    CKMB <1.0 10/06/2018    CKMB <1.0 08/16/2018    CKMB <1.0 08/16/2018     TSH:    Lab Results   Component Value Date    TSH 0.740 08/16/2018     Radiology Review:  CXR with no

## 2019-07-10 NOTE — CONSULTS
no wheezes  Abdomen: soft, non-tender, bowel sounds present, no masses or hepatomegaly   Extremities: no digital clubbing, no edema   Skin: warm, no rashes   Neuro/Psych: A&O x 3, normal mood and affect    I have personally reviewed the laboratory, cardiac diagnostic and radiographic testing as outlined below:    Pertinent lab abnormalities include:   Troponin <0.01  0.00 - 0.03 ng/mL Final 07/10/2019  5:52 AM  - [address] [facility]     Troponin <0.01  0.00 - 0.03 ng/mL Final 07/10/2019 12:43 AM  - [address] [facility]     hCG Qual NEGATIVE  NEGATIVE Final 07/09/2019  5:34 PM  - [address] [facility]     D-Dimer, Quant 299  ng/mL DDU Final 07/09/2019  5:20 PM [address] [facility]         Pertinent radiology abnormalities include:   CT head 7/9/2019:  FINDINGS:       There is no evidence for acute intracranial hemorrhage, midline shift   or mass effect. The brain parenchyma, CSF spaces, paranasal sinuses   and mastoid air cells and surrounding soft tissue and osseous   structures have a satisfactory appearance. The gray-white matter   junction is preserved. The cerebellopontine angle and internal   auditory canals have a normal appearance. The sella turcica and   pituitary gland are unremarkable.           Impression       No evidence for acute intracranial process.       CT of the brain is grossly unremarkable. CT ABD 7/1/2019:  Findings: This examination was performed on a CT scanner with dose   reduction.       Technique: Low-dose CT  acquisition technique included one of   following options; 1 . Automated exposure control, 2. Adjustment of MA   and or KV according to patient's size or 3. Use of iterative   reconstruction.  Lack of IV contrast limits the interpretation and the   sensitivity of the exam in the evaluation of solid organs and   vasculature, including but not limited to lack of detection of   underlying mass or malignancy and/or any other potential   abnormalities.       Pulmonary nodule in the Dimension: 2.4 cm   LV FS:37.2 %    LV Volume Systolic: 34.7 ml   LV PW           LV EDV/LV EDV Index: 36.8   Diastolic: 0.8  LB/11 YC/I^3MT ESV/LV ESV   cm              Index: 27.7 ml/15ml/ m^2     RV Diastolic Dimension: 3.6   LV PW Systolic: EF Calculated: 67 %          cm   1.2 cm          LV Mass Index: 57 l/min*m^2   Septum                                       LA/Aorta: 1.01   Diastolic: 0.8                               Ascending Aorta: 2.5 cm   cm              LVOT: 2 cm                   LA volume/Index: 55 ml   Septum                                       /06.08FA/F^6   Systolic: 1.3                                RA Area: 14.6 cm^2   cm   CO: 4.83 l/min   CI: 2.62   l/m*m^2   LV Mass: 105.33   g     Doppler Measurements & Calculations      MV Peak E-Wave:   AV Peak Velocity: 1.51 m/s    LVOT Peak Velocity: 1.18   1.18 m/s          AV Peak Gradient: 9.08 mmHg   m/s   MV Peak A-Wave:   AV Mean Velocity: 0.95 m/s    LVOT Mean Velocity: 0.71   0.78 m/s          AV Mean Gradient: 4.2 mmHg    m/s   MV E/A Ratio:     AV VTI: 33.8 cm               LVOT Peak Gradient: 5.6   1.52              AV Area (Continuity):2.46     mmHgLVOT Mean Gradient:   MV Peak Gradient: cm^2                          2.4 mmHg   7.9 mmHg                                        Estimated RVSP: 24.2 mmHg   MV Mean Gradient: LVOT VTI: 26.5 cm             Estimated RAP:3 mmHg   1.5 mmHg          IVRT: 87.7 msec   MV Mean Velocity: Estimated PASP: 24.23 mmHg   0.55 m/s          Pulm. Vein A Reversal         TR Velocity:2.3 m/s   MV Deceleration   Duration:92.3 msec            TR Gradient:21.23 mmHg   Time: 226.3 msec  Pulm. Vein D Velocity:0.85    PV Peak Velocity: 1.14 m/s   MV P1/2t: 68.4    m/sPulm. Vein A Reversal      PV Peak Gradient: 5.2 mmHg   msec              Velocity:0.24 m/s             PV Mean Velocity: 0.76 m/s   MVA by PHT:3.22   Pulm.  Vein S Velocity: 0.63   PV Mean Gradient: 2.7 mmHg   cm^2              m/s   MV

## 2019-07-19 ENCOUNTER — TELEPHONE (OUTPATIENT)
Dept: ADMINISTRATIVE | Age: 49
End: 2019-07-19

## 2019-08-19 NOTE — PROGRESS NOTES
stool. All other review of systems are negative     PHYSICAL EXAM:   Vitals:    08/21/19 0954 08/21/19 1018   BP: (!) 140/80 108/80   Pulse: 69    Resp: 16    Weight: 203 lb (92.1 kg)    Height: 5' 1.5\" (1.562 m)    Constitutional: Well-developed, no acute distress  Eyes: conjunctivae normal, no xanthelasma   Ears, Nose, Throat: oral mucosa moist, no cyanosis   CV: no JVD. Regular rate and rhythm. Normal S1S2 and no S3. No murmurs, rubs, or gallops. PMI is nondisplaced  Lungs: clear to auscultation bilaterally, normal respiratory effort without used of accessory muscles  Abdomen: soft, non-tender, bowel sounds present, no masses or hepatomegaly   Musculoskeletal: no digital clubbing, no edema   Skin: warm, no rashes     I have personally reviewed the laboratory, cardiac diagnostic and radiographic testing as outlined below:    Data:    No results for input(s): WBC, HGB, HCT, PLT in the last 72 hours. No results for input(s): NA, K, CL, CO2, BUN, CREATININE, CALCIUM in the last 72 hours. Invalid input(s): GLU, MAGNESIUM   Lab Results   Component Value Date    MG 1.8 07/10/2019     No results for input(s): TSH in the last 72 hours. No results for input(s): INR in the last 72 hours. CXR 11/15/18:   Exam: XR CHEST (2 VW)   Number of Images: 2 views   Indication:  Shortness of   Comparison: Prior study from 10/09/2018 is available       Findings: Examination of the chest in PA and lateral views shows that   both lungs are free of active disease. The heart is normal in size and   configuration. The trachea is in the midline. The mediastinum and both   hemidiaphragms are normal. The bony thorax is intact.            Impression   NO ACUTE CARDIOPULMONARY PROCESS            EKG 8/21/19: SR, rate: 69 bpm, QTc 405  no delta waves - Please see scan in Cardiology.     Echocardiogram 8/16/18:   Type of Study      TTE procedure:Echo Complete W/Doppler & Color Flow.     Procedure Date  Date: 08/16/2018 Start: 03:13

## 2019-08-21 ENCOUNTER — OFFICE VISIT (OUTPATIENT)
Dept: NON INVASIVE DIAGNOSTICS | Age: 49
End: 2019-08-21
Payer: COMMERCIAL

## 2019-08-21 VITALS
WEIGHT: 203 LBS | HEIGHT: 62 IN | RESPIRATION RATE: 16 BRPM | BODY MASS INDEX: 37.36 KG/M2 | HEART RATE: 69 BPM | SYSTOLIC BLOOD PRESSURE: 108 MMHG | DIASTOLIC BLOOD PRESSURE: 80 MMHG

## 2019-08-21 DIAGNOSIS — R55 SYNCOPE AND COLLAPSE: ICD-10-CM

## 2019-08-21 DIAGNOSIS — R00.2 HEART PALPITATIONS: Primary | ICD-10-CM

## 2019-08-21 PROCEDURE — 1036F TOBACCO NON-USER: CPT | Performed by: NURSE PRACTITIONER

## 2019-08-21 PROCEDURE — 99214 OFFICE O/P EST MOD 30 MIN: CPT | Performed by: NURSE PRACTITIONER

## 2019-08-21 PROCEDURE — G8417 CALC BMI ABV UP PARAM F/U: HCPCS | Performed by: NURSE PRACTITIONER

## 2019-08-21 PROCEDURE — G8427 DOCREV CUR MEDS BY ELIG CLIN: HCPCS | Performed by: NURSE PRACTITIONER

## 2019-08-21 PROCEDURE — 93000 ELECTROCARDIOGRAM COMPLETE: CPT | Performed by: INTERNAL MEDICINE

## 2019-08-21 RX ORDER — TIZANIDINE 2 MG/1
2 TABLET ORAL PRN
Status: ON HOLD | COMMUNITY
End: 2019-11-14 | Stop reason: HOSPADM

## 2019-08-21 RX ORDER — GUAIFENESIN 600 MG/1
1200 TABLET, EXTENDED RELEASE ORAL PRN
COMMUNITY

## 2019-09-10 ENCOUNTER — APPOINTMENT (OUTPATIENT)
Dept: CT IMAGING | Age: 49
End: 2019-09-10
Payer: COMMERCIAL

## 2019-09-10 ENCOUNTER — APPOINTMENT (OUTPATIENT)
Dept: GENERAL RADIOLOGY | Age: 49
End: 2019-09-10
Payer: COMMERCIAL

## 2019-09-10 ENCOUNTER — TELEPHONE (OUTPATIENT)
Dept: NON INVASIVE DIAGNOSTICS | Age: 49
End: 2019-09-10

## 2019-09-10 ENCOUNTER — HOSPITAL ENCOUNTER (EMERGENCY)
Age: 49
Discharge: HOME | End: 2019-09-10
Attending: EMERGENCY MEDICINE
Payer: COMMERCIAL

## 2019-09-10 VITALS
HEIGHT: 61 IN | TEMPERATURE: 97.4 F | OXYGEN SATURATION: 99 % | HEART RATE: 88 BPM | SYSTOLIC BLOOD PRESSURE: 118 MMHG | DIASTOLIC BLOOD PRESSURE: 81 MMHG | RESPIRATION RATE: 16 BRPM | WEIGHT: 203 LBS | BODY MASS INDEX: 38.33 KG/M2

## 2019-09-10 DIAGNOSIS — R10.13 ABDOMINAL PAIN, EPIGASTRIC: Primary | ICD-10-CM

## 2019-09-10 DIAGNOSIS — R11.2 NON-INTRACTABLE VOMITING WITH NAUSEA, UNSPECIFIED VOMITING TYPE: ICD-10-CM

## 2019-09-10 DIAGNOSIS — E03.9 HYPOTHYROIDISM, UNSPECIFIED TYPE: ICD-10-CM

## 2019-09-10 DIAGNOSIS — E86.0 DEHYDRATION: ICD-10-CM

## 2019-09-10 DIAGNOSIS — N17.9 ACUTE KIDNEY INJURY (HCC): ICD-10-CM

## 2019-09-10 LAB
ALBUMIN SERPL-MCNC: 3.8 G/DL (ref 3.5–5.2)
ALP BLD-CCNC: 124 U/L (ref 35–104)
ALT SERPL-CCNC: 10 U/L (ref 0–32)
AMORPHOUS: NORMAL
ANION GAP SERPL CALCULATED.3IONS-SCNC: 13 MMOL/L (ref 7–16)
AST SERPL-CCNC: 16 U/L (ref 0–31)
BACTERIA: NORMAL /HPF
BASOPHILS ABSOLUTE: 0.01 E9/L (ref 0–0.2)
BASOPHILS RELATIVE PERCENT: 0.1 % (ref 0–2)
BILIRUB SERPL-MCNC: 0.6 MG/DL (ref 0–1.2)
BILIRUBIN URINE: NEGATIVE
BLOOD, URINE: ABNORMAL
BUN BLDV-MCNC: 22 MG/DL (ref 6–20)
CALCIUM SERPL-MCNC: 8.6 MG/DL (ref 8.6–10.2)
CHLORIDE BLD-SCNC: 98 MMOL/L (ref 98–107)
CLARITY: ABNORMAL
CO2: 21 MMOL/L (ref 22–29)
COLOR: YELLOW
CREAT SERPL-MCNC: 1.4 MG/DL (ref 0.5–1)
EKG ATRIAL RATE: 97 BPM
EKG P AXIS: 63 DEGREES
EKG P-R INTERVAL: 146 MS
EKG Q-T INTERVAL: 336 MS
EKG QRS DURATION: 78 MS
EKG QTC CALCULATION (BAZETT): 426 MS
EKG R AXIS: 30 DEGREES
EKG T AXIS: 8 DEGREES
EKG VENTRICULAR RATE: 97 BPM
EOSINOPHILS ABSOLUTE: 0.06 E9/L (ref 0.05–0.5)
EOSINOPHILS RELATIVE PERCENT: 0.7 % (ref 0–6)
GFR AFRICAN AMERICAN: 49
GFR NON-AFRICAN AMERICAN: 40 ML/MIN/1.73
GLUCOSE BLD-MCNC: 470 MG/DL (ref 74–99)
GLUCOSE URINE: NEGATIVE MG/DL
HCT VFR BLD CALC: 42.2 % (ref 34–48)
HEMOGLOBIN: 13.5 G/DL (ref 11.5–15.5)
IMMATURE GRANULOCYTES #: 0.02 E9/L
IMMATURE GRANULOCYTES %: 0.2 % (ref 0–5)
KETONES, URINE: NEGATIVE MG/DL
LACTIC ACID: 0.5 MMOL/L (ref 0.5–2.2)
LEUKOCYTE ESTERASE, URINE: NEGATIVE
LIPASE: 76 U/L (ref 13–60)
LYMPHOCYTES ABSOLUTE: 1.17 E9/L (ref 1.5–4)
LYMPHOCYTES RELATIVE PERCENT: 14.1 % (ref 20–42)
MCH RBC QN AUTO: 27.3 PG (ref 26–35)
MCHC RBC AUTO-ENTMCNC: 32 % (ref 32–34.5)
MCV RBC AUTO: 85.3 FL (ref 80–99.9)
METER GLUCOSE: 68 MG/DL (ref 74–99)
MONOCYTES ABSOLUTE: 0.53 E9/L (ref 0.1–0.95)
MONOCYTES RELATIVE PERCENT: 6.4 % (ref 2–12)
NEUTROPHILS ABSOLUTE: 6.5 E9/L (ref 1.8–7.3)
NEUTROPHILS RELATIVE PERCENT: 78.5 % (ref 43–80)
NITRITE, URINE: NEGATIVE
PDW BLD-RTO: 14.1 FL (ref 11.5–15)
PH UA: 5.5 (ref 5–9)
PLATELET # BLD: 248 E9/L (ref 130–450)
PMV BLD AUTO: 8.6 FL (ref 7–12)
POTASSIUM REFLEX MAGNESIUM: 4.6 MMOL/L (ref 3.5–5)
PROTEIN UA: NEGATIVE MG/DL
RBC # BLD: 4.95 E12/L (ref 3.5–5.5)
RBC UA: NORMAL /HPF (ref 0–2)
SODIUM BLD-SCNC: 132 MMOL/L (ref 132–146)
SPECIFIC GRAVITY UA: >=1.03 (ref 1–1.03)
TOTAL PROTEIN: 6.7 G/DL (ref 6.4–8.3)
TROPONIN: <0.01 NG/ML (ref 0–0.03)
TSH SERPL DL<=0.05 MIU/L-ACNC: 9.73 UIU/ML (ref 0.27–4.2)
UROBILINOGEN, URINE: 0.2 E.U./DL
WBC # BLD: 8.3 E9/L (ref 4.5–11.5)
WBC UA: NORMAL /HPF (ref 0–5)

## 2019-09-10 PROCEDURE — 36415 COLL VENOUS BLD VENIPUNCTURE: CPT

## 2019-09-10 PROCEDURE — 2580000003 HC RX 258: Performed by: EMERGENCY MEDICINE

## 2019-09-10 PROCEDURE — 96361 HYDRATE IV INFUSION ADD-ON: CPT

## 2019-09-10 PROCEDURE — 83690 ASSAY OF LIPASE: CPT

## 2019-09-10 PROCEDURE — 6360000002 HC RX W HCPCS: Performed by: EMERGENCY MEDICINE

## 2019-09-10 PROCEDURE — 84443 ASSAY THYROID STIM HORMONE: CPT

## 2019-09-10 PROCEDURE — 87088 URINE BACTERIA CULTURE: CPT

## 2019-09-10 PROCEDURE — 80053 COMPREHEN METABOLIC PANEL: CPT

## 2019-09-10 PROCEDURE — 85025 COMPLETE CBC W/AUTO DIFF WBC: CPT

## 2019-09-10 PROCEDURE — 6360000004 HC RX CONTRAST MEDICATION: Performed by: RADIOLOGY

## 2019-09-10 PROCEDURE — 84484 ASSAY OF TROPONIN QUANT: CPT

## 2019-09-10 PROCEDURE — 6370000000 HC RX 637 (ALT 250 FOR IP): Performed by: EMERGENCY MEDICINE

## 2019-09-10 PROCEDURE — 83605 ASSAY OF LACTIC ACID: CPT

## 2019-09-10 PROCEDURE — 93005 ELECTROCARDIOGRAM TRACING: CPT | Performed by: EMERGENCY MEDICINE

## 2019-09-10 PROCEDURE — 74177 CT ABD & PELVIS W/CONTRAST: CPT

## 2019-09-10 PROCEDURE — 81001 URINALYSIS AUTO W/SCOPE: CPT

## 2019-09-10 PROCEDURE — 82962 GLUCOSE BLOOD TEST: CPT

## 2019-09-10 PROCEDURE — 96375 TX/PRO/DX INJ NEW DRUG ADDON: CPT

## 2019-09-10 PROCEDURE — 93010 ELECTROCARDIOGRAM REPORT: CPT | Performed by: INTERNAL MEDICINE

## 2019-09-10 PROCEDURE — 99285 EMERGENCY DEPT VISIT HI MDM: CPT

## 2019-09-10 PROCEDURE — 96374 THER/PROPH/DIAG INJ IV PUSH: CPT

## 2019-09-10 PROCEDURE — 71046 X-RAY EXAM CHEST 2 VIEWS: CPT

## 2019-09-10 RX ORDER — DICYCLOMINE HYDROCHLORIDE 10 MG/ML
20 INJECTION INTRAMUSCULAR ONCE
Status: DISCONTINUED | OUTPATIENT
Start: 2019-09-10 | End: 2019-09-10

## 2019-09-10 RX ORDER — ONDANSETRON 2 MG/ML
4 INJECTION INTRAMUSCULAR; INTRAVENOUS ONCE
Status: COMPLETED | OUTPATIENT
Start: 2019-09-10 | End: 2019-09-10

## 2019-09-10 RX ORDER — PROMETHAZINE HYDROCHLORIDE 25 MG/1
25 SUPPOSITORY RECTAL EVERY 4 HOURS
Qty: 20 SUPPOSITORY | Refills: 0 | Status: SHIPPED | OUTPATIENT
Start: 2019-09-10 | End: 2019-09-15

## 2019-09-10 RX ORDER — DIPHENHYDRAMINE HYDROCHLORIDE 50 MG/ML
25 INJECTION INTRAMUSCULAR; INTRAVENOUS ONCE
Status: COMPLETED | OUTPATIENT
Start: 2019-09-10 | End: 2019-09-10

## 2019-09-10 RX ORDER — DICYCLOMINE HYDROCHLORIDE 10 MG/1
10 CAPSULE ORAL ONCE
Status: COMPLETED | OUTPATIENT
Start: 2019-09-10 | End: 2019-09-10

## 2019-09-10 RX ORDER — 0.9 % SODIUM CHLORIDE 0.9 %
1000 INTRAVENOUS SOLUTION INTRAVENOUS ONCE
Status: COMPLETED | OUTPATIENT
Start: 2019-09-10 | End: 2019-09-10

## 2019-09-10 RX ORDER — KETOROLAC TROMETHAMINE 30 MG/ML
15 INJECTION, SOLUTION INTRAMUSCULAR; INTRAVENOUS ONCE
Status: COMPLETED | OUTPATIENT
Start: 2019-09-10 | End: 2019-09-10

## 2019-09-10 RX ORDER — ONDANSETRON 4 MG/1
8 TABLET, ORALLY DISINTEGRATING ORAL EVERY 8 HOURS PRN
Qty: 20 TABLET | Refills: 0 | Status: ON HOLD | OUTPATIENT
Start: 2019-09-10 | End: 2019-11-13 | Stop reason: ALTCHOICE

## 2019-09-10 RX ADMIN — DICYCLOMINE HYDROCHLORIDE 10 MG: 10 CAPSULE ORAL at 17:24

## 2019-09-10 RX ADMIN — ONDANSETRON 4 MG: 2 INJECTION INTRAMUSCULAR; INTRAVENOUS at 14:29

## 2019-09-10 RX ADMIN — KETOROLAC TROMETHAMINE 15 MG: 30 INJECTION, SOLUTION INTRAMUSCULAR at 14:29

## 2019-09-10 RX ADMIN — IOPAMIDOL 110 ML: 755 INJECTION, SOLUTION INTRAVENOUS at 17:53

## 2019-09-10 RX ADMIN — SODIUM CHLORIDE 1000 ML: 9 INJECTION, SOLUTION INTRAVENOUS at 14:30

## 2019-09-10 RX ADMIN — SODIUM CHLORIDE 1000 ML: 9 INJECTION, SOLUTION INTRAVENOUS at 17:25

## 2019-09-10 RX ADMIN — DIPHENHYDRAMINE HYDROCHLORIDE 25 MG: 50 INJECTION, SOLUTION INTRAMUSCULAR; INTRAVENOUS at 14:30

## 2019-09-10 ASSESSMENT — PAIN SCALES - GENERAL
PAINLEVEL_OUTOF10: 10
PAINLEVEL_OUTOF10: 5
PAINLEVEL_OUTOF10: 10

## 2019-09-10 ASSESSMENT — PAIN DESCRIPTION - PAIN TYPE: TYPE: CHRONIC PAIN

## 2019-09-10 NOTE — ED NOTES
Dr Roddie Lombard advised of BS and that we fed patient and gave juice, advises this nurse okay to dc at this time     Sander Reynolds RN  09/10/19 1383

## 2019-09-10 NOTE — ED PROVIDER NOTES
Department of Emergency Medicine   ED  Provider Note  Admit Date/RoomTime: 9/10/2019 12:56 PM  ED Room: 14/14          History of Present Illness:  9/10/19, Time: 1:48 PM         Antonette June is a 57 y.o. female presenting to the ED for chest pain, palpitations, abdominal pain, diarrhea, nausea, beginning Saturday evening. The complaint has been persistent, moderate in severity, and worsened by nothing. This patient has multiple comorbidities including anxiety, lupus, depression, and ovarian cyst.  She states that beginning Saturday evening she began to develop sensation of severe fatigue, as well as cardiac palpitations and abdominal discomfort with associated nausea. The next day she developed emesis was nonbloody and nonbilious, as well as watery diarrhea which is brown in color without evidence of blood. Chest pain is achy in nature, and abdominal pain is achy in nature, abdominal pain is located in the left upper quadrant, as well as right lower quadrant. The patient states she also has a history of tachycardia, and is on Cardizem, however has not been taking it because her heart rate is either too low or too high. She also notes that yesterday she ate a bowl of cereal that had some worms in it. Review of Systems:   Pertinent positives and negatives are stated within HPI, all other systems reviewed and are negative.        --------------------------------------------- PAST HISTORY ---------------------------------------------  Past Medical History:  has a past medical history of ARDS (adult respiratory distress syndrome) ([facility] [address] Asthma, Colitis, Depression, Essential tremor, Gallstones, Hyperlipidemia, Hypertension, [facility] and Tachycardia. Past Surgical History:  has a past surgical history that includes Cholecystectomy, laparoscopic (03/2018) and eye surgery. Social History:  reports that she has quit smoking. Her smoking use included cigarettes. -------------------------------------------------  I have personally reviewed all laboratory and imaging results for this patient. Results are listed below.      LABS:  Results for orders placed or performed during the hospital encounter of 09/10/19   CBC Auto Differential   Result Value Ref Range    WBC 8.3 4.5 - 11.5 E9/L    RBC 4.95 3.50 - 5.50 E12/L    Hemoglobin 13.5 11.5 - 15.5 g/dL    Hematocrit 42.2 34.0 - 48.0 %    MCV 85.3 80.0 - 99.9 fL    MCH 27.3 26.0 - 35.0 pg    MCHC 32.0 32.0 - 34.5 %    RDW 14.1 11.5 - 15.0 fL    Platelets 652 542 - 992 E9/L    MPV 8.6 7.0 - 12.0 fL    Neutrophils % 78.5 43.0 - 80.0 %    Immature Granulocytes % 0.2 0.0 - 5.0 %    Lymphocytes % 14.1 (L) 20.0 - 42.0 %    Monocytes % 6.4 2.0 - 12.0 %    Eosinophils % 0.7 0.0 - 6.0 %    Basophils % 0.1 0.0 - 2.0 %    Neutrophils Absolute 6.50 1.80 - 7.30 E9/L    Immature Granulocytes # 0.02 E9/L    Lymphocytes Absolute 1.17 (L) 1.50 - 4.00 E9/L    Monocytes Absolute 0.53 0.10 - 0.95 E9/L    Eosinophils Absolute 0.06 0.05 - 0.50 E9/L    Basophils Absolute 0.01 0.00 - 0.20 E9/L   Comprehensive Metabolic Panel w/ Reflex to MG   Result Value Ref Range    Sodium 132 132 - 146 mmol/L    Potassium reflex Magnesium 4.6 3.5 - 5.0 mmol/L    Chloride 98 98 - 107 mmol/L    CO2 21 (L) 22 - 29 mmol/L    Anion Gap 13 7 - 16 mmol/L    Glucose 470 (H) 74 - 99 mg/dL    BUN 22 (H) 6 - 20 mg/dL    CREATININE 1.4 (H) 0.5 - 1.0 mg/dL    GFR Non-African American 40 >=60 mL/min/1.73    GFR African American 49     Calcium 8.6 8.6 - 10.2 mg/dL    Total Protein 6.7 6.4 - 8.3 g/dL    Alb 3.8 3.5 - 5.2 g/dL    Total Bilirubin 0.6 0.0 - 1.2 mg/dL    Alkaline Phosphatase 124 (H) 35 - 104 U/L    ALT 10 0 - 32 U/L    AST 16 0 - 31 U/L   Troponin   Result Value Ref Range    Troponin <0.01 0.00 - 0.03 ng/mL   Lipase   Result Value Ref Range    Lipase 76 (H) 13 - 60 U/L   Urinalysis, reflex to microscopic   Result Value Ref Range    Color, UA Yellow Straw/Yellow

## 2019-09-12 LAB
ORGANISM: ABNORMAL
URINE CULTURE, ROUTINE: ABNORMAL

## 2019-10-14 ENCOUNTER — TELEPHONE (OUTPATIENT)
Dept: ADMINISTRATIVE | Age: 49
End: 2019-10-14

## 2019-10-14 DIAGNOSIS — R00.2 PALPITATIONS: Primary | ICD-10-CM

## 2019-10-15 ENCOUNTER — NURSE ONLY (OUTPATIENT)
Dept: CARDIOLOGY CLINIC | Age: 49
End: 2019-10-15

## 2019-10-29 ENCOUNTER — OFFICE VISIT (OUTPATIENT)
Dept: CARDIOLOGY CLINIC | Age: 49
End: 2019-10-29
Payer: COMMERCIAL

## 2019-10-29 VITALS
SYSTOLIC BLOOD PRESSURE: 100 MMHG | HEIGHT: 62 IN | RESPIRATION RATE: 16 BRPM | WEIGHT: 203 LBS | BODY MASS INDEX: 37.36 KG/M2 | DIASTOLIC BLOOD PRESSURE: 68 MMHG | HEART RATE: 64 BPM

## 2019-10-29 DIAGNOSIS — R07.9 CHEST PAIN, UNSPECIFIED TYPE: Primary | ICD-10-CM

## 2019-10-29 PROCEDURE — 1036F TOBACCO NON-USER: CPT | Performed by: INTERNAL MEDICINE

## 2019-10-29 PROCEDURE — G8484 FLU IMMUNIZE NO ADMIN: HCPCS | Performed by: INTERNAL MEDICINE

## 2019-10-29 PROCEDURE — G8427 DOCREV CUR MEDS BY ELIG CLIN: HCPCS | Performed by: INTERNAL MEDICINE

## 2019-10-29 PROCEDURE — 93000 ELECTROCARDIOGRAM COMPLETE: CPT | Performed by: INTERNAL MEDICINE

## 2019-10-29 PROCEDURE — G8417 CALC BMI ABV UP PARAM F/U: HCPCS | Performed by: INTERNAL MEDICINE

## 2019-10-29 PROCEDURE — 99214 OFFICE O/P EST MOD 30 MIN: CPT | Performed by: INTERNAL MEDICINE

## 2019-10-29 RX ORDER — METOPROLOL SUCCINATE 25 MG/1
25 TABLET, EXTENDED RELEASE ORAL DAILY
COMMUNITY
End: 2019-10-29

## 2019-10-31 ENCOUNTER — HOSPITAL ENCOUNTER (OUTPATIENT)
Dept: CARDIOLOGY | Age: 49
Discharge: HOME | End: 2019-10-31
Payer: COMMERCIAL

## 2019-10-31 VITALS
DIASTOLIC BLOOD PRESSURE: 72 MMHG | BODY MASS INDEX: 37.91 KG/M2 | SYSTOLIC BLOOD PRESSURE: 110 MMHG | HEIGHT: 62 IN | HEART RATE: 86 BPM | WEIGHT: 206 LBS

## 2019-10-31 DIAGNOSIS — R07.9 CHEST PAIN, UNSPECIFIED TYPE: ICD-10-CM

## 2019-10-31 PROCEDURE — 93017 CV STRESS TEST TRACING ONLY: CPT

## 2019-10-31 RX ORDER — AZATHIOPRINE 50 MG/1
50 TABLET ORAL
Status: ON HOLD | COMMUNITY
End: 2021-01-30 | Stop reason: ALTCHOICE

## 2019-11-01 ENCOUNTER — TELEPHONE (OUTPATIENT)
Dept: CARDIOLOGY CLINIC | Age: 49
End: 2019-11-01

## 2019-11-13 ENCOUNTER — HOSPITAL ENCOUNTER (OUTPATIENT)
Age: 49
Setting detail: OBSERVATION
Discharge: HOME | End: 2019-11-14
Attending: EMERGENCY MEDICINE | Admitting: FAMILY MEDICINE
Payer: COMMERCIAL

## 2019-11-13 ENCOUNTER — APPOINTMENT (OUTPATIENT)
Dept: MRI IMAGING | Age: 49
End: 2019-11-13
Payer: COMMERCIAL

## 2019-11-13 ENCOUNTER — TELEPHONE (OUTPATIENT)
Dept: CARDIOLOGY CLINIC | Age: 49
End: 2019-11-13

## 2019-11-13 DIAGNOSIS — R55 SYNCOPE AND COLLAPSE: Primary | ICD-10-CM

## 2019-11-13 DIAGNOSIS — I95.1 ORTHOSTATIC HYPOTENSION: ICD-10-CM

## 2019-11-13 LAB
ANION GAP SERPL CALCULATED.3IONS-SCNC: 10 MMOL/L (ref 7–16)
BASOPHILS ABSOLUTE: 0.04 E9/L (ref 0–0.2)
BASOPHILS RELATIVE PERCENT: 0.7 % (ref 0–2)
BUN BLDV-MCNC: 17 MG/DL (ref 6–20)
CALCIUM SERPL-MCNC: 9.1 MG/DL (ref 8.6–10.2)
CHLORIDE BLD-SCNC: 105 MMOL/L (ref 98–107)
CO2: 25 MMOL/L (ref 22–29)
CREAT SERPL-MCNC: 0.9 MG/DL (ref 0.5–1)
EKG ATRIAL RATE: 68 BPM
EKG P AXIS: 49 DEGREES
EKG P-R INTERVAL: 158 MS
EKG Q-T INTERVAL: 412 MS
EKG QRS DURATION: 78 MS
EKG QTC CALCULATION (BAZETT): 412 MS
EKG R AXIS: 17 DEGREES
EKG T AXIS: 8 DEGREES
EKG VENTRICULAR RATE: 60 BPM
EOSINOPHILS ABSOLUTE: 0.17 E9/L (ref 0.05–0.5)
EOSINOPHILS RELATIVE PERCENT: 2.9 % (ref 0–6)
GFR AFRICAN AMERICAN: >60
GFR NON-AFRICAN AMERICAN: >60 ML/MIN/1.73
GLUCOSE BLD-MCNC: 114 MG/DL (ref 74–99)
HCT VFR BLD CALC: 38.5 % (ref 34–48)
HEMOGLOBIN: 12.1 G/DL (ref 11.5–15.5)
IMMATURE GRANULOCYTES #: 0.02 E9/L
IMMATURE GRANULOCYTES %: 0.3 % (ref 0–5)
LYMPHOCYTES ABSOLUTE: 1.26 E9/L (ref 1.5–4)
LYMPHOCYTES RELATIVE PERCENT: 21.3 % (ref 20–42)
MCH RBC QN AUTO: 26.7 PG (ref 26–35)
MCHC RBC AUTO-ENTMCNC: 31.4 % (ref 32–34.5)
MCV RBC AUTO: 85 FL (ref 80–99.9)
MONOCYTES ABSOLUTE: 0.44 E9/L (ref 0.1–0.95)
MONOCYTES RELATIVE PERCENT: 7.4 % (ref 2–12)
NEUTROPHILS ABSOLUTE: 3.98 E9/L (ref 1.8–7.3)
NEUTROPHILS RELATIVE PERCENT: 67.4 % (ref 43–80)
PDW BLD-RTO: 14.2 FL (ref 11.5–15)
PLATELET # BLD: 290 E9/L (ref 130–450)
PMV BLD AUTO: 8.4 FL (ref 7–12)
POTASSIUM REFLEX MAGNESIUM: 4.4 MMOL/L (ref 3.5–5)
RBC # BLD: 4.53 E12/L (ref 3.5–5.5)
SODIUM BLD-SCNC: 140 MMOL/L (ref 132–146)
TROPONIN: <0.01 NG/ML (ref 0–0.03)
TROPONIN: <0.01 NG/ML (ref 0–0.03)
WBC # BLD: 5.9 E9/L (ref 4.5–11.5)

## 2019-11-13 PROCEDURE — 94664 DEMO&/EVAL PT USE INHALER: CPT

## 2019-11-13 PROCEDURE — 99244 OFF/OP CNSLTJ NEW/EST MOD 40: CPT | Performed by: INTERNAL MEDICINE

## 2019-11-13 PROCEDURE — A9579 GAD-BASE MR CONTRAST NOS,1ML: HCPCS | Performed by: RADIOLOGY

## 2019-11-13 PROCEDURE — 93005 ELECTROCARDIOGRAM TRACING: CPT | Performed by: EMERGENCY MEDICINE

## 2019-11-13 PROCEDURE — 85025 COMPLETE CBC W/AUTO DIFF WBC: CPT

## 2019-11-13 PROCEDURE — 6370000000 HC RX 637 (ALT 250 FOR IP): Performed by: FAMILY MEDICINE

## 2019-11-13 PROCEDURE — G0378 HOSPITAL OBSERVATION PER HR: HCPCS

## 2019-11-13 PROCEDURE — APPSS60 APP SPLIT SHARED TIME 46-60 MINUTES: Performed by: NURSE PRACTITIONER

## 2019-11-13 PROCEDURE — 97161 PT EVAL LOW COMPLEX 20 MIN: CPT

## 2019-11-13 PROCEDURE — 97165 OT EVAL LOW COMPLEX 30 MIN: CPT

## 2019-11-13 PROCEDURE — 2580000003 HC RX 258: Performed by: EMERGENCY MEDICINE

## 2019-11-13 PROCEDURE — 36415 COLL VENOUS BLD VENIPUNCTURE: CPT

## 2019-11-13 PROCEDURE — 2580000003 HC RX 258: Performed by: FAMILY MEDICINE

## 2019-11-13 PROCEDURE — 97530 THERAPEUTIC ACTIVITIES: CPT

## 2019-11-13 PROCEDURE — 93010 ELECTROCARDIOGRAM REPORT: CPT | Performed by: INTERNAL MEDICINE

## 2019-11-13 PROCEDURE — 6360000004 HC RX CONTRAST MEDICATION: Performed by: RADIOLOGY

## 2019-11-13 PROCEDURE — 70553 MRI BRAIN STEM W/O & W/DYE: CPT

## 2019-11-13 PROCEDURE — 99285 EMERGENCY DEPT VISIT HI MDM: CPT

## 2019-11-13 PROCEDURE — 84484 ASSAY OF TROPONIN QUANT: CPT

## 2019-11-13 PROCEDURE — 80048 BASIC METABOLIC PNL TOTAL CA: CPT

## 2019-11-13 RX ORDER — ACETAMINOPHEN 325 MG/1
650 TABLET ORAL EVERY 4 HOURS PRN
Status: DISCONTINUED | OUTPATIENT
Start: 2019-11-13 | End: 2019-11-14 | Stop reason: HOSPADM

## 2019-11-13 RX ORDER — ONDANSETRON 2 MG/ML
4 INJECTION INTRAMUSCULAR; INTRAVENOUS EVERY 6 HOURS PRN
Status: DISCONTINUED | OUTPATIENT
Start: 2019-11-13 | End: 2019-11-14 | Stop reason: HOSPADM

## 2019-11-13 RX ORDER — SODIUM CHLORIDE 9 MG/ML
INJECTION, SOLUTION INTRAVENOUS CONTINUOUS
Status: DISCONTINUED | OUTPATIENT
Start: 2019-11-13 | End: 2019-11-14 | Stop reason: HOSPADM

## 2019-11-13 RX ORDER — IPRATROPIUM BROMIDE AND ALBUTEROL SULFATE 2.5; .5 MG/3ML; MG/3ML
1 SOLUTION RESPIRATORY (INHALATION) EVERY 6 HOURS PRN
Status: DISCONTINUED | OUTPATIENT
Start: 2019-11-13 | End: 2019-11-14 | Stop reason: HOSPADM

## 2019-11-13 RX ORDER — ASPIRIN 81 MG/1
81 TABLET, CHEWABLE ORAL DAILY
Status: DISCONTINUED | OUTPATIENT
Start: 2019-11-14 | End: 2019-11-14 | Stop reason: HOSPADM

## 2019-11-13 RX ORDER — DIPHENHYDRAMINE HCL 25 MG
25 TABLET ORAL EVERY 6 HOURS PRN
Status: DISCONTINUED | OUTPATIENT
Start: 2019-11-13 | End: 2019-11-14 | Stop reason: HOSPADM

## 2019-11-13 RX ORDER — NITROGLYCERIN 0.4 MG/1
0.4 TABLET SUBLINGUAL EVERY 5 MIN PRN
Status: DISCONTINUED | OUTPATIENT
Start: 2019-11-13 | End: 2019-11-14 | Stop reason: HOSPADM

## 2019-11-13 RX ORDER — LACTOBACILLUS RHAMNOSUS GG 10B CELL
1 CAPSULE ORAL DAILY
Status: DISCONTINUED | OUTPATIENT
Start: 2019-11-13 | End: 2019-11-14 | Stop reason: HOSPADM

## 2019-11-13 RX ORDER — MONTELUKAST SODIUM 10 MG/1
10 TABLET ORAL DAILY
Status: DISCONTINUED | OUTPATIENT
Start: 2019-11-13 | End: 2019-11-14 | Stop reason: HOSPADM

## 2019-11-13 RX ORDER — CETIRIZINE HYDROCHLORIDE 10 MG/1
10 TABLET ORAL DAILY
Status: DISCONTINUED | OUTPATIENT
Start: 2019-11-13 | End: 2019-11-14 | Stop reason: HOSPADM

## 2019-11-13 RX ORDER — ALPRAZOLAM 1 MG/1
1 TABLET ORAL 3 TIMES DAILY PRN
Status: DISCONTINUED | OUTPATIENT
Start: 2019-11-13 | End: 2019-11-14 | Stop reason: HOSPADM

## 2019-11-13 RX ORDER — SUCRALFATE 1 G/1
1 TABLET ORAL
Status: DISCONTINUED | OUTPATIENT
Start: 2019-11-13 | End: 2019-11-14 | Stop reason: HOSPADM

## 2019-11-13 RX ORDER — SODIUM CHLORIDE 0.9 % (FLUSH) 0.9 %
10 SYRINGE (ML) INJECTION PRN
Status: DISCONTINUED | OUTPATIENT
Start: 2019-11-13 | End: 2019-11-14 | Stop reason: HOSPADM

## 2019-11-13 RX ORDER — ALBUTEROL SULFATE 90 UG/1
2 AEROSOL, METERED RESPIRATORY (INHALATION) EVERY 6 HOURS PRN
Status: DISCONTINUED | OUTPATIENT
Start: 2019-11-13 | End: 2019-11-14 | Stop reason: HOSPADM

## 2019-11-13 RX ORDER — SODIUM CHLORIDE 0.9 % (FLUSH) 0.9 %
10 SYRINGE (ML) INJECTION EVERY 12 HOURS SCHEDULED
Status: DISCONTINUED | OUTPATIENT
Start: 2019-11-13 | End: 2019-11-14 | Stop reason: HOSPADM

## 2019-11-13 RX ORDER — AZATHIOPRINE 50 MG/1
50 TABLET ORAL DAILY
Status: DISCONTINUED | OUTPATIENT
Start: 2019-11-13 | End: 2019-11-14 | Stop reason: HOSPADM

## 2019-11-13 RX ORDER — 0.9 % SODIUM CHLORIDE 0.9 %
1000 INTRAVENOUS SOLUTION INTRAVENOUS ONCE
Status: COMPLETED | OUTPATIENT
Start: 2019-11-13 | End: 2019-11-13

## 2019-11-13 RX ORDER — DIPHENOXYLATE HYDROCHLORIDE AND ATROPINE SULFATE 2.5; .025 MG/1; MG/1
1 TABLET ORAL 4 TIMES DAILY PRN
Status: DISCONTINUED | OUTPATIENT
Start: 2019-11-13 | End: 2019-11-14 | Stop reason: HOSPADM

## 2019-11-13 RX ADMIN — ACETAMINOPHEN 650 MG: 325 TABLET ORAL at 17:30

## 2019-11-13 RX ADMIN — SUCRALFATE 1 G: 1 TABLET ORAL at 17:30

## 2019-11-13 RX ADMIN — SUCRALFATE 1 G: 1 TABLET ORAL at 12:00

## 2019-11-13 RX ADMIN — Medication 1 CAPSULE: at 12:02

## 2019-11-13 RX ADMIN — GADOTERIDOL 19 ML: 279.3 INJECTION, SOLUTION INTRAVENOUS at 20:35

## 2019-11-13 RX ADMIN — SODIUM CHLORIDE 1000 ML: 9 INJECTION, SOLUTION INTRAVENOUS at 05:43

## 2019-11-13 RX ADMIN — SODIUM CHLORIDE: 9 INJECTION, SOLUTION INTRAVENOUS at 14:40

## 2019-11-13 RX ADMIN — Medication 10 ML: at 11:04

## 2019-11-13 RX ADMIN — MOMETASONE FUROATE AND FORMOTEROL FUMARATE DIHYDRATE 2 PUFF: 200; 5 AEROSOL RESPIRATORY (INHALATION) at 18:53

## 2019-11-13 RX ADMIN — ACETAMINOPHEN 650 MG: 325 TABLET ORAL at 12:03

## 2019-11-13 RX ADMIN — Medication 10 ML: at 21:33

## 2019-11-13 RX ADMIN — DILTIAZEM HYDROCHLORIDE 15 MG: 30 TABLET, FILM COATED ORAL at 21:32

## 2019-11-13 RX ADMIN — SUCRALFATE 1 G: 1 TABLET ORAL at 21:32

## 2019-11-13 RX ADMIN — MONTELUKAST SODIUM 10 MG: 10 TABLET, FILM COATED ORAL at 21:33

## 2019-11-13 ASSESSMENT — PAIN SCALES - GENERAL
PAINLEVEL_OUTOF10: 7
PAINLEVEL_OUTOF10: 7
PAINLEVEL_OUTOF10: 3
PAINLEVEL_OUTOF10: 8

## 2019-11-13 ASSESSMENT — PAIN DESCRIPTION - ORIENTATION
ORIENTATION: MID

## 2019-11-13 ASSESSMENT — PAIN DESCRIPTION - LOCATION
LOCATION: ARM;HEAD
LOCATION: HEAD
LOCATION: HEAD

## 2019-11-13 ASSESSMENT — PAIN DESCRIPTION - FREQUENCY
FREQUENCY: INTERMITTENT

## 2019-11-13 ASSESSMENT — PAIN DESCRIPTION - PROGRESSION: CLINICAL_PROGRESSION: GRADUALLY WORSENING

## 2019-11-13 ASSESSMENT — PAIN DESCRIPTION - ONSET
ONSET: GRADUAL

## 2019-11-13 ASSESSMENT — PAIN DESCRIPTION - DESCRIPTORS
DESCRIPTORS: ACHING

## 2019-11-13 ASSESSMENT — PAIN - FUNCTIONAL ASSESSMENT
PAIN_FUNCTIONAL_ASSESSMENT: ACTIVITIES ARE NOT PREVENTED

## 2019-11-13 ASSESSMENT — PAIN DESCRIPTION - PAIN TYPE
TYPE: ACUTE PAIN
TYPE: ACUTE PAIN

## 2019-11-14 VITALS
WEIGHT: 210 LBS | HEART RATE: 83 BPM | RESPIRATION RATE: 16 BRPM | TEMPERATURE: 97.8 F | OXYGEN SATURATION: 99 % | BODY MASS INDEX: 38.64 KG/M2 | DIASTOLIC BLOOD PRESSURE: 83 MMHG | HEIGHT: 62 IN | SYSTOLIC BLOOD PRESSURE: 135 MMHG

## 2019-11-14 LAB
ANION GAP SERPL CALCULATED.3IONS-SCNC: 12 MMOL/L (ref 7–16)
BUN BLDV-MCNC: 10 MG/DL (ref 6–20)
CALCIUM SERPL-MCNC: 8.9 MG/DL (ref 8.6–10.2)
CHLORIDE BLD-SCNC: 107 MMOL/L (ref 98–107)
CO2: 22 MMOL/L (ref 22–29)
CREAT SERPL-MCNC: 0.6 MG/DL (ref 0.5–1)
GFR AFRICAN AMERICAN: >60
GFR NON-AFRICAN AMERICAN: >60 ML/MIN/1.73
GLUCOSE BLD-MCNC: 108 MG/DL (ref 74–99)
HCT VFR BLD CALC: 37.6 % (ref 34–48)
HEMOGLOBIN: 12 G/DL (ref 11.5–15.5)
MCH RBC QN AUTO: 27.1 PG (ref 26–35)
MCHC RBC AUTO-ENTMCNC: 31.9 % (ref 32–34.5)
MCV RBC AUTO: 85.1 FL (ref 80–99.9)
PDW BLD-RTO: 14.2 FL (ref 11.5–15)
PLATELET # BLD: 299 E9/L (ref 130–450)
PMV BLD AUTO: 8.7 FL (ref 7–12)
POTASSIUM REFLEX MAGNESIUM: 3.8 MMOL/L (ref 3.5–5)
RBC # BLD: 4.42 E12/L (ref 3.5–5.5)
SODIUM BLD-SCNC: 141 MMOL/L (ref 132–146)
WBC # BLD: 5.1 E9/L (ref 4.5–11.5)

## 2019-11-14 PROCEDURE — 2580000003 HC RX 258: Performed by: FAMILY MEDICINE

## 2019-11-14 PROCEDURE — G0378 HOSPITAL OBSERVATION PER HR: HCPCS

## 2019-11-14 PROCEDURE — 94640 AIRWAY INHALATION TREATMENT: CPT

## 2019-11-14 PROCEDURE — 6360000002 HC RX W HCPCS: Performed by: FAMILY MEDICINE

## 2019-11-14 PROCEDURE — 97110 THERAPEUTIC EXERCISES: CPT

## 2019-11-14 PROCEDURE — 96372 THER/PROPH/DIAG INJ SC/IM: CPT

## 2019-11-14 PROCEDURE — 80048 BASIC METABOLIC PNL TOTAL CA: CPT

## 2019-11-14 PROCEDURE — 85027 COMPLETE CBC AUTOMATED: CPT

## 2019-11-14 PROCEDURE — 6370000000 HC RX 637 (ALT 250 FOR IP): Performed by: FAMILY MEDICINE

## 2019-11-14 PROCEDURE — 36415 COLL VENOUS BLD VENIPUNCTURE: CPT

## 2019-11-14 PROCEDURE — 6370000000 HC RX 637 (ALT 250 FOR IP)

## 2019-11-14 RX ORDER — ASPIRIN 81 MG/1
TABLET, CHEWABLE ORAL
Status: COMPLETED
Start: 2019-11-14 | End: 2019-11-14

## 2019-11-14 RX ADMIN — ASPIRIN 81 MG: 81 TABLET, CHEWABLE ORAL at 08:23

## 2019-11-14 RX ADMIN — ASPIRIN 81 MG 81 MG: 81 TABLET ORAL at 08:23

## 2019-11-14 RX ADMIN — CETIRIZINE HYDROCHLORIDE 10 MG: 10 TABLET, FILM COATED ORAL at 08:19

## 2019-11-14 RX ADMIN — MOMETASONE FUROATE AND FORMOTEROL FUMARATE DIHYDRATE 2 PUFF: 200; 5 AEROSOL RESPIRATORY (INHALATION) at 09:04

## 2019-11-14 RX ADMIN — Medication 10 ML: at 08:22

## 2019-11-14 RX ADMIN — SUCRALFATE 1 G: 1 TABLET ORAL at 16:02

## 2019-11-14 RX ADMIN — SUCRALFATE 1 G: 1 TABLET ORAL at 06:43

## 2019-11-14 RX ADMIN — SUCRALFATE 1 G: 1 TABLET ORAL at 10:36

## 2019-11-14 RX ADMIN — SALINE NASAL SPRAY 1 SPRAY: 1.5 SOLUTION NASAL at 08:21

## 2019-11-14 RX ADMIN — ACETAMINOPHEN 650 MG: 325 TABLET ORAL at 10:42

## 2019-11-14 RX ADMIN — DILTIAZEM HYDROCHLORIDE 15 MG: 30 TABLET, FILM COATED ORAL at 08:20

## 2019-11-14 RX ADMIN — DILTIAZEM HYDROCHLORIDE 15 MG: 30 TABLET, FILM COATED ORAL at 14:16

## 2019-11-14 RX ADMIN — Medication 1 CAPSULE: at 08:20

## 2019-11-14 RX ADMIN — ACETAMINOPHEN 650 MG: 325 TABLET ORAL at 00:27

## 2019-11-14 RX ADMIN — ENOXAPARIN SODIUM 40 MG: 40 INJECTION SUBCUTANEOUS at 08:21

## 2019-11-14 ASSESSMENT — PAIN DESCRIPTION - ORIENTATION: ORIENTATION: RIGHT;LOWER

## 2019-11-14 ASSESSMENT — PAIN DESCRIPTION - LOCATION: LOCATION: ABDOMEN

## 2019-11-14 ASSESSMENT — PAIN DESCRIPTION - FREQUENCY: FREQUENCY: INTERMITTENT

## 2019-11-14 ASSESSMENT — PAIN SCALES - GENERAL
PAINLEVEL_OUTOF10: 3
PAINLEVEL_OUTOF10: 0
PAINLEVEL_OUTOF10: 4

## 2019-11-14 ASSESSMENT — PAIN DESCRIPTION - ONSET: ONSET: GRADUAL

## 2019-11-14 ASSESSMENT — PAIN DESCRIPTION - DESCRIPTORS: DESCRIPTORS: CRAMPING

## 2019-11-14 ASSESSMENT — PAIN DESCRIPTION - PAIN TYPE: TYPE: ACUTE PAIN

## 2019-11-18 DIAGNOSIS — R00.2 PALPITATIONS: ICD-10-CM

## 2019-11-21 ENCOUNTER — OFFICE VISIT (OUTPATIENT)
Dept: NON INVASIVE DIAGNOSTICS | Age: 49
End: 2019-11-21
Payer: COMMERCIAL

## 2019-11-21 VITALS
RESPIRATION RATE: 18 BRPM | HEIGHT: 62 IN | SYSTOLIC BLOOD PRESSURE: 124 MMHG | HEART RATE: 62 BPM | WEIGHT: 206 LBS | BODY MASS INDEX: 37.91 KG/M2 | DIASTOLIC BLOOD PRESSURE: 72 MMHG

## 2019-11-21 DIAGNOSIS — R00.2 PALPITATIONS: Primary | ICD-10-CM

## 2019-11-21 PROCEDURE — G8417 CALC BMI ABV UP PARAM F/U: HCPCS | Performed by: NURSE PRACTITIONER

## 2019-11-21 PROCEDURE — 99213 OFFICE O/P EST LOW 20 MIN: CPT | Performed by: NURSE PRACTITIONER

## 2019-11-21 PROCEDURE — G8484 FLU IMMUNIZE NO ADMIN: HCPCS | Performed by: NURSE PRACTITIONER

## 2019-11-21 PROCEDURE — 1036F TOBACCO NON-USER: CPT | Performed by: NURSE PRACTITIONER

## 2019-11-21 PROCEDURE — G8427 DOCREV CUR MEDS BY ELIG CLIN: HCPCS | Performed by: NURSE PRACTITIONER

## 2019-11-21 PROCEDURE — 93000 ELECTROCARDIOGRAM COMPLETE: CPT | Performed by: INTERNAL MEDICINE

## 2019-11-25 ENCOUNTER — TELEPHONE (OUTPATIENT)
Dept: CARDIOLOGY CLINIC | Age: 49
End: 2019-11-25

## 2019-12-02 ENCOUNTER — TELEPHONE (OUTPATIENT)
Dept: CARDIOLOGY CLINIC | Age: 49
End: 2019-12-02

## 2019-12-18 ENCOUNTER — TELEPHONE (OUTPATIENT)
Dept: NEUROLOGY | Age: 49
End: 2019-12-18

## 2019-12-18 ENCOUNTER — OFFICE VISIT (OUTPATIENT)
Dept: NEUROLOGY | Age: 49
End: 2019-12-18
Payer: COMMERCIAL

## 2019-12-18 VITALS
BODY MASS INDEX: 38.28 KG/M2 | TEMPERATURE: 97.4 F | WEIGHT: 208 LBS | HEIGHT: 62 IN | RESPIRATION RATE: 18 BRPM | HEART RATE: 67 BPM | DIASTOLIC BLOOD PRESSURE: 78 MMHG | OXYGEN SATURATION: 96 % | SYSTOLIC BLOOD PRESSURE: 113 MMHG

## 2019-12-18 DIAGNOSIS — R41.3 MEMORY LOSS: ICD-10-CM

## 2019-12-18 DIAGNOSIS — G62.9 NEUROPATHY: ICD-10-CM

## 2019-12-18 DIAGNOSIS — G40.209 DEJA VU SEIZURE DISORDER (HCC): Primary | ICD-10-CM

## 2019-12-18 PROCEDURE — 99244 OFF/OP CNSLTJ NEW/EST MOD 40: CPT | Performed by: PSYCHIATRY & NEUROLOGY

## 2019-12-18 PROCEDURE — G8417 CALC BMI ABV UP PARAM F/U: HCPCS | Performed by: PSYCHIATRY & NEUROLOGY

## 2019-12-18 PROCEDURE — G8484 FLU IMMUNIZE NO ADMIN: HCPCS | Performed by: PSYCHIATRY & NEUROLOGY

## 2019-12-18 PROCEDURE — G8427 DOCREV CUR MEDS BY ELIG CLIN: HCPCS | Performed by: PSYCHIATRY & NEUROLOGY

## 2019-12-18 ASSESSMENT — ENCOUNTER SYMPTOMS
VOMITING: 0
TROUBLE SWALLOWING: 0
PHOTOPHOBIA: 0
SHORTNESS OF BREATH: 0
NAUSEA: 0

## 2019-12-23 LAB
TSH SERPL DL<=0.05 MIU/L-ACNC: 2 UIU/ML
VITAMIN B-12: 719

## 2019-12-27 DIAGNOSIS — R41.3 MEMORY LOSS: ICD-10-CM

## 2019-12-30 ENCOUNTER — TELEPHONE (OUTPATIENT)
Dept: NEUROLOGY | Age: 49
End: 2019-12-30

## 2020-01-03 ENCOUNTER — OFFICE VISIT (OUTPATIENT)
Dept: NEUROLOGY | Age: 50
End: 2020-01-03
Payer: COMMERCIAL

## 2020-01-03 VITALS
HEIGHT: 62 IN | SYSTOLIC BLOOD PRESSURE: 120 MMHG | WEIGHT: 212 LBS | DIASTOLIC BLOOD PRESSURE: 80 MMHG | BODY MASS INDEX: 39.01 KG/M2

## 2020-01-03 PROCEDURE — G8427 DOCREV CUR MEDS BY ELIG CLIN: HCPCS | Performed by: PSYCHIATRY & NEUROLOGY

## 2020-01-03 PROCEDURE — G8484 FLU IMMUNIZE NO ADMIN: HCPCS | Performed by: PSYCHIATRY & NEUROLOGY

## 2020-01-03 PROCEDURE — 95886 MUSC TEST DONE W/N TEST COMP: CPT | Performed by: PSYCHIATRY & NEUROLOGY

## 2020-01-03 PROCEDURE — 99212 OFFICE O/P EST SF 10 MIN: CPT | Performed by: PSYCHIATRY & NEUROLOGY

## 2020-01-03 PROCEDURE — G8417 CALC BMI ABV UP PARAM F/U: HCPCS | Performed by: PSYCHIATRY & NEUROLOGY

## 2020-01-03 PROCEDURE — 1036F TOBACCO NON-USER: CPT | Performed by: PSYCHIATRY & NEUROLOGY

## 2020-01-03 PROCEDURE — 95912 NRV CNDJ TEST 11-12 STUDIES: CPT | Performed by: PSYCHIATRY & NEUROLOGY

## 2020-01-03 NOTE — PROGRESS NOTES
[facility]  Electrodiagnostic Laboratory  *Accredited by the [address] [place] [address] with exemplary status  [address] [facility]  Phone: [phone]  Fax: [phone]    Referring Provider: Kyara Villaseñor MD  Primary Care Physician: Josh Zelaya DO  Patient Name: Raeann Silva  Patient YOB: 1970  Gender: female  BMI: Body mass index is 38.78 kg/m². Blood pressure 120/80, height 5' 2\" (1.575 m), weight 212 lb (96.2 kg), not currently breastfeeding. 1/3/2020    Description of clinical problem:   Chronic neuropathic pain, paresthesias of arms, left greater than right lower extremities, neuropathic clinical symptoms described of left anterior lateral thigh consistent with left meralgia paresthetica; reports chronic left-sided low back pain. Morbidly obese. Chief Complaint   Patient presents with    New Patient     EMG referral, neuropathy     Pain Yes   ; Numbness/tingling  Yes; Weakness  No       Brief physical exam:   Sensory deficit Yes; Weakness No; Atrophy  No; Reflex abnormality No  Limited neurologic exam performed of the bilateral upper and lower extremities shows grossly intact 5/5 power in all muscle groups of the upper and lower extremities, with some variability and is noted to be effort-related. Motor tone is grossly intact without cogwheeling or spasticity. Intact fine motor function of both hands, symmetric. DTRs: 1-2+ and symmetric. No ankle clonus, plantar responses are flexor. Negative Tinel's test to percussion over both wrists. No fasciculations or focal muscular atrophy noted. Sensory modalities to light touch and sharp stick testing are subjectively reported as showing absent sensation in the distal lower extremities to the level of the knees approximately and circumferentially and decreased sensation in the left anterolateral thigh as compared to the medial side.     Study Limitations: Pain      Full Name: Horatio Hamman Boris Almonte Gender: Female  MRN: [id number] YOB: 1970  Location[de-identified] Outpt. Visit Date: 1/3/2020 10:00  Age: 54 Years 0 Months Old  Examining Physician: Dr. Kiersten Wood  Referring Physician: Dr. Isabella Andrews  Technician: Toña Hewitt   Height: 5 feet 2 inch  Weight: 212 lbs  Notes: Neuropathy G62.9      Motor NCS      Nerve / Sites Latency Amplitude Amp. 1-2 Distance Lat Diff Velocity Temp.    ms mV % cm ms m/s °C   R Median - APB      Wrist 3.44 13.8 100 8   33      Elbow 6.30 13.4 97.2 19 2.86 66 32.9   L Median - APB      Wrist 3.28 10.0 100 8   32.4      Elbow 6.30 9.9 99 16 3.02 53 32.4   R Ulnar - ADM      Wrist 2.76 11.7 100 8   32.6      B. Elbow 5.36 11.2 95.5 16 2.60 61 32.6      A. Elbow 6.51 11.0 94 10 1.15 87 32.6   L Ulnar - ADM      Wrist 2.40 10.0 100 8   32.2      B. Elbow 5.16 8.9 88.5 16 2.76 58 32.2      A. Elbow 6.46 8.5 84.3 10 1.30 77 32.2   L Peroneal - EDB      Ankle 4.84 2.7 100 8   31.1      Pop fossa 11.61 2.5 93.6 34 6.77 50 31   L Tibial - AH      Ankle 2.86 12.3 100 8   31.3      Pop fossa 10.42 10.9 88.8 36 7.55 48 31.2                   Sensory NCS      Nerve / Sites Onset Lat Peak Lat PP Amp Amp. 1-2 Distance Velocity Temp.    ms ms µV % cm m/s °C   L Median - Digit II (Antidromic)      Mid Palm 1.30 1.77 78.5 100 7 54 32.2      Wrist 2.40 3.18 50.5 63.2 14 58 32.2   R Median - Digit II (Antidromic)      Mid Palm 1.15 1.67 73.1 100 7 61 32.2      Wrist 2.50 3.02 52.1 62.2 14 56 32.2   L Ulnar - Digit V (Antidromic)      Wrist 2.34 2.86 32.4 100 14 60 32.2   R Ulnar - Digit V (Antidromic)      Wrist 2.40 2.86 27.3 100 14 58 32.2   R Radial - Anatomical  (Forearm)      Forearm 1.61 2.19 39.4 100 10 62 32.4   L Radial - Anatomical  (Forearm)      Forearm 1.41 1.98 25.3 100 10 71 32.2   L Sural - Ankle (Calf)      Calf 2.86 3.54 11.5 100 14 49 31.3   L Superficial peroneal - Ankle      Lat leg 2.14 2.66 6.3 100 10 47 31.3                       F  Wave      Nerve F Lat M Lat F-M Lat    ms ms studies:  · All nerve conduction studies listed in the table above were normal in latency, amplitude and conduction velocity. Needle EMG:   · Needle EMG was performed using a concentric needle. · The needle EMG examination performed of the left extremities and right upper extremity is within normal limits in all muscles as listed in the table above demonstrating normal amplitude, duration, phases and recruitment and no active denervation signs seen. · The left cervical and lumbar paraspinal muscle groups were not attempted due to technical difficulty from excess adipose tissue, complaint of pain and positioning difficulty. Diagnostic Interpretation: This study was normal.   Electrodiagnosis: NCS/EMG examination performed of the left extremities and right upper extremity is within normal limits showing no evidence of a generalized sensorimotor peripheral polyneuropathy, left lumbar or cervical radiculopathy, myopathy, median mononeuropathy at the wrist (I.e., carpal tunnel syndrome) or ulnar neuropathy. A small fiber sensory neuropathy cannot be evaluated by means of electrodiagnostic testing. Clinical correlation is recommended. Technologist:   Physician: Neha Conley MD    Nerve conduction studies and electromyography were performed according to our laboratory policies and procedures which can be provided upon request. All abnormal values are identified in the table.  Laboratory normal values can also be provided upon request.       Cc: MD Cinda Masters DO

## 2020-01-07 ENCOUNTER — TELEPHONE (OUTPATIENT)
Dept: NEUROLOGY | Age: 50
End: 2020-01-07

## 2020-01-07 NOTE — TELEPHONE ENCOUNTER
----- Message from Pallavi Barker MD sent at 1/6/2020  4:23 PM EST -----      ----- Message -----  From: Myrna Chavarria MD  Sent: 1/3/2020  11:05 AM EST  To: Pallavi Barker MD

## 2020-01-09 ENCOUNTER — HOSPITAL ENCOUNTER (OUTPATIENT)
Dept: NEUROLOGY | Age: 50
Discharge: HOME | End: 2020-01-09
Payer: COMMERCIAL

## 2020-01-09 PROCEDURE — 95816 EEG AWAKE AND DROWSY: CPT

## 2020-01-09 PROCEDURE — 95812 EEG 41-60 MINUTES: CPT | Performed by: PSYCHIATRY & NEUROLOGY

## 2020-01-09 NOTE — PROCEDURES
EEG REPORT    NAME: Nasra Mancilla  : 1970  MRN: [id number]    DATE of EE2020    Duration of the EE minutes and 20 seconds    CLINICAL INDICATION: This is a 54 y.o. female  is being evaluated for seizures  MEDICATIONS:   Current Outpatient Medications:     sertraline (ZOLOFT) 50 MG tablet, Take 1 tablet by mouth daily (Patient not taking: Reported on 2019), Disp: 30 tablet, Rfl: 3    azaTHIOprine (IMURAN) 50 MG tablet, Take 50 mg by mouth, Disp: , Rfl:     guaiFENesin (MUCINEX) 600 MG extended release tablet, Take 1,200 mg by mouth as needed for Congestion, Disp: , Rfl:     Cholecalciferol (VITAMIN D3) 5000 units TABS, Take by mouth daily, Disp: , Rfl:     diltiazem (CARDIZEM) 30 MG tablet, Take 0.5 tablets by mouth 3 times daily Hold if HR less than 55, Disp: 120 tablet, Rfl: 0    DEEP SEA NASAL SPRAY 0.65 % nasal spray, 1 spray by Nasal route as needed, Disp: , Rfl: 2    sucralfate (CARAFATE) 1 GM tablet, Take 1 tablet by mouth 4 times daily (Patient taking differently: Take 1 g by mouth as needed ), Disp: 28 tablet, Rfl: 0    Multiple Vitamins-Minerals (MULTIVITAMIN ADULT PO), Take by mouth daily, Disp: , Rfl:     albuterol sulfate  (90 Base) MCG/ACT inhaler, Inhale 2 puffs into the lungs every 6 hours as needed for Wheezing, Disp: , Rfl:     ammonium lactate (AMLACTIN) 12 % cream, Apply topically as needed for Dry Skin Apply topically as needed. , Disp: , Rfl:     ipratropium-albuterol (DUONEB) 0.5-2.5 (3) MG/3ML SOLN nebulizer solution, Inhale 1 vial into the lungs every 6 hours as needed for Shortness of Breath, Disp: , Rfl:     cetirizine (ZYRTEC) 10 MG tablet, Take 1 tablet by mouth daily, Disp: 30 tablet, Rfl: 1    Probiotic Product (PROBIOTIC-10) CAPS, Take 1 capsule by mouth daily , Disp: , Rfl:     aspirin 81 MG tablet, Take 81 mg by mouth daily, Disp: , Rfl:     ALPRAZolam (XANAX) 1 MG tablet, Take 1 mg by mouth 3 times daily as needed for Sleep.  ., Disp: , Rfl:     diphenhydrAMINE (BENADRYL ALLERGY) 25 MG capsule, Take 25 mg by mouth every 6 hours as needed for Itching, Disp: , Rfl:     Fluticasone Furoate-Vilanterol (BREO ELLIPTA) 200-25 MCG/INH AEPB, Inhale 1 puff into the lungs daily, Disp: , Rfl:     montelukast (SINGULAIR) 10 MG tablet, Take 10 mg by mouth daily , Disp: , Rfl:      EEG DESCRIPTION:  A 16-channel EEG was performed with electrode placement in 10/20 International System. Longitudinal bipolar and referential montages were utilized in analysis. This is a technically adequate study with minor muscle and motion artifacts. This is a low voltage EEG    The dominant posterior background rhythm was a well modulated, symmetric, synchronous, reactive, 9 Hz, 15-30 ? V rhythm. The record was reactive to eye opening and closure. Anterior derivations showed the expected admixture of low-voltage beta and theta frequencies as well as intermittent eye blink artifact. Drowsiness was characterized by voltage attenuation and lateral eye movements. Sleep was not captured    Photic stimulation, performed at 1- 30 Hz, elicited the metric bilateral driving response. Hyperventilation  was not performed. No epileptiform discharges were recorded. No electrographic or electroclinical seizures were recorded. DIAGNOSIS: This is a normal EEG. No lateralizing or epileptiform features are noted. No electrographic or electroclinical seizures are recorded. CLINICAL INTERPRETATION:  This is a normal EEG recorded during wakefulness and drowsiness.       Electronically signed by Guerda Hall on 1/9/2020 at 9:47 AM

## 2020-01-30 ENCOUNTER — TELEPHONE (OUTPATIENT)
Dept: NEUROLOGY | Age: 50
End: 2020-01-30

## 2020-02-13 ENCOUNTER — TELEPHONE (OUTPATIENT)
Dept: NEUROLOGY | Age: 50
End: 2020-02-13

## 2020-03-25 ENCOUNTER — NURSE TRIAGE (OUTPATIENT)
Dept: CARDIOLOGY CLINIC | Age: 50
End: 2020-03-25

## 2020-06-12 ENCOUNTER — TELEPHONE (OUTPATIENT)
Dept: NON INVASIVE DIAGNOSTICS | Age: 50
End: 2020-06-12

## 2020-06-12 NOTE — TELEPHONE ENCOUNTER
Crow Jaimes called for Sheron Sever. Crow Jaimes states Sheron Sever need to see someone to today that she is not doing well. We advised her to seek medical attention.

## 2020-06-15 ENCOUNTER — APPOINTMENT (OUTPATIENT)
Dept: CT IMAGING | Age: 50
End: 2020-06-15
Payer: COMMERCIAL

## 2020-06-15 ENCOUNTER — HOSPITAL ENCOUNTER (EMERGENCY)
Age: 50
Discharge: HOME | End: 2020-06-15
Attending: EMERGENCY MEDICINE
Payer: COMMERCIAL

## 2020-06-15 VITALS
WEIGHT: 205 LBS | RESPIRATION RATE: 16 BRPM | OXYGEN SATURATION: 99 % | HEART RATE: 70 BPM | BODY MASS INDEX: 37.73 KG/M2 | TEMPERATURE: 98.2 F | SYSTOLIC BLOOD PRESSURE: 110 MMHG | HEIGHT: 62 IN | DIASTOLIC BLOOD PRESSURE: 60 MMHG

## 2020-06-15 LAB
ANION GAP SERPL CALCULATED.3IONS-SCNC: 10 MMOL/L (ref 7–16)
APTT: 37.3 SEC (ref 24.5–35.1)
BUN BLDV-MCNC: 11 MG/DL (ref 6–20)
CALCIUM SERPL-MCNC: 9.2 MG/DL (ref 8.6–10.2)
CHLORIDE BLD-SCNC: 106 MMOL/L (ref 98–107)
CO2: 23 MMOL/L (ref 22–29)
CREAT SERPL-MCNC: 0.7 MG/DL (ref 0.5–1)
EKG ATRIAL RATE: 72 BPM
EKG P AXIS: 48 DEGREES
EKG P-R INTERVAL: 166 MS
EKG Q-T INTERVAL: 400 MS
EKG QRS DURATION: 80 MS
EKG QTC CALCULATION (BAZETT): 438 MS
EKG R AXIS: 15 DEGREES
EKG T AXIS: 11 DEGREES
EKG VENTRICULAR RATE: 72 BPM
GFR AFRICAN AMERICAN: >60
GFR NON-AFRICAN AMERICAN: >60 ML/MIN/1.73
GLUCOSE BLD-MCNC: 109 MG/DL (ref 74–99)
HCT VFR BLD CALC: 40.3 % (ref 34–48)
HEMOGLOBIN: 13.4 G/DL (ref 11.5–15.5)
INR BLD: 1
MCH RBC QN AUTO: 27.3 PG (ref 26–35)
MCHC RBC AUTO-ENTMCNC: 33.3 % (ref 32–34.5)
MCV RBC AUTO: 82.2 FL (ref 80–99.9)
PDW BLD-RTO: 14.3 FL (ref 11.5–15)
PLATELET # BLD: 274 E9/L (ref 130–450)
PMV BLD AUTO: 8.7 FL (ref 7–12)
POTASSIUM SERPL-SCNC: 3.4 MMOL/L (ref 3.5–5)
PRO-BNP: 87 PG/ML (ref 0–125)
PROCALCITONIN: 0.05 NG/ML (ref 0–0.08)
PROTHROMBIN TIME: 11.9 SEC (ref 9.3–12.4)
RBC # BLD: 4.9 E12/L (ref 3.5–5.5)
SODIUM BLD-SCNC: 139 MMOL/L (ref 132–146)
TROPONIN: <0.01 NG/ML (ref 0–0.03)
TROPONIN: <0.01 NG/ML (ref 0–0.03)
WBC # BLD: 6.6 E9/L (ref 4.5–11.5)

## 2020-06-15 PROCEDURE — 80048 BASIC METABOLIC PNL TOTAL CA: CPT

## 2020-06-15 PROCEDURE — 93010 ELECTROCARDIOGRAM REPORT: CPT | Performed by: INTERNAL MEDICINE

## 2020-06-15 PROCEDURE — 2580000003 HC RX 258: Performed by: EMERGENCY MEDICINE

## 2020-06-15 PROCEDURE — 85730 THROMBOPLASTIN TIME PARTIAL: CPT

## 2020-06-15 PROCEDURE — 6370000000 HC RX 637 (ALT 250 FOR IP): Performed by: EMERGENCY MEDICINE

## 2020-06-15 PROCEDURE — 83880 ASSAY OF NATRIURETIC PEPTIDE: CPT

## 2020-06-15 PROCEDURE — 85027 COMPLETE CBC AUTOMATED: CPT

## 2020-06-15 PROCEDURE — 84484 ASSAY OF TROPONIN QUANT: CPT

## 2020-06-15 PROCEDURE — 6360000002 HC RX W HCPCS: Performed by: EMERGENCY MEDICINE

## 2020-06-15 PROCEDURE — 96374 THER/PROPH/DIAG INJ IV PUSH: CPT

## 2020-06-15 PROCEDURE — 84145 PROCALCITONIN (PCT): CPT

## 2020-06-15 PROCEDURE — 2580000003 HC RX 258: Performed by: RADIOLOGY

## 2020-06-15 PROCEDURE — 93005 ELECTROCARDIOGRAM TRACING: CPT | Performed by: EMERGENCY MEDICINE

## 2020-06-15 PROCEDURE — 99285 EMERGENCY DEPT VISIT HI MDM: CPT

## 2020-06-15 PROCEDURE — 6360000004 HC RX CONTRAST MEDICATION: Performed by: RADIOLOGY

## 2020-06-15 PROCEDURE — 85610 PROTHROMBIN TIME: CPT

## 2020-06-15 PROCEDURE — 71275 CT ANGIOGRAPHY CHEST: CPT

## 2020-06-15 RX ORDER — SODIUM CHLORIDE 0.9 % (FLUSH) 0.9 %
10 SYRINGE (ML) INJECTION 2 TIMES DAILY
Status: DISCONTINUED | OUTPATIENT
Start: 2020-06-15 | End: 2020-06-15 | Stop reason: HOSPADM

## 2020-06-15 RX ORDER — NITROGLYCERIN 0.4 MG/1
0.4 TABLET SUBLINGUAL EVERY 5 MIN PRN
Status: DISCONTINUED | OUTPATIENT
Start: 2020-06-15 | End: 2020-06-15 | Stop reason: HOSPADM

## 2020-06-15 RX ORDER — POTASSIUM CHLORIDE 20 MEQ/1
TABLET, EXTENDED RELEASE ORAL
Status: DISCONTINUED
Start: 2020-06-15 | End: 2020-06-15 | Stop reason: WASHOUT

## 2020-06-15 RX ORDER — ACETAMINOPHEN 325 MG/1
650 TABLET ORAL ONCE
Status: COMPLETED | OUTPATIENT
Start: 2020-06-15 | End: 2020-06-15

## 2020-06-15 RX ORDER — ASPIRIN 81 MG/1
243 TABLET, CHEWABLE ORAL ONCE
Status: DISCONTINUED | OUTPATIENT
Start: 2020-06-15 | End: 2020-06-15 | Stop reason: HOSPADM

## 2020-06-15 RX ORDER — ONDANSETRON 2 MG/ML
4 INJECTION INTRAMUSCULAR; INTRAVENOUS ONCE
Status: COMPLETED | OUTPATIENT
Start: 2020-06-15 | End: 2020-06-15

## 2020-06-15 RX ORDER — 0.9 % SODIUM CHLORIDE 0.9 %
1000 INTRAVENOUS SOLUTION INTRAVENOUS ONCE
Status: COMPLETED | OUTPATIENT
Start: 2020-06-15 | End: 2020-06-15

## 2020-06-15 RX ADMIN — SODIUM CHLORIDE 1000 ML: 9 INJECTION, SOLUTION INTRAVENOUS at 05:55

## 2020-06-15 RX ADMIN — ACETAMINOPHEN 650 MG: 325 TABLET ORAL at 05:47

## 2020-06-15 RX ADMIN — Medication 10 ML: at 03:53

## 2020-06-15 RX ADMIN — ONDANSETRON 4 MG: 2 INJECTION INTRAMUSCULAR; INTRAVENOUS at 02:32

## 2020-06-15 RX ADMIN — IOPAMIDOL 80 ML: 755 INJECTION, SOLUTION INTRAVENOUS at 03:59

## 2020-06-15 RX ADMIN — POTASSIUM BICARBONATE 20 MEQ: 782 TABLET, EFFERVESCENT ORAL at 06:00

## 2020-06-15 RX ADMIN — NITROGLYCERIN 0.4 MG: 0.4 TABLET, ORALLY DISINTEGRATING SUBLINGUAL at 02:32

## 2020-06-15 ASSESSMENT — PAIN DESCRIPTION - FREQUENCY: FREQUENCY: CONTINUOUS

## 2020-06-15 ASSESSMENT — PAIN DESCRIPTION - DESCRIPTORS: DESCRIPTORS: ACHING;CONSTANT;DISCOMFORT

## 2020-06-15 ASSESSMENT — PAIN DESCRIPTION - PROGRESSION: CLINICAL_PROGRESSION: NOT CHANGED

## 2020-06-15 ASSESSMENT — PAIN DESCRIPTION - ONSET: ONSET: ON-GOING

## 2020-06-15 ASSESSMENT — PAIN DESCRIPTION - LOCATION: LOCATION: CHEST

## 2020-06-15 ASSESSMENT — PAIN DESCRIPTION - PAIN TYPE: TYPE: ACUTE PAIN

## 2020-06-15 ASSESSMENT — PAIN SCALES - GENERAL: PAINLEVEL_OUTOF10: 4

## 2020-06-15 NOTE — ED PROVIDER NOTES
HPI:  6/15/20, Time: 2:28 AM EDT         Sammie Argueta is a 54 y.o. female presenting to the ED for Left sided chest radiating to left breast, beginning two hours ago. The complaint has been persistent, moderate in severity, and worsened by deep breath, cough. Patient states she woke up suddenly with left-sided chest pain sharp rating to her left breast.  She also had nausea no vomiting she is short of breath. She has no hemoptysis. She reports a history of asthma respiratory distress syndrome lupus anxiety and chest pain. Patient also complains of pain in the right leg. No recent travel history. She reports remote history of tachycardia and dysrhythmias. No obvious DVT or PE. She has no fevers chills or cough. States her sister works in a nursing home is a high risk for COVID-19 however she has no viral symptoms. Patient is had multiple cardiac work-ups including Lexiscan stress testing exercise stress testing echocardiogram and evaluation by EP at [facility] cardiology and cardiology services at [facility]. ROS:   Pertinent positives and negatives are stated within HPI, all other systems reviewed and are negative.  --------------------------------------------- PAST HISTORY ---------------------------------------------  Past Medical History:  has a past medical history of ARDS (adult respiratory distress syndrome) ([facility] [address] Asthma, Colitis, Depression, Essential tremor, Gallstones, Head trauma, Hyperlipidemia, Hypertension, Lupus (systemic lupus erythematosus) ([facility] [address] and Tachycardia. Past Surgical History:  has a past surgical history that includes Cholecystectomy, laparoscopic (03/2018) and Retinal detachment surgery. Social History:  reports that she quit smoking about 7 years ago. Her smoking use included cigarettes. She has never used smokeless tobacco. She reports that she does not drink alcohol or use drugs.     Family History: family history includes Celiac Disease in her sister; has been electronically signed by Amrita Lynn MD.            EKG Interpretation  Interpreted by emergency department physician    Rhythm: normal sinus   Rate: normal and 72 bpm  Axis: normal  Conduction: normal  ST Segments: nonspecific changes  T Waves: non specific changes    Clinical Impression: NSR   Comparison to prior EKG: None      ------------------------- NURSING NOTES AND VITALS REVIEWED ---------------------------   The nursing notes within the ED encounter and vital signs as below have been reviewed by myself. /60   Pulse 70   Temp 98.2 °F (36.8 °C) (Oral)   Resp 16   Ht 5' 2\" (1.575 m)   Wt 205 lb (93 kg)   SpO2 99%   BMI 37.49 kg/m²   Oxygen Saturation Interpretation: Normal    The patients available past medical records and past encounters were reviewed. ------------------------------ ED COURSE/MEDICAL DECISION MAKING----------------------  Medications   ondansetron (ZOFRAN) injection 4 mg (4 mg Intravenous Given 6/15/20 0232)   iopamidol (ISOVUE-370) 76 % injection 80 mL (80 mLs Intravenous Given 6/15/20 0359)   acetaminophen (TYLENOL) tablet 650 mg (650 mg Oral Given 6/15/20 0547)   0.9 % sodium chloride bolus (0 mLs Intravenous Stopped 6/15/20 0719)   potassium bicarb-citric acid (EFFER-K) effervescent tablet 20 mEq (20 mEq Oral Given 6/15/20 0600)             Medical Decision Making:      HEART Score For Major Cardiac Events  (Max Score 10 Points)  HISTORY       [x]   Slightly Suspicious  0       []   Moderately Suspicious  +1       []   Highly Suspicious  +2    EK point: No ST deviation but LBBB, LVH repolarization changes (ex:digoxin);               2 points: ST deviation not due to LBBB, LVH or digoxin         [x]   Normal  0       []   Nonspecific Repolarization Disturbance  +1       []   Significant ST Depression  +2    AGE       []   <45  0       [x]   45-64  +1       []    >65  +2    RISK FACTORS:  1. HTN    2. Hypercholesterolemia    3. DM     4.  Cigarette smoking (current or cessation < 3 mos)    5. Positive family history  (parent or sibling with CVD before age 72). 6. Obesity (BMI >30kg/m2)         []   No Risk factors Known  0       [x]   1-2 Risk Factors  +1       []   >3 Risk Factors or History of Atherosclerotic Disease  +2      INITIAL TROPONIN       [x]   < Normal Limit   0       []   1-3 x Normal Limit   +1       []   >3 x Normal Limit   +2     -----------------------------------------------------------------------------------------------------------------  SCORE TOTAL:  2 POINTS     Low Score          (0-3 Points), risk of MACE of 0.9-1.7% (discuss d/c home with f/u)  Mod Score          (4-6 Points), risk of MACE of 12-16.6% (discuss admission for further testing)  High Score         (7-10 Points), risk of MACE of 50-65% (Admit ALL as they are candidates for early invasive measures)    Extensive review of old records was made. Patient's initial cardiac work-up was negative including a heart score of 2. CTA was negative for PE. We will repeat a delta troponin. If repeat troponin and EKG are normal she will be discharged home. Patient currently is pain-free. Patient requested referral to another cardiologist.    Re-Evaluations:             Re-evaluation. Patients symptoms are improving after nitro and aspirin    ED Course as of Jun 19 1028   Mon Yaniv 15, 2020   2842 I did review all labs with patient. She complains of headache we will give her Tylenol. Nursing noticed her blood pressure was trending down apparently she has a history of orthostatic hypotension in the past.  Was tight vital signs will be obtained. She will be given 1 L fluid. Repeat troponin scheduled for 530 this morning. If delta troponin negative she will be discharged to follow-up with cardiology. Patient was given oral potassium. For right level 3.4mmol/L    [JN]   0604 6:04 AM EDT  I received this patient at sign out from Dr. Eva Eng.   I have discussed the patient's initial

## 2020-06-16 ENCOUNTER — CARE COORDINATION (OUTPATIENT)
Dept: CARE COORDINATION | Age: 50
End: 2020-06-16

## 2020-06-16 NOTE — CARE COORDINATION
can take to protect yourself, see CDC's How to [address]  enrollment. Plan for follow-up call in 7-14 days based on severity of symptoms and risk factors.

## 2020-06-30 ENCOUNTER — CARE COORDINATION (OUTPATIENT)
Dept: CARE COORDINATION | Age: 50
End: 2020-06-30

## 2020-06-30 NOTE — CARE COORDINATION
Left First message for patient to return call re: ED Follow-up for 14 day follow-up  Patient has contact information  Episode to be resolved

## 2020-07-31 LAB
ALBUMIN SERPL-MCNC: NORMAL G/DL
ALP BLD-CCNC: NORMAL U/L
ALT SERPL-CCNC: NORMAL U/L
ANION GAP SERPL CALCULATED.3IONS-SCNC: NORMAL MMOL/L
AST SERPL-CCNC: NORMAL U/L
BILIRUB SERPL-MCNC: NORMAL MG/DL
BUN BLDV-MCNC: NORMAL MG/DL
CALCIUM SERPL-MCNC: NORMAL MG/DL
CHLORIDE BLD-SCNC: NORMAL MMOL/L
CHOLESTEROL, TOTAL: NORMAL
CHOLESTEROL/HDL RATIO: NORMAL
CO2: NORMAL
CREAT SERPL-MCNC: NORMAL MG/DL
GFR CALCULATED: NORMAL
GLUCOSE BLD-MCNC: NORMAL MG/DL
HDLC SERPL-MCNC: NORMAL MG/DL
LDL CHOLESTEROL CALCULATED: NORMAL
MAGNESIUM: NORMAL
NONHDLC SERPL-MCNC: NORMAL MG/DL
POTASSIUM SERPL-SCNC: NORMAL MMOL/L
SODIUM BLD-SCNC: NORMAL MMOL/L
TOTAL PROTEIN: NORMAL
TRIGL SERPL-MCNC: NORMAL MG/DL
VLDLC SERPL CALC-MCNC: NORMAL MG/DL

## 2020-11-21 ENCOUNTER — APPOINTMENT (OUTPATIENT)
Dept: CT IMAGING | Age: 50
End: 2020-11-21
Payer: COMMERCIAL

## 2020-11-21 ENCOUNTER — APPOINTMENT (OUTPATIENT)
Dept: GENERAL RADIOLOGY | Age: 50
End: 2020-11-21
Payer: COMMERCIAL

## 2020-11-21 ENCOUNTER — HOSPITAL ENCOUNTER (EMERGENCY)
Age: 50
Discharge: HOME | End: 2020-11-21
Attending: EMERGENCY MEDICINE
Payer: COMMERCIAL

## 2020-11-21 VITALS
OXYGEN SATURATION: 99 % | RESPIRATION RATE: 20 BRPM | DIASTOLIC BLOOD PRESSURE: 67 MMHG | HEIGHT: 62 IN | BODY MASS INDEX: 40.12 KG/M2 | HEART RATE: 65 BPM | SYSTOLIC BLOOD PRESSURE: 119 MMHG | WEIGHT: 218 LBS | TEMPERATURE: 97.7 F

## 2020-11-21 LAB
ADENOVIRUS BY PCR: NOT DETECTED
ALBUMIN SERPL-MCNC: 3.9 G/DL (ref 3.5–5.2)
ALP BLD-CCNC: 87 U/L (ref 35–104)
ALT SERPL-CCNC: 10 U/L (ref 0–32)
ANION GAP SERPL CALCULATED.3IONS-SCNC: 9 MMOL/L (ref 7–16)
AST SERPL-CCNC: 14 U/L (ref 0–31)
BASOPHILS ABSOLUTE: 0.02 E9/L (ref 0–0.2)
BASOPHILS RELATIVE PERCENT: 0.3 % (ref 0–2)
BILIRUB SERPL-MCNC: <0.2 MG/DL (ref 0–1.2)
BORDETELLA PARAPERTUSSIS BY PCR: NOT DETECTED
BORDETELLA PERTUSSIS BY PCR: NOT DETECTED
BUN BLDV-MCNC: 12 MG/DL (ref 6–20)
CALCIUM SERPL-MCNC: 9.4 MG/DL (ref 8.6–10.2)
CHLAMYDOPHILIA PNEUMONIAE BY PCR: NOT DETECTED
CHLORIDE BLD-SCNC: 105 MMOL/L (ref 98–107)
CO2: 25 MMOL/L (ref 22–29)
CORONAVIRUS 229E BY PCR: NOT DETECTED
CORONAVIRUS HKU1 BY PCR: NOT DETECTED
CORONAVIRUS NL63 BY PCR: NOT DETECTED
CORONAVIRUS OC43 BY PCR: NOT DETECTED
CREAT SERPL-MCNC: 0.7 MG/DL (ref 0.5–1)
D DIMER: 256 NG/ML DDU
EOSINOPHILS ABSOLUTE: 0.09 E9/L (ref 0.05–0.5)
EOSINOPHILS RELATIVE PERCENT: 1.5 % (ref 0–6)
GFR AFRICAN AMERICAN: >60
GFR NON-AFRICAN AMERICAN: >60 ML/MIN/1.73
GLUCOSE BLD-MCNC: 94 MG/DL (ref 74–99)
HCG, URINE, POC: NEGATIVE
HCT VFR BLD CALC: 40.9 % (ref 34–48)
HEMOGLOBIN: 12.9 G/DL (ref 11.5–15.5)
HUMAN METAPNEUMOVIRUS BY PCR: NOT DETECTED
HUMAN RHINOVIRUS/ENTEROVIRUS BY PCR: NOT DETECTED
IMMATURE GRANULOCYTES #: 0.02 E9/L
IMMATURE GRANULOCYTES %: 0.3 % (ref 0–5)
INFLUENZA A BY PCR: NOT DETECTED
INFLUENZA B BY PCR: NOT DETECTED
LYMPHOCYTES ABSOLUTE: 1.16 E9/L (ref 1.5–4)
LYMPHOCYTES RELATIVE PERCENT: 19.9 % (ref 20–42)
Lab: NORMAL
MCH RBC QN AUTO: 27.1 PG (ref 26–35)
MCHC RBC AUTO-ENTMCNC: 31.5 % (ref 32–34.5)
MCV RBC AUTO: 85.9 FL (ref 80–99.9)
MONOCYTES ABSOLUTE: 0.44 E9/L (ref 0.1–0.95)
MONOCYTES RELATIVE PERCENT: 7.6 % (ref 2–12)
MYCOPLASMA PNEUMONIAE BY PCR: NOT DETECTED
NEGATIVE QC PASS/FAIL: NORMAL
NEUTROPHILS ABSOLUTE: 4.09 E9/L (ref 1.8–7.3)
NEUTROPHILS RELATIVE PERCENT: 70.4 % (ref 43–80)
PARAINFLUENZA VIRUS 1 BY PCR: NOT DETECTED
PARAINFLUENZA VIRUS 2 BY PCR: NOT DETECTED
PARAINFLUENZA VIRUS 3 BY PCR: NOT DETECTED
PARAINFLUENZA VIRUS 4 BY PCR: NOT DETECTED
PDW BLD-RTO: 14.3 FL (ref 11.5–15)
PLATELET # BLD: 211 E9/L (ref 130–450)
PMV BLD AUTO: 8.8 FL (ref 7–12)
POSITIVE QC PASS/FAIL: NORMAL
POTASSIUM SERPL-SCNC: 4.1 MMOL/L (ref 3.5–5)
PRO-BNP: 130 PG/ML (ref 0–125)
RBC # BLD: 4.76 E12/L (ref 3.5–5.5)
RESPIRATORY SYNCYTIAL VIRUS BY PCR: NOT DETECTED
SARS-COV-2, PCR: NOT DETECTED
SODIUM BLD-SCNC: 139 MMOL/L (ref 132–146)
TOTAL PROTEIN: 7.1 G/DL (ref 6.4–8.3)
TROPONIN: <0.01 NG/ML (ref 0–0.03)
TROPONIN: <0.01 NG/ML (ref 0–0.03)
WBC # BLD: 5.8 E9/L (ref 4.5–11.5)

## 2020-11-21 PROCEDURE — 6360000004 HC RX CONTRAST MEDICATION: Performed by: RADIOLOGY

## 2020-11-21 PROCEDURE — 83880 ASSAY OF NATRIURETIC PEPTIDE: CPT

## 2020-11-21 PROCEDURE — 0202U NFCT DS 22 TRGT SARS-COV-2: CPT

## 2020-11-21 PROCEDURE — 93005 ELECTROCARDIOGRAM TRACING: CPT | Performed by: EMERGENCY MEDICINE

## 2020-11-21 PROCEDURE — 99283 EMERGENCY DEPT VISIT LOW MDM: CPT

## 2020-11-21 PROCEDURE — 71275 CT ANGIOGRAPHY CHEST: CPT

## 2020-11-21 PROCEDURE — 85025 COMPLETE CBC W/AUTO DIFF WBC: CPT

## 2020-11-21 PROCEDURE — 80053 COMPREHEN METABOLIC PANEL: CPT

## 2020-11-21 PROCEDURE — 84484 ASSAY OF TROPONIN QUANT: CPT

## 2020-11-21 PROCEDURE — 85378 FIBRIN DEGRADE SEMIQUANT: CPT

## 2020-11-21 PROCEDURE — 71045 X-RAY EXAM CHEST 1 VIEW: CPT

## 2020-11-21 RX ADMIN — IOPAMIDOL 80 ML: 755 INJECTION, SOLUTION INTRAVENOUS at 17:51

## 2020-11-21 ASSESSMENT — PAIN DESCRIPTION - DESCRIPTORS: DESCRIPTORS: ACHING

## 2020-11-21 ASSESSMENT — PAIN DESCRIPTION - ORIENTATION: ORIENTATION: LEFT

## 2020-11-21 ASSESSMENT — PAIN DESCRIPTION - PAIN TYPE: TYPE: ACUTE PAIN

## 2020-11-21 ASSESSMENT — PAIN SCALES - GENERAL: PAINLEVEL_OUTOF10: 5

## 2020-11-21 ASSESSMENT — PAIN - FUNCTIONAL ASSESSMENT: PAIN_FUNCTIONAL_ASSESSMENT: PREVENTS OR INTERFERES SOME ACTIVE ACTIVITIES AND ADLS

## 2020-11-21 ASSESSMENT — PAIN DESCRIPTION - FREQUENCY: FREQUENCY: CONTINUOUS

## 2020-11-21 ASSESSMENT — PAIN DESCRIPTION - ONSET: ONSET: SUDDEN

## 2020-11-21 ASSESSMENT — PAIN DESCRIPTION - LOCATION: LOCATION: ARM

## 2020-11-21 NOTE — ED NOTES
Bed:  HALL-07  Expected date:   Expected time:   Means of arrival:   Comments:  bradford Betts RN  11/21/20 2006

## 2020-11-21 NOTE — ED PROVIDER NOTES
HPI:  11/21/20,   Time: 1:07 PM EST Vonnie Cushing is a 54 y.o. female presenting to the ED for chest pain and achiness, beginning 8 hours ago. The complaint has been persistent, moderate in severity, and worsened by nothing. Patient is a pleasant 54 female history of lupus, hypertension hyperlipidemia who presents the emergency room with achinge and occasionally sharp left-sided chest pain that woke her from sleep last night around 3 AM.  She states that the pain lasted about 45 minutes. She felt some vague shortness of breath and nausea associated with it as well she felt some radiation to the right jaw as well as tingling to left arm. The symptoms have fully resolved and they have not returned since around 4 AM which was 7 hours ago. She states has had similar episodes like this in the past and they have not found a problem with multiple cardiac workups. she denies any recent pleuritic pain. However, she has had recent cough congestion. No fevers. No known Covid exposures. She has had extensive cardiac work-ups in the past including multiple stress tests I can see in our computer with most recent stress test I have here was from October of last year. Stress test was negative. He states her last cardiac stress test was just this past August which was 3 months ago and that was normal.  She states she is never had any positive stress test.  She is not had any recent exertional symptoms of chest pain or chest pressure. She does have a history of tachycardia episodes as well. The reason she takes metoprolol. She states she did feel her heart racing during this episode and she is considering that it may have been one of her \"tachycardia episodes\" again. She feels no palpitations chest pain shortness of breath at this time. No abdominal pain nausea or vomiting.     Review of Systems:   Pertinent positives and negatives are stated within HPI, all other systems reviewed and are negative.          --------------------------------------------- PAST HISTORY ---------------------------------------------  Past Medical History:  has a past medical history of ARDS (adult respiratory distress syndrome) ([facility] [address] Asthma, Colitis, Depression, Essential tremor, Gallstones, Head trauma, Hyperlipidemia, Hypertension, [facility] and Tachycardia. Past Surgical History:  has a past surgical history that includes Cholecystectomy, laparoscopic (03/2018) and Retinal detachment surgery. Social History:  reports that she quit smoking about 8 years ago. Her smoking use included cigarettes. She has never used smokeless tobacco. She reports that she does not drink alcohol or use drugs. Family History: family history includes Celiac Disease in her sister; Crohn's Disease in her brother; Diabetes in her father, mother, and sister; High Blood Pressure in her brother, father, mother, and sister; Stroke in her mother; Uterine Cancer in her maternal grandmother. The patients home medications have been reviewed. Allergies: Cefdinir; Cephalosporins; Fish allergy; Fish-derived products; Nsaids; Ciprofloxacin; Codeine; Folic acid; Levaquin [levofloxacin in d5w]; Methotrexate derivatives; Metronidazole; Other; Prednisone; Wellbutrin [bupropion]; and Pce [erythromycin]        ---------------------------------------------------PHYSICAL EXAM--------------------------------------    Constitutional/General: Alert and oriented x3, well appearing, non toxic in NAD; overweight  Head: Normocephalic and atraumatic  Eyes: PERRL, EOMI, conjunctive normal, sclera non icteric  Mouth: Oropharynx clear, handling secretions, no trismus, no asymmetry of the posterior oropharynx or uvular edema  Neck: Supple, full ROM, non tender to palpation in the midline, no stridor, no crepitus, no meningeal signs  Respiratory: Lungs clear to auscultation bilaterally, no wheezes, rales, or rhonchi.  Not in Not Detected    Parainfluenza Virus 3 by PCR Not Detected Not Detected    Parainfluenza Virus 4 by PCR Not Detected Not Detected    Respiratory Syncytial Virus by PCR Not Detected Not Detected   CBC Auto Differential   Result Value Ref Range    WBC 5.8 4.5 - 11.5 E9/L    RBC 4.76 3.50 - 5.50 E12/L    Hemoglobin 12.9 11.5 - 15.5 g/dL    Hematocrit 40.9 34.0 - 48.0 %    MCV 85.9 80.0 - 99.9 fL    MCH 27.1 26.0 - 35.0 pg    MCHC 31.5 (L) 32.0 - 34.5 %    RDW 14.3 11.5 - 15.0 fL    Platelets 073 401 - 482 E9/L    MPV 8.8 7.0 - 12.0 fL    Neutrophils % 70.4 43.0 - 80.0 %    Immature Granulocytes % 0.3 0.0 - 5.0 %    Lymphocytes % 19.9 (L) 20.0 - 42.0 %    Monocytes % 7.6 2.0 - 12.0 %    Eosinophils % 1.5 0.0 - 6.0 %    Basophils % 0.3 0.0 - 2.0 %    Neutrophils Absolute 4.09 1.80 - 7.30 E9/L    Immature Granulocytes # 0.02 E9/L    Lymphocytes Absolute 1.16 (L) 1.50 - 4.00 E9/L    Monocytes Absolute 0.44 0.10 - 0.95 E9/L    Eosinophils Absolute 0.09 0.05 - 0.50 E9/L    Basophils Absolute 0.02 0.00 - 0.20 E9/L   Comprehensive Metabolic Panel   Result Value Ref Range    Sodium 139 132 - 146 mmol/L    Potassium 4.1 3.5 - 5.0 mmol/L    Chloride 105 98 - 107 mmol/L    CO2 25 22 - 29 mmol/L    Anion Gap 9 7 - 16 mmol/L    Glucose 94 74 - 99 mg/dL    BUN 12 6 - 20 mg/dL    CREATININE 0.7 0.5 - 1.0 mg/dL    GFR Non-African American >60 >=60 mL/min/1.73    GFR African American >60     Calcium 9.4 8.6 - 10.2 mg/dL    Total Protein 7.1 6.4 - 8.3 g/dL    Alb 3.9 3.5 - 5.2 g/dL    Total Bilirubin <0.2 0.0 - 1.2 mg/dL    Alkaline Phosphatase 87 35 - 104 U/L    ALT 10 0 - 32 U/L    AST 14 0 - 31 U/L   Troponin   Result Value Ref Range    Troponin <0.01 0.00 - 0.03 ng/mL   D-Dimer, Quantitative   Result Value Ref Range    D-Dimer, Quant 256 ng/mL DDU   Brain Natriuretic Peptide   Result Value Ref Range    Pro- (H) 0 - 125 pg/mL   Troponin   Result Value Ref Range    Troponin <0.01 0.00 - 0.03 ng/mL   POC Pregnancy Urine Qual Result Value Ref Range    HCG, Urine, POC Negative Negative    Lot Number [id number]     Positive QC Pass/Fail Pass     Negative QC Pass/Fail Pass    EKG 12 Lead   Result Value Ref Range    Ventricular Rate 69 BPM    Atrial Rate 69 BPM    P-R Interval 164 ms    QRS Duration 86 ms    Q-T Interval 400 ms    QTc Calculation (Bazett) 428 ms    P Axis 58 degrees    R Axis 46 degrees    T Axis 52 degrees       RADIOLOGY:  Interpreted by Radiologist.  CTA PULMONARY W CONTRAST   Final Result   No evidence of pulmonary embolism or acute pulmonary abnormality. XR CHEST PORTABLE   Final Result   No acute process. EKG: This EKG is signed and interpreted by the EP. Time: 12:55  Rate: 69  Rhythm: Sinus  Interpretation: no acute changes; no st changes  Comparison: stable as compared to patient's most recent EKG       ------------------------- NURSING NOTES AND VITALS REVIEWED ---------------------------   The nursing notes within the ED encounter and vital signs as below have been reviewed by myself. /67   Pulse 65   Temp 97.7 °F (36.5 °C) (Temporal)   Resp 20   Ht 5' 2\" (1.575 m)   Wt 218 lb (98.9 kg)   SpO2 99%   BMI 39.87 kg/m²   Oxygen Saturation Interpretation: Normal    The patients available past medical records and past encounters were reviewed.         ------------------------------ ED COURSE/MEDICAL DECISION MAKING----------------------  Medications   iopamidol (ISOVUE-370) 76 % injection 80 mL (80 mLs Intravenous Given 20 9241)         HEART Score For Major Cardiac Events  (Max Score 10 Points)  HISTORY       [x]   Slightly Suspicious  0       []   Moderately Suspicious  +1       []   Highly Suspicious  +2    EK point: No ST deviation but LBBB, LVH repolarization changes (ex:digoxin);               2 points: ST deviation not due to LBBB, LVH or digoxin         [x]   Normal  0       []   Nonspecific Repolarization Disturbance  +1       []   Significant ST Depression  +2    AGE []   <45  0       [x]   45-64  +1       []    >65  +2    RISK FACTORS:  1. HTN    2. Hypercholesterolemia    3. DM     4. Cigarette smoking (current or cessation < 3 mos)    5. Positive family history  (parent or sibling with CVD before age 72). 6. Obesity (BMI >30kg/m2)         []   No Risk factors Known  0       []   1-2 Risk Factors  +1       [x]   >3 Risk Factors or History of Atherosclerotic Disease  +2      INITIAL TROPONIN       [x]   < Normal Limit   0       []   1-3 x Normal Limit   +1       []   >3 x Normal Limit   +2     -----------------------------------------------------------------------------------------------------------------  SCORE TOTAL:  3 POINTS     Low Score          (0-3 Points), risk of MACE of 0.9-1.7% (discuss d/c home with f/u)  Mod Score          (4-6 Points), risk of MACE of 12-16.6% (discuss admission for further testing)  High Score         (7-10 Points), risk of MACE of 50-65% (Admit ALL as they are candidates for early invasive measures)    ED COURSE:       Medical Decision Making:    Is a pleasant 54 old female came in with aching chest pain that lasted about 45 minutes. This occurred in the middle night which was 8 hours ago. She is new to symptoms at this time. She had recent cough and congestion, but no fevers. We will do Covid testing, as well as EKG chest x-ray D-dimer troponin BNP. Patient is stable vitals here. Differential diagnosis includes tachyarrhythmia, palpitations, dysrhythmia, ACS, MI, PE, CHF, anxiety, atypical chest pain, Covid exposure or pneumonia. This patient has remained hemodynamically stable during their ED course. Patient had an EKG has showed sinus rhythm at 69 beats a minute no signs of any ST changes. She had a CBC that was normal.  Chemistry that was normal troponin that was negative D-dimer that was slightly positive at 256 and a BNP that was 130.   Respiratory panel was negative there is no Covid detected her pregnancy test was negative she did have a CTA of the chest given that the D-dimer slightly positive. This was read by radiology that showed no abnormality and no pulmonary embolism. She did have a second troponin that was negative, as well. This was done 5 hours after the first one. She has been asymptomatic all day while here in the department. She was offered observation admission and declined. She is low risk heart score and I do feel she is a stable for discharge for outpatient follow-up. She states her last cardiac stress test was actually in August of this year that was normal.  She will follow-up outpatient with her PCP. Given the atypical nature of this chest pain and frequent presentations in the past with negative work-ups, I do feel is reasonable to be discharged home for close outpatient follow-up. To return to the ER for any new or worsening symptoms. She is asymptomatic on discharge. Re-Evaluations:             Re-evaluation. Patients symptoms are improving    Re-examination  11/21/20   1:07 PM EST          Vital Signs:   Vitals:    11/21/20 1615 11/21/20 1630 11/21/20 1632 11/21/20 1800   BP: 119/67 119/67 119/67 119/67   Pulse: 56 53 61 65   Resp:       Temp:       TempSrc:       SpO2:       Weight:       Height:               Counseling: The emergency provider has spoken with the patient and discussed todays results, in addition to providing specific details for the plan of care and counseling regarding the diagnosis and prognosis. Questions are answered at this time and they are agreeable with the plan.       --------------------------------- IMPRESSION AND DISPOSITION ---------------------------------    IMPRESSION  1. Atypical chest pain Improving       DISPOSITION  Disposition: Discharge to home  Patient condition is stable    NOTE: This report was transcribed using voice recognition software.  Every effort was made to ensure accuracy; however, inadvertent computerized transcription errors may be present        Levy Bledsoe MD  11/22/20 [id number]

## 2020-11-22 LAB
EKG ATRIAL RATE: 69 BPM
EKG P AXIS: 58 DEGREES
EKG P-R INTERVAL: 164 MS
EKG Q-T INTERVAL: 400 MS
EKG QRS DURATION: 86 MS
EKG QTC CALCULATION (BAZETT): 428 MS
EKG R AXIS: 46 DEGREES
EKG T AXIS: 52 DEGREES
EKG VENTRICULAR RATE: 69 BPM

## 2020-11-22 PROCEDURE — 93010 ELECTROCARDIOGRAM REPORT: CPT | Performed by: INTERNAL MEDICINE

## 2020-12-03 ENCOUNTER — HOSPITAL ENCOUNTER (EMERGENCY)
Age: 50
Discharge: LEFT BEFORE DISCHARGE | End: 2020-12-03
Payer: COMMERCIAL

## 2020-12-03 ENCOUNTER — APPOINTMENT (OUTPATIENT)
Dept: GENERAL RADIOLOGY | Age: 50
End: 2020-12-03
Payer: COMMERCIAL

## 2020-12-03 VITALS
HEART RATE: 76 BPM | RESPIRATION RATE: 22 BRPM | OXYGEN SATURATION: 98 % | BODY MASS INDEX: 39.32 KG/M2 | TEMPERATURE: 97.3 F | WEIGHT: 215 LBS | SYSTOLIC BLOOD PRESSURE: 154 MMHG | DIASTOLIC BLOOD PRESSURE: 96 MMHG

## 2020-12-03 LAB
ALBUMIN SERPL-MCNC: 3.9 G/DL (ref 3.5–5.2)
ALP BLD-CCNC: 88 U/L (ref 35–104)
ALT SERPL-CCNC: 13 U/L (ref 0–32)
ANION GAP SERPL CALCULATED.3IONS-SCNC: 9 MMOL/L (ref 7–16)
AST SERPL-CCNC: 14 U/L (ref 0–31)
BILIRUB SERPL-MCNC: 0.3 MG/DL (ref 0–1.2)
BUN BLDV-MCNC: 12 MG/DL (ref 6–20)
CALCIUM SERPL-MCNC: 9.4 MG/DL (ref 8.6–10.2)
CHLORIDE BLD-SCNC: 106 MMOL/L (ref 98–107)
CO2: 25 MMOL/L (ref 22–29)
CREAT SERPL-MCNC: 0.7 MG/DL (ref 0.5–1)
GFR AFRICAN AMERICAN: >60
GFR NON-AFRICAN AMERICAN: >60 ML/MIN/1.73
GLUCOSE BLD-MCNC: 99 MG/DL (ref 74–99)
HCT VFR BLD CALC: 40.9 % (ref 34–48)
HEMOGLOBIN: 13.5 G/DL (ref 11.5–15.5)
MCH RBC QN AUTO: 27.7 PG (ref 26–35)
MCHC RBC AUTO-ENTMCNC: 33 % (ref 32–34.5)
MCV RBC AUTO: 83.8 FL (ref 80–99.9)
PDW BLD-RTO: 14.3 FL (ref 11.5–15)
PLATELET # BLD: 223 E9/L (ref 130–450)
PMV BLD AUTO: 8.7 FL (ref 7–12)
POTASSIUM SERPL-SCNC: 4.3 MMOL/L (ref 3.5–5)
RBC # BLD: 4.88 E12/L (ref 3.5–5.5)
SODIUM BLD-SCNC: 140 MMOL/L (ref 132–146)
TOTAL PROTEIN: 7.1 G/DL (ref 6.4–8.3)
TROPONIN: <0.01 NG/ML (ref 0–0.03)
WBC # BLD: 6.6 E9/L (ref 4.5–11.5)

## 2020-12-03 PROCEDURE — 85027 COMPLETE CBC AUTOMATED: CPT

## 2020-12-03 PROCEDURE — 80053 COMPREHEN METABOLIC PANEL: CPT

## 2020-12-03 PROCEDURE — 84484 ASSAY OF TROPONIN QUANT: CPT

## 2020-12-03 PROCEDURE — 4500000002 HC ER NO CHARGE

## 2020-12-03 PROCEDURE — 93005 ELECTROCARDIOGRAM TRACING: CPT | Performed by: NURSE PRACTITIONER

## 2020-12-03 PROCEDURE — 71046 X-RAY EXAM CHEST 2 VIEWS: CPT

## 2020-12-03 ASSESSMENT — PAIN SCALES - GENERAL: PAINLEVEL_OUTOF10: 8

## 2020-12-03 NOTE — ED TRIAGE NOTES
FIRST PROVIDER CONTACT ASSESSMENT NOTE      Department of Emergency Medicine   ED  First Provider Note   12/3/20  5:21 PM EST    Chief Complaint: Concern For COVID-19 (states sxs started two weeks ago after being exposed to covid. Chest tightness, SOB, fever, chills, sweating and worsening symptoms this AM)      History of Present Illness:    Fausto Spring is a 54 y.o. female who presents to the ED by private car for chest pain, covid exposure  Focused Screening Exam:  Constitutional:  Alert, appears stated age and is in no distress. *ALLERGIES*     Cefdinir; Cephalosporins; Fish allergy; Fish-derived products; Nsaids; Ciprofloxacin; Codeine; Folic acid; Levaquin [levofloxacin in d5w]; Methotrexate derivatives; Metronidazole; Other; Prednisone;  Wellbutrin [bupropion]; and Pce [erythromycin]     ED Triage Vitals   BP Temp Temp src Pulse Resp SpO2 Height Weight   12/03/20 1441 12/03/20 1415 -- 12/03/20 1415 12/03/20 1441 12/03/20 1415 -- 12/03/20 1441   (!) 154/96 97.3 °F (36.3 °C)  76 22 98 %  215 lb (97.5 kg)        Initial Plan of Care:  Initiate Treatment-Testing, Proceed toTreatment Area When Bed Available for ED Attending/MLP to Continue Care    -----------------END OF FIRST PROVIDER CONTACT ASSESSMENT NOTE--------------  Electronically signed by SUZANNE Bear CNP   DD: 12/3/20

## 2020-12-04 LAB
EKG ATRIAL RATE: 77 BPM
EKG P AXIS: 63 DEGREES
EKG P-R INTERVAL: 150 MS
EKG Q-T INTERVAL: 374 MS
EKG QRS DURATION: 74 MS
EKG QTC CALCULATION (BAZETT): 423 MS
EKG R AXIS: 57 DEGREES
EKG T AXIS: 40 DEGREES
EKG VENTRICULAR RATE: 77 BPM

## 2020-12-04 PROCEDURE — 93010 ELECTROCARDIOGRAM REPORT: CPT | Performed by: INTERNAL MEDICINE

## 2021-01-30 ENCOUNTER — APPOINTMENT (OUTPATIENT)
Dept: CT IMAGING | Age: 51
DRG: 203 | End: 2021-01-30
Payer: COMMERCIAL

## 2021-01-30 ENCOUNTER — APPOINTMENT (OUTPATIENT)
Dept: GENERAL RADIOLOGY | Age: 51
DRG: 203 | End: 2021-01-30
Payer: COMMERCIAL

## 2021-01-30 ENCOUNTER — HOSPITAL ENCOUNTER (INPATIENT)
Age: 51
LOS: 1 days | Discharge: HOME | DRG: 203 | End: 2021-02-02
Attending: EMERGENCY MEDICINE | Admitting: FAMILY MEDICINE
Payer: COMMERCIAL

## 2021-01-30 DIAGNOSIS — R07.9 CHEST PAIN, UNSPECIFIED TYPE: Primary | ICD-10-CM

## 2021-01-30 PROBLEM — R55 SYNCOPE AND COLLAPSE: Status: RESOLVED | Noted: 2019-07-09 | Resolved: 2021-01-30

## 2021-01-30 PROBLEM — E78.5 HYPERLIPIDEMIA: Chronic | Status: ACTIVE | Noted: 2021-01-30

## 2021-01-30 PROBLEM — I10 ESSENTIAL HYPERTENSION: Chronic | Status: ACTIVE | Noted: 2021-01-30

## 2021-01-30 LAB
ALBUMIN SERPL-MCNC: 4.2 G/DL (ref 3.5–5.2)
ALP BLD-CCNC: 100 U/L (ref 35–104)
ALT SERPL-CCNC: 18 U/L (ref 0–32)
ANION GAP SERPL CALCULATED.3IONS-SCNC: 9 MMOL/L (ref 7–16)
AST SERPL-CCNC: 18 U/L (ref 0–31)
BASOPHILS ABSOLUTE: 0.02 E9/L (ref 0–0.2)
BASOPHILS RELATIVE PERCENT: 0.4 % (ref 0–2)
BILIRUB SERPL-MCNC: 0.4 MG/DL (ref 0–1.2)
BILIRUBIN URINE: NEGATIVE
BLOOD, URINE: NEGATIVE
BUN BLDV-MCNC: 12 MG/DL (ref 6–20)
CALCIUM SERPL-MCNC: 9.4 MG/DL (ref 8.6–10.2)
CHLORIDE BLD-SCNC: 104 MMOL/L (ref 98–107)
CLARITY: CLEAR
CO2: 26 MMOL/L (ref 22–29)
COLOR: YELLOW
CREAT SERPL-MCNC: 0.8 MG/DL (ref 0.5–1)
D DIMER: 333 NG/ML DDU
EOSINOPHILS ABSOLUTE: 0.08 E9/L (ref 0.05–0.5)
EOSINOPHILS RELATIVE PERCENT: 1.4 % (ref 0–6)
GFR AFRICAN AMERICAN: >60
GFR NON-AFRICAN AMERICAN: >60 ML/MIN/1.73
GLUCOSE BLD-MCNC: 88 MG/DL (ref 74–99)
GLUCOSE URINE: NEGATIVE MG/DL
HCG, URINE, POC: NEGATIVE
HCT VFR BLD CALC: 42 % (ref 34–48)
HEMOGLOBIN: 13.7 G/DL (ref 11.5–15.5)
IMMATURE GRANULOCYTES #: 0.02 E9/L
IMMATURE GRANULOCYTES %: 0.4 % (ref 0–5)
KETONES, URINE: NEGATIVE MG/DL
LACTIC ACID: 1.1 MMOL/L (ref 0.5–2.2)
LEUKOCYTE ESTERASE, URINE: NEGATIVE
LIPASE: 36 U/L (ref 13–60)
LYMPHOCYTES ABSOLUTE: 1.22 E9/L (ref 1.5–4)
LYMPHOCYTES RELATIVE PERCENT: 21.8 % (ref 20–42)
Lab: NORMAL
MAGNESIUM: 1.8 MG/DL (ref 1.6–2.6)
MCH RBC QN AUTO: 27.6 PG (ref 26–35)
MCHC RBC AUTO-ENTMCNC: 32.6 % (ref 32–34.5)
MCV RBC AUTO: 84.7 FL (ref 80–99.9)
MONOCYTES ABSOLUTE: 0.45 E9/L (ref 0.1–0.95)
MONOCYTES RELATIVE PERCENT: 8.1 % (ref 2–12)
NEGATIVE QC PASS/FAIL: NORMAL
NEUTROPHILS ABSOLUTE: 3.8 E9/L (ref 1.8–7.3)
NEUTROPHILS RELATIVE PERCENT: 67.9 % (ref 43–80)
NITRITE, URINE: NEGATIVE
PDW BLD-RTO: 14.4 FL (ref 11.5–15)
PH UA: 6.5 (ref 5–9)
PLATELET # BLD: 177 E9/L (ref 130–450)
PMV BLD AUTO: 8.8 FL (ref 7–12)
POSITIVE QC PASS/FAIL: NORMAL
POTASSIUM REFLEX MAGNESIUM: 3.5 MMOL/L (ref 3.5–5)
PROTEIN UA: NEGATIVE MG/DL
RBC # BLD: 4.96 E12/L (ref 3.5–5.5)
REASON FOR REJECTION: NORMAL
REJECTED TEST: NORMAL
SARS-COV-2, NAAT: NOT DETECTED
SODIUM BLD-SCNC: 139 MMOL/L (ref 132–146)
SPECIFIC GRAVITY UA: 1.02 (ref 1–1.03)
TOTAL PROTEIN: 7.8 G/DL (ref 6.4–8.3)
TROPONIN: <0.01 NG/ML (ref 0–0.03)
TROPONIN: <0.01 NG/ML (ref 0–0.03)
UROBILINOGEN, URINE: 0.2 E.U./DL
WBC # BLD: 5.6 E9/L (ref 4.5–11.5)

## 2021-01-30 PROCEDURE — 96374 THER/PROPH/DIAG INJ IV PUSH: CPT

## 2021-01-30 PROCEDURE — 83735 ASSAY OF MAGNESIUM: CPT

## 2021-01-30 PROCEDURE — 83690 ASSAY OF LIPASE: CPT

## 2021-01-30 PROCEDURE — 96372 THER/PROPH/DIAG INJ SC/IM: CPT

## 2021-01-30 PROCEDURE — 2580000003 HC RX 258: Performed by: STUDENT IN AN ORGANIZED HEALTH CARE EDUCATION/TRAINING PROGRAM

## 2021-01-30 PROCEDURE — 84484 ASSAY OF TROPONIN QUANT: CPT

## 2021-01-30 PROCEDURE — 96375 TX/PRO/DX INJ NEW DRUG ADDON: CPT

## 2021-01-30 PROCEDURE — 6370000000 HC RX 637 (ALT 250 FOR IP): Performed by: FAMILY MEDICINE

## 2021-01-30 PROCEDURE — 6370000000 HC RX 637 (ALT 250 FOR IP): Performed by: STUDENT IN AN ORGANIZED HEALTH CARE EDUCATION/TRAINING PROGRAM

## 2021-01-30 PROCEDURE — 99285 EMERGENCY DEPT VISIT HI MDM: CPT

## 2021-01-30 PROCEDURE — 71275 CT ANGIOGRAPHY CHEST: CPT

## 2021-01-30 PROCEDURE — 6360000002 HC RX W HCPCS: Performed by: STUDENT IN AN ORGANIZED HEALTH CARE EDUCATION/TRAINING PROGRAM

## 2021-01-30 PROCEDURE — 81003 URINALYSIS AUTO W/O SCOPE: CPT

## 2021-01-30 PROCEDURE — 36415 COLL VENOUS BLD VENIPUNCTURE: CPT

## 2021-01-30 PROCEDURE — G0378 HOSPITAL OBSERVATION PER HR: HCPCS

## 2021-01-30 PROCEDURE — 6360000002 HC RX W HCPCS: Performed by: FAMILY MEDICINE

## 2021-01-30 PROCEDURE — 83605 ASSAY OF LACTIC ACID: CPT

## 2021-01-30 PROCEDURE — 80053 COMPREHEN METABOLIC PANEL: CPT

## 2021-01-30 PROCEDURE — 6360000004 HC RX CONTRAST MEDICATION: Performed by: RADIOLOGY

## 2021-01-30 PROCEDURE — 2580000003 HC RX 258: Performed by: FAMILY MEDICINE

## 2021-01-30 PROCEDURE — U0002 COVID-19 LAB TEST NON-CDC: HCPCS

## 2021-01-30 PROCEDURE — 85378 FIBRIN DEGRADE SEMIQUANT: CPT

## 2021-01-30 PROCEDURE — 93005 ELECTROCARDIOGRAM TRACING: CPT | Performed by: STUDENT IN AN ORGANIZED HEALTH CARE EDUCATION/TRAINING PROGRAM

## 2021-01-30 PROCEDURE — 71045 X-RAY EXAM CHEST 1 VIEW: CPT

## 2021-01-30 PROCEDURE — 85025 COMPLETE CBC W/AUTO DIFF WBC: CPT

## 2021-01-30 RX ORDER — DIPHENHYDRAMINE HCL 25 MG
25 TABLET ORAL EVERY 6 HOURS PRN
Status: DISCONTINUED | OUTPATIENT
Start: 2021-01-30 | End: 2021-02-02 | Stop reason: HOSPADM

## 2021-01-30 RX ORDER — ACETAMINOPHEN 650 MG/1
650 SUPPOSITORY RECTAL EVERY 6 HOURS PRN
Status: DISCONTINUED | OUTPATIENT
Start: 2021-01-30 | End: 2021-02-02 | Stop reason: HOSPADM

## 2021-01-30 RX ORDER — GUAIFENESIN 400 MG/1
400 TABLET ORAL 4 TIMES DAILY PRN
Status: DISCONTINUED | OUTPATIENT
Start: 2021-01-30 | End: 2021-02-02 | Stop reason: HOSPADM

## 2021-01-30 RX ORDER — IPRATROPIUM BROMIDE AND ALBUTEROL SULFATE 2.5; .5 MG/3ML; MG/3ML
1 SOLUTION RESPIRATORY (INHALATION) EVERY 6 HOURS PRN
Status: DISCONTINUED | OUTPATIENT
Start: 2021-01-30 | End: 2021-02-02 | Stop reason: HOSPADM

## 2021-01-30 RX ORDER — PROMETHAZINE HYDROCHLORIDE 25 MG/1
12.5 TABLET ORAL EVERY 6 HOURS PRN
Status: DISCONTINUED | OUTPATIENT
Start: 2021-01-30 | End: 2021-02-02 | Stop reason: HOSPADM

## 2021-01-30 RX ORDER — ASPIRIN 81 MG/1
324 TABLET, CHEWABLE ORAL ONCE
Status: COMPLETED | OUTPATIENT
Start: 2021-01-30 | End: 2021-01-30

## 2021-01-30 RX ORDER — SODIUM CHLORIDE 0.9 % (FLUSH) 0.9 %
10 SYRINGE (ML) INJECTION PRN
Status: DISCONTINUED | OUTPATIENT
Start: 2021-01-30 | End: 2021-02-02 | Stop reason: HOSPADM

## 2021-01-30 RX ORDER — NITROGLYCERIN 0.4 MG/1
0.4 TABLET SUBLINGUAL EVERY 5 MIN PRN
Status: DISCONTINUED | OUTPATIENT
Start: 2021-01-30 | End: 2021-02-02 | Stop reason: HOSPADM

## 2021-01-30 RX ORDER — BUDESONIDE AND FORMOTEROL FUMARATE DIHYDRATE 80; 4.5 UG/1; UG/1
2 AEROSOL RESPIRATORY (INHALATION) 2 TIMES DAILY
Status: DISCONTINUED | OUTPATIENT
Start: 2021-01-30 | End: 2021-01-30 | Stop reason: CLARIF

## 2021-01-30 RX ORDER — GUAIFENESIN 600 MG/1
1200 TABLET, EXTENDED RELEASE ORAL PRN
Status: DISCONTINUED | OUTPATIENT
Start: 2021-01-30 | End: 2021-01-30 | Stop reason: CLARIF

## 2021-01-30 RX ORDER — KETOROLAC TROMETHAMINE 30 MG/ML
15 INJECTION, SOLUTION INTRAMUSCULAR; INTRAVENOUS ONCE
Status: COMPLETED | OUTPATIENT
Start: 2021-01-30 | End: 2021-01-30

## 2021-01-30 RX ORDER — ASPIRIN 81 MG/1
81 TABLET, CHEWABLE ORAL DAILY
Status: DISCONTINUED | OUTPATIENT
Start: 2021-01-30 | End: 2021-02-02 | Stop reason: HOSPADM

## 2021-01-30 RX ORDER — ALPRAZOLAM 0.25 MG/1
0.5 TABLET ORAL EVERY 8 HOURS PRN
Status: DISCONTINUED | OUTPATIENT
Start: 2021-01-30 | End: 2021-02-02 | Stop reason: HOSPADM

## 2021-01-30 RX ORDER — MONTELUKAST SODIUM 10 MG/1
10 TABLET ORAL DAILY
Status: DISCONTINUED | OUTPATIENT
Start: 2021-01-30 | End: 2021-02-02 | Stop reason: HOSPADM

## 2021-01-30 RX ORDER — ONDANSETRON 2 MG/ML
4 INJECTION INTRAMUSCULAR; INTRAVENOUS EVERY 6 HOURS PRN
Status: DISCONTINUED | OUTPATIENT
Start: 2021-01-30 | End: 2021-02-02 | Stop reason: HOSPADM

## 2021-01-30 RX ORDER — IBUPROFEN 600 MG/1
600 TABLET ORAL EVERY 8 HOURS PRN
Status: DISCONTINUED | OUTPATIENT
Start: 2021-01-30 | End: 2021-01-31

## 2021-01-30 RX ORDER — CETIRIZINE HYDROCHLORIDE 10 MG/1
10 TABLET ORAL DAILY
Status: DISCONTINUED | OUTPATIENT
Start: 2021-01-30 | End: 2021-02-02 | Stop reason: HOSPADM

## 2021-01-30 RX ORDER — ONDANSETRON 2 MG/ML
4 INJECTION INTRAMUSCULAR; INTRAVENOUS ONCE
Status: COMPLETED | OUTPATIENT
Start: 2021-01-30 | End: 2021-01-30

## 2021-01-30 RX ORDER — FAMOTIDINE 20 MG/1
20 TABLET, FILM COATED ORAL 2 TIMES DAILY
Status: DISPENSED | OUTPATIENT
Start: 2021-01-30 | End: 2021-01-31

## 2021-01-30 RX ORDER — ALPRAZOLAM 1 MG/1
1 TABLET ORAL 3 TIMES DAILY PRN
Status: DISCONTINUED | OUTPATIENT
Start: 2021-01-30 | End: 2021-01-30

## 2021-01-30 RX ORDER — AMMONIUM LACTATE 12 G/100G
LOTION TOPICAL 2 TIMES DAILY PRN
Status: DISCONTINUED | OUTPATIENT
Start: 2021-01-30 | End: 2021-02-02 | Stop reason: HOSPADM

## 2021-01-30 RX ORDER — 0.9 % SODIUM CHLORIDE 0.9 %
1000 INTRAVENOUS SOLUTION INTRAVENOUS ONCE
Status: COMPLETED | OUTPATIENT
Start: 2021-01-30 | End: 2021-01-30

## 2021-01-30 RX ORDER — BUDESONIDE 0.5 MG/2ML
0.5 INHALANT ORAL 2 TIMES DAILY
Status: DISCONTINUED | OUTPATIENT
Start: 2021-01-30 | End: 2021-02-02 | Stop reason: HOSPADM

## 2021-01-30 RX ORDER — AZATHIOPRINE 50 MG/1
50 TABLET ORAL DAILY
Status: DISCONTINUED | OUTPATIENT
Start: 2021-01-30 | End: 2021-01-30

## 2021-01-30 RX ORDER — ARFORMOTEROL TARTRATE 15 UG/2ML
15 SOLUTION RESPIRATORY (INHALATION) 2 TIMES DAILY
Status: DISCONTINUED | OUTPATIENT
Start: 2021-01-30 | End: 2021-02-02 | Stop reason: HOSPADM

## 2021-01-30 RX ORDER — ACETAMINOPHEN 325 MG/1
650 TABLET ORAL EVERY 6 HOURS PRN
Status: DISCONTINUED | OUTPATIENT
Start: 2021-01-30 | End: 2021-02-02 | Stop reason: HOSPADM

## 2021-01-30 RX ORDER — SUCRALFATE 1 G/1
1 TABLET ORAL 4 TIMES DAILY
Status: DISCONTINUED | OUTPATIENT
Start: 2021-01-30 | End: 2021-02-02 | Stop reason: HOSPADM

## 2021-01-30 RX ORDER — SODIUM CHLORIDE 0.9 % (FLUSH) 0.9 %
10 SYRINGE (ML) INJECTION EVERY 12 HOURS SCHEDULED
Status: DISCONTINUED | OUTPATIENT
Start: 2021-01-30 | End: 2021-02-02 | Stop reason: HOSPADM

## 2021-01-30 RX ORDER — POLYETHYLENE GLYCOL 3350 17 G/17G
17 POWDER, FOR SOLUTION ORAL DAILY PRN
Status: DISCONTINUED | OUTPATIENT
Start: 2021-01-30 | End: 2021-02-02 | Stop reason: HOSPADM

## 2021-01-30 RX ADMIN — IOPAMIDOL 80 ML: 755 INJECTION, SOLUTION INTRAVENOUS at 14:11

## 2021-01-30 RX ADMIN — ACETAMINOPHEN 650 MG: 325 TABLET ORAL at 21:40

## 2021-01-30 RX ADMIN — SUCRALFATE 1 G: 1 TABLET ORAL at 21:40

## 2021-01-30 RX ADMIN — KETOROLAC TROMETHAMINE 15 MG: 30 INJECTION, SOLUTION INTRAMUSCULAR at 11:47

## 2021-01-30 RX ADMIN — ONDANSETRON 4 MG: 2 INJECTION INTRAMUSCULAR; INTRAVENOUS at 11:47

## 2021-01-30 RX ADMIN — ENOXAPARIN SODIUM 40 MG: 40 INJECTION SUBCUTANEOUS at 21:40

## 2021-01-30 RX ADMIN — NITROGLYCERIN 1 INCH: 20 OINTMENT TOPICAL at 15:04

## 2021-01-30 RX ADMIN — SODIUM CHLORIDE, PRESERVATIVE FREE 10 ML: 5 INJECTION INTRAVENOUS at 21:41

## 2021-01-30 RX ADMIN — ASPIRIN 324 MG: 81 TABLET, CHEWABLE ORAL at 11:47

## 2021-01-30 RX ADMIN — SODIUM CHLORIDE 1000 ML: 9 INJECTION, SOLUTION INTRAVENOUS at 11:45

## 2021-01-30 ASSESSMENT — PAIN SCALES - GENERAL
PAINLEVEL_OUTOF10: 0
PAINLEVEL_OUTOF10: 6
PAINLEVEL_OUTOF10: 10

## 2021-01-30 ASSESSMENT — ENCOUNTER SYMPTOMS
VOICE CHANGE: 0
PHOTOPHOBIA: 0
COUGH: 0
BACK PAIN: 0
SHORTNESS OF BREATH: 0
RHINORRHEA: 0
TROUBLE SWALLOWING: 0
DIARRHEA: 1
VOMITING: 0
ABDOMINAL PAIN: 1
NAUSEA: 0

## 2021-01-30 NOTE — ED PROVIDER NOTES
Patient is a 44-year-old female with past medical history of hypertension, hyperlipidemia, lupus, asthma, tachycardia presents to the emergency department with complaint of chest pain. States is been intermittent since Wednesday with no known palliative or provoking factor. Chest pain is intermittent, currently 5/10, burning, radiating to her left scapula. she denies shortness of breath, blurred vision, double vision, syncope, urinary or other GI symptoms. Patient states she has a history of GERD and has been thinking it was back, treating with Pepto-Bismol without relief. Patient has also been treating herself with nitroglycerin, anxiolytic, aspirin without relief. Patient states she last took aspirin last night which was 81 mg. Patient also endorses a multitude of other symptoms including diarrhea yesterday, headache, rash, diffuse abdominal pain. Patient states that she was seen by a neighbor who is a nurse who recommended she go to the emergency department for evaluation. Review of Systems   Constitutional: Negative for appetite change, chills, fatigue and fever. HENT: Negative for congestion, rhinorrhea, trouble swallowing and voice change. Eyes: Negative for photophobia and visual disturbance. Respiratory: Negative for cough and shortness of breath. Cardiovascular: Positive for chest pain. Negative for palpitations. Gastrointestinal: Positive for abdominal pain and diarrhea. Negative for nausea and vomiting. Genitourinary: Negative for dysuria, frequency and urgency. Musculoskeletal: Negative for arthralgias, back pain and myalgias. Skin: Positive for rash. Negative for wound. Allergic/Immunologic: Negative for immunocompromised state. Neurological: Positive for headaches. Negative for dizziness, tremors and numbness. Psychiatric/Behavioral: Negative for confusion. The patient is not nervous/anxious. Physical Exam  Vitals signs and nursing note reviewed. Constitutional:       General: She is not in acute distress. Appearance: She is not ill-appearing or toxic-appearing. HENT:      Head: Normocephalic and atraumatic. Mouth/Throat:      Mouth: Mucous membranes are moist.      Pharynx: Oropharynx is clear. Eyes:      Extraocular Movements: Extraocular movements intact. Pupils: Pupils are equal, round, and reactive to light. Neck:      Musculoskeletal: Normal range of motion and neck supple. Cardiovascular:      Rate and Rhythm: Normal rate and regular rhythm. Pulses: Normal pulses. Heart sounds: Normal heart sounds. Pulmonary:      Effort: Pulmonary effort is normal.      Breath sounds: Normal breath sounds. Chest:      Chest wall: No tenderness. Comments: Chest pain is not reproducible. Abdominal:      General: There is no distension. Palpations: Abdomen is soft. Tenderness: There is no abdominal tenderness. There is no guarding or rebound. Musculoskeletal: Normal range of motion. Skin:     General: Skin is warm and dry. Capillary Refill: Capillary refill takes less than 2 seconds. Neurological:      Mental Status: She is alert and oriented to person, place, and time. MDM  Number of Diagnoses or Management Options  Chest pain, unspecified type  Diagnosis management comments: Patient is a 47-year-old female with extensive past medical history including lupus, tachycardia, possible CAD who presents to the emergency department with a complaint of chest pain. Pain has been onset for the past few days and has been burning, radiating to her scapula and she has been treating at home with aspirin and nitroglycerin with relief. Patient believe this could have been her GERD however it was not relieved with her GERD treatment regimen. Patient additionally complained of abdominal pain with diarrhea as well as headache. Patient presents awake, alert, following all commands, no apparent distress.   Vital signs all without abnormality including hypotension, tachycardia or hypoxia. Physical exam shows no reproducible pain. Patient additionally complained of a rash noted to the chest with small erythematous flat nonspecific rash to the chest that was no induration, excoriation, pruritus. Work-up including labs shows no leukocytosis or anemia. Electrolytes all within normal limits. Pregnancy, troponin, lactic acid, Covid all normal and negative. Patient D-dimer was elevated which prompted CTA for evaluation of pulmonary embolus. EKG shows sinus mechanism without signs of ectopy or ischemia. Chest x-ray shows no acute findings, CTA shows no pulmonary embolus or other pathological findings. Work-up was generally negative however patient with heart score of 5 with significant risk factors and presentation of chest pain with radiation that improved with nitroglycerin. Patient on reevaluation was showing calm, no apparent distress. Work-up was discussed as well as plan for admission. Patient was agreeable. Patient was monitored in the emergency department and showed some improvement in her pain after treatment with Toradol, aspirin, nitroglycerin, Zofran. Patient without further change and hospitalist was consulted for admission, agreed. Patient was admitted in stable condition. Amount and/or Complexity of Data Reviewed  Clinical lab tests: ordered and reviewed  Tests in the radiology section of CPT®: ordered and reviewed         HEART SCORE   History    --Highly suspicious 2  --Moderately suspicious 1  --Slightly suspicious 0  1       ECG  --Significant ST-depression 2  --Non specific repolarisation disturbance 1  --Normal 0   0   Age   ? 65 years 2  45 - 65 years 1  ? 47 years 0  1   Risk Factors  ? 3 risk factors or history of atherosclerotic disease 2  1 or 2 risk factors 1  No risk factors known 0  3   Troponin   ?  3x normal limit 2  1 - 3x normal limit 1  ? normal limit 0 0           Total  5 A score of 0-3 indicates a risk of 1.6% for reaching a MACE, and therefore supports a policy of early discharge. In case of a HEART score of 4-6 points, with a risk of MACE of 13%, immediate discharge is not an option. These patients should be admitted for clinical observation and subjected to non-invasive investigations such as repeated troponin or advanced ischemia detection. A HEART score ? 7 points, with a risk of 50% for a MACE, calls for early aggressive treatments possibly including invasive strategies without preceding non-invasive testing.    --------------------------------------------- PAST HISTORY ---------------------------------------------  Past Medical History:  has a past medical history of ARDS (adult respiratory distress syndrome) ([facility] [address] Asthma, Colitis, Depression, Essential tremor, Gallstones, Head trauma, Hyperlipidemia, Hypertension, Lupus (systemic lupus erythematosus) ([email or web address] [facility] and Tachycardia. Past Surgical History:  has a past surgical history that includes Cholecystectomy, laparoscopic (03/2018) and Retinal detachment surgery. Social History:  reports that she quit smoking about 8 years ago. Her smoking use included cigarettes. She has never used smokeless tobacco. She reports that she does not drink alcohol or use drugs. Family History: family history includes Celiac Disease in her sister; Crohn's Disease in her brother; Diabetes in her father, mother, and sister; High Blood Pressure in her brother, father, mother, and sister; Stroke in her mother; Uterine Cancer in her maternal grandmother. The patients home medications have been reviewed.     Allergies: Cefdinir, Cephalosporins, Fish allergy, Fish-derived products, Nsaids, Ciprofloxacin, Codeine, Folic acid, Levaquin [levofloxacin in d5w], Methotrexate derivatives, Metronidazole, Other, Prednisone, Wellbutrin [bupropion], and Pce [erythromycin]    -------------------------------------------------- RESULTS -------------------------------------------------    LABS:  Results for orders placed or performed during the hospital encounter of 01/30/21   CBC Auto Differential   Result Value Ref Range    WBC 5.6 4.5 - 11.5 E9/L    RBC 4.96 3.50 - 5.50 E12/L    Hemoglobin 13.7 11.5 - 15.5 g/dL    Hematocrit 42.0 34.0 - 48.0 %    MCV 84.7 80.0 - 99.9 fL    MCH 27.6 26.0 - 35.0 pg    MCHC 32.6 32.0 - 34.5 %    RDW 14.4 11.5 - 15.0 fL    Platelets 772 148 - 323 E9/L    MPV 8.8 7.0 - 12.0 fL    Neutrophils % 67.9 43.0 - 80.0 %    Immature Granulocytes % 0.4 0.0 - 5.0 %    Lymphocytes % 21.8 20.0 - 42.0 %    Monocytes % 8.1 2.0 - 12.0 %    Eosinophils % 1.4 0.0 - 6.0 %    Basophils % 0.4 0.0 - 2.0 %    Neutrophils Absolute 3.80 1.80 - 7.30 E9/L    Immature Granulocytes # 0.02 E9/L    Lymphocytes Absolute 1.22 (L) 1.50 - 4.00 E9/L    Monocytes Absolute 0.45 0.10 - 0.95 E9/L    Eosinophils Absolute 0.08 0.05 - 0.50 E9/L    Basophils Absolute 0.02 0.00 - 0.20 E9/L   Comprehensive Metabolic Panel w/ Reflex to MG   Result Value Ref Range    Sodium 139 132 - 146 mmol/L    Potassium reflex Magnesium 3.5 3.5 - 5.0 mmol/L    Chloride 104 98 - 107 mmol/L    CO2 26 22 - 29 mmol/L    Anion Gap 9 7 - 16 mmol/L    Glucose 88 74 - 99 mg/dL    BUN 12 6 - 20 mg/dL    CREATININE 0.8 0.5 - 1.0 mg/dL    GFR Non-African American >60 >=60 mL/min/1.73    GFR African American >60     Calcium 9.4 8.6 - 10.2 mg/dL    Total Protein 7.8 6.4 - 8.3 g/dL    Albumin 4.2 3.5 - 5.2 g/dL    Total Bilirubin 0.4 0.0 - 1.2 mg/dL    Alkaline Phosphatase 100 35 - 104 U/L    ALT 18 0 - 32 U/L    AST 18 0 - 31 U/L   Troponin   Result Value Ref Range    Troponin <0.01 0.00 - 0.03 ng/mL   Lipase   Result Value Ref Range    Lipase 36 13 - 60 U/L   Urinalysis, reflex to microscopic   Result Value Ref Range    Color, UA Yellow Straw/Yellow    Clarity, UA Clear Clear    Glucose, Ur Negative Negative mg/dL    Bilirubin Urine Negative Negative    Ketones, Urine Negative Negative mg/dL    Specific Gravity, UA 1.025 1.005 - 1.030    Blood, Urine Negative Negative    pH, UA 6.5 5.0 - 9.0    Protein, UA Negative Negative mg/dL    Urobilinogen, Urine 0.2 <2.0 E.U./dL    Nitrite, Urine Negative Negative    Leukocyte Esterase, Urine Negative Negative   Lactic Acid, Plasma   Result Value Ref Range    Lactic Acid 1.1 0.5 - 2.2 mmol/L   D-Dimer, Quantitative   Result Value Ref Range    D-Dimer, Quant 333 ng/mL DDU   COVID-19   Result Value Ref Range    SARS-CoV-2, NAAT Not Detected Not Detected   Magnesium   Result Value Ref Range    Magnesium 1.8 1.6 - 2.6 mg/dL   SPECIMEN REJECTION   Result Value Ref Range    Rejected Test trop     Reason for Rejection see below    POC Pregnancy Urine   Result Value Ref Range    HCG, Urine, POC Negative Negative    Lot Number [id number]     Positive QC Pass/Fail Pass     Negative QC Pass/Fail Pass    EKG 12 Lead   Result Value Ref Range    Ventricular Rate 78 BPM    Atrial Rate 78 BPM    P-R Interval 152 ms    QRS Duration 76 ms    Q-T Interval 380 ms    QTc Calculation (Bazett) 433 ms    P Axis 54 degrees    R Axis 16 degrees    T Axis 14 degrees       RADIOLOGY:  CTA PULMONARY W CONTRAST   Final Result   1. There is no evidence of a pulmonary embolus   2. There are no findings of atypical or COVID pneumonia. XR CHEST PORTABLE   Final Result   No acute process. EKG: This EKG is signed and interpreted by me. Rate: 78  Rhythm: Sinus  Interpretation: no acute changes  Comparison: stable as compared to patient's most recent EKG      ------------------------- NURSING NOTES AND VITALS REVIEWED ---------------------------  Date / Time Roomed:  1/30/2021 10:50 AM  ED Bed Assignment:  [id number]    The nursing notes within the ED encounter and vital signs as below have been reviewed.      Patient Vitals for the past 24 hrs:   BP Temp Temp src Pulse Resp SpO2 Height Weight   01/30/21 1714 128/66 98.1 °F (36.7 °C) Oral 70 16 100 % -- --   01/30/21 1634 114/64 97.8 °F (36.6 °C) Oral 65 16 97 % -- --   01/30/21 1532 113/63 -- -- 66 -- -- -- --   01/30/21 1503 114/68 -- -- 62 16 98 % -- --   01/30/21 1153 (!) 150/70 -- -- -- -- -- -- --   01/30/21 1114 -- 97.8 °F (36.6 °C) Oral 79 16 98 % 5' 2\" (1.575 m) 215 lb (97.5 kg)       Oxygen Saturation Interpretation: Normal    ------------------------------------------ PROGRESS NOTES ------------------------------------------  Re-evaluation(s):  Patients symptoms are improving  Repeat physical examination is not changed    Counseling:  I have spoken with the patient and discussed todays results, in addition to providing specific details for the plan of care and counseling regarding the diagnosis and prognosis. Their questions are answered at this time and they are agreeable with the plan of admission.    --------------------------------- ADDITIONAL PROVIDER NOTES ---------------------------------  Consultations:  Spoke with Dr. Rupinder Campo. Discussed case. They will admit the patient. This patient's ED course included: a personal history and physicial examination, multiple bedside re-evaluations, IV medications, cardiac monitoring and continuous pulse oximetry    This patient has remained hemodynamically stable during their ED course. Diagnosis:  1. Chest pain, unspecified type      Disposition:  Patient's disposition: Admit to telemetry  Patient's condition is stable. 1/30/21, 2:43 PM EST.     This note is prepared by Ramana Harris MD -PGY-1                       Ramana Harris MD  Resident  01/30/21 7906

## 2021-01-31 ENCOUNTER — APPOINTMENT (OUTPATIENT)
Dept: NUCLEAR MEDICINE | Age: 51
DRG: 203 | End: 2021-01-31
Payer: COMMERCIAL

## 2021-01-31 LAB
CHOLESTEROL, TOTAL: 157 MG/DL (ref 0–199)
EKG ATRIAL RATE: 78 BPM
EKG P AXIS: 54 DEGREES
EKG P-R INTERVAL: 152 MS
EKG Q-T INTERVAL: 380 MS
EKG QRS DURATION: 76 MS
EKG QTC CALCULATION (BAZETT): 433 MS
EKG R AXIS: 16 DEGREES
EKG T AXIS: 14 DEGREES
EKG VENTRICULAR RATE: 78 BPM
HDLC SERPL-MCNC: 32 MG/DL
LDL CHOLESTEROL CALCULATED: 98 MG/DL (ref 0–99)
TRIGL SERPL-MCNC: 133 MG/DL (ref 0–149)
TROPONIN: <0.01 NG/ML (ref 0–0.03)
VLDLC SERPL CALC-MCNC: 27 MG/DL

## 2021-01-31 PROCEDURE — 36415 COLL VENOUS BLD VENIPUNCTURE: CPT

## 2021-01-31 PROCEDURE — 94664 DEMO&/EVAL PT USE INHALER: CPT

## 2021-01-31 PROCEDURE — 99254 IP/OBS CNSLTJ NEW/EST MOD 60: CPT | Performed by: INTERNAL MEDICINE

## 2021-01-31 PROCEDURE — G0378 HOSPITAL OBSERVATION PER HR: HCPCS

## 2021-01-31 PROCEDURE — 2580000003 HC RX 258: Performed by: FAMILY MEDICINE

## 2021-01-31 PROCEDURE — 94640 AIRWAY INHALATION TREATMENT: CPT

## 2021-01-31 PROCEDURE — 3430000000 HC RX DIAGNOSTIC RADIOPHARMACEUTICAL: Performed by: RADIOLOGY

## 2021-01-31 PROCEDURE — 80061 LIPID PANEL: CPT

## 2021-01-31 PROCEDURE — APPSS60 APP SPLIT SHARED TIME 46-60 MINUTES: Performed by: PHYSICIAN ASSISTANT

## 2021-01-31 PROCEDURE — 6360000002 HC RX W HCPCS: Performed by: FAMILY MEDICINE

## 2021-01-31 PROCEDURE — 93010 ELECTROCARDIOGRAM REPORT: CPT | Performed by: INTERNAL MEDICINE

## 2021-01-31 PROCEDURE — 6370000000 HC RX 637 (ALT 250 FOR IP): Performed by: FAMILY MEDICINE

## 2021-01-31 PROCEDURE — A9500 TC99M SESTAMIBI: HCPCS | Performed by: RADIOLOGY

## 2021-01-31 PROCEDURE — 84484 ASSAY OF TROPONIN QUANT: CPT

## 2021-01-31 PROCEDURE — 96372 THER/PROPH/DIAG INJ SC/IM: CPT

## 2021-01-31 RX ORDER — CYCLOBENZAPRINE HCL 10 MG
10 TABLET ORAL 3 TIMES DAILY PRN
Status: DISCONTINUED | OUTPATIENT
Start: 2021-01-31 | End: 2021-02-02 | Stop reason: HOSPADM

## 2021-01-31 RX ORDER — LANOLIN ALCOHOL/MO/W.PET/CERES
CREAM (GRAM) TOPICAL 2 TIMES DAILY
Status: DISCONTINUED | OUTPATIENT
Start: 2021-01-31 | End: 2021-02-02 | Stop reason: HOSPADM

## 2021-01-31 RX ORDER — PANTOPRAZOLE SODIUM 40 MG/1
40 TABLET, DELAYED RELEASE ORAL
Status: DISCONTINUED | OUTPATIENT
Start: 2021-02-01 | End: 2021-02-02 | Stop reason: HOSPADM

## 2021-01-31 RX ADMIN — FAMOTIDINE 20 MG: 20 TABLET ORAL at 08:41

## 2021-01-31 RX ADMIN — SUCRALFATE 1 G: 1 TABLET ORAL at 20:04

## 2021-01-31 RX ADMIN — ARFORMOTEROL TARTRATE 15 MCG: 15 SOLUTION RESPIRATORY (INHALATION) at 20:55

## 2021-01-31 RX ADMIN — ASPIRIN 81 MG: 81 TABLET, CHEWABLE ORAL at 08:38

## 2021-01-31 RX ADMIN — SODIUM CHLORIDE, PRESERVATIVE FREE 10 ML: 5 INJECTION INTRAVENOUS at 20:05

## 2021-01-31 RX ADMIN — ENOXAPARIN SODIUM 40 MG: 40 INJECTION SUBCUTANEOUS at 20:04

## 2021-01-31 RX ADMIN — Medication 10 MILLICURIE: at 08:50

## 2021-01-31 RX ADMIN — SUCRALFATE 1 G: 1 TABLET ORAL at 17:35

## 2021-01-31 RX ADMIN — MONTELUKAST SODIUM 10 MG: 10 TABLET, FILM COATED ORAL at 08:41

## 2021-01-31 RX ADMIN — SERTRALINE HYDROCHLORIDE 50 MG: 50 TABLET ORAL at 08:41

## 2021-01-31 RX ADMIN — CYCLOBENZAPRINE 10 MG: 10 TABLET, FILM COATED ORAL at 11:15

## 2021-01-31 RX ADMIN — CETIRIZINE HYDROCHLORIDE 10 MG: 10 TABLET, FILM COATED ORAL at 08:40

## 2021-01-31 RX ADMIN — METOPROLOL TARTRATE 12.5 MG: 25 TABLET, FILM COATED ORAL at 08:41

## 2021-01-31 RX ADMIN — ACETAMINOPHEN 650 MG: 325 TABLET ORAL at 20:09

## 2021-01-31 RX ADMIN — ALPRAZOLAM 0.5 MG: 0.25 TABLET ORAL at 00:12

## 2021-01-31 RX ADMIN — BUDESONIDE 500 MCG: 0.5 SUSPENSION RESPIRATORY (INHALATION) at 20:55

## 2021-01-31 RX ADMIN — Medication: at 13:12

## 2021-01-31 RX ADMIN — METOPROLOL TARTRATE 12.5 MG: 25 TABLET, FILM COATED ORAL at 20:04

## 2021-01-31 RX ADMIN — FAMOTIDINE 20 MG: 20 TABLET ORAL at 20:04

## 2021-01-31 RX ADMIN — SUCRALFATE 1 G: 1 TABLET ORAL at 13:12

## 2021-01-31 ASSESSMENT — PAIN DESCRIPTION - PROGRESSION: CLINICAL_PROGRESSION: NOT CHANGED

## 2021-01-31 ASSESSMENT — PAIN DESCRIPTION - FREQUENCY: FREQUENCY: INTERMITTENT

## 2021-01-31 ASSESSMENT — PAIN DESCRIPTION - ORIENTATION: ORIENTATION: MID;LOWER

## 2021-01-31 ASSESSMENT — PAIN DESCRIPTION - PAIN TYPE: TYPE: CHRONIC PAIN

## 2021-01-31 NOTE — CONSULTS
I independently performed an evaluation on the patient. I have reviewed the above documentation completed by the advance practitioner. Please see my additional contributions to the HPI, physical exam, assessment and medical decision making. Physical Exam   BP (!) 99/56   Pulse 56   Temp 97.4 °F (36.3 °C) (Oral)   Resp 18   Ht 5' 2\" (1.575 m)   Wt 231 lb 12.8 oz (105.1 kg)   SpO2 99%   BMI 42.40 kg/m²   Constitutional: Oriented to person, place, and time. Well-developed and well-nourished. No distress. Head: Normocephalic and atraumatic. Eyes: EOM are normal. Pupils are equal, round, and reactive to light. Neck: Normal range of motion. Neck supple. No hepatojugular reflux and no JVD present. Carotid bruit is not present. No tracheal deviation present. No thyromegaly present. Cardiovascular: Normal rate, regular rhythm, normal heart sounds and intact distal pulses. Exam reveals no gallop and no friction rub. No murmur heard. Pulmonary/Chest: Effort normal and breath sounds normal. No respiratory distress. No wheezes. No rales. No tenderness. Abdominal: Soft. Bowel sounds are normal. No distension and no mass. No tenderness. No rebound and no guarding. Musculoskeletal: Normal range of motion. No edema and no tenderness. Lymphadenopathy:   No cervical adenopathy. Neurological: Alert and oriented to person, place, and time. Skin: Skin is warm and dry. No rash noted. Not diaphoretic. No erythema. Psychiatric: Normal mood and affect. Behavior is normal.     Normal echo 7/10/19. See accompanying documentation for full consult. ASSESSMENT AND PLAN:  1. Chest pain:     EKG okay. CE negative. CTA no PE. Stress in am as she has already eaten. 2. PSVT: Continue BB as tolerated. 3. HTN: Observe. 4. Lipids    5. Lupus    6. Asthma    7. GERD    Arline Lea D.O.   Cardiologist  Cardiology, [id number] [facility]

## 2021-01-31 NOTE — PROGRESS NOTES
Patient refused to take Xanax because 1mg too high of a dose and requesting something besides Tylenol for pain. Orders entered. Patient states allergic to ibuprofen but can take aspirin for pain. Notified would leave note for physicians in morning.

## 2021-01-31 NOTE — PROGRESS NOTES
1/30/21 2130 Patient c/o left upper back pain. Tylenol given. Patient refused famotidine d/t side effects and metoprolol d/t bp 117/58 and NSR.     1/30/21 2322 Patient complaining of new symptoms. Patient feels \"queasy\" in right upper chest area by shoulder with a \"knot\" in her left upper back. New orders entered. 1/31/21 0012 Patient refused to take Xanax because 1mg too high of a dose and requesting something besides Tylenol for pain. Orders entered.       Patient states allergic to ibuprofen but can take aspirin for pain. Notified would leave note for physicians in morning.    1/31/21 0224 When patient sleeping HR drops to 47-53.

## 2021-01-31 NOTE — H&P
Roz Clark History and Physical      CHIEF COMPLAINT:  Chest pain    History Obtained From:  Patient    HISTORY OF PRESENT ILLNESS:    The patient is a 52 y.o. female with significant past medical history of hypertension, hyperlipidemia, lupus, asthma, tachycardia who presented to the emergency department with complaint of chest pain yesterday. She states the chest pain has been intermittent since Wednesday with no known palliative or provoking factor. Chest pain is intermittent, burning, radiating to her left scapula. she denies shortness of breath, blurred vision, double vision, syncope, urinary or other GI symptoms. Patient states she has a history of GERD and has been thinking it was back, treating with Pepto-Bismol without relief. Patient has also been treating herself with nitroglycerin, anxiolytic, aspirin without relief. Patient states she last took aspirin last night which was 81 mg. Patient also endorses a multitude of other symptoms including diarrhea yesterday, headache, rash, diffuse abdominal pain. Patient states that she was seen by a neighbor who is a nurse who recommended she go to the emergency department for evaluation. Since being here yesterday, she continues to feel pain in her left trapezius and upper posterior left thorax that is worse with movement. She experiences a burning in her anterior chest bilaterally and by sternum and epigastric area. She has had a rash on her anterior chest with papules. She feels heartburn and feels anxious and feels some worsening of shortness of breath. She feels she may have costochondritis also. Past Medical History:     has a past medical history of ARDS (adult respiratory distress syndrome) ([facility] [address] Asthma, Colitis, Depression, Essential tremor, Gallstones, Head trauma, Hyperlipidemia, Hypertension, Lupus (systemic lupus erythematosus) ([address] and Tachycardia.   Past Surgical History:     has a past surgical history that includes Cholecystectomy, laparoscopic (03/2018) and Retinal detachment surgery. Medications Prior to Admission:    Medications Prior to Admission: metoprolol tartrate (LOPRESSOR) 25 MG tablet, Take 12.5 mg by mouth 2 times daily  guaiFENesin (MUCINEX) 600 MG extended release tablet, Take 1,200 mg by mouth as needed for Congestion  Cholecalciferol (VITAMIN D3) 5000 units TABS, Take by mouth daily  [DISCONTINUED] diltiazem (CARDIZEM) 30 MG tablet, Take 0.5 tablets by mouth 3 times daily Hold if HR less than 55  DEEP SEA NASAL SPRAY 0.65 % nasal spray, 1 spray by Nasal route as needed  [DISCONTINUED] sucralfate (CARAFATE) 1 GM tablet, Take 1 tablet by mouth 4 times daily (Patient taking differently: Take 1 g by mouth as needed )  Multiple Vitamins-Minerals (MULTIVITAMIN ADULT PO), Take by mouth daily  albuterol sulfate  (90 Base) MCG/ACT inhaler, Inhale 2 puffs into the lungs every 6 hours as needed for Wheezing  ammonium lactate (AMLACTIN) 12 % cream, Apply topically as needed for Dry Skin Apply topically as needed. ipratropium-albuterol (DUONEB) 0.5-2.5 (3) MG/3ML SOLN nebulizer solution, Inhale 1 vial into the lungs every 6 hours as needed for Shortness of Breath  cetirizine (ZYRTEC) 10 MG tablet, Take 1 tablet by mouth daily  Probiotic Product (PROBIOTIC-10) CAPS, Take 1 capsule by mouth daily   aspirin 81 MG tablet, Take 81 mg by mouth daily  ALPRAZolam (XANAX) 1 MG tablet, Take 0.5 mg by mouth 3 times daily as needed for Sleep or Anxiety.    diphenhydrAMINE (BENADRYL ALLERGY) 25 MG capsule, Take 25 mg by mouth every 6 hours as needed for Itching  Fluticasone Furoate-Vilanterol (BREO ELLIPTA) 200-25 MCG/INH AEPB, Inhale 1 puff into the lungs daily  montelukast (SINGULAIR) 10 MG tablet, Take 10 mg by mouth daily     Allergies:  Cefdinir, Cephalosporins, Fish allergy, Fish-derived products, Nsaids, Ciprofloxacin, Codeine, Folic acid, Levaquin [levofloxacin in d5w], Methotrexate derivatives, Metronidazole, Other, Prednisone, Wellbutrin [bupropion], and Pce [erythromycin]    Social History:    reports that she quit smoking about 8 years ago. Her smoking use included cigarettes. She has never used smokeless tobacco. She reports that she does not drink alcohol or use drugs. Family History:       Problem Relation Age of Onset    High Blood Pressure Mother     Diabetes Mother     Stroke Mother     Diabetes Father     High Blood Pressure Father     High Blood Pressure Sister     Diabetes Sister     Celiac Disease Sister     High Blood Pressure Brother     Crohn's Disease Brother     Uterine Cancer Maternal Grandmother        REVIEW OF SYSTEMS:  CONSTITUTIONAL:  negative for  fevers and chills  RESPIRATORY:  positive for  cough with sputum  CARDIOVASCULAR:  positive for  History of tachycardia  GASTROINTESTINAL:  negative for nausea, vomiting and change in bowel habits  GENITOURINARY:  negative for frequency, dysuria and hematuria  MUSCULOSKELETAL:  Pain in left scapular and trapezius area worse with movement  NEUROLOGICAL:  negative for headaches, dysphagia, weakness and syncope    PHYSICAL EXAM:  Vitals:  BP (!) 99/56   Pulse 56   Temp 97.4 °F (36.3 °C) (Oral)   Resp 18   Ht 5' 2\" (1.575 m)   Wt 231 lb 12.8 oz (105.1 kg)   SpO2 99%   BMI 42.40 kg/m²   CONSTITUTIONAL:  awake, alert, cooperative, no apparent distress, and appears stated age  EYES:  Lids and lashes normal, pupils equal, round and reactive to light, extra ocular muscles intact, sclera clear, conjunctiva normal  ENT:  Normocephalic, without obvious abnormality, atraumatic, sinuses nontender on palpation, external ears without lesions, oral pharynx with moist mucus membranes, tonsils without erythema or exudates, gums normal and good dentition.   NECK:  Supple, symmetrical, trachea midline, no adenopathy, thyroid symmetric, not enlarged and no tenderness, skin normal  HEMATOLOGIC/LYMPHATICS:  no cervical lymphadenopathy  BACK:  Symmetric, no curvature, spinous processes are non-tender on palpation, paraspinous muscles are non-tender on palpation, no costal vertebral tenderness. There is tenderness of left trapezius muscle and left upper posterior thoracic paraspinal muscles. LUNGS:  No increased work of breathing, good air exchange, clear to auscultation bilaterally, no crackles or wheezing  CARDIOVASCULAR:  Normal apical impulse, regular rate and rhythm, normal S1 and S2, no S3 or S4, and no murmur noted  ABDOMEN:  Flat, soft, with mild epigastric tenderness, no HSM, no masses. CHEST/BREASTS:  Mild to moderate tenderness of left 2nd to 4th medial left ribs that does not reproduce all the types of pain she is feeling in her chest and back. GENITAL/URINARY:  deferred  MUSCULOSKELETAL:  There is no redness, warmth, or swelling of the joints. Full range of motion noted. Motor strength is 5 out of 5 all extremities bilaterally. Tone is normal.  NEUROLOGIC:  Awake, alert, oriented to name, place and time. Cranial nerves II-XII are grossly intact. Motor is 5 out of 5 bilaterally. Cerebellar finger to nose, heel to shin intact. Sensory is intact. Babinski down going, Romberg negative, and gait is normal.  SKIN:  Anterior chest with a few papules, no vesicles, no patches of erythema.      DATA:  EKG:  No ST-T changes  CBC with Differential:    Lab Results   Component Value Date    WBC 5.6 01/30/2021    RBC 4.96 01/30/2021    HGB 13.7 01/30/2021    HCT 42.0 01/30/2021     01/30/2021    MCV 84.7 01/30/2021    MCH 27.6 01/30/2021    MCHC 32.6 01/30/2021    RDW 14.4 01/30/2021    NRBC 0.0 08/19/2018    SEGSPCT 69 01/21/2013    LYMPHOPCT 21.8 01/30/2021    MONOPCT 8.1 01/30/2021    BASOPCT 0.4 01/30/2021    MONOSABS 0.45 01/30/2021    LYMPHSABS 1.22 01/30/2021    EOSABS 0.08 01/30/2021    BASOSABS 0.02 01/30/2021     CMP:    Lab Results   Component Value Date     01/30/2021    K 3.5 01/30/2021     01/30/2021    CO2 26 01/30/2021 BUN 12 01/30/2021    CREATININE 0.8 01/30/2021    GFRAA >60 01/30/2021    LABGLOM >60 01/30/2021    GLUCOSE 88 01/30/2021    GLUCOSE 93 04/13/2011    PROT 7.8 01/30/2021    LABALBU 4.2 01/30/2021    LABALBU 4.3 04/13/2011    CALCIUM 9.4 01/30/2021    BILITOT 0.4 01/30/2021    ALKPHOS 100 01/30/2021    AST 18 01/30/2021    ALT 18 01/30/2021     PT/INR:    Lab Results   Component Value Date    PROTIME 11.9 06/15/2020    INR 1.0 06/15/2020     Last 3 Troponin:    Lab Results   Component Value Date    TROPONINI <0.01 01/30/2021    TROPONINI <0.01 01/30/2021    TROPONINI <0.01 12/03/2020    CKTOTAL 37 05/05/2019    CKTOTAL 41 10/06/2018    CKTOTAL 23 09/01/2016    CKMB <1.0 10/06/2018    CKMB <1.0 08/16/2018    CKMB <1.0 08/16/2018     TSH:    Lab Results   Component Value Date    TSH 2.000 12/23/2019     Radiology Review:  CTA of chest with no pulmonary emboli    ASSESSMENT AND PLAN:    Patient Active Problem List    Diagnosis Date Noted    Chest pain 01/27/2018     Priority: High    Lupus ([facility] [address] 07/09/2019     Priority: Medium    Anxiety 01/27/2018     Priority: Medium    Moderate persistent asthma without complication      Priority: Medium    Essential hypertension 01/30/2021     Priority: Low    Hyperlipidemia 01/30/2021     Priority: Low    Depression 01/27/2018     Priority: Low        Left trapezius and thoracic muscle strains       Probable GERD       Irritant dermatitis of chest      Admit the patient. Consult with cardiologist to rule out MI or angina as cause of the chest pain. Chest pain seems multifactorial related to GERD, muscle strains, costochondritis, dermatitis, and anxiety.

## 2021-01-31 NOTE — PLAN OF CARE
Problem: Pain:  Goal: Pain level will decrease  Description: Pain level will decrease  1/31/2021 1112 by Dameon Iglesias RN  Outcome: Ongoing     Problem: Falls - Risk of:  Goal: Will remain free from falls  Description: Will remain free from falls  Outcome: Met This Shift

## 2021-01-31 NOTE — CONSULTS
Inpatient Cardiology Consultation      Reason for Consult: Chest pain    Consulting Physician: Dr. Darci Nobles    Requesting Physician: Dr. Waqar Avilez    Date of Consultation: 1/31/2021    HISTORY OF PRESENT ILLNESS:   Patient is a 82-year-old WF who is known to Dr. Arely Schwarz and Dr. Kelly Rhodes. She is being seen in consultation this hospital admission by Dr. Darci Nobles for evaluation and recommendations regarding chest discomfort. She has a known past medical history of morbid obesity, paroxysmal supraventricular tachycardia, frequent palpitations, questionable transient orthostatic hypotension, hypertension, hyperlipidemia, asthma, systemic lupus erythematosus, anxiety, depression and gastroesophageal reflux disease. Patient presented to [address] [place] [address] on January 30, 2021 due to complaints of chest discomfort. Patient states for the past several days, she has been noticing intermittent episodes of chest discomfort. The chest discomfort is located substernally/midsternally with occasional radiation into her left chest, left shoulder, and left arm. She describes the chest discomfort as an aching and burning sensation. She originally thought the discomfort was reflux, however does state this complaint is different from her typical reflux symptoms. She describes the shoulder discomfort as noticing this more so with certain arm movements. The chest discomfort occurs multiple times per day, and is intermittent. She states each episode can last anywhere from a few minutes to an hour in duration -- each episode varies. She states that sometimes the discomfort is severe, sometimes it is tolerable. She notes that the chest discomfort is worse with exertion and deep inspiration, as well as palpation of her chest.  She states it is not related to meals or cough. She was given nitroglycerin with mild improvement in her chest discomfort.   She notes of associated diaphoresis, nausea, vomiting, abdominal bloating, one episode of diarrhea this past Friday, and shortness of breath. She admits to noticing recently of dry cough and intermittent chills. She denies any subjective fever or known recent sick contacts. She states at baseline, she has frequent episodes of palpitations and dizziness. These episodes are not related to exertion, changes in position or certain head movements. She states her palpitations and dizziness episodes are at her typical baseline. She denies any complaints of lower extremity edema, paroxysmal nocturnal dyspnea, orthopnea, near-syncope or syncope. She currently denies any chest discomfort, but states she has some discomfort in her left shoulder. She states her mother and sister had heart disease, but cannot provide any specific details at this time. Denies any other pertinent cardiac family medical history. She is a former tobacco smoker, quit in 2012. States she smoked for approximately ten years, less than one pack/day. She notes of prior alcohol use, but denies former or current illicit drug use. Labs and diagnostic testing as noted below. Please note: past medical records were reviewed per electronic medical record (EMR) - see detailed reports under Past Medical/ Surgical History. PAST MEDICAL HISTORY:    1. Palpitations/PSVT. ? 30-day event recorder from 02/12-03/14/2018.  No atrial fibrillation, ventricular tachycardia or heart block.  Patient did have one episode of possible SVT at a rate of 92 at which time she complained of a skipped beat.  She did have multiple other symptoms of chest pain, palpitations, dyspnea, etc when she was in sinus rhythm with mild sinus tachycardia.  The monitor was essentially normal for a woman of her age. ? Initial evaluation by Dr. Clay Goltz 11/29/2018 for PSVT (9 beats) on a 30-day of on monitor. Otherwise event monitor showed sinus rhythm and sinus tachycardia. At that time patient was started on Cardizem  mg daily. ?  Evaluated by Dr. Nery Park (1/7/2019) most likely related to hormonal changes as well as her anxiety. She was recommended to avoid caffeine, EtOH, dehydration and over-the-counter decongestants. ? OP evaluation Dr. Bishop Loaiza: 1/29/2019 -Cardizem po stopped  ? Event monitor 3/6/2019 showing sinus tachycardia. She was started on Toprol-XL 25 mg daily. Complaining of side effects affecting her asthma so Toprol-XL was stopped she was tried on Cardizem 15 mg 3 times daily. ? OP evaluation Dr. Bishop Loaiza - continued palpitations,  patient was started on Toprol XL 25 mg daily. ? OP evaluation Genevieve Connell (APRN-CNP, EP): 8/21/2019 -Toprol-XL 25 mg daily stopped due to her asthma, Cardizem 15 mg 3 times daily was started. ? OP evaluation (APRN-CNP, EP) 22/2019: Cardizem increased to 50 mg TID. 2.  Near syncope/history suggestive of transient orthostatic hypotension  ? Hospital consultation 7/10/2019: Dr. Mireille Atkins -orthostatic BPs negative, ECG unremarkable. Noted to have an adequate oral intake on day of incident. No loss of consciousness. Recommended to quit hydration. Repeat TTE normal.   3.  Stress echocardiogram: [facility]:11/28/2018: Exercise stress echo was negative for ischemia at 10 3% of MPHR) 7.6 METS) the left ventricle is normal in size, left ventricular systolic function is normal.  EF equals 57 ±5%) 2D biplane) normal left ventricular diastolic function, the right ventricle is normal in size, right ventricular systolic function is normal with no significant valvular disease. 4. Exercise stress study non-nuclear: 10/29/2019: The patient exercised using a Jaron protocol, completing 6:01 minutes and reaching an estimated work load of 7.0 metabolic equivalents (METS). Resting HR was 77. Peak exercise heart rate was 149 ( 86% of maximum predicted heart rate for age). Baseline BP 110/70. Peak exercise BP 160/74.   Exercise EKG was negative.   5. TTE 7/10/2019 (Dr. Norm Robins): EF 65%, no wall motion abnormality, trace MR, normal right ventricular structure and function. 6.  Hypertension. 7.  Hyperlipidemia, per chart review. 9.  Asthma. 10.  Systemic lupus erythematosus. 11.  History of cholecystectomy. 12.  Anxiety/Depression. 13.  History of colitis. 14.  GERD.        Cardiac outpatient Event Monitors:      Outpatient Monitor 3/14/18:         MCOT 3/6/19:         PAST SURGICAL HISTORY:    Past Surgical History:   Procedure Laterality Date    CHOLECYSTECTOMY, LAPAROSCOPIC  03/2018    RETINAL DETACHMENT SURGERY      retina's reattached       HOME MEDICATIONS:  Prior to Admission medications    Medication Sig Start Date End Date Taking? Authorizing Provider   metoprolol tartrate (LOPRESSOR) 25 MG tablet Take 12.5 mg by mouth 2 times daily   Yes Historical Provider, MD   guaiFENesin (MUCINEX) 600 MG extended release tablet Take 1,200 mg by mouth as needed for Congestion    Historical Provider, MD   Cholecalciferol (VITAMIN D3) 5000 units TABS Take by mouth daily    Historical Provider, MD   DEEP SEA NASAL SPRAY 0.65 % nasal spray 1 spray by Nasal route as needed 4/29/19   Historical Provider, MD   Multiple Vitamins-Minerals (MULTIVITAMIN ADULT PO) Take by mouth daily    Historical Provider, MD   albuterol sulfate  (90 Base) MCG/ACT inhaler Inhale 2 puffs into the lungs every 6 hours as needed for Wheezing    Historical Provider, MD   ammonium lactate (AMLACTIN) 12 % cream Apply topically as needed for Dry Skin Apply topically as needed.     Historical Provider, MD   ipratropium-albuterol (DUONEB) 0.5-2.5 (3) MG/3ML SOLN nebulizer solution Inhale 1 vial into the lungs every 6 hours as needed for Shortness of Breath    Historical Provider, MD   cetirizine (ZYRTEC) 10 MG tablet Take 1 tablet by mouth daily 8/21/18   Tong Garcia DO   Probiotic Product (PROBIOTIC-10) CAPS Take 1 capsule by mouth daily     Historical Provider, MD   aspirin 81 MG tablet Take 81 mg by mouth daily    Historical Provider, MD   ALPRAZolam Jonny Chand) 1 MG tablet Take 0.5 mg by mouth 3 times daily as needed for Sleep or Anxiety.      Historical Provider, MD   diphenhydrAMINE (BENADRYL ALLERGY) 25 MG capsule Take 25 mg by mouth every 6 hours as needed for Itching    Historical Provider, MD   Fluticasone Furoate-Vilanterol (BREO ELLIPTA) 200-25 MCG/INH AEPB Inhale 1 puff into the lungs daily    Historical Provider, MD   montelukast (SINGULAIR) 10 MG tablet Take 10 mg by mouth daily     Historical Provider, MD       CURRENT MEDICATIONS:      Current Facility-Administered Medications:     ammonium lactate (LAC-HYDRIN) 12 % lotion, , Topical, BID PRN, Cathy Santana MD    aspirin chewable tablet 81 mg, 81 mg, Oral, Daily, Cathy Santana MD    cetirizine (ZYRTEC) tablet 10 mg, 10 mg, Oral, Daily, Cathy Santana MD    sodium chloride (OCEAN, BABY AYR) 0.65 % nasal spray 1 spray, 1 spray, Nasal, Q2H PRN, Cathy Santana MD    diphenhydrAMINE (BENADRYL) tablet 25 mg, 25 mg, Oral, Q6H PRN, Cathy Santana MD    ipratropium-albuterol (DUONEB) nebulizer solution 3 mL, 1 vial, Inhalation, Q6H PRN, Cathy Santana MD    montelukast (SINGULAIR) tablet 10 mg, 10 mg, Oral, Daily, Cathy Santana MD    sertraline (ZOLOFT) tablet 50 mg, 50 mg, Oral, Daily, Cathy Santana MD    sucralfate (CARAFATE) tablet 1 g, 1 g, Oral, 4x Daily, Cathy Santana MD, 1 g at 01/30/21 2140    sodium chloride flush 0.9 % injection 10 mL, 10 mL, Intravenous, 2 times per day, Cathy Santana MD, 10 mL at 01/30/21 2141    sodium chloride flush 0.9 % injection 10 mL, 10 mL, Intravenous, PRN, Cathy Santana MD    enoxaparin (LOVENOX) injection 40 mg, 40 mg, Subcutaneous, Daily, Cathy Santana MD, 40 mg at 01/30/21 2140    promethazine (PHENERGAN) tablet 12.5 mg, 12.5 mg, Oral, Q6H PRN **OR** ondansetron (ZOFRAN) injection 4 mg, 4 mg, Intravenous, Q6H PRN, Cathy Santana MD    polyethylene glycol [facility]) packet 17 g, 17 g, Oral, Daily PRN, Cathy Santana MD    acetaminophen (TYLENOL) tablet 650 mg, 650 mg, Oral, Q6H PRN, 650 mg at 01/30/21 2140 **OR** acetaminophen (TYLENOL) suppository 650 mg, 650 mg, Rectal, Q6H PRN, Deepak Chandler MD    famotidine (PEPCID) tablet 20 mg, 20 mg, Oral, BID, Deepak Cahndler MD    nitroGLYCERIN (NITROSTAT) SL tablet 0.4 mg, 0.4 mg, Sublingual, Q5 Min PRN, Deepak Chandler MD    Arformoterol Tartrate [facility]) nebulizer solution 15 mcg, 15 mcg, Nebulization, BID **AND** budesonide (PULMICORT) nebulizer suspension 500 mcg, 0.5 mg, Nebulization, BID, Deepak Chandler MD    guaiFENesin tablet 400 mg, 400 mg, Oral, 4x Daily PRN, Deepak Chandler MD    metoprolol tartrate (LOPRESSOR) tablet 12.5 mg, 12.5 mg, Oral, BID, Deepak Chandler MD    ALPRAZolam Tamiko Frater) tablet 0.5 mg, 0.5 mg, Oral, Q8H PRN, Deepak Chandler MD, 0.5 mg at 01/31/21 0012      ALLERGIES:  Cefdinir, Cephalosporins, Fish allergy, Fish-derived products, Nsaids, Ciprofloxacin, Codeine, Folic acid, Levaquin [levofloxacin in d5w], Methotrexate derivatives, Metronidazole, Other, Prednisone, Wellbutrin [bupropion], and Pce [erythromycin]    SOCIAL HISTORY:    She is a former tobacco smoker, quit in 2012. States she smoked for approximately ten years, less than one pack/day. She notes of prior alcohol use, but denies former or current illicit drug use. FAMILY HISTORY:   She states her mother and sister had heart disease, but cannot provide any specific details at this time. Denies any other pertinent cardiac family medical history. REVIEW OF SYSTEMS:     · Constitutional: +chills. Denies fevers, night sweats, and generalized fatigue. Denies significant weight loss or weight gain. · HEENT: Denies headaches, nose bleeds, rhinorrhea, sore throat. Denies blurred vision. Denies dysphagia, odynophagia. · Musculoskeletal: Denies falls, pain to BLE with ambulation. Denies muscle weakness. · Neurological: Denies numbness and tingling. Denies focal neurological deficits.   · Cardiovascular: +chest pain, +palpitations, +diaphoresis, +dizziness. Denies near syncope syncope. Denies PND, orthopnea, peripheral edema. · Respiratory: +shortness of breath, +dry cough. Denies hemoptysis. · Gastrointestinal: +diarrhea, +nausea, +vomiting. Denies constipation, black/bloody, and tarry stools. · Genitourinary: Denies dysuria and hematuria. · Hematologic: Denies excessive bruising or bleeding. · Endocrine: Denies excessive thirst. Denies intolerance to hot and cold. · Psychiatric: +anxiety and +depression. PHYSICAL EXAM:   BP (!) 99/56   Pulse 56   Temp 97.4 °F (36.3 °C) (Oral)   Resp 18   Ht 5' 2\" (1.575 m)   Wt 231 lb 12.8 oz (105.1 kg)   SpO2 99%   BMI 42.40 kg/m²   CONST:  Well developed, morbidly obese WF who appears stated age. Awake, alert, cooperative, no apparent distress. HEENT:   Head- Normocephalic, atraumatic. Eyes- Conjunctivae pink, anicteric. Neck-  No stridor, trachea midline, no apparent jugular venous distention. CHEST: Chest symmetrical. No accessory muscle use or intercostal retractions. RESPIRATORY: Lung sounds - clear throughout fields. No wheezing, rales or rhonchi. CARDIOVASCULAR:     Heart Inspection- shows no noted pulsations. Heart Palpation- no heaves or thrills. Heart Ausculation- Regular rate and rhythm (slow rate at times), no apparent murmur. PV: No lower extremity edema. Pedal pulses palpable, no clubbing or cyanosis. ABDOMEN: Soft, non-tender to light palpation. Bowel sounds present. MS: Good muscle strength and tone. No atrophy or abnormal movements. SKIN: Warm and dry. NEURO / PSYCH: Oriented to person, place and time. Speech clear and appropriate. Follows all commands. Pleasant affect. DATA:    Telemetry: SB with HR in the high 40s-50s, NSR with HR in the 60s    Diagnostic:  All diagnostic testing and lab work thus far this admission reviewed in detail.     CXR 1/30/2021:  FINDINGS:  The lungs are without acute focal process.  There is no effusion or  pneumothorax. The cardiomediastinal silhouette is without acute process. The  osseous structures are without acute process. Impression  No acute process. Chest CTA 1/30/2021:  Impression  1. There is no evidence of a pulmonary embolus  2. There are no findings of atypical or COVID pneumonia. Intake/Output Summary (Last 24 hours) at 1/31/2021 0753  Last data filed at 1/31/2021 0509  Gross per 24 hour   Intake 130 ml   Output --   Net 130 ml       Labs:   CBC:   Recent Labs     01/30/21  1140   WBC 5.6   HGB 13.7   HCT 42.0        BMP:   Recent Labs     01/30/21  1140      K 3.5   CO2 26   BUN 12   CREATININE 0.8   LABGLOM >60   CALCIUM 9.4     Mag:   Recent Labs     01/30/21  1140   MG 1.8     CARDIAC ENZYMES:  Recent Labs     01/30/21  1140 01/30/21  1935   TROPONINI <0.01 <0.01     LIVER PROFILE:  Recent Labs     01/30/21  1140   AST 18   ALT 18   LABALBU 4.2           Assessment and plan to follow as per Dr. Eleanor Glass. Prateek Royal, [address] [place] [address] Cardiology    Electronically signed by Bev Vargas PA-C on 1/31/2021 at 7:53 AM       I independently performed an evaluation on the patient. I have reviewed the above documentation completed by the advance practitioner. Please see my additional contributions to the HPI, physical exam, assessment and medical decision making.     Physical Exam   BP (!) 99/56   Pulse 56   Temp 97.4 °F (36.3 °C) (Oral)   Resp 18   Ht 5' 2\" (1.575 m)   Wt 231 lb 12.8 oz (105.1 kg)   SpO2 99%   BMI 42.40 kg/m²   Constitutional: Oriented to person, place, and time. Well-developed and well-nourished. No distress. Head: Normocephalic and atraumatic. Eyes: EOM are normal. Pupils are equal, round, and reactive to light. Neck: Normal range of motion. Neck supple. No hepatojugular reflux and no JVD present. Carotid bruit is not present. No tracheal deviation present. No thyromegaly present.    Cardiovascular: Normal rate, regular rhythm, normal

## 2021-02-01 ENCOUNTER — APPOINTMENT (OUTPATIENT)
Dept: NUCLEAR MEDICINE | Age: 51
DRG: 203 | End: 2021-02-01
Payer: COMMERCIAL

## 2021-02-01 ENCOUNTER — APPOINTMENT (OUTPATIENT)
Dept: NON INVASIVE DIAGNOSTICS | Age: 51
DRG: 203 | End: 2021-02-01
Payer: COMMERCIAL

## 2021-02-01 LAB
ANION GAP SERPL CALCULATED.3IONS-SCNC: 4 MMOL/L (ref 7–16)
BUN BLDV-MCNC: 13 MG/DL (ref 6–20)
CALCIUM SERPL-MCNC: 9.1 MG/DL (ref 8.6–10.2)
CHLORIDE BLD-SCNC: 107 MMOL/L (ref 98–107)
CO2: 29 MMOL/L (ref 22–29)
CREAT SERPL-MCNC: 0.8 MG/DL (ref 0.5–1)
EKG Q-T INTERVAL: 316 MS
EKG QRS DURATION: 84 MS
EKG QTC CALCULATION (BAZETT): 486 MS
EKG R AXIS: -49 DEGREES
EKG T AXIS: -63 DEGREES
EKG VENTRICULAR RATE: 142 BPM
GFR AFRICAN AMERICAN: >60
GFR NON-AFRICAN AMERICAN: >60 ML/MIN/1.73
GLUCOSE BLD-MCNC: 113 MG/DL (ref 74–99)
HCG QUALITATIVE: NEGATIVE
HCT VFR BLD CALC: 39.1 % (ref 34–48)
HEMOGLOBIN: 12.6 G/DL (ref 11.5–15.5)
LV EF: 68 %
LVEF MODALITY: NORMAL
MCH RBC QN AUTO: 27.9 PG (ref 26–35)
MCHC RBC AUTO-ENTMCNC: 32.2 % (ref 32–34.5)
MCV RBC AUTO: 86.5 FL (ref 80–99.9)
PDW BLD-RTO: 14.3 FL (ref 11.5–15)
PLATELET # BLD: 182 E9/L (ref 130–450)
PMV BLD AUTO: 8.8 FL (ref 7–12)
POTASSIUM SERPL-SCNC: 4.2 MMOL/L (ref 3.5–5)
RBC # BLD: 4.52 E12/L (ref 3.5–5.5)
SODIUM BLD-SCNC: 140 MMOL/L (ref 132–146)
WBC # BLD: 4.7 E9/L (ref 4.5–11.5)

## 2021-02-01 PROCEDURE — 84703 CHORIONIC GONADOTROPIN ASSAY: CPT

## 2021-02-01 PROCEDURE — 85027 COMPLETE CBC AUTOMATED: CPT

## 2021-02-01 PROCEDURE — A9500 TC99M SESTAMIBI: HCPCS | Performed by: RADIOLOGY

## 2021-02-01 PROCEDURE — 3430000000 HC RX DIAGNOSTIC RADIOPHARMACEUTICAL: Performed by: RADIOLOGY

## 2021-02-01 PROCEDURE — G0378 HOSPITAL OBSERVATION PER HR: HCPCS

## 2021-02-01 PROCEDURE — 93016 CV STRESS TEST SUPVJ ONLY: CPT | Performed by: INTERNAL MEDICINE

## 2021-02-01 PROCEDURE — 99233 SBSQ HOSP IP/OBS HIGH 50: CPT | Performed by: INTERNAL MEDICINE

## 2021-02-01 PROCEDURE — 6360000002 HC RX W HCPCS: Performed by: FAMILY MEDICINE

## 2021-02-01 PROCEDURE — 78452 HT MUSCLE IMAGE SPECT MULT: CPT | Performed by: INTERNAL MEDICINE

## 2021-02-01 PROCEDURE — 2580000003 HC RX 258: Performed by: FAMILY MEDICINE

## 2021-02-01 PROCEDURE — 80048 BASIC METABOLIC PNL TOTAL CA: CPT

## 2021-02-01 PROCEDURE — 78452 HT MUSCLE IMAGE SPECT MULT: CPT

## 2021-02-01 PROCEDURE — 2060000000 HC ICU INTERMEDIATE R&B

## 2021-02-01 PROCEDURE — 6360000002 HC RX W HCPCS: Performed by: PHYSICIAN ASSISTANT

## 2021-02-01 PROCEDURE — 6370000000 HC RX 637 (ALT 250 FOR IP): Performed by: FAMILY MEDICINE

## 2021-02-01 PROCEDURE — 93017 CV STRESS TEST TRACING ONLY: CPT

## 2021-02-01 PROCEDURE — 93018 CV STRESS TEST I&R ONLY: CPT | Performed by: INTERNAL MEDICINE

## 2021-02-01 PROCEDURE — 36415 COLL VENOUS BLD VENIPUNCTURE: CPT

## 2021-02-01 PROCEDURE — 94640 AIRWAY INHALATION TREATMENT: CPT

## 2021-02-01 RX ADMIN — SUCRALFATE 1 G: 1 TABLET ORAL at 17:00

## 2021-02-01 RX ADMIN — SUCRALFATE 1 G: 1 TABLET ORAL at 13:31

## 2021-02-01 RX ADMIN — CYCLOBENZAPRINE 10 MG: 10 TABLET, FILM COATED ORAL at 17:03

## 2021-02-01 RX ADMIN — ARFORMOTEROL TARTRATE 15 MCG: 15 SOLUTION RESPIRATORY (INHALATION) at 19:49

## 2021-02-01 RX ADMIN — ENOXAPARIN SODIUM 40 MG: 40 INJECTION SUBCUTANEOUS at 20:35

## 2021-02-01 RX ADMIN — ASPIRIN 81 MG: 81 TABLET, CHEWABLE ORAL at 07:58

## 2021-02-01 RX ADMIN — SUCRALFATE 1 G: 1 TABLET ORAL at 20:34

## 2021-02-01 RX ADMIN — METOPROLOL TARTRATE 12.5 MG: 25 TABLET, FILM COATED ORAL at 20:34

## 2021-02-01 RX ADMIN — SODIUM CHLORIDE, PRESERVATIVE FREE 10 ML: 5 INJECTION INTRAVENOUS at 20:35

## 2021-02-01 RX ADMIN — ALPRAZOLAM 0.5 MG: 0.25 TABLET ORAL at 13:30

## 2021-02-01 RX ADMIN — CETIRIZINE HYDROCHLORIDE 10 MG: 10 TABLET, FILM COATED ORAL at 13:31

## 2021-02-01 RX ADMIN — METOPROLOL TARTRATE 12.5 MG: 25 TABLET, FILM COATED ORAL at 07:58

## 2021-02-01 RX ADMIN — ACETAMINOPHEN 650 MG: 325 TABLET ORAL at 20:34

## 2021-02-01 RX ADMIN — Medication 30 MILLICURIE: at 12:04

## 2021-02-01 RX ADMIN — REGADENOSON 0.4 MG: 0.08 INJECTION, SOLUTION INTRAVENOUS at 11:11

## 2021-02-01 RX ADMIN — MONTELUKAST SODIUM 10 MG: 10 TABLET, FILM COATED ORAL at 13:30

## 2021-02-01 RX ADMIN — BUDESONIDE 500 MCG: 0.5 SUSPENSION RESPIRATORY (INHALATION) at 19:49

## 2021-02-01 RX ADMIN — SODIUM CHLORIDE, PRESERVATIVE FREE 10 ML: 5 INJECTION INTRAVENOUS at 07:58

## 2021-02-01 ASSESSMENT — PAIN DESCRIPTION - LOCATION: LOCATION: SHOULDER

## 2021-02-01 ASSESSMENT — PAIN DESCRIPTION - PAIN TYPE
TYPE: ACUTE PAIN
TYPE: ACUTE PAIN

## 2021-02-01 ASSESSMENT — PAIN DESCRIPTION - ORIENTATION: ORIENTATION: RIGHT

## 2021-02-01 ASSESSMENT — PAIN SCALES - GENERAL
PAINLEVEL_OUTOF10: 4
PAINLEVEL_OUTOF10: 4

## 2021-02-01 NOTE — PROGRESS NOTES
[facility] Cardiology Inpatient Progress Note    Patient is a 52 y.o. female of Fuentes Stewart DO seen in hospital follow up. Chief complaint: CP    HPI: No CP or SOB. Patient Active Problem List   Diagnosis    Moderate persistent asthma without complication    Anxiety    Depression    Chest pain    Lupus (HCC)    Essential hypertension    Hyperlipidemia       Allergies   Allergen Reactions    Cefdinir Rash     Rash, Blisters. Rash, Blisters.     Cephalosporins Rash     hallucinations    Fish Allergy Anaphylaxis    Fish-Derived Products Anaphylaxis     anaphylaxis    Nsaids      Vomiting    Ciprofloxacin     Codeine Nausea Only    Folic Acid Hives    Levaquin [Levofloxacin In D5w] Hives    Methotrexate Derivatives     Metronidazole Hives     throat closing, hives  throat closing, hives    Other      Allergic to Midrin    Prednisone Hives    Wellbutrin [Bupropion]     Pce [Erythromycin] Rash       Current Facility-Administered Medications   Medication Dose Route Frequency Provider Last Rate Last Admin    regadenoson (LEXISCAN) injection 0.4 mg  0.4 mg Intravenous ONCE PRN [facility], PA-C        technetium sestamibi (CARDIOLITE) injection 30 millicurie  30 millicurie Intravenous ONCE PRN Ned Rea MD        Hydrocerin cream CREA   Topical BID Sim Hatchet, MD   Given at 01/31/21 1312    pantoprazole (PROTONIX) tablet 40 mg  40 mg Oral QAM AC Sim Hatchet, MD        cyclobenzaprine (FLEXERIL) tablet 10 mg  10 mg Oral TID PRN Sim Hatchet, MD   10 mg at 01/31/21 1115    ammonium lactate (LAC-HYDRIN) 12 % lotion   Topical BID PRN Sim Hatchet, MD        aspirin chewable tablet 81 mg  81 mg Oral Daily Sim Hatchet, MD   81 mg at 02/01/21 0758    cetirizine (ZYRTEC) tablet 10 mg  10 mg Oral Daily Sim Hatchet, MD   10 mg at 01/31/21 0840    sodium chloride (OCEAN, BABY AYR) 0.65 % nasal spray 1 spray  1 spray Nasal Q2H PRN Sim Hatchet, MD        diphenhydrAMINE (BENADRYL) tablet 25 mg  25 mg Oral Q6H PRN Cristina Grace MD        ipratropium-albuterol (DUONEB) nebulizer solution 3 mL  1 vial Inhalation Q6H PRN Cristina Grace MD        montelukast (SINGULAIR) tablet 10 mg  10 mg Oral Daily Cristina Grace MD   10 mg at 01/31/21 0841    sertraline (ZOLOFT) tablet 50 mg  50 mg Oral Daily Cristina Grace MD   50 mg at 01/31/21 0841    sucralfate (CARAFATE) tablet 1 g  1 g Oral 4x Daily Cristina Grace MD   1 g at 01/31/21 2004    sodium chloride flush 0.9 % injection 10 mL  10 mL Intravenous 2 times per day Cristina Grace MD   10 mL at 02/01/21 0758    sodium chloride flush 0.9 % injection 10 mL  10 mL Intravenous PRN Cristina Grace MD        enoxaparin (LOVENOX) injection 40 mg  40 mg Subcutaneous Daily Cristina Grace MD   40 mg at 01/31/21 2004    promethazine (PHENERGAN) tablet 12.5 mg  12.5 mg Oral Q6H PRN Cristina Grace MD        Or    ondansetron [facility]) injection 4 mg  4 mg Intravenous Q6H PRN Cristina Grace MD        polyethylene glycol [facility]) packet 17 g  17 g Oral Daily PRN Cristina Grace MD        acetaminophen (TYLENOL) tablet 650 mg  650 mg Oral Q6H PRN Cristina Grace MD   650 mg at 01/31/21 2009    Or    acetaminophen (TYLENOL) suppository 650 mg  650 mg Rectal Q6H PRN Cristina Grace MD        nitroGLYCERIN (NITROSTAT) SL tablet 0.4 mg  0.4 mg Sublingual Q5 Min PRN Cristina Grace MD        Arformoterol Tartrate [facility]) nebulizer solution 15 mcg  15 mcg Nebulization BID Cristina rGace MD   15 mcg at 01/31/21 2055    And    budesonide (PULMICORT) nebulizer suspension 500 mcg  0.5 mg Nebulization BID Cristina Grace MD   500 mcg at 01/31/21 2055    guaiFENesin tablet 400 mg  400 mg Oral 4x Daily PRN Cristina Grace MD        metoprolol tartrate (LOPRESSOR) tablet 12.5 mg  12.5 mg Oral BID Cristina Grace MD   12.5 mg at 02/01/21 0758    ALPRAZolam Faviola Alar) tablet 0.5 mg  0.5 mg Oral Q8H PRN Cristina Grace MD   0.5 mg at 01/31/21 0012       Review of systems:   Heart: as above   Lungs: as above   Eyes: denies changes in vision or discharge. Ears: denies changes in hearing or pain. Nose: denies epistaxis or masses   Throat: denies sore throat or trouble swallowing. Neuro: denies numbness, tingling, tremors. Skin: denies rashes or itching. : denies hematuria, dysuria   GI: denies vomiting, diarrhea   Psych: denies mood changed, anxiety, depression. Physical Exam   /63   Pulse 62   Temp 97.1 °F (36.2 °C) (Temporal)   Resp 16   Ht 5' 2\" (1.575 m)   Wt 231 lb 4.8 oz (104.9 kg)   SpO2 98%   BMI 42.31 kg/m²   Constitutional: Oriented to person, place, and time. No distress. Well developed. Head: Normocephalic and atraumatic. Neck: Neck supple. No hepatojugular reflux. No JVD present. Carotid bruit is not present. No tracheal deviation present. No thyromegaly present. Cardiovascular: Normal rate, regular rhythm, normal heart sounds. intact distal pulses. No gallop and no friction rub. No murmur heard. Pulmonary: Breath sounds normal. No respiratory distress. No wheezes. No rales. Chest: Effort normal. No tenderness. Abdominal: Soft. Bowel sounds are normal. No distension or mass. No tenderness, rebound or guarding. Musculoskeletal: . No tenderness. No clubbing or cyanosis. Extremitites: Intact distal pulses. No edema  Neurological: Alert and oriented to person, place, and time. Skin: Skin is warm and dry. No rash noted. Not diaphoretic. No erythema. Psychiatric: Normal mood and affect. Behavior is normal.   Lymphadenopathy: No cervical adenopathy. No groin adenopathy.       CBC:   Lab Results   Component Value Date    WBC 4.7 02/01/2021    RBC 4.52 02/01/2021    HGB 12.6 02/01/2021    HCT 39.1 02/01/2021    MCV 86.5 02/01/2021    MCH 27.9 02/01/2021    MCHC 32.2 02/01/2021    RDW 14.3 02/01/2021     02/01/2021    MPV 8.8 02/01/2021     BMP:   Lab Results   Component Value Date     02/01/2021    K 4.2 02/01/2021    K 3.5 01/30/2021     02/01/2021    CO2 29 02/01/2021    BUN 13 02/01/2021    LABALBU 4.2 01/30/2021    LABALBU 4.3 04/13/2011    CREATININE 0.8 02/01/2021    CALCIUM 9.1 02/01/2021    GFRAA >60 02/01/2021    LABGLOM >60 02/01/2021     Magnesium:    Lab Results   Component Value Date    MG 1.8 01/30/2021     Cardiac Enzymes:   Lab Results   Component Value Date    CKTOTAL 37 05/05/2019    CKTOTAL 41 10/06/2018    CKTOTAL 23 09/01/2016    CKMB <1.0 10/06/2018    CKMB <1.0 08/16/2018    CKMB <1.0 08/16/2018    TROPONINI <0.01 01/31/2021    TROPONINI <0.01 01/30/2021    TROPONINI <0.01 01/30/2021      PT/INR:    Lab Results   Component Value Date    PROTIME 11.9 06/15/2020    INR 1.0 06/15/2020     TSH:    Lab Results   Component Value Date    TSH 2.000 12/23/2019     Rhythm Strip: normal sinus rhythm. ASSESSMENT & PLAN:    Patient Active Problem List   Diagnosis    Moderate persistent asthma without complication    Anxiety    Depression    Chest pain    Lupus ([address]    Essential hypertension    Hyperlipidemia     1. Chest pain:     EKG okay. CE negative. CTA no PE. Stress today. 2. PSVT: Continue BB as tolerated. 3. HTN: Observe. 4. Lipids    5. Lupus    6. Asthma    7. GERD    Dania Bates D.O.   Cardiologist  Cardiology, [address] [facility]

## 2021-02-01 NOTE — CARE COORDINATION
2/1/2021  Social Work Discharge Planning:Pt is currently out for a stress test. Will see Pt later.  Electronically signed by TROY Clinton on 2/1/2021 at 12:00 PM

## 2021-02-01 NOTE — PROGRESS NOTES
Plan  Acute chest pain, r/o cardiac, positive gerd and lupus  Anxiety- depression  htn stable  Pulse stable  For stress  Electronically signed by Baldemar Liu DO on 2/1/21 at 1:21 PM EST

## 2021-02-02 ENCOUNTER — HOSPITAL ENCOUNTER (OUTPATIENT)
Age: 51
Discharge: HOME | End: 2021-02-02
Payer: COMMERCIAL

## 2021-02-02 VITALS
BODY MASS INDEX: 42.69 KG/M2 | SYSTOLIC BLOOD PRESSURE: 120 MMHG | DIASTOLIC BLOOD PRESSURE: 84 MMHG | TEMPERATURE: 97.4 F | HEIGHT: 62 IN | RESPIRATION RATE: 18 BRPM | HEART RATE: 72 BPM | WEIGHT: 232 LBS

## 2021-02-02 VITALS
OXYGEN SATURATION: 96 % | DIASTOLIC BLOOD PRESSURE: 88 MMHG | SYSTOLIC BLOOD PRESSURE: 134 MMHG | BODY MASS INDEX: 42.69 KG/M2 | TEMPERATURE: 98.1 F | RESPIRATION RATE: 16 BRPM | HEIGHT: 62 IN | HEART RATE: 84 BPM | WEIGHT: 232 LBS

## 2021-02-02 LAB
ABO/RH: NORMAL
ANTIBODY SCREEN: NORMAL
LV EF: 65 %
LVEF MODALITY: NORMAL

## 2021-02-02 PROCEDURE — 36415 COLL VENOUS BLD VENIPUNCTURE: CPT

## 2021-02-02 PROCEDURE — C1887 CATHETER, GUIDING: HCPCS

## 2021-02-02 PROCEDURE — 86900 BLOOD TYPING SEROLOGIC ABO: CPT

## 2021-02-02 PROCEDURE — 93458 L HRT ARTERY/VENTRICLE ANGIO: CPT | Performed by: INTERNAL MEDICINE

## 2021-02-02 PROCEDURE — 99233 SBSQ HOSP IP/OBS HIGH 50: CPT | Performed by: INTERNAL MEDICINE

## 2021-02-02 PROCEDURE — 94640 AIRWAY INHALATION TREATMENT: CPT

## 2021-02-02 PROCEDURE — C1769 GUIDE WIRE: HCPCS

## 2021-02-02 PROCEDURE — 6360000002 HC RX W HCPCS

## 2021-02-02 PROCEDURE — 6360000002 HC RX W HCPCS: Performed by: FAMILY MEDICINE

## 2021-02-02 PROCEDURE — 2709999900 HC NON-CHARGEABLE SUPPLY

## 2021-02-02 PROCEDURE — 6370000000 HC RX 637 (ALT 250 FOR IP): Performed by: FAMILY MEDICINE

## 2021-02-02 PROCEDURE — 86850 RBC ANTIBODY SCREEN: CPT

## 2021-02-02 PROCEDURE — 86901 BLOOD TYPING SEROLOGIC RH(D): CPT

## 2021-02-02 PROCEDURE — 2580000003 HC RX 258: Performed by: FAMILY MEDICINE

## 2021-02-02 PROCEDURE — 2500000003 HC RX 250 WO HCPCS

## 2021-02-02 PROCEDURE — 2580000003 HC RX 258: Performed by: INTERNAL MEDICINE

## 2021-02-02 PROCEDURE — C1894 INTRO/SHEATH, NON-LASER: HCPCS

## 2021-02-02 RX ORDER — SODIUM CHLORIDE 9 MG/ML
INJECTION, SOLUTION INTRAVENOUS ONCE
Status: COMPLETED | OUTPATIENT
Start: 2021-02-02 | End: 2021-02-02

## 2021-02-02 RX ORDER — ACETAMINOPHEN 325 MG/1
650 TABLET ORAL EVERY 4 HOURS PRN
Status: DISCONTINUED | OUTPATIENT
Start: 2021-02-02 | End: 2021-02-02 | Stop reason: SDUPTHER

## 2021-02-02 RX ADMIN — ASPIRIN 81 MG: 81 TABLET, CHEWABLE ORAL at 08:33

## 2021-02-02 RX ADMIN — SUCRALFATE 1 G: 1 TABLET ORAL at 12:02

## 2021-02-02 RX ADMIN — ALPRAZOLAM 0.5 MG: 0.25 TABLET ORAL at 08:33

## 2021-02-02 RX ADMIN — SODIUM CHLORIDE: 9 INJECTION, SOLUTION INTRAVENOUS at 17:32

## 2021-02-02 RX ADMIN — CETIRIZINE HYDROCHLORIDE 10 MG: 10 TABLET, FILM COATED ORAL at 08:33

## 2021-02-02 RX ADMIN — MONTELUKAST SODIUM 10 MG: 10 TABLET, FILM COATED ORAL at 08:34

## 2021-02-02 RX ADMIN — SUCRALFATE 1 G: 1 TABLET ORAL at 08:33

## 2021-02-02 RX ADMIN — BUDESONIDE 500 MCG: 0.5 SUSPENSION RESPIRATORY (INHALATION) at 08:09

## 2021-02-02 RX ADMIN — SODIUM CHLORIDE: 9 INJECTION, SOLUTION INTRAVENOUS at 17:31

## 2021-02-02 RX ADMIN — METOPROLOL TARTRATE 12.5 MG: 25 TABLET, FILM COATED ORAL at 08:33

## 2021-02-02 RX ADMIN — SODIUM CHLORIDE, PRESERVATIVE FREE 10 ML: 5 INJECTION INTRAVENOUS at 08:34

## 2021-02-02 RX ADMIN — ARFORMOTEROL TARTRATE 15 MCG: 15 SOLUTION RESPIRATORY (INHALATION) at 08:09

## 2021-02-02 ASSESSMENT — PAIN DESCRIPTION - LOCATION: LOCATION: ARM

## 2021-02-02 ASSESSMENT — PAIN DESCRIPTION - DESCRIPTORS: DESCRIPTORS: ACHING

## 2021-02-02 ASSESSMENT — PAIN DESCRIPTION - ORIENTATION: ORIENTATION: RIGHT

## 2021-02-02 NOTE — CARE COORDINATION
Pt noted to have  + stress test; cardiology to arrange for cardiac cath. has home 02 at [employer] Will follow. Lynn Butler.

## 2021-02-02 NOTE — ADT AUTH CERT
Utilization Reviews       Chest Pain - Care Day 4 (2/2/2021) by Keesha Becerra RN       Review Status Review Entered   Completed 2/2/2021 11:18      Criteria Review      Care Day: 4 Care Date: 2/2/2021 Level of Care: Intermediate Care    Guideline Day 2    Level Of Care    (X) ICU, intermediate care, or telemetry to discharge    2/2/2021 11:18 AM EST by Faby Monique      intermediate    Clinical Status    (X) * Hemodynamic stability    2/2/2021 11:18 AM EST by Faby Monique      hr 65-71  bp: 93/53    ( ) * Acute coronary syndrome ruled out    (X) * Pain absent or managed    (X) * Dangerous arrhythmia absent    ( ) * No identification of etiology of pain requiring inpatient care    ( ) * Discharge plans and education understood    Activity    (X) * Ambulatory or acceptable for next level of care    Routes    (X) * Oral hydration, medications, and diet    Interventions    (X) Possible stress test or cardiac catheterization    Medications    (X) Possible aspirin or cardiac medications    * Milestone   Additional Notes   2/2  Day 2      Pt remains on intermediate care unit      Vitals: t: 36.4 p: 65 rr: 16 bp: 93/53 spo2: 98% ra      Stress test 2/1:   Impression:        1.  Moderate-sized reversible inferior and inferolateral wall    myocardial perfusion defects. 2.  Gated SPECT left ventricular ejection fraction was calculated to    be 68% with normal myocardial wall motion. Orders:   Pulmicort nebs bid   Aspirin 81mg po daily      Per Cardiology PN:  ASSESSMENT & PLAN:       Patient Active Problem List   Diagnosis   · Moderate persistent asthma without complication   · Anxiety   · Depression   · Chest pain   · Lupus (HCC)   · Essential hypertension   · Hyperlipidemia       1. Chest pain:        EKG okay. CE negative. CTA no PE.       Stress abnormal. Arrange cath.        AUC 8/4.         2. PSVT: Continue BB as tolerated.        3. HTN: Observe.        4. Lipids       5. Lupus       6.  Asthma     7. GERD         Chest Pain - Care Day 3 (2/1/2021) by Gibson Vázquez RN       Review Status Review Entered   Completed 2/2/2021 11:07      Criteria Review      Care Day: 3 Care Date: 2/1/2021 Level of Care: Intermediate Care    Guideline Day 2    Clinical Status    (X) * Hemodynamic stability    2/2/2021 11:07 AM EST by Ajay Lopes      97.1 16 62 117/63    ( ) * Acute coronary syndrome ruled out    (X) * Pain absent or managed    (X) * Dangerous arrhythmia absent    2/2/2021 11:07 AM EST by Ajay Lopes      nsr    ( ) * No identification of etiology of pain requiring inpatient care    2/2/2021 11:07 AM EST by Ajay Lopes      + stress test    ( ) * Discharge plans and education understood    2/2/2021 11:07 AM EST by Stephanie Duran pending progress and tx    Activity    (X) * Ambulatory or acceptable for next level of care    2/2/2021 11:07 AM EST by Ajay Lopes      cont same    Routes    (X) * Oral hydration, medications, and diet    2/2/2021 11:07 AM EST by Ajay Lopes      cont current meds plus  tylenol 650 mg q6 prn x1 po  xanax 0.5 mg q8 prn x1 po  flexeril 10 mg tid prn x1 po    Interventions    (X) Possible stress test or cardiac catheterization    Medications    (X) Possible aspirin or cardiac medications    2/2/2021 11:07 AM EST by Ajay Lopes      cont asa and lopressor    * Milestone   Additional Notes   2/1/2021      OBSERVATION STATUS FAILED, INPT ORDER ON CHART      NM cardiac stress test nuclear imaging   1.  Moderate-sized reversible inferior and inferolateral wall    myocardial perfusion defects.     2.  Gated SPECT left ventricular ejection fraction was calculated to    be 68% with normal myocardial wall motion      Anion gap 4   =======================================================      Per cardio consult note      ASSESSMENT & PLAN:       Patient Active Problem List   Diagnosis   · Moderate persistent asthma without complication   · Anxiety   · Depression · Chest pain   · Lupus ([address]   · Essential hypertension   · Hyperlipidemia       1. Chest pain:        EKG okay. CE negative. CTA no PE.       Stress today.        2. PSVT: Continue BB as tolerated.        3. HTN: Observe.        4. Lipids       5. Lupus       6. Asthma       7.  GERD   ___________________________________________________________      Per IM   Assessment//Plan             Hospital Problems      Last Modified POA     * (Principal) Chest pain 1/30/2021 Yes     Anxiety (Chronic) 1/30/2021 Yes     Lupus ([address] (Chronic) 1/30/2021 Yes     Depression (Chronic) 1/30/2021 Yes     Essential hypertension (Chronic) 1/30/2021 Yes     Hyperlipidemia (Chronic) 1/30/2021 Yes     Moderate persistent asthma without complication (Chronic) 7/53/2385 Yes           Assessment & Plan   Acute chest pain, r/o cardiac, positive gerd and lupus   Anxiety- depression   htn stable   Pulse stable   For stress

## 2021-02-02 NOTE — PROGRESS NOTES
quality of the study was good. Left ventricular cavity size was noted to be normal. Rotational analog analysis demonstrated soft tissue attenuation. Moderate/severe defects were present in the inferior and inferolateral walls that were moderate-sized by quantification. The resting images demonstrates reversibility. Gated SPECT left ventricular ejection fraction was calculated to be 68% with normal myocardial wall motion. 1.  Moderate-sized reversible inferior and inferolateral wall myocardial perfusion defects. 2.  Gated SPECT left ventricular ejection fraction was calculated to be 68% with normal myocardial wall motion.  Geovani Vogt MD    Cardio pulmonary syable  No bradycardia, tachycardia  No edema  anxious  Assessment//Plan           Hospital Problems           Last Modified POA    * (Principal) Chest pain 1/30/2021 Yes    Anxiety (Chronic) 1/30/2021 Yes    Lupus ([address] (Chronic) 1/30/2021 Yes    Depression (Chronic) 1/30/2021 Yes    Essential hypertension (Chronic) 1/30/2021 Yes    Hyperlipidemia (Chronic) 1/30/2021 Yes    Moderate persistent asthma without complication (Chronic) 4/36/4792 Yes        Assessment & Plan  Chest pain, positive stress cardio for heart cath  Lupus  gerd  Anxiety  Depression  htn  Hl  Await results  Electronically signed by Dax Partida DO on 2/2/21 at 3:34 PM EST

## 2021-02-02 NOTE — CARE COORDINATION
2/2/2021 Social Work Discharge Planning: This worker met with Pt to discuss  role and transition of care/discharge planning. Pt resides with her parents and sister. Pt is independent with no DME except a nebulizer and home o2 -2l HS via [employer]. Pt is NPO-had a neg. stress test. Currently on room air. Will be going to [facility] for cath. Pharmacy is [facility] on  and PCP is Dr. Nighat Hammond.  Electronically signed by TROY Hernandez on 2/2/2021 at 11:07 AM

## 2021-02-02 NOTE — PROGRESS NOTES
[facility] Cardiology Inpatient Progress Note    Patient is a 52 y.o. female of [place]  seen in hospital follow up. Chief complaint: CP    HPI: No CP or SOB. Patient Active Problem List   Diagnosis    Moderate persistent asthma without complication    Anxiety    Depression    Chest pain    Lupus (HCC)    Essential hypertension    Hyperlipidemia       Allergies   Allergen Reactions    Cefdinir Rash     Rash, Blisters. Rash, Blisters.     Cephalosporins Rash     hallucinations    Fish Allergy Anaphylaxis    Fish-Derived Products Anaphylaxis     anaphylaxis    Nsaids      Vomiting    Ciprofloxacin     Codeine Nausea Only    Folic Acid Hives    Levaquin [Levofloxacin In D5w] Hives    Methotrexate Derivatives     Metronidazole Hives     throat closing, hives  throat closing, hives    Other      Allergic to Midrin    Prednisone Hives    Wellbutrin [Bupropion]     Pce [Erythromycin] Rash       Current Facility-Administered Medications   Medication Dose Route Frequency Provider Last Rate Last Admin    Hydrocerin cream CREA   Topical BID Yoana Ruiz MD   Given at 01/31/21 1312    pantoprazole (PROTONIX) tablet 40 mg  40 mg Oral QAM AC Yoana Ruiz MD        cyclobenzaprine (FLEXERIL) tablet 10 mg  10 mg Oral TID PRN Yoana Ruiz MD   10 mg at 02/01/21 1703    ammonium lactate (LAC-HYDRIN) 12 % lotion   Topical BID PRN Yoana Ruiz MD        aspirin chewable tablet 81 mg  81 mg Oral Daily Yoana Ruiz MD   81 mg at 02/01/21 0758    cetirizine (ZYRTEC) tablet 10 mg  10 mg Oral Daily Yoana Ruiz MD   10 mg at 02/01/21 1331    sodium chloride (OCEAN, BABY AYR) 0.65 % nasal spray 1 spray  1 spray Nasal Q2H PRN Yoana Ruiz MD        diphenhydrAMINE (BENADRYL) tablet 25 mg  25 mg Oral Q6H PRN Yoana Ruiz MD        ipratropium-albuterol (DUONEB) nebulizer solution 3 mL  1 vial Inhalation Q6H PRN Yoana Ruiz MD        montelukast (SINGULAIR) tablet 10 mg  10 mg Oral Daily Tyrel Ashby MD   10 mg at 02/01/21 1330    sucralfate (CARAFATE) tablet 1 g  1 g Oral 4x Daily Tyrel Ashby MD   1 g at 02/01/21 2034    sodium chloride flush 0.9 % injection 10 mL  10 mL Intravenous 2 times per day Tyrel Ashby MD   10 mL at 02/01/21 2035    sodium chloride flush 0.9 % injection 10 mL  10 mL Intravenous PRN Tyrel Ashby MD        enoxaparin (LOVENOX) injection 40 mg  40 mg Subcutaneous Daily Tyrel Ashby MD   40 mg at 02/01/21 2035    promethazine (PHENERGAN) tablet 12.5 mg  12.5 mg Oral Q6H PRN Tyrel Ashby MD        Or    ondansetron [facility]) injection 4 mg  4 mg Intravenous Q6H PRN Tyrel Ashby MD        polyethylene glycol [facility]) packet 17 g  17 g Oral Daily PRN Tyrel Ashby MD        acetaminophen (TYLENOL) tablet 650 mg  650 mg Oral Q6H PRN Tyrel Ashby MD   650 mg at 02/01/21 2034    Or    acetaminophen (TYLENOL) suppository 650 mg  650 mg Rectal Q6H PRN Tyrel Ashby MD        nitroGLYCERIN (NITROSTAT) SL tablet 0.4 mg  0.4 mg Sublingual Q5 Min PRN Tyrel Ashby MD        Arformoterol Tartrate [facility]) nebulizer solution 15 mcg  15 mcg Nebulization BID Tyrel Ashby MD   15 mcg at 02/01/21 1949    And    budesonide (PULMICORT) nebulizer suspension 500 mcg  0.5 mg Nebulization BID Tyrel Ashby MD   500 mcg at 02/01/21 1949    guaiFENesin tablet 400 mg  400 mg Oral 4x Daily PRN Tyrel Ashby MD        metoprolol tartrate (LOPRESSOR) tablet 12.5 mg  12.5 mg Oral BID Tyrel Ashby MD   12.5 mg at 02/01/21 2034    ALPRAZolam [facility] [place]) tablet 0.5 mg  0.5 mg Oral Q8H PRN Tyrel Ashby MD   0.5 mg at 02/01/21 1330       Review of systems:   Heart: as above   Lungs: as above   Eyes: denies changes in vision or discharge. Ears: denies changes in hearing or pain. Nose: denies epistaxis or masses   Throat: denies sore throat or trouble swallowing. Neuro: denies numbness, tingling, tremors. Skin: denies rashes or itching.    : denies hematuria, dysuria   GI: denies vomiting, diarrhea   Psych: denies mood changed, anxiety, depression. Physical Exam   BP (!) 93/53   Pulse 67   Temp 97.6 °F (36.4 °C) (Oral)   Resp 16   Ht 5' 2\" (1.575 m)   Wt 232 lb (105.2 kg)   SpO2 98%   BMI 42.43 kg/m²   Constitutional: Oriented to person, place, and time. No distress. Well developed. Head: Normocephalic and atraumatic. Neck: Neck supple. No hepatojugular reflux. No JVD present. Carotid bruit is not present. No tracheal deviation present. No thyromegaly present. Cardiovascular: Normal rate, regular rhythm, normal heart sounds. intact distal pulses. No gallop and no friction rub. No murmur heard. Pulmonary: Breath sounds normal. No respiratory distress. No wheezes. No rales. Chest: Effort normal. No tenderness. Abdominal: Soft. Bowel sounds are normal. No distension or mass. No tenderness, rebound or guarding. Musculoskeletal: . No tenderness. No clubbing or cyanosis. Extremitites: Intact distal pulses. No edema  Neurological: Alert and oriented to person, place, and time. Skin: Skin is warm and dry. No rash noted. Not diaphoretic. No erythema. Psychiatric: Normal mood and affect. Behavior is normal.   Lymphadenopathy: No cervical adenopathy. No groin adenopathy.       CBC:   Lab Results   Component Value Date    WBC 4.7 02/01/2021    RBC 4.52 02/01/2021    HGB 12.6 02/01/2021    HCT 39.1 02/01/2021    MCV 86.5 02/01/2021    MCH 27.9 02/01/2021    MCHC 32.2 02/01/2021    RDW 14.3 02/01/2021     02/01/2021    MPV 8.8 02/01/2021     BMP:   Lab Results   Component Value Date     02/01/2021    K 4.2 02/01/2021    K 3.5 01/30/2021     02/01/2021    CO2 29 02/01/2021    BUN 13 02/01/2021    LABALBU 4.2 01/30/2021    LABALBU 4.3 04/13/2011    CREATININE 0.8 02/01/2021    CALCIUM 9.1 02/01/2021    GFRAA >60 02/01/2021    LABGLOM >60 02/01/2021     Magnesium:    Lab Results   Component Value Date    MG 1.8 01/30/2021 Cardiac Enzymes:   Lab Results   Component Value Date    CKTOTAL 37 05/05/2019    CKTOTAL 41 10/06/2018    CKTOTAL 23 09/01/2016    CKMB <1.0 10/06/2018    CKMB <1.0 08/16/2018    CKMB <1.0 08/16/2018    TROPONINI <0.01 01/31/2021    TROPONINI <0.01 01/30/2021    TROPONINI <0.01 01/30/2021      PT/INR:    Lab Results   Component Value Date    PROTIME 11.9 06/15/2020    INR 1.0 06/15/2020     TSH:    Lab Results   Component Value Date    TSH 2.000 12/23/2019     Rhythm Strip: normal sinus rhythm. ASSESSMENT & PLAN:    Patient Active Problem List   Diagnosis    Moderate persistent asthma without complication    Anxiety    Depression    Chest pain    Lupus ([address]    Essential hypertension    Hyperlipidemia     1. Chest pain:     EKG okay. CE negative. CTA no PE. Stress abnormal. Arrange cath. AUC 8/4.      2. PSVT: Continue BB as tolerated. 3. HTN: Observe. 4. Lipids    5. Lupus    6. Asthma    7. GERD    Ciera Posada D.O.   Cardiologist  Cardiology, [facility]

## 2021-02-02 NOTE — PROCEDURES
Procedure:    1. Left heart cath    Physician: Sathish Nichols DO. Assistant: none    Indication: Chest pain, abnormal stress test.  AUC: 8  AUC indication: 4    Complication: None. Sedation: Intravenous Versed. Anesthesia: Xylocaine, fentanyl     Sedation time: I was present for sedation and ministration at 1816 and I ended sedation at 1832 for a total face-to-face sedation time of 16 minutes. Estimated blood loss: Minimal    Specimens: none    Contrast used: 72 cc    Hemodynamics:  Opening Aortic pressure: 763/93  LV systolic pressure: 771  LVEDP: 6  No significant gradient across the aortic valve    Angiographic Results/findings:  Left Main: Short. No angiographically significant stenosis  LAD: No angiographically significant stenosis  D1: No angiographically significant stenosis  Cx: No angiographically significant stenosis  OM1: Very small vessel. No angiographically significant stenosis  OM2: No angiographically significant stenosis  OM 3: Small vessel. No angiographically significant stenosis. RCA: Dominant. No angiographically significant stenosis  PDA: No angiographically significant stenosis  PLB: No angiographically significant stenosis  LV: Normal LV size and systolic function. No wall motion abnormalities. Ejection fraction 65%    Procedure:   After obtaining informed consent the patient was taken to the cardiac Cath Lab where the area over the right radial artery was prepped and draped in a sterile fashion. Using ultrasound guidance and a micropuncture technique a 6 [place]iqu[place] slender glide sheath was placed in the right radial artery. This was aspirated & flushed several times throughout the procedure. This was medicated with verapamil. No nitroglycerin was given. They were given heparin systemically. A 5 [place] TIG catheter was advanced over a wire to the root of the aorta. It was aspirated & flushed with saline. Pressures were obtained.   It was filled with contrast.  This was then manipulated into the left main coronary artery. 4 orthogonal views were obtained. A JR4 catheter was then manipulated into the right coronary artery and 3 orthogonal views were then obtained. A 5 [place] angled pigtail was then advanced & manipulated into the left ventricle. This was then aspirated & flushed with saline & pressures were obtained. An HALEY view was then obtained. The catheter was then aspirated & flushed with saline once again & pull back pressures were then obtained across the aortic vlave. The right radial artery sheath was removed and a vasc band was then placed with good patent hemostasis. They tolerated the procedure well with no complications. Note: This report was completed using computerized voice recognition software. Every effort has been made to ensure accuracy, however; and invert and computerized transcription errors may be present.

## 2021-02-03 ENCOUNTER — TELEPHONE (OUTPATIENT)
Dept: CARDIOLOGY CLINIC | Age: 51
End: 2021-02-03

## 2021-02-03 NOTE — TELEPHONE ENCOUNTER
Liliya Carlson, DO Paramj[facility] Hurst             Continue to follow with EP. Baldemar Pass follow with cardiology as needed.

## 2021-02-05 ENCOUNTER — HOSPITAL ENCOUNTER (EMERGENCY)
Age: 51
Discharge: HOME | End: 2021-02-05
Attending: EMERGENCY MEDICINE
Payer: COMMERCIAL

## 2021-02-05 ENCOUNTER — APPOINTMENT (OUTPATIENT)
Dept: GENERAL RADIOLOGY | Age: 51
End: 2021-02-05
Payer: COMMERCIAL

## 2021-02-05 ENCOUNTER — TELEPHONE (OUTPATIENT)
Dept: CARDIOLOGY CLINIC | Age: 51
End: 2021-02-05

## 2021-02-05 ENCOUNTER — APPOINTMENT (OUTPATIENT)
Dept: ULTRASOUND IMAGING | Age: 51
End: 2021-02-05
Payer: COMMERCIAL

## 2021-02-05 VITALS
TEMPERATURE: 97.5 F | DIASTOLIC BLOOD PRESSURE: 85 MMHG | OXYGEN SATURATION: 100 % | RESPIRATION RATE: 16 BRPM | WEIGHT: 230 LBS | HEIGHT: 62 IN | BODY MASS INDEX: 42.33 KG/M2 | HEART RATE: 87 BPM | SYSTOLIC BLOOD PRESSURE: 131 MMHG

## 2021-02-05 DIAGNOSIS — M79.601 PAIN OF RIGHT UPPER EXTREMITY: ICD-10-CM

## 2021-02-05 DIAGNOSIS — R20.2 PARESTHESIA: Primary | ICD-10-CM

## 2021-02-05 DIAGNOSIS — R07.9 CHEST PAIN, UNSPECIFIED TYPE: ICD-10-CM

## 2021-02-05 LAB
ALBUMIN SERPL-MCNC: 4.1 G/DL (ref 3.5–5.2)
ALP BLD-CCNC: 88 U/L (ref 35–104)
ALT SERPL-CCNC: 25 U/L (ref 0–32)
ANION GAP SERPL CALCULATED.3IONS-SCNC: 8 MMOL/L (ref 7–16)
AST SERPL-CCNC: 22 U/L (ref 0–31)
BASOPHILS ABSOLUTE: 0.03 E9/L (ref 0–0.2)
BASOPHILS RELATIVE PERCENT: 0.5 % (ref 0–2)
BILIRUB SERPL-MCNC: <0.2 MG/DL (ref 0–1.2)
BUN BLDV-MCNC: 12 MG/DL (ref 6–20)
CALCIUM SERPL-MCNC: 9.6 MG/DL (ref 8.6–10.2)
CHLORIDE BLD-SCNC: 102 MMOL/L (ref 98–107)
CO2: 26 MMOL/L (ref 22–29)
CREAT SERPL-MCNC: 0.7 MG/DL (ref 0.5–1)
EOSINOPHILS ABSOLUTE: 0.13 E9/L (ref 0.05–0.5)
EOSINOPHILS RELATIVE PERCENT: 2.1 % (ref 0–6)
GFR AFRICAN AMERICAN: >60
GFR NON-AFRICAN AMERICAN: >60 ML/MIN/1.73
GLUCOSE BLD-MCNC: 107 MG/DL (ref 74–99)
HCT VFR BLD CALC: 40.5 % (ref 34–48)
HEMOGLOBIN: 13.6 G/DL (ref 11.5–15.5)
IMMATURE GRANULOCYTES #: 0.02 E9/L
IMMATURE GRANULOCYTES %: 0.3 % (ref 0–5)
LYMPHOCYTES ABSOLUTE: 1.52 E9/L (ref 1.5–4)
LYMPHOCYTES RELATIVE PERCENT: 24.5 % (ref 20–42)
MCH RBC QN AUTO: 27.9 PG (ref 26–35)
MCHC RBC AUTO-ENTMCNC: 33.6 % (ref 32–34.5)
MCV RBC AUTO: 83 FL (ref 80–99.9)
MONOCYTES ABSOLUTE: 0.42 E9/L (ref 0.1–0.95)
MONOCYTES RELATIVE PERCENT: 6.8 % (ref 2–12)
NEUTROPHILS ABSOLUTE: 4.09 E9/L (ref 1.8–7.3)
NEUTROPHILS RELATIVE PERCENT: 65.8 % (ref 43–80)
PDW BLD-RTO: 14.3 FL (ref 11.5–15)
PLATELET # BLD: 191 E9/L (ref 130–450)
PMV BLD AUTO: 8.8 FL (ref 7–12)
POTASSIUM SERPL-SCNC: 3.9 MMOL/L (ref 3.5–5)
RBC # BLD: 4.88 E12/L (ref 3.5–5.5)
SODIUM BLD-SCNC: 136 MMOL/L (ref 132–146)
TOTAL PROTEIN: 7.4 G/DL (ref 6.4–8.3)
TROPONIN: <0.01 NG/ML (ref 0–0.03)
WBC # BLD: 6.2 E9/L (ref 4.5–11.5)

## 2021-02-05 PROCEDURE — 99284 EMERGENCY DEPT VISIT MOD MDM: CPT

## 2021-02-05 PROCEDURE — 84484 ASSAY OF TROPONIN QUANT: CPT

## 2021-02-05 PROCEDURE — 93931 UPPER EXTREMITY STUDY: CPT

## 2021-02-05 PROCEDURE — 93005 ELECTROCARDIOGRAM TRACING: CPT | Performed by: EMERGENCY MEDICINE

## 2021-02-05 PROCEDURE — 80053 COMPREHEN METABOLIC PANEL: CPT

## 2021-02-05 PROCEDURE — 85025 COMPLETE CBC W/AUTO DIFF WBC: CPT

## 2021-02-05 PROCEDURE — 71046 X-RAY EXAM CHEST 2 VIEWS: CPT

## 2021-02-05 ASSESSMENT — PAIN SCALES - GENERAL: PAINLEVEL_OUTOF10: 5

## 2021-02-05 ASSESSMENT — PAIN DESCRIPTION - LOCATION: LOCATION: ARM

## 2021-02-06 LAB
EKG ATRIAL RATE: 88 BPM
EKG P AXIS: 48 DEGREES
EKG P-R INTERVAL: 158 MS
EKG Q-T INTERVAL: 376 MS
EKG QRS DURATION: 78 MS
EKG QTC CALCULATION (BAZETT): 454 MS
EKG R AXIS: 10 DEGREES
EKG T AXIS: 22 DEGREES
EKG VENTRICULAR RATE: 88 BPM

## 2021-02-06 PROCEDURE — 93010 ELECTROCARDIOGRAM REPORT: CPT | Performed by: INTERNAL MEDICINE

## 2021-02-06 ASSESSMENT — ENCOUNTER SYMPTOMS
ABDOMINAL PAIN: 0
CHEST TIGHTNESS: 0
BACK PAIN: 0
VOMITING: 0
PHOTOPHOBIA: 0
SORE THROAT: 0
SHORTNESS OF BREATH: 0
NAUSEA: 0
RHINORRHEA: 0

## 2021-02-06 NOTE — ED PROVIDER NOTES
for adenopathy. All other systems reviewed and are negative. Physical Exam  Vitals signs and nursing note reviewed. Constitutional:       General: She is not in acute distress. Appearance: Normal appearance. She is not ill-appearing or diaphoretic. HENT:      Head: Normocephalic and atraumatic. Mouth/Throat:      Mouth: Mucous membranes are moist.   Eyes:      Extraocular Movements: Extraocular movements intact. Pupils: Pupils are equal, round, and reactive to light. Neck:      Musculoskeletal: No neck rigidity or muscular tenderness. Cardiovascular:      Rate and Rhythm: Normal rate and regular rhythm. Pulses: Normal pulses. Radial pulses are 2+ on the right side and 2+ on the left side. Dorsalis pedis pulses are 2+ on the right side and 2+ on the left side. Posterior tibial pulses are 2+ on the right side and 2+ on the left side. Pulmonary:      Effort: Pulmonary effort is normal. No respiratory distress. Breath sounds: Normal breath sounds. No wheezing or rhonchi. Abdominal:      Palpations: Abdomen is soft. Tenderness: There is no abdominal tenderness. There is no guarding or rebound. Musculoskeletal:      Right lower leg: No edema. Left lower leg: No edema. Lymphadenopathy:      Cervical: No cervical adenopathy. Skin:     General: Skin is warm and dry. Neurological:      Mental Status: She is alert and oriented to person, place, and time. Procedures     MDM  Number of Diagnoses or Management Options  Chest pain, unspecified type  Pain of right upper extremity  Paresthesia  Diagnosis management comments: Patient is a 71-year-old female that presents emergency room for evaluation of chest pain. Patient resting comfortably and in no obvious distress on exam.  She has some paresthesias of the right upper extremity however pulses are intact and patient's capillary refill is less than 3 seconds.   No evidence of swelling, redness to the right upper extremity. Blood work is unremarkable. Ultrasound of the right upper extremity was performed to evaluate for possible pseudoaneurysm however ultrasound was ultimately negative. As patient had heart catheterization 3 days ago patient discharged at this time with her negative troponin. Symptoms return to the emergency room were discussed with patient. She is to follow-up with her cardiologist within 1 week.              --------------------------------------------- PAST HISTORY ---------------------------------------------  Past Medical History:  has a past medical history of ARDS (adult respiratory distress syndrome) ([facility] Arthritis, Asthma, Colitis, Depression, Essential tremor, Gallstones, Head trauma, History of blood transfusion, Hx of blood clots, Hyperlipidemia, Hypertension, Lupus (systemic lupus erythematosus) ([facility] Prolonged emergence from general anesthesia, and Tachycardia. Past Surgical History:  has a past surgical history that includes Cholecystectomy, laparoscopic (03/2018) and Retinal detachment surgery. Social History:  reports that she quit smoking about 8 years ago. Her smoking use included cigarettes. She has never used smokeless tobacco. She reports that she does not drink alcohol or use drugs. Family History: family history includes Celiac Disease in her sister; Crohn's Disease in her brother; Diabetes in her father, mother, and sister; High Blood Pressure in her brother, father, mother, and sister; Stroke in her mother; Uterine Cancer in her maternal grandmother. The patients home medications have been reviewed.     Allergies: Cefdinir, Cephalosporins, Fish allergy, Fish-derived products, Nsaids, Ciprofloxacin, Codeine, Folic acid, Levaquin [levofloxacin in d5w], Methotrexate derivatives, Metronidazole, Other, Prednisone, Wellbutrin [bupropion], and Pce [erythromycin]    -------------------------------------------------- RESULTS -------------------------------------------------  Labs:  Results for orders placed or performed during the hospital encounter of 02/05/21   CBC auto differential   Result Value Ref Range    WBC 6.2 4.5 - 11.5 E9/L    RBC 4.88 3.50 - 5.50 E12/L    Hemoglobin 13.6 11.5 - 15.5 g/dL    Hematocrit 40.5 34.0 - 48.0 %    MCV 83.0 80.0 - 99.9 fL    MCH 27.9 26.0 - 35.0 pg    MCHC 33.6 32.0 - 34.5 %    RDW 14.3 11.5 - 15.0 fL    Platelets 445 929 - 764 E9/L    MPV 8.8 7.0 - 12.0 fL    Neutrophils % 65.8 43.0 - 80.0 %    Immature Granulocytes % 0.3 0.0 - 5.0 %    Lymphocytes % 24.5 20.0 - 42.0 %    Monocytes % 6.8 2.0 - 12.0 %    Eosinophils % 2.1 0.0 - 6.0 %    Basophils % 0.5 0.0 - 2.0 %    Neutrophils Absolute 4.09 1.80 - 7.30 E9/L    Immature Granulocytes # 0.02 E9/L    Lymphocytes Absolute 1.52 1.50 - 4.00 E9/L    Monocytes Absolute 0.42 0.10 - 0.95 E9/L    Eosinophils Absolute 0.13 0.05 - 0.50 E9/L    Basophils Absolute 0.03 0.00 - 0.20 E9/L   Comprehensive metabolic panel   Result Value Ref Range    Sodium 136 132 - 146 mmol/L    Potassium 3.9 3.5 - 5.0 mmol/L    Chloride 102 98 - 107 mmol/L    CO2 26 22 - 29 mmol/L    Anion Gap 8 7 - 16 mmol/L    Glucose 107 (H) 74 - 99 mg/dL    BUN 12 6 - 20 mg/dL    CREATININE 0.7 0.5 - 1.0 mg/dL    GFR Non-African American >60 >=60 mL/min/1.73    GFR African American >60     Calcium 9.6 8.6 - 10.2 mg/dL    Total Protein 7.4 6.4 - 8.3 g/dL    Albumin 4.1 3.5 - 5.2 g/dL    Total Bilirubin <0.2 0.0 - 1.2 mg/dL    Alkaline Phosphatase 88 35 - 104 U/L    ALT 25 0 - 32 U/L    AST 22 0 - 31 U/L   Troponin   Result Value Ref Range    Troponin <0.01 0.00 - 0.03 ng/mL   EKG 12 Lead   Result Value Ref Range    Ventricular Rate 88 BPM    Atrial Rate 88 BPM    P-R Interval 158 ms    QRS Duration 78 ms    Q-T Interval 376 ms    QTc Calculation (Bazett) 454 ms    P Axis 48 degrees    R Axis 10 degrees    T Axis 22 degrees       Radiology:  US DUP UPPER EXTREMITY RIGHT ARTERIES   Final Result No evidence of pseudoaneurysm. Radial and ulnar arteries are patent and   normal in contour. XR CHEST (2 VW)   Final Result   No acute process. ------------------------- NURSING NOTES AND VITALS REVIEWED ---------------------------  Date / Time Roomed:  2/5/2021  8:58 PM  ED Bed Assignment:  11/11    The nursing notes within the ED encounter and vital signs as below have been reviewed. /85   Pulse 87   Temp 97.5 °F (36.4 °C) (Temporal)   Resp 16   Ht 5' 2\" (1.575 m)   Wt 230 lb (104.3 kg)   SpO2 100%   BMI 42.07 kg/m²   Oxygen Saturation Interpretation: Normal      ------------------------------------------ PROGRESS NOTES ------------------------------------------  7:45 AM EST  I have spoken with the patient and discussed todays results, in addition to providing specific details for the plan of care and counseling regarding the diagnosis and prognosis. Their questions are answered at this time and they are agreeable with the plan. I discussed at length with them reasons for immediate return here for re evaluation. They will followup with their primary care physician by calling their office tomorrow. --------------------------------- ADDITIONAL PROVIDER NOTES ---------------------------------  At this time the patient is without objective evidence of an acute process requiring hospitalization or inpatient management. They have remained hemodynamically stable throughout their entire ED visit and are stable for discharge with outpatient follow-up. The plan has been discussed in detail and they are aware of the specific conditions for emergent return, as well as the importance of follow-up. Discharge Medication List as of 2/5/2021 10:46 PM          Diagnosis:  1. Paresthesia    2. Pain of right upper extremity    3. Chest pain, unspecified type        Disposition:  Patient's disposition: Discharge to home  Patient's condition is stable.        Clarissa Navarro.,

## 2021-02-06 NOTE — ED NOTES
Pt to [address] in stable condition at this time.      David Isaac RN  02/05/21 0070

## 2021-02-07 ENCOUNTER — APPOINTMENT (OUTPATIENT)
Dept: GENERAL RADIOLOGY | Age: 51
End: 2021-02-07
Payer: COMMERCIAL

## 2021-02-07 ENCOUNTER — HOSPITAL ENCOUNTER (EMERGENCY)
Age: 51
Discharge: HOME | End: 2021-02-08
Attending: EMERGENCY MEDICINE
Payer: COMMERCIAL

## 2021-02-07 VITALS
DIASTOLIC BLOOD PRESSURE: 85 MMHG | TEMPERATURE: 97.3 F | SYSTOLIC BLOOD PRESSURE: 132 MMHG | OXYGEN SATURATION: 100 % | HEART RATE: 75 BPM | RESPIRATION RATE: 16 BRPM | HEIGHT: 62 IN | BODY MASS INDEX: 42.33 KG/M2 | WEIGHT: 230 LBS

## 2021-02-07 DIAGNOSIS — R68.84 JAW PAIN: ICD-10-CM

## 2021-02-07 DIAGNOSIS — M79.601 RIGHT ARM PAIN: ICD-10-CM

## 2021-02-07 DIAGNOSIS — R07.9 CHEST PAIN, UNSPECIFIED TYPE: Primary | ICD-10-CM

## 2021-02-07 DIAGNOSIS — K02.9 DENTAL CARIES: ICD-10-CM

## 2021-02-07 LAB
ALBUMIN SERPL-MCNC: 4 G/DL (ref 3.5–5.2)
ALP BLD-CCNC: 78 U/L (ref 35–104)
ALT SERPL-CCNC: 17 U/L (ref 0–32)
ANION GAP SERPL CALCULATED.3IONS-SCNC: 9 MMOL/L (ref 7–16)
AST SERPL-CCNC: 13 U/L (ref 0–31)
BASOPHILS ABSOLUTE: 0.03 E9/L (ref 0–0.2)
BASOPHILS RELATIVE PERCENT: 0.5 % (ref 0–2)
BILIRUB SERPL-MCNC: 0.4 MG/DL (ref 0–1.2)
BUN BLDV-MCNC: 10 MG/DL (ref 6–20)
CALCIUM SERPL-MCNC: 9 MG/DL (ref 8.6–10.2)
CHLORIDE BLD-SCNC: 106 MMOL/L (ref 98–107)
CO2: 26 MMOL/L (ref 22–29)
CREAT SERPL-MCNC: 0.7 MG/DL (ref 0.5–1)
D DIMER: 327 NG/ML DDU
EOSINOPHILS ABSOLUTE: 0.11 E9/L (ref 0.05–0.5)
EOSINOPHILS RELATIVE PERCENT: 1.7 % (ref 0–6)
GFR AFRICAN AMERICAN: >60
GFR NON-AFRICAN AMERICAN: >60 ML/MIN/1.73
GLUCOSE BLD-MCNC: 103 MG/DL (ref 74–99)
HCT VFR BLD CALC: 38.5 % (ref 34–48)
HEMOGLOBIN: 12.8 G/DL (ref 11.5–15.5)
IMMATURE GRANULOCYTES #: 0.04 E9/L
IMMATURE GRANULOCYTES %: 0.6 % (ref 0–5)
LYMPHOCYTES ABSOLUTE: 1.43 E9/L (ref 1.5–4)
LYMPHOCYTES RELATIVE PERCENT: 22.2 % (ref 20–42)
MAGNESIUM: 1.7 MG/DL (ref 1.6–2.6)
MCH RBC QN AUTO: 27.5 PG (ref 26–35)
MCHC RBC AUTO-ENTMCNC: 33.2 % (ref 32–34.5)
MCV RBC AUTO: 82.8 FL (ref 80–99.9)
MONOCYTES ABSOLUTE: 0.56 E9/L (ref 0.1–0.95)
MONOCYTES RELATIVE PERCENT: 8.7 % (ref 2–12)
NEUTROPHILS ABSOLUTE: 4.28 E9/L (ref 1.8–7.3)
NEUTROPHILS RELATIVE PERCENT: 66.3 % (ref 43–80)
PDW BLD-RTO: 14 FL (ref 11.5–15)
PLATELET # BLD: 185 E9/L (ref 130–450)
PMV BLD AUTO: 8.7 FL (ref 7–12)
POTASSIUM SERPL-SCNC: 3.6 MMOL/L (ref 3.5–5)
RBC # BLD: 4.65 E12/L (ref 3.5–5.5)
SODIUM BLD-SCNC: 141 MMOL/L (ref 132–146)
TOTAL PROTEIN: 6.8 G/DL (ref 6.4–8.3)
TROPONIN: <0.01 NG/ML (ref 0–0.03)
WBC # BLD: 6.5 E9/L (ref 4.5–11.5)

## 2021-02-07 PROCEDURE — 85025 COMPLETE CBC W/AUTO DIFF WBC: CPT

## 2021-02-07 PROCEDURE — 6370000000 HC RX 637 (ALT 250 FOR IP): Performed by: STUDENT IN AN ORGANIZED HEALTH CARE EDUCATION/TRAINING PROGRAM

## 2021-02-07 PROCEDURE — 84484 ASSAY OF TROPONIN QUANT: CPT

## 2021-02-07 PROCEDURE — 96375 TX/PRO/DX INJ NEW DRUG ADDON: CPT

## 2021-02-07 PROCEDURE — 93005 ELECTROCARDIOGRAM TRACING: CPT | Performed by: EMERGENCY MEDICINE

## 2021-02-07 PROCEDURE — 83735 ASSAY OF MAGNESIUM: CPT

## 2021-02-07 PROCEDURE — 6360000002 HC RX W HCPCS: Performed by: STUDENT IN AN ORGANIZED HEALTH CARE EDUCATION/TRAINING PROGRAM

## 2021-02-07 PROCEDURE — 85378 FIBRIN DEGRADE SEMIQUANT: CPT

## 2021-02-07 PROCEDURE — 99285 EMERGENCY DEPT VISIT HI MDM: CPT

## 2021-02-07 PROCEDURE — 2500000003 HC RX 250 WO HCPCS: Performed by: STUDENT IN AN ORGANIZED HEALTH CARE EDUCATION/TRAINING PROGRAM

## 2021-02-07 PROCEDURE — 80053 COMPREHEN METABOLIC PANEL: CPT

## 2021-02-07 PROCEDURE — 71046 X-RAY EXAM CHEST 2 VIEWS: CPT

## 2021-02-07 PROCEDURE — 96374 THER/PROPH/DIAG INJ IV PUSH: CPT

## 2021-02-07 RX ORDER — FENTANYL CITRATE 50 UG/ML
50 INJECTION, SOLUTION INTRAMUSCULAR; INTRAVENOUS ONCE
Status: COMPLETED | OUTPATIENT
Start: 2021-02-07 | End: 2021-02-07

## 2021-02-07 RX ORDER — KETOROLAC TROMETHAMINE 30 MG/ML
15 INJECTION, SOLUTION INTRAMUSCULAR; INTRAVENOUS ONCE
Status: COMPLETED | OUTPATIENT
Start: 2021-02-07 | End: 2021-02-07

## 2021-02-07 RX ORDER — DEXAMETHASONE SODIUM PHOSPHATE 10 MG/ML
10 INJECTION INTRAMUSCULAR; INTRAVENOUS ONCE
Status: COMPLETED | OUTPATIENT
Start: 2021-02-07 | End: 2021-02-07

## 2021-02-07 RX ORDER — DIAZEPAM 5 MG/1
5 TABLET ORAL ONCE
Status: COMPLETED | OUTPATIENT
Start: 2021-02-07 | End: 2021-02-07

## 2021-02-07 RX ORDER — ONDANSETRON 2 MG/ML
4 INJECTION INTRAMUSCULAR; INTRAVENOUS ONCE
Status: COMPLETED | OUTPATIENT
Start: 2021-02-07 | End: 2021-02-07

## 2021-02-07 RX ADMIN — FAMOTIDINE 20 MG: 10 INJECTION, SOLUTION INTRAVENOUS at 23:47

## 2021-02-07 RX ADMIN — KETOROLAC TROMETHAMINE 15 MG: 30 INJECTION, SOLUTION INTRAMUSCULAR at 23:46

## 2021-02-07 RX ADMIN — FENTANYL CITRATE 50 MCG: 0.05 INJECTION, SOLUTION INTRAMUSCULAR; INTRAVENOUS at 23:03

## 2021-02-07 RX ADMIN — ONDANSETRON HYDROCHLORIDE 4 MG: 2 SOLUTION INTRAMUSCULAR; INTRAVENOUS at 23:46

## 2021-02-07 RX ADMIN — DEXAMETHASONE SODIUM PHOSPHATE 10 MG: 10 INJECTION INTRAMUSCULAR; INTRAVENOUS at 23:47

## 2021-02-07 RX ADMIN — DIAZEPAM 5 MG: 5 TABLET ORAL at 23:47

## 2021-02-07 ASSESSMENT — PAIN DESCRIPTION - DESCRIPTORS: DESCRIPTORS: ACHING

## 2021-02-07 ASSESSMENT — PAIN DESCRIPTION - PAIN TYPE: TYPE: ACUTE PAIN

## 2021-02-07 ASSESSMENT — PAIN DESCRIPTION - LOCATION: LOCATION: ARM

## 2021-02-08 ENCOUNTER — APPOINTMENT (OUTPATIENT)
Dept: CT IMAGING | Age: 51
End: 2021-02-08
Payer: COMMERCIAL

## 2021-02-08 LAB
EKG ATRIAL RATE: 70 BPM
EKG P AXIS: 53 DEGREES
EKG P-R INTERVAL: 162 MS
EKG Q-T INTERVAL: 408 MS
EKG QRS DURATION: 76 MS
EKG QTC CALCULATION (BAZETT): 440 MS
EKG R AXIS: 64 DEGREES
EKG T AXIS: 35 DEGREES
EKG VENTRICULAR RATE: 70 BPM

## 2021-02-08 PROCEDURE — 6360000004 HC RX CONTRAST MEDICATION: Performed by: RADIOLOGY

## 2021-02-08 PROCEDURE — 2580000003 HC RX 258: Performed by: RADIOLOGY

## 2021-02-08 PROCEDURE — 6370000000 HC RX 637 (ALT 250 FOR IP): Performed by: STUDENT IN AN ORGANIZED HEALTH CARE EDUCATION/TRAINING PROGRAM

## 2021-02-08 PROCEDURE — 71275 CT ANGIOGRAPHY CHEST: CPT

## 2021-02-08 PROCEDURE — 93010 ELECTROCARDIOGRAM REPORT: CPT | Performed by: INTERNAL MEDICINE

## 2021-02-08 RX ORDER — HYDROCODONE BITARTRATE AND ACETAMINOPHEN 5; 325 MG/1; MG/1
1 TABLET ORAL ONCE
Status: COMPLETED | OUTPATIENT
Start: 2021-02-08 | End: 2021-02-08

## 2021-02-08 RX ORDER — HYDROCODONE BITARTRATE AND ACETAMINOPHEN 5; 325 MG/1; MG/1
1 TABLET ORAL EVERY 6 HOURS PRN
Qty: 12 TABLET | Refills: 0 | Status: SHIPPED | OUTPATIENT
Start: 2021-02-08 | End: 2021-02-11

## 2021-02-08 RX ORDER — CYCLOBENZAPRINE HCL 10 MG
10 TABLET ORAL 3 TIMES DAILY PRN
Qty: 21 TABLET | Refills: 0 | Status: SHIPPED | OUTPATIENT
Start: 2021-02-08 | End: 2021-02-18

## 2021-02-08 RX ORDER — SODIUM CHLORIDE 0.9 % (FLUSH) 0.9 %
10 SYRINGE (ML) INJECTION PRN
Status: COMPLETED | OUTPATIENT
Start: 2021-02-08 | End: 2021-02-08

## 2021-02-08 RX ORDER — NAPROXEN 500 MG/1
500 TABLET ORAL 2 TIMES DAILY
Qty: 60 TABLET | Refills: 0 | Status: SHIPPED | OUTPATIENT
Start: 2021-02-08 | End: 2021-05-05

## 2021-02-08 RX ADMIN — HYDROCODONE BITARTRATE AND ACETAMINOPHEN 1 TABLET: 5; 325 TABLET ORAL at 04:11

## 2021-02-08 RX ADMIN — IOPAMIDOL 90 ML: 755 INJECTION, SOLUTION INTRAVENOUS at 03:05

## 2021-02-08 RX ADMIN — Medication 10 ML: at 03:05

## 2021-02-08 ASSESSMENT — PAIN SCALES - GENERAL: PAINLEVEL_OUTOF10: 7

## 2021-02-08 NOTE — ED NOTES
Pt resting at this time. Pt continues to await CT.  Per CT, pt will come over shortly     Kushal Dunham RN  02/08/21 5836
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
## 2021-02-08 NOTE — ED PROVIDER NOTES
---------------------------   The nursing notes within the ED encounter and vital signs as below have been reviewed. /85   Pulse 75   Temp 97.3 °F (36.3 °C) (Oral)   Resp 16   Ht 5' 2\" (1.575 m)   Wt 230 lb (104.3 kg)   SpO2 100%   BMI 42.07 kg/m²   Oxygen Saturation Interpretation: Normal      ---------------------------------------------------PHYSICAL EXAM--------------------------------------      Constitutional/General: Alert and oriented x3, well appearing, non toxic in NAD, morbidly obese  Head: Normocephalic and atraumatic  Eyes: PERRL, EOMI  Mouth: Oropharynx clear, handling secretions, no trismus, multiple dental caries noted, left worse than right  Neck: Supple, full ROM, phonation normal, tenderness to palpation to right-sided paraspinal musculature  Pulmonary: Lungs clear to auscultation bilaterally, no wheezes, rales, or rhonchi. Not in respiratory distress  Cardiovascular:  Regular rate and rhythm, no murmurs, gallops, or rubs. 2+ distal pulses  Abdomen: Soft, non tender, non distended,   Extremities: Moves all extremities x 4. Warm and well perfused, some ecchymosis noted along patient's right arm where she had her cath. She has a easily palpable radial pulse, full range of motion noted, compartments are soft nontender, she is tender to palpation of the right tricep area  Skin: warm and dry without rash  Neurologic: GCS 15, no focal deficit noted  Psych: Normal Affect        EKG: This EKG is signed and interpreted by me.     Rate: 70  Rhythm: Sinus  Interpretation: Normal sinus rhythm, no deviation,  ms, no acute changes noted  Comparison: stable as compared to patient's most recent EKG    ------------------------------ ED COURSE/MEDICAL DECISION MAKING----------------------  Medications   fentaNYL (SUBLIMAZE) injection 50 mcg (50 mcg Intravenous Given 2/7/21 4476)   ketorolac (TORADOL) injection 15 mg (15 mg Intravenous Given 2/7/21 8126)   ondansetron (ZOFRAN) injection 4 mg (4 mg Intravenous Given 2/7/21 2346)   famotidine (PEPCID) injection 20 mg (20 mg Intravenous Given 2/7/21 2347)   diazePAM (VALIUM) tablet 5 mg (5 mg Oral Given 2/7/21 2347)   dexamethasone (DECADRON) injection 10 mg (10 mg Intravenous Given 2/7/21 2347)   iopamidol (ISOVUE-370) 76 % injection 90 mL (90 mLs Intravenous Given 2/8/21 0305)   sodium chloride flush 0.9 % injection 10 mL (10 mLs Intravenous Given 2/8/21 0305)   HYDROcodone-acetaminophen (NORCO) 5-325 MG per tablet 1 tablet (1 tablet Oral Given 2/8/21 0411)         ED COURSE:       Medical Decision Making:    Patient presents to the ER today with multiple chief complaints. Labs and EKG all within normal limits. Patient given medications which did help her pain. I feel this is likely musculoskeletal with the exception of the jaw pain which I feel is likely secondary to dental caries as there were extensive dental caries noted to patient's mouth. Patient be discharged home with Flexeril, Naprosyn, Norco and instructions to follow with PCP. Patient is agreeable to this. All questions been answered. Patient given strict return precautions return to the ER symptoms return or worsen. Counseling: The emergency provider has spoken with the patient and discussed todays results, in addition to providing specific details for the plan of care and counseling regarding the diagnosis and prognosis. Questions are answered at this time and they are agreeable with the plan.      --------------------------------- IMPRESSION AND DISPOSITION ---------------------------------    IMPRESSION  1. Chest pain, unspecified type    2. Jaw pain    3. Dental caries    4. Right arm pain        Discharge Medication List as of 2/8/2021  3:44 AM      START taking these medications    Details   HYDROcodone-acetaminophen (NORCO) 5-325 MG per tablet Take 1 tablet by mouth every 6 hours as needed for Pain for up to 3 days. Intended supply: 3 days.  Take lowest dose possible to manage pain, Disp-12 tablet, R-0Print      cyclobenzaprine (FLEXERIL) 10 MG tablet Take 1 tablet by mouth 3 times daily as needed for Muscle spasms, Disp-21 tablet, R-0Print      naproxen (NAPROSYN) 500 MG tablet Take 1 tablet by mouth 2 times daily, Disp-60 tablet, R-0Print             DISPOSITION  Disposition: Discharge to home  Patient condition is stable      NOTE: This report was transcribed using voice recognition software.  Every effort was made to ensure accuracy; however, inadvertent computerized transcription errors may be present       Zhanna Ch DO  Resident  02/08/21 2009

## 2021-02-08 NOTE — ED NOTES
Name: Devika Kumar  : 1970  MRN: [id number]    Date: 2021    Benefits of immediately proceeding with Radiology exam outweigh the risks and therefore the following is being waived:      [x] Pregnancy test    [] Protocol for Iodine allergy    [] MRI questionnaire    [] BUN/Creatinine        MD Brigid Vega MD  21 0517

## 2021-02-12 ENCOUNTER — TELEPHONE (OUTPATIENT)
Dept: ADMINISTRATIVE | Age: 51
End: 2021-02-12

## 2021-02-15 ENCOUNTER — APPOINTMENT (OUTPATIENT)
Dept: ULTRASOUND IMAGING | Age: 51
End: 2021-02-15
Payer: COMMERCIAL

## 2021-02-15 ENCOUNTER — APPOINTMENT (OUTPATIENT)
Dept: GENERAL RADIOLOGY | Age: 51
End: 2021-02-15
Payer: COMMERCIAL

## 2021-02-15 ENCOUNTER — HOSPITAL ENCOUNTER (EMERGENCY)
Age: 51
Discharge: HOME | End: 2021-02-15
Attending: EMERGENCY MEDICINE
Payer: COMMERCIAL

## 2021-02-15 VITALS
DIASTOLIC BLOOD PRESSURE: 82 MMHG | HEIGHT: 62 IN | TEMPERATURE: 98.1 F | BODY MASS INDEX: 42.33 KG/M2 | OXYGEN SATURATION: 99 % | HEART RATE: 76 BPM | RESPIRATION RATE: 16 BRPM | SYSTOLIC BLOOD PRESSURE: 126 MMHG | WEIGHT: 230 LBS

## 2021-02-15 DIAGNOSIS — R52 GENERALIZED PAIN: Primary | ICD-10-CM

## 2021-02-15 DIAGNOSIS — M25.531 RIGHT WRIST PAIN: ICD-10-CM

## 2021-02-15 LAB
ALBUMIN SERPL-MCNC: 3.5 G/DL (ref 3.5–5.2)
ALP BLD-CCNC: 72 U/L (ref 35–104)
ALT SERPL-CCNC: <5 U/L (ref 0–32)
ANION GAP SERPL CALCULATED.3IONS-SCNC: 7 MMOL/L (ref 7–16)
AST SERPL-CCNC: 14 U/L (ref 0–31)
BASOPHILS ABSOLUTE: 0.02 E9/L (ref 0–0.2)
BASOPHILS RELATIVE PERCENT: 0.4 % (ref 0–2)
BILIRUB SERPL-MCNC: 0.4 MG/DL (ref 0–1.2)
BILIRUBIN URINE: NEGATIVE
BLOOD, URINE: NEGATIVE
BUN BLDV-MCNC: 11 MG/DL (ref 6–20)
CALCIUM SERPL-MCNC: 9.1 MG/DL (ref 8.6–10.2)
CHLORIDE BLD-SCNC: 106 MMOL/L (ref 98–107)
CLARITY: CLEAR
CO2: 23 MMOL/L (ref 22–29)
COLOR: YELLOW
CREAT SERPL-MCNC: 0.6 MG/DL (ref 0.5–1)
EKG ATRIAL RATE: 80 BPM
EKG P AXIS: 51 DEGREES
EKG P-R INTERVAL: 146 MS
EKG Q-T INTERVAL: 372 MS
EKG QRS DURATION: 82 MS
EKG QTC CALCULATION (BAZETT): 429 MS
EKG R AXIS: 17 DEGREES
EKG T AXIS: 3 DEGREES
EKG VENTRICULAR RATE: 80 BPM
EOSINOPHILS ABSOLUTE: 0.17 E9/L (ref 0.05–0.5)
EOSINOPHILS RELATIVE PERCENT: 3 % (ref 0–6)
GFR AFRICAN AMERICAN: >60
GFR NON-AFRICAN AMERICAN: >60 ML/MIN/1.73
GLUCOSE BLD-MCNC: 107 MG/DL (ref 74–99)
GLUCOSE URINE: NEGATIVE MG/DL
HCG(URINE) PREGNANCY TEST: NEGATIVE
HCT VFR BLD CALC: 39 % (ref 34–48)
HEMOGLOBIN: 13 G/DL (ref 11.5–15.5)
IMMATURE GRANULOCYTES #: 0.01 E9/L
IMMATURE GRANULOCYTES %: 0.2 % (ref 0–5)
KETONES, URINE: NEGATIVE MG/DL
LEUKOCYTE ESTERASE, URINE: NEGATIVE
LYMPHOCYTES ABSOLUTE: 1.13 E9/L (ref 1.5–4)
LYMPHOCYTES RELATIVE PERCENT: 19.8 % (ref 20–42)
MCH RBC QN AUTO: 28.1 PG (ref 26–35)
MCHC RBC AUTO-ENTMCNC: 33.3 % (ref 32–34.5)
MCV RBC AUTO: 84.4 FL (ref 80–99.9)
MONOCYTES ABSOLUTE: 0.54 E9/L (ref 0.1–0.95)
MONOCYTES RELATIVE PERCENT: 9.5 % (ref 2–12)
NEUTROPHILS ABSOLUTE: 3.83 E9/L (ref 1.8–7.3)
NEUTROPHILS RELATIVE PERCENT: 67.1 % (ref 43–80)
NITRITE, URINE: NEGATIVE
PDW BLD-RTO: 14.6 FL (ref 11.5–15)
PH UA: 5 (ref 5–9)
PLATELET # BLD: 249 E9/L (ref 130–450)
PMV BLD AUTO: 9 FL (ref 7–12)
POTASSIUM REFLEX MAGNESIUM: 4 MMOL/L (ref 3.5–5)
PROTEIN UA: NEGATIVE MG/DL
RBC # BLD: 4.62 E12/L (ref 3.5–5.5)
SARS-COV-2, NAAT: NOT DETECTED
SODIUM BLD-SCNC: 136 MMOL/L (ref 132–146)
SPECIFIC GRAVITY UA: 1.02 (ref 1–1.03)
TOTAL PROTEIN: 6.5 G/DL (ref 6.4–8.3)
TROPONIN: <0.01 NG/ML (ref 0–0.03)
UROBILINOGEN, URINE: 0.2 E.U./DL
WBC # BLD: 5.7 E9/L (ref 4.5–11.5)

## 2021-02-15 PROCEDURE — 93005 ELECTROCARDIOGRAM TRACING: CPT | Performed by: STUDENT IN AN ORGANIZED HEALTH CARE EDUCATION/TRAINING PROGRAM

## 2021-02-15 PROCEDURE — 99284 EMERGENCY DEPT VISIT MOD MDM: CPT

## 2021-02-15 PROCEDURE — 71045 X-RAY EXAM CHEST 1 VIEW: CPT

## 2021-02-15 PROCEDURE — 81025 URINE PREGNANCY TEST: CPT

## 2021-02-15 PROCEDURE — 80053 COMPREHEN METABOLIC PANEL: CPT

## 2021-02-15 PROCEDURE — 81003 URINALYSIS AUTO W/O SCOPE: CPT

## 2021-02-15 PROCEDURE — 93971 EXTREMITY STUDY: CPT

## 2021-02-15 PROCEDURE — U0002 COVID-19 LAB TEST NON-CDC: HCPCS

## 2021-02-15 PROCEDURE — 93010 ELECTROCARDIOGRAM REPORT: CPT | Performed by: INTERNAL MEDICINE

## 2021-02-15 PROCEDURE — 84484 ASSAY OF TROPONIN QUANT: CPT

## 2021-02-15 PROCEDURE — 85025 COMPLETE CBC W/AUTO DIFF WBC: CPT

## 2021-02-15 PROCEDURE — 87088 URINE BACTERIA CULTURE: CPT

## 2021-02-15 PROCEDURE — 93970 EXTREMITY STUDY: CPT

## 2021-02-15 ASSESSMENT — PAIN DESCRIPTION - PAIN TYPE: TYPE: ACUTE PAIN

## 2021-02-15 ASSESSMENT — ENCOUNTER SYMPTOMS
NAUSEA: 0
EYE REDNESS: 0
BACK PAIN: 0
WHEEZING: 0
VOMITING: 0
COUGH: 0
SORE THROAT: 0
EYE PAIN: 0
DIARRHEA: 0
SINUS PRESSURE: 0
EYE DISCHARGE: 0
ABDOMINAL DISTENTION: 0
COLOR CHANGE: 0

## 2021-02-15 ASSESSMENT — PAIN DESCRIPTION - ORIENTATION: ORIENTATION: LEFT;RIGHT

## 2021-02-15 ASSESSMENT — PAIN DESCRIPTION - PROGRESSION: CLINICAL_PROGRESSION: GRADUALLY WORSENING

## 2021-02-15 ASSESSMENT — PAIN DESCRIPTION - FREQUENCY: FREQUENCY: CONTINUOUS

## 2021-02-15 NOTE — ED PROVIDER NOTES
[address]      Pt Name: Devonte Olvera  MRN: [id number]  [place] 1970  Date of evaluation: 2/15/2021      CHIEF COMPLAINT       Chief Complaint   Patient presents with    Other     pt c/o body wide pain starting in R wrist after cardiac cath 2/2/21, has spread to BLE    Shortness of Breath     w/ exertion     Leg Pain     L calf more than R calf, pt states that she has felt the pain spread up leg and into abd, concern for Blot clots        HPI  Devonte Olvera is a 52 y.o. female with a past medical history of lupus on medication recent cardiac cath on 2/2/2021 presents with complaints of body wide pain, right hand pain, and bilateral calf pain for 1 week. Patient's describes her right arm pain as moderate, sharp, shooting pain that is from her right wrist to her right arm. Exacerbated with touch and movement. In addition patient is also complaining of bilateral leg pain in both calves. Also exacerbated with movement. Alleviated with rest.  And patient also describes shortness of breath with exertion. She states that she has had multiple hospital visits and has had no relief of her symptoms. Except as noted above the remainder of the review of systems was reviewed and negative. Review of Systems   Constitutional: Negative for chills and fever. HENT: Negative for ear pain, sinus pressure and sore throat. Eyes: Negative for pain, discharge and redness. Respiratory: Positive for shortness of breath. Negative for cough and wheezing. Cardiovascular: Negative for chest pain. Gastrointestinal: Negative for abdominal distention, diarrhea, nausea and vomiting. Genitourinary: Negative for dysuria and frequency. Musculoskeletal: Negative for arthralgias and back pain. Skin: Negative for color change, pallor, rash and wound. Neurological: Negative for weakness, numbness and headaches. Hematological: Negative for adenopathy. Psychiatric/Behavioral: Negative for agitation. All other systems reviewed and are negative. Physical Exam  Vitals signs and nursing note reviewed. Constitutional:       General: She is not in acute distress. Appearance: She is well-developed. She is obese. She is not ill-appearing or diaphoretic. HENT:      Head: Normocephalic and atraumatic. Eyes:      Pupils: Pupils are equal, round, and reactive to light. Neck:      Musculoskeletal: Normal range of motion and neck supple. Cardiovascular:      Rate and Rhythm: Normal rate and regular rhythm. Pulmonary:      Effort: Pulmonary effort is normal. No respiratory distress. Breath sounds: Normal breath sounds. No decreased breath sounds, wheezing, rhonchi or rales. Abdominal:      General: Bowel sounds are normal.      Palpations: Abdomen is soft. Tenderness: There is no abdominal tenderness. There is no guarding or rebound. Musculoskeletal:      Comments: Patient's right hand mildly tender to touch. There is a small bruise at the ventral aspect of her wrist.  Patient is refusing to oppose her fingers due to pain. We will still seek medical care capillary fill less than 2 seconds. No signs of rashes, sores, or infection. No areas of crepitus. Patient's lower extremities bilaterally are nontender to touch or palpation. Both legs are equal size. Skin:     General: Skin is warm and dry. Capillary Refill: Capillary refill takes less than 2 seconds. Neurological:      General: No focal deficit present. Mental Status: She is alert and oriented to person, place, and time. Cranial Nerves: No cranial nerve deficit.       Coordination: Coordination normal.   Psychiatric:         Mood and Affect: Mood normal.          Procedures     MDM  Number of Diagnoses or Management Options  Generalized pain  Right wrist pain  Diagnosis management comments: 79-year-old female with a past medical history of lupus, recent cardiac catheterization, presents from home with complaints of generalized body pain but especially in her right hand radiating up to her right arm as well as bilateral legs. In addition she is complaining of shortness of breath with exertion. Vitals within normal limits. On physical exam patient is no acute distress, speaking full sentences, alert and oriented x3. Her lungs are clear to auscultation bilaterally. No wheezes rales or rhonchi. She has a small bruise at the ventral aspect of her right wrist.  Patient is refusing to oppose her fingers on the right hand due to pain. She is able to supinate protein in her right arm. No visible deformities or erythema appreciated. Bilateral legs have no tenderness palpation. They are of equal size. No calf tenderness. Diagnostic labs and imaging reviewed. Significant for no signs of DVTs in patient's right arm or bilateral legs. Covid not detected. POC pregnancy negative. Chest x-ray negative for acute process. EKG shows normal sinus rhythm. Troponin negative. From evaluation patient unlikely having compartment syndrome, infection, or blood clot causing her pain. Patient shortness of breath unlikely due to acute MI, infection, pneumonia, or Covid. She was given follow-up to orthopedics as well as pain management for her symptoms. She is agreeable with plan for discharge. Patient is awake alert, hemodynamic stable, afebrile and in no respiratory distress. Discussed with patient plan for close outpatient follow-up with the patient's PCP as well as return precautions and the patient understands and agrees to the plan.     ED Course as of Feb 16 0503   Mon Feb 15, 2021   0825 PHYSICAL EXAM:  Vitals Reviewed  Constitutional/General: Alert and oriented x3, well appearing, non toxic in NAD  Head: Normocephalic and atraumatic  Eyes: PERRL, EOMI  Mouth: Oropharynx clear, handling secretions, no trismus  Neck: Supple, full ROM,   Pulmonary: Lungs clear to auscultation bilaterally, no wheezes, rales, or rhonchi. Not in respiratory distress  Cardiovascular:  Regular rate and rhythm, no murmurs, gallops, or rubs. 2+ distal pulses  Abdomen: Soft, non tender, non distended,   Extremities: Moves all extremities x 4. Warm and well perfused. Tenderness to bilateral lower extremities, no wounds, DP and PT pulses intact, no crepitus, soft and easily compressible compartments. RUE-on radial pulse, subacute ecchymoses, no erythema, no discharge, soft and easily compressible compartments. Skin: warm and dry without rash  Neurologic: GCS 15, no focal motor or sensory deficits. Psych: Normal Affect        [JA]      ED Course User Index  [JA] Erma Shelley MD         --------------------------------------------- PAST HISTORY ---------------------------------------------  Past Medical History:  has a past medical history of ARDS (adult respiratory distress syndrome) ([facility] [address] Arthritis, Asthma, Colitis, Depression, Essential tremor, Gallstones, Head trauma, History of blood transfusion, Hx of blood clots, Hyperlipidemia, Hypertension, Lupus (systemic lupus erythematosus) ([facility] [address] Prolonged emergence from general anesthesia, and Tachycardia. Past Surgical History:  has a past surgical history that includes Cholecystectomy, laparoscopic (03/2018) and Retinal detachment surgery. Social History:  reports that she quit smoking about 8 years ago. Her smoking use included cigarettes. She has never used smokeless tobacco. She reports that she does not drink alcohol or use drugs. Family History: family history includes Celiac Disease in her sister; Crohn's Disease in her brother; Diabetes in her father, mother, and sister; High Blood Pressure in her brother, father, mother, and sister; Stroke in her mother; Uterine Cancer in her maternal grandmother. The patients home medications have been reviewed.     Allergies: Cefdinir, Cephalosporins, Fish allergy, Fish-derived products, Nsaids, Ciprofloxacin, Codeine, Folic acid, Levaquin [levofloxacin in d5w], Methotrexate derivatives, Metronidazole, Other, Prednisone, Wellbutrin [bupropion], and Pce [erythromycin]    -------------------------------------------------- RESULTS -------------------------------------------------  Labs:  Results for orders placed or performed during the hospital encounter of 02/15/21   CBC Auto Differential   Result Value Ref Range    WBC 5.7 4.5 - 11.5 E9/L    RBC 4.62 3.50 - 5.50 E12/L    Hemoglobin 13.0 11.5 - 15.5 g/dL    Hematocrit 39.0 34.0 - 48.0 %    MCV 84.4 80.0 - 99.9 fL    MCH 28.1 26.0 - 35.0 pg    MCHC 33.3 32.0 - 34.5 %    RDW 14.6 11.5 - 15.0 fL    Platelets 580 567 - 555 E9/L    MPV 9.0 7.0 - 12.0 fL    Neutrophils % 67.1 43.0 - 80.0 %    Immature Granulocytes % 0.2 0.0 - 5.0 %    Lymphocytes % 19.8 (L) 20.0 - 42.0 %    Monocytes % 9.5 2.0 - 12.0 %    Eosinophils % 3.0 0.0 - 6.0 %    Basophils % 0.4 0.0 - 2.0 %    Neutrophils Absolute 3.83 1.80 - 7.30 E9/L    Immature Granulocytes # 0.01 E9/L    Lymphocytes Absolute 1.13 (L) 1.50 - 4.00 E9/L    Monocytes Absolute 0.54 0.10 - 0.95 E9/L    Eosinophils Absolute 0.17 0.05 - 0.50 E9/L    Basophils Absolute 0.02 0.00 - 0.20 E9/L   Comprehensive Metabolic Panel w/ Reflex to MG   Result Value Ref Range    Sodium 136 132 - 146 mmol/L    Potassium reflex Magnesium 4.0 3.5 - 5.0 mmol/L    Chloride 106 98 - 107 mmol/L    CO2 23 22 - 29 mmol/L    Anion Gap 7 7 - 16 mmol/L    Glucose 107 (H) 74 - 99 mg/dL    BUN 11 6 - 20 mg/dL    CREATININE 0.6 0.5 - 1.0 mg/dL    GFR Non-African American >60 >=60 mL/min/1.73    GFR African American >60     Calcium 9.1 8.6 - 10.2 mg/dL    Total Protein 6.5 6.4 - 8.3 g/dL    Albumin 3.5 3.5 - 5.2 g/dL    Total Bilirubin 0.4 0.0 - 1.2 mg/dL    Alkaline Phosphatase 72 35 - 104 U/L    ALT <5 0 - 32 U/L    AST 14 0 - 31 U/L   Troponin   Result Value Ref Range    Troponin <0.01 0.00 - 0.03 ng/mL   Urinalysis, reflex to microscopic   Result Value Ref Range    Color, UA Yellow Straw/Yellow    Clarity, UA Clear Clear    Glucose, Ur Negative Negative mg/dL    Bilirubin Urine Negative Negative    Ketones, Urine Negative Negative mg/dL    Specific Gravity, UA 1.020 1.005 - 1.030    Blood, Urine Negative Negative    pH, UA 5.0 5.0 - 9.0    Protein, UA Negative Negative mg/dL    Urobilinogen, Urine 0.2 <2.0 E.U./dL    Nitrite, Urine Negative Negative    Leukocyte Esterase, Urine Negative Negative   Pregnancy, Urine   Result Value Ref Range    HCG(Urine) Pregnancy Test NEGATIVE NEGATIVE   COVID-19, Rapid   Result Value Ref Range    SARS-CoV-2, NAAT Not Detected Not Detected   EKG 12 Lead   Result Value Ref Range    Ventricular Rate 80 BPM    Atrial Rate 80 BPM    P-R Interval 146 ms    QRS Duration 82 ms    Q-T Interval 372 ms    QTc Calculation (Bazett) 429 ms    P Axis 51 degrees    R Axis 17 degrees    T Axis 3 degrees     EKG read by me. Heart rate 80. Normal sinus rhythm. Normal axis deviation. No acute dislocation. No ST elevations or depressions. Stable as compared to previous EKG. Radiology:  XR CHEST PORTABLE   Final Result   No acute process. US DUP LOWER EXTREMITIES BILATERAL VENOUS   Final Result   No evidence of DVT in either lower extremity. US DUP UPPER EXTREMITY RIGHT VENOUS   Final Result   No evidence of DVT.          ------------------------- NURSING NOTES AND VITALS REVIEWED ---------------------------  Date / Time Roomed:  2/15/2021  7:04 AM  ED Bed Assignment:  JOSE M/JOSE M    The nursing notes within the ED encounter and vital signs as below have been reviewed.    /82   Pulse 76   Temp 98.1 °F (36.7 °C) (Oral)   Resp 16   Ht 5' 2\" (1.575 m)   Wt 230 lb (104.3 kg)   SpO2 99%   BMI 42.07 kg/m²   Oxygen Saturation Interpretation: normal      ------------------------------------------ PROGRESS NOTES ------------------------------------------    I have spoken with the patient and discussed todays results, in addition to providing specific details for the plan of care and counseling regarding the diagnosis and prognosis. Their questions are answered at this time and they are agreeable with the plan. I discussed at length with them reasons for immediate return here for re evaluation. They will followup with their PCP by calling their office tomorrow. --------------------------------- ADDITIONAL PROVIDER NOTES ---------------------------------  At this time the patient is without objective evidence of an acute process requiring hospitalization or inpatient management. They have remained hemodynamically stable throughout their entire ED visit and are stable for discharge with outpatient follow-up. The plan has been discussed in detail and they are aware of the specific conditions for emergent return, as well as the importance of follow-up. Discharge Medication List as of 2/15/2021  1:47 PM          Diagnosis:  1. Generalized pain    2. Right wrist pain        Disposition:  Patient's disposition: Discharge to home  Patient's condition is stable.       Kortney Tom MD  Resident  02/16/21 [id number]

## 2021-02-15 NOTE — ED NOTES
Discharge instructions given. Patient verbalizes understanding. No other noted or stated problems at this time. Patient will follow up with primary care.      Dwayne Sandhu RN  02/15/21 [id number]

## 2021-02-16 LAB
ORGANISM: ABNORMAL
URINE CULTURE, ROUTINE: ABNORMAL

## 2021-02-16 ASSESSMENT — ENCOUNTER SYMPTOMS: SHORTNESS OF BREATH: 1

## 2021-02-17 VITALS
RESPIRATION RATE: 18 BRPM | HEIGHT: 62 IN | HEART RATE: 80 BPM | WEIGHT: 220 LBS | DIASTOLIC BLOOD PRESSURE: 74 MMHG | TEMPERATURE: 98.7 F | OXYGEN SATURATION: 99 % | SYSTOLIC BLOOD PRESSURE: 116 MMHG | BODY MASS INDEX: 40.48 KG/M2

## 2021-02-24 ENCOUNTER — OFFICE VISIT (OUTPATIENT)
Dept: CARDIOLOGY CLINIC | Age: 51
End: 2021-02-24
Payer: COMMERCIAL

## 2021-02-24 VITALS
BODY MASS INDEX: 42.23 KG/M2 | DIASTOLIC BLOOD PRESSURE: 86 MMHG | SYSTOLIC BLOOD PRESSURE: 124 MMHG | HEIGHT: 62 IN | RESPIRATION RATE: 16 BRPM | WEIGHT: 229.5 LBS | OXYGEN SATURATION: 96 % | HEART RATE: 81 BPM

## 2021-02-24 DIAGNOSIS — R07.9 CHEST PAIN, UNSPECIFIED TYPE: Primary | ICD-10-CM

## 2021-02-24 PROCEDURE — G8484 FLU IMMUNIZE NO ADMIN: HCPCS | Performed by: INTERNAL MEDICINE

## 2021-02-24 PROCEDURE — 99213 OFFICE O/P EST LOW 20 MIN: CPT | Performed by: INTERNAL MEDICINE

## 2021-02-24 PROCEDURE — 93000 ELECTROCARDIOGRAM COMPLETE: CPT | Performed by: INTERNAL MEDICINE

## 2021-02-24 PROCEDURE — 1111F DSCHRG MED/CURRENT MED MERGE: CPT | Performed by: INTERNAL MEDICINE

## 2021-02-24 PROCEDURE — 1036F TOBACCO NON-USER: CPT | Performed by: INTERNAL MEDICINE

## 2021-02-24 PROCEDURE — G8417 CALC BMI ABV UP PARAM F/U: HCPCS | Performed by: INTERNAL MEDICINE

## 2021-02-24 PROCEDURE — G8427 DOCREV CUR MEDS BY ELIG CLIN: HCPCS | Performed by: INTERNAL MEDICINE

## 2021-02-24 NOTE — PROGRESS NOTES
Elijah Velazquez  1970  Date of Service: 2021    Patient Active Problem List    Diagnosis Date Noted    Lupus ([address] 2019     Priority: Medium    Anxiety 2018     Priority: Medium    Essential hypertension 2021     Priority: Low    Hyperlipidemia 2021     Priority: Low    Depression 2018     Priority: Low    Chest pain 2018    Moderate persistent asthma without complication        Social History     Socioeconomic History    Marital status: Single     Spouse name: None    Number of children: None    Years of education: None    Highest education level: None   Occupational History    None   Social Needs    Financial resource strain: None    Food insecurity     Worry: None     Inability: None    Transportation needs     Medical: None     Non-medical: None   Tobacco Use    Smoking status: Former Smoker     Types: Cigarettes     Quit date: 10/31/2012     Years since quittin.3    Smokeless tobacco: Never Used    Tobacco comment: quit long time ago, only smoked for 3 months   Substance and Sexual Activity    Alcohol use: No    Drug use: No    Sexual activity: None   Lifestyle    Physical activity     Days per week: None     Minutes per session: None    Stress: None   Relationships    Social connections     Talks on phone: None     Gets together: None     Attends [employer] service: None     Active member of club or organization: None     Attends meetings of clubs or organizations: None     Relationship status: None    Intimate partner violence     Fear of current or ex partner: None     Emotionally abused: None     Physically abused: None     Forced sexual activity: None   Other Topics Concern    None   Social History Narrative    None       Current Outpatient Medications   Medication Sig Dispense Refill    naproxen (NAPROSYN) 500 MG tablet Take 1 tablet by mouth 2 times daily (Patient taking differently: Take 500 mg by mouth 2 times daily Metronidazole Hives     throat closing, hives  throat closing, hives    Other      Allergic to Midrin    Prednisone Hives    Wellbutrin [Bupropion]     Pce [Erythromycin] Rash       Chief Complaint:  Maeve Calvillo is here today for follow up and management/recomendations for chest pain. History of Present Illness: Maeve Calvillo was previously followed by Dr. Kusum Arevalo. She has had multiple evaluations by my other partners as well negative cardiac work-ups she follows her rhythm and heart rate with electrophysiology. She presents today after having a cardiac catheterization on 2/2/2021. After the cardiac catheterization she had right arm and wrist discomfort and therefore was seen in the emergency room. She now states that she had right arm pain, pain across all parts of her chest, pain in her neck, pain in her left leg, and other generalized body pains. She states that she was actually having some aches and pains prior to the cardiac catheterization and now feels that she may have been having a lupus flare. However, she states that since the cardiac catheterization these aches and pains all over her body have increased. She also states that for the past 1 month her palpitations have increased. She now feels that she has been having skipping and fast heart rates. REVIEW OF SYSTEMS:  As above. Patient does not complain of any fever, chills, nausea, vomiting or diarrhea. No focal, motor or neurological deficits. No changes in his/her vision, hearing, bowel or bladder habits. She is not known to have a history of thyroid problems. No recent nose bleeds. PHYSICAL EXAM:  Vitals:    02/24/21 0840   BP: 124/86   SpO2: 96%   Weight: 229 lb 8 oz (104.1 kg)   Height: 5' 2\" (1.575 m)       GENERAL:  She is alert and oriented x 3, communicates well, in no distress. NECK:  No masses, trachea is mid position. Supple, full ROM, no JVD or bruits. No palpable thyromegaly or lymphadenopathy. HEART:  Regular rate and rhythm. Normal S1 and S2. There are no abnormal murmurs or gallops. LUNGS:  Clear to auscultation bilaterally. No use of accessory muscles. symmetrical excursion. ABDOMEN:  Soft, non-tender. Normal bowel sounds. Morbidly obese. EXTREMITIES:  Full ROM x 4. No bilateral lower extremity edema. Good distal pulses. EYES:  Extraocular muscles intact. PERRL. Normal lids & conjunctiva. ENT:  Nares are clear & not bleeding. Moist mucosa. Normal lips formation. No external masses   NEURO: no tremors, full ROM x 4, EOMI. SKIN:  Warm, dry and intact. Normal turgor. Radial access site: No visual abnormalities. She also showed me her left wrist stating that when she has her palpitations she feels that her veins are more prominent. Visually this looks normal to my view as well. EKG: Sinus rhythm,  81bpm, nl axis, nonspecific ST - T wave changes. Assessment:   1. Multiple previous evaluations for atypical chest discomforts. These all had negative work-ups. Prior stress tests and echo. She now had a cardiac catheterization on 2/2/2021. She has no angiographic coronary artery disease. 2. She repeatedly asks \"what about these pains from my heart. \"  However, as described above she states that the pains are all over her body including her chest.  I discussed with her that I do not feel that the pains are coming from her heart. She states that she does have fibromyalgia and lupus. 3. Negative work-up for DVT and PE as described above. I will defer further evaluation of her legs, etc. to others. 4. GERD. 5. Palpitations and reported fast and low heart rates. She is following with electrophysiology. She is in normal sinus rhythm today. 6. ARDS review of her chart. 7. Reported history of asthma per review of her chart. 8. Anxiety/depression. 9. Cholecystectomy  10. Lupus  11. She states that she has fibromyalgia. Recommendations:  1.  I will defer the palpitations and she states electrophysiology found fast heart rates. I will defer to electrophysiology. She is in sinus rhythm today. 2. I reviewed her cardiac catheterization with her multiple times today in great detail. I do not have any cardiac etiology for her pains. Her pain does not sound cardiac. I recommend that she follow-up with her rheumatologist for her lupus and with other physicians for her fibromyalgia. 3. She then was concerned that after her cardiac catheterization she had a left lower extremity discomfort and thought that she felt a lump in her left leg. She was seen and evaluated for this in the emergency room. Her D-dimer was borderline, she had a right upper extremity ultrasound that was negative for DVT for the above right arm discomfort after her cardiac catheterization. She had bilateral lower extremity Dopplers performed and this was negative for DVT as well. She has no swelling or edema on today's exam.  She then states that \"it is not there today. \"  She states that Dr. Benji Fernandez has ordered testing of her legs to be performed at [facility]. I will defer her comorbidities to others. 4. She can follow with cardiology on an as-needed basis. Thank you for allowing me to participate in your patient's care.       [id number], [address] [facility]  Interventional Cardiology

## 2021-05-04 ENCOUNTER — HOSPITAL ENCOUNTER (EMERGENCY)
Age: 51
Discharge: HOME | End: 2021-05-04
Payer: COMMERCIAL

## 2021-05-04 VITALS
WEIGHT: 235.9 LBS | SYSTOLIC BLOOD PRESSURE: 150 MMHG | RESPIRATION RATE: 16 BRPM | TEMPERATURE: 96.7 F | OXYGEN SATURATION: 99 % | DIASTOLIC BLOOD PRESSURE: 71 MMHG | HEIGHT: 62 IN | BODY MASS INDEX: 43.41 KG/M2 | HEART RATE: 80 BPM

## 2021-05-04 DIAGNOSIS — Z20.3 NEED FOR POST EXPOSURE PROPHYLAXIS FOR RABIES: ICD-10-CM

## 2021-05-04 DIAGNOSIS — Z20.3 CONTACT WITH OR EXPOSURE TO RABIES: Primary | ICD-10-CM

## 2021-05-04 PROCEDURE — 90471 IMMUNIZATION ADMIN: CPT | Performed by: PHYSICIAN ASSISTANT

## 2021-05-04 PROCEDURE — 90675 RABIES VACCINE IM: CPT | Performed by: PHYSICIAN ASSISTANT

## 2021-05-04 PROCEDURE — 99284 EMERGENCY DEPT VISIT MOD MDM: CPT

## 2021-05-04 PROCEDURE — 6360000002 HC RX W HCPCS: Performed by: PHYSICIAN ASSISTANT

## 2021-05-04 PROCEDURE — 90375 RABIES IG IM/SC: CPT | Performed by: PHYSICIAN ASSISTANT

## 2021-05-04 PROCEDURE — 96372 THER/PROPH/DIAG INJ SC/IM: CPT

## 2021-05-04 RX ADMIN — RABIES VACCINE 1 ML: KIT at 04:16

## 2021-05-04 RX ADMIN — RABIES IMMUNE GLOBULIN (HUMAN) 2100 UNITS: 300 INJECTION, SOLUTION INFILTRATION; INTRAMUSCULAR at 05:06

## 2021-05-04 ASSESSMENT — PAIN DESCRIPTION - FREQUENCY: FREQUENCY: CONTINUOUS

## 2021-05-04 ASSESSMENT — PAIN DESCRIPTION - ORIENTATION: ORIENTATION: LEFT

## 2021-05-04 NOTE — ED PROVIDER NOTES
[facility]  Department of Emergency Medicine   ED  Encounter Note  Admit Date/RoomTime: 2021  2:51 AM  ED Room: JOSE M/JOSE M    NAME: Yokasta Cuevas  : 1970  MRN: [id number]     Chief Complaint:  Animal Bite (Possible Bat; Patient Concerned for Rabies)    History of Present Illness        Yokasta Cuevas is a 52 y.o. old female presenting to the emergency department by private vehicle, for what pt believes may have been a bat bite to left thumb, with puncture wound which occured 2 day(s) prior to arrival.  Since onset the symptoms have been stable. Tetanus Status:  up to date. Pt reports she was walking in the park early Saturday morning when suddenly a flying creature  flew into her face and she put up her hand and hit it away. She has a couple scratches on her face and a wound on her left thumb. She reports it was migue and she is not certain whether this was a bird or a bat. Abnormal Behavior Witnessed:  Yes. Geographic Location Where Bitten:  outside            Immunization Status of Animal:  animal is stray/wild    ROS   Pertinent positives and negatives are stated within HPI, all other systems reviewed and are negative. Past Medical History:  has a past medical history of ARDS (adult respiratory distress syndrome) ([address] Arthritis, Asthma, Chest pain, Depression, Essential tremor, Gallstones, Generalized abdominal pain, Head trauma, History of blood transfusion, Hx of blood clots, Hyperlipidemia, Hypertension, Infectious colitis, enteritis and gastroenteritis, Lupus (systemic lupus erythematosus) ([address] Obesity, Palpitations, Prolonged emergence from general anesthesia, SOB (shortness of breath), Systemic lupus erythematosus ([address] and Tachycardia. Surgical History:  has a past surgical history that includes Cholecystectomy, laparoscopic (2018);  Retinal detachment surgery; and Diagnostic Cardiac Cath Lab Procedure. Social History:  reports that she quit smoking about 8 years ago. Her smoking use included cigarettes. She has never used smokeless tobacco. She reports that she does not drink alcohol or use drugs. Family History: family history includes Arthritis in her mother; Celiac Disease in her sister; Crohn's Disease in her brother; Diabetes in her father, mother, and sister; High Blood Pressure in her brother, father, mother, and sister; Stroke in her mother; Uterine Cancer in her maternal grandmother. Allergies: Cefdinir, Cephalosporins, Fish allergy, Fish-derived products, Nsaids, Ciprofloxacin, Codeine, Folic acid, Levaquin [levofloxacin in d5w], Methotrexate, Methotrexate derivatives, Metronidazole, Other, Prednisone, Wellbutrin [bupropion], and Pce [erythromycin]    Physical Exam   Oxygen Saturation Interpretation: Normal.        ED Triage Vitals   BP Temp Temp Source Pulse Resp SpO2 Height Weight   05/04/21 0249 05/04/21 0246 05/04/21 0616 05/04/21 0246 05/04/21 0246 05/04/21 0246 05/04/21 0246 05/04/21 0246   (!) 153/79 96.8 °F (36 °C) Oral 88 18 99 % 5' 2\" (1.575 m) 230 lb (104.3 kg)         Constitutional:  Alert, development consistent with age. Neck:  Normal ROM. Supple. Extremity(s):  Left: thumb volar distal digit. .              Tenderness:  none. Swelling: None. Deformity: No.               ROM: full range of motion. Skin:  Two miniscule side by side puncture wounds or may be just a small abrasion. .        Neurovascular: Motor deficit: none. Sensory deficit: none. Pulse deficit: none. Capillary refill: normal.  Lymphatics: No lymphangitis or adenopathy noted. Neurological:  Oriented. Motor functions intact. Lab / Imaging Results   (All laboratory and radiology results have been personally reviewed by myself)  Labs:  No results found for this visit on 05/04/21. Imaging:   All Radiology results interpreted by Radiologist unless otherwise noted. No orders to display       ED Course / Medical Decision Making     Medications   rabies vaccine, PCEC (RABAVERT) injection 1 mL (1 mL Intramuscular Given 5/4/21 1020)   rabies immune globulin (HYPERRAB) 300 UNIT/ML injection 2,100 Units (2,100 Units Intramuscular Given 5/4/21 4895)        Consult(s):   None    Procedure(s):   none    MDM:   Pt presents with what she believes may have a been a bat that flew into her face in the early morning Saturday when she was walking in the park. She did not notice the wound until today and feels like she is having numbness in the finger and in her left left. She may have been bitten by a bat and there fore the Rabies IG and Rabies vaccine were administered in ED and pt was set up  With schedule for remaining Rabies treatment protocol. Pt was set to get her 2nd covid vaccine today, advise to discuss with pharmacist. PT had no adverse reaction to vaccine in ED. Advise return to Ed if severe reactions develop. Plan of Care/Counseling:  I reviewed today's visit with the patient in addition to providing specific details for the plan of care and counseling regarding the diagnosis and prognosis. Questions are answered at this time and are agreeable with the plan. Assessment      1. Contact with or exposure to rabies      Plan   Discharge home. Patient condition is stable    New Medications     Discharge Medication List as of 5/4/2021  6:04 AM        Electronically signed by Alberto Perera PA-C   DD: 5/4/21  **This report was transcribed using voice recognition software. Every effort was made to ensure accuracy; however, inadvertent computerized transcription errors may be present.   END OF ED PROVIDER NOTE       Alberto Perera PA-C  05/04/21 [facility] JOSÉ ANTONIO Galarza  05/04/21 4045

## 2021-05-04 NOTE — ED NOTES
Rabavert (Rabies Vaccine) paper work faxed to the infusion center. Pt was given instructions on the times and dates when to go for follow up vaccine shots. Pt was given the original rabies vaccine paper work and told to bring with her for next vaccine shot. Pt verbalized understanding.       Rodolfo Jennings RN  05/04/21 0210

## 2021-05-05 ENCOUNTER — APPOINTMENT (OUTPATIENT)
Dept: GENERAL RADIOLOGY | Age: 51
End: 2021-05-05
Payer: COMMERCIAL

## 2021-05-05 ENCOUNTER — HOSPITAL ENCOUNTER (EMERGENCY)
Age: 51
Discharge: HOME | End: 2021-05-05
Attending: EMERGENCY MEDICINE
Payer: COMMERCIAL

## 2021-05-05 ENCOUNTER — APPOINTMENT (OUTPATIENT)
Dept: CT IMAGING | Age: 51
End: 2021-05-05
Payer: COMMERCIAL

## 2021-05-05 VITALS
DIASTOLIC BLOOD PRESSURE: 70 MMHG | BODY MASS INDEX: 42.88 KG/M2 | HEART RATE: 64 BPM | HEIGHT: 62 IN | RESPIRATION RATE: 16 BRPM | SYSTOLIC BLOOD PRESSURE: 128 MMHG | WEIGHT: 233 LBS | OXYGEN SATURATION: 97 % | TEMPERATURE: 97.3 F

## 2021-05-05 DIAGNOSIS — F41.1 ANXIETY REACTION: ICD-10-CM

## 2021-05-05 DIAGNOSIS — R55 SYNCOPE, UNSPECIFIED SYNCOPE TYPE: Primary | ICD-10-CM

## 2021-05-05 LAB
ALBUMIN SERPL-MCNC: 3.8 G/DL (ref 3.5–5.2)
ALP BLD-CCNC: 81 U/L (ref 35–104)
ALT SERPL-CCNC: 15 U/L (ref 0–32)
ANION GAP SERPL CALCULATED.3IONS-SCNC: 6 MMOL/L (ref 7–16)
AST SERPL-CCNC: 18 U/L (ref 0–31)
BACTERIA: ABNORMAL /HPF
BASOPHILS ABSOLUTE: 0.03 E9/L (ref 0–0.2)
BASOPHILS RELATIVE PERCENT: 0.4 % (ref 0–2)
BILIRUB SERPL-MCNC: <0.2 MG/DL (ref 0–1.2)
BILIRUBIN URINE: NEGATIVE
BLOOD, URINE: NEGATIVE
BUN BLDV-MCNC: 15 MG/DL (ref 6–20)
CALCIUM SERPL-MCNC: 8.9 MG/DL (ref 8.6–10.2)
CHLORIDE BLD-SCNC: 107 MMOL/L (ref 98–107)
CHP ED QC CHECK: YES
CLARITY: CLEAR
CO2: 26 MMOL/L (ref 22–29)
COLOR: YELLOW
CREAT SERPL-MCNC: 1 MG/DL (ref 0.5–1)
EKG ATRIAL RATE: 81 BPM
EKG P AXIS: 57 DEGREES
EKG P-R INTERVAL: 172 MS
EKG Q-T INTERVAL: 384 MS
EKG QRS DURATION: 86 MS
EKG QTC CALCULATION (BAZETT): 446 MS
EKG R AXIS: 30 DEGREES
EKG T AXIS: 17 DEGREES
EKG VENTRICULAR RATE: 81 BPM
EOSINOPHILS ABSOLUTE: 0.38 E9/L (ref 0.05–0.5)
EOSINOPHILS RELATIVE PERCENT: 5.7 % (ref 0–6)
GFR AFRICAN AMERICAN: >60
GFR NON-AFRICAN AMERICAN: 59 ML/MIN/1.73
GLUCOSE BLD-MCNC: 119 MG/DL
GLUCOSE BLD-MCNC: 119 MG/DL (ref 74–99)
GLUCOSE URINE: NEGATIVE MG/DL
HCT VFR BLD CALC: 37.2 % (ref 34–48)
HEMOGLOBIN: 11.9 G/DL (ref 11.5–15.5)
IMMATURE GRANULOCYTES #: 0.03 E9/L
IMMATURE GRANULOCYTES %: 0.4 % (ref 0–5)
KETONES, URINE: NEGATIVE MG/DL
LEUKOCYTE ESTERASE, URINE: ABNORMAL
LYMPHOCYTES ABSOLUTE: 1.18 E9/L (ref 1.5–4)
LYMPHOCYTES RELATIVE PERCENT: 17.6 % (ref 20–42)
MCH RBC QN AUTO: 27 PG (ref 26–35)
MCHC RBC AUTO-ENTMCNC: 32 % (ref 32–34.5)
MCV RBC AUTO: 84.5 FL (ref 80–99.9)
METER GLUCOSE: 119 MG/DL (ref 74–99)
MONOCYTES ABSOLUTE: 0.59 E9/L (ref 0.1–0.95)
MONOCYTES RELATIVE PERCENT: 8.8 % (ref 2–12)
NEUTROPHILS ABSOLUTE: 4.51 E9/L (ref 1.8–7.3)
NEUTROPHILS RELATIVE PERCENT: 67.1 % (ref 43–80)
NITRITE, URINE: NEGATIVE
PDW BLD-RTO: 14 FL (ref 11.5–15)
PH UA: 6 (ref 5–9)
PLATELET # BLD: 167 E9/L (ref 130–450)
PMV BLD AUTO: 8.7 FL (ref 7–12)
POTASSIUM REFLEX MAGNESIUM: 3.8 MMOL/L (ref 3.5–5)
PRO-BNP: 112 PG/ML (ref 0–125)
PROTEIN UA: NEGATIVE MG/DL
RBC # BLD: 4.4 E12/L (ref 3.5–5.5)
RBC UA: ABNORMAL /HPF (ref 0–2)
SODIUM BLD-SCNC: 139 MMOL/L (ref 132–146)
SPECIFIC GRAVITY UA: 1.02 (ref 1–1.03)
TOTAL PROTEIN: 6.4 G/DL (ref 6.4–8.3)
TROPONIN: <0.01 NG/ML (ref 0–0.03)
UROBILINOGEN, URINE: 0.2 E.U./DL
WBC # BLD: 6.7 E9/L (ref 4.5–11.5)
WBC UA: ABNORMAL /HPF (ref 0–5)

## 2021-05-05 PROCEDURE — 84484 ASSAY OF TROPONIN QUANT: CPT

## 2021-05-05 PROCEDURE — 93005 ELECTROCARDIOGRAM TRACING: CPT | Performed by: STUDENT IN AN ORGANIZED HEALTH CARE EDUCATION/TRAINING PROGRAM

## 2021-05-05 PROCEDURE — 71045 X-RAY EXAM CHEST 1 VIEW: CPT

## 2021-05-05 PROCEDURE — 70450 CT HEAD/BRAIN W/O DYE: CPT

## 2021-05-05 PROCEDURE — 81001 URINALYSIS AUTO W/SCOPE: CPT

## 2021-05-05 PROCEDURE — 93010 ELECTROCARDIOGRAM REPORT: CPT | Performed by: INTERNAL MEDICINE

## 2021-05-05 PROCEDURE — 2580000003 HC RX 258: Performed by: STUDENT IN AN ORGANIZED HEALTH CARE EDUCATION/TRAINING PROGRAM

## 2021-05-05 PROCEDURE — 83880 ASSAY OF NATRIURETIC PEPTIDE: CPT

## 2021-05-05 PROCEDURE — 82962 GLUCOSE BLOOD TEST: CPT

## 2021-05-05 PROCEDURE — 85025 COMPLETE CBC W/AUTO DIFF WBC: CPT

## 2021-05-05 PROCEDURE — 6360000002 HC RX W HCPCS: Performed by: STUDENT IN AN ORGANIZED HEALTH CARE EDUCATION/TRAINING PROGRAM

## 2021-05-05 PROCEDURE — 80053 COMPREHEN METABOLIC PANEL: CPT

## 2021-05-05 PROCEDURE — 99285 EMERGENCY DEPT VISIT HI MDM: CPT

## 2021-05-05 PROCEDURE — 96374 THER/PROPH/DIAG INJ IV PUSH: CPT

## 2021-05-05 RX ORDER — 0.9 % SODIUM CHLORIDE 0.9 %
1000 INTRAVENOUS SOLUTION INTRAVENOUS ONCE
Status: COMPLETED | OUTPATIENT
Start: 2021-05-05 | End: 2021-05-05

## 2021-05-05 RX ORDER — ONDANSETRON 2 MG/ML
4 INJECTION INTRAMUSCULAR; INTRAVENOUS ONCE
Status: COMPLETED | OUTPATIENT
Start: 2021-05-05 | End: 2021-05-05

## 2021-05-05 RX ADMIN — SODIUM CHLORIDE 1000 ML: 9 INJECTION, SOLUTION INTRAVENOUS at 05:31

## 2021-05-05 RX ADMIN — ONDANSETRON 4 MG: 2 INJECTION INTRAMUSCULAR; INTRAVENOUS at 05:31

## 2021-05-05 ASSESSMENT — ENCOUNTER SYMPTOMS
COUGH: 0
SHORTNESS OF BREATH: 1
PHOTOPHOBIA: 0
NAUSEA: 1
ABDOMINAL DISTENTION: 0
BACK PAIN: 0
WHEEZING: 0
SORE THROAT: 0
VOMITING: 0
DIARRHEA: 0
SINUS PRESSURE: 0

## 2021-05-05 ASSESSMENT — PAIN SCALES - GENERAL: PAINLEVEL_OUTOF10: 0

## 2021-05-05 NOTE — ED PROVIDER NOTES
[facility]  Department of Emergency Medicine     Written by: Flaca Sanchez DO  Patient Name: Colleen Lantigua  Attending Provider: Charo Duque MD  Admit Date: 2021  4:28 AM  MRN: [id number]                   : 1970        Chief Complaint   Patient presents with    Loss of Consciousness     onset approx half hour PTA upon awakening; Rabies Vaccine yesterday    Difficulty Walking    Extremity Weakness     with numbness    Nausea    Shortness of Breath    - Chief complaint    Patient is a 81-year-old female past medical history of asthma, depression, hyperlipidemia, hypertension and lupus. Patient presents with chief complaint of syncopal episode. Patient states that approximately 1/2-hour prior to arrival she woke up from bed and tried to get up to use the bathroom. When she stood up she became dizzy, lightheaded and believes she may have passed out. Patient also noted that she had a heaviness and numbness in her lower extremities. Patient notes that she immediately regained consciousness. Patient still notes some weakness to the bilateral lower extremities. Patient also notes that she had recently just received the rabies vaccine yesterday due to possible bat bite that she received on Saturday. Patient states that symptoms have been constant since onset and moderate in severity. She denies any exacerbating relieving factors. She denies any similar episodes in the past.  Patient denies any fevers, chills, abdominal pain, constipation, diarrhea or chest pain. The history is provided by the patient. No  was used. Review of Systems   Constitutional: Positive for fatigue. Negative for chills and fever. HENT: Negative for ear pain, sinus pressure and sore throat. Eyes: Negative for photophobia and visual disturbance. Respiratory: Positive for shortness of breath. Negative for cough and wheezing.     Cardiovascular: Negative for chest pain. Gastrointestinal: Positive for nausea. Negative for abdominal distention, diarrhea and vomiting. Genitourinary: Negative for dysuria and frequency. Musculoskeletal: Negative for arthralgias and back pain. Skin: Negative for rash and wound. Neurological: Positive for syncope and weakness. Negative for headaches. All other systems reviewed and are negative. Physical Exam  Vitals signs and nursing note reviewed. Constitutional:       General: She is not in acute distress. Appearance: Normal appearance. HENT:      Head: Normocephalic and atraumatic. Nose: No congestion or rhinorrhea. Mouth/Throat:      Mouth: Mucous membranes are moist.      Pharynx: Oropharynx is clear. Eyes:      Extraocular Movements: Extraocular movements intact. Pupils: Pupils are equal, round, and reactive to light. Neck:      Musculoskeletal: Normal range of motion. No neck rigidity or muscular tenderness. Cardiovascular:      Rate and Rhythm: Normal rate and regular rhythm. Heart sounds: No murmur. No gallop. Pulmonary:      Effort: Pulmonary effort is normal. No respiratory distress. Breath sounds: No wheezing, rhonchi or rales. Abdominal:      General: Abdomen is flat. Palpations: Abdomen is soft. There is no mass. Tenderness: There is no abdominal tenderness. There is no guarding. Hernia: No hernia is present. Musculoskeletal: Normal range of motion. General: No swelling, tenderness or signs of injury. Skin:     General: Skin is warm and dry. Capillary Refill: Capillary refill takes less than 2 seconds. Neurological:      General: No focal deficit present. Mental Status: She is alert and oriented to person, place, and time. Mental status is at baseline. Comments:  On neurological exam, cranial nerves intact, sensation intact to light touch, muscle strength 5 out of 5 to bilateral upper and lower extremities, no focal neurological deficits. Psychiatric:         Mood and Affect: Mood normal.          Procedures   EKG #1:  Interpreted by emergency department physician unless otherwise noted. Time:  509    Rate: 81  Rhythm: Sinus. Interpretation: EKG obtained demonstrated normal sinus rhythm, rate 81, normal axis, , no acute ST segment changes. .  Comparison: stable as compared to patient's most recent EKG. MDM  Number of Diagnoses or Management Options  Anxiety reaction  Syncope, unspecified syncope type  Diagnosis management comments: Patient is a 72-year-old female past medical history of asthma, depression, hyperlipidemia, hypertension and lupus. Patient presents with chief complaint of syncopal episode. Vital signs stable on presentation. On physical exam heart regular rate and rhythm, lungs clear to auscultation bilaterally, abdomen soft nontender. On neurological exam, no focal neurological deficits, cranial nerves intact, sensation intact to light touch, muscle strength 5 out of 5 to bilateral upper and lower extremities. EKG obtained demonstrated normal sinus rhythm no acute ischemic changes. Laboratory work obtained CBC unremarkable, CMP unremarkable, urinalysis not negative infection, troponin less than 0.01, proBNP 112. Chest x-ray obtained demonstrate no acute abnormalities. CT scan of the head demonstrated no acute abnormalities. Findings consistent with syncope likely vasovagal in nature. Decision made to discharge patient. Patient was instructed to increase fluids and to follow-up with primary care doctor soon as possible. Patient notes any new worrisome symptoms, she was instructed to return to the emergency department for evaluation. Plan of care discussed with patient including discharge, all questions were answered, patient was in agreement plan of care and discharged home in stable condition.        Amount and/or Complexity of Data Reviewed  Clinical lab tests: ordered and reviewed  Tests in the radiology section of CPT®: reviewed and ordered    Risk of Complications, Morbidity, and/or Mortality  Presenting problems: moderate  Diagnostic procedures: moderate  Management options: moderate    Patient Progress  Patient progress: stable       ED Course as of May 06 2328   Wed May 05, 2021   5620 6:32 AM EDT  I received this patient at sign out from Dr. Dottie Villafana. I have discussed the patient's initial exam, treatment and plan of care with the out going physician. I have introduced my self to the patient / family and have answered their questions to this point. I have examined the patient myself and reviewed ordered tests / medications and  reviewed any available results to this point. If a resident is involved in the Emergency Department care, I have discussed my findings and plan with them as well. [WL]   [id number] I have reviewed the notes, assessments, and/or procedures performed by dr Juan Meléndez and dr Cristi Ramirez, I concur with her/his documentation of Angella Merino. Pt was re-evaluated feels markedly better. Likely pt has a vasal vagal episode and has associated anxiety. All testing d/w patient, she is to be discharged and f/w pcp in 2 days for recheck,       Counseled regarding todays diagnosis, including possible risks and complications,  especially if left uncontrolled.     Counseled regarding the possible side effects, risks, benefits and alternatives to treatment; patient and/or guardian verbalizes understanding, agrees, feels comfortable with and wishes to proceed with treatment plan.     Advised patient to call her primary care physician with any new medication issues, and read all Rx info from pharmacy to assure aware of all possible risks and side effects of medication before taking.     I did discuss warning signs for when to return to the Emergency Room, and the patient verbalized understanding          [MY]      ED Course User Index  [MY] Glenn Cosby DO  [WL] Jen Glover, DO        --------------------------------------------- PAST HISTORY ---------------------------------------------  Past Medical History:  has a past medical history of ARDS (adult respiratory distress syndrome) ([facility] [address] Arthritis, Asthma, Chest pain, Depression, Essential tremor, Gallstones, Generalized abdominal pain, Head trauma, History of blood transfusion, Hx of blood clots, Hyperlipidemia, Hypertension, Infectious colitis, enteritis and gastroenteritis, Lupus (systemic lupus erythematosus) ([facility] [address] Obesity, Palpitations, Prolonged emergence from general anesthesia, SOB (shortness of breath), Systemic lupus erythematosus ([facility] [address] and Tachycardia. Past Surgical History:  has a past surgical history that includes Cholecystectomy, laparoscopic (03/2018); Retinal detachment surgery; and Diagnostic Cardiac Cath Lab Procedure. Social History:  reports that she quit smoking about 8 years ago. Her smoking use included cigarettes. She has never used smokeless tobacco. She reports that she does not drink alcohol or use drugs. Family History: family history includes Arthritis in her mother; Celiac Disease in her sister; Crohn's Disease in her brother; Diabetes in her father, mother, and sister; High Blood Pressure in her brother, father, mother, and sister; Stroke in her mother; Uterine Cancer in her maternal grandmother. The patients home medications have been reviewed.     Allergies: Cefdinir, Cephalosporins, Fish allergy, Fish-derived products, Nsaids, Ciprofloxacin, Codeine, Folic acid, Levaquin [levofloxacin in d5w], Methotrexate, Methotrexate derivatives, Metronidazole, Other, Prednisone, Wellbutrin [bupropion], and Pce [erythromycin]    -------------------------------------------------- RESULTS -------------------------------------------------  Labs:  Results for orders placed or performed during the hospital encounter of 05/05/21   CBC Auto Differential   Result Value Ref Range    WBC 6.7 4.5 - 11.5 E9/L    RBC 4.40 3.50 - 5.50 E12/L    Hemoglobin 11.9 11.5 - 15.5 g/dL    Hematocrit 37.2 34.0 - 48.0 %    MCV 84.5 80.0 - 99.9 fL    MCH 27.0 26.0 - 35.0 pg    MCHC 32.0 32.0 - 34.5 %    RDW 14.0 11.5 - 15.0 fL    Platelets 795 246 - 081 E9/L    MPV 8.7 7.0 - 12.0 fL    Neutrophils % 67.1 43.0 - 80.0 %    Immature Granulocytes % 0.4 0.0 - 5.0 %    Lymphocytes % 17.6 (L) 20.0 - 42.0 %    Monocytes % 8.8 2.0 - 12.0 %    Eosinophils % 5.7 0.0 - 6.0 %    Basophils % 0.4 0.0 - 2.0 %    Neutrophils Absolute 4.51 1.80 - 7.30 E9/L    Immature Granulocytes # 0.03 E9/L    Lymphocytes Absolute 1.18 (L) 1.50 - 4.00 E9/L    Monocytes Absolute 0.59 0.10 - 0.95 E9/L    Eosinophils Absolute 0.38 0.05 - 0.50 E9/L    Basophils Absolute 0.03 0.00 - 0.20 E9/L   Comprehensive Metabolic Panel w/ Reflex to MG   Result Value Ref Range    Sodium 139 132 - 146 mmol/L    Potassium reflex Magnesium 3.8 3.5 - 5.0 mmol/L    Chloride 107 98 - 107 mmol/L    CO2 26 22 - 29 mmol/L    Anion Gap 6 (L) 7 - 16 mmol/L    Glucose 119 (H) 74 - 99 mg/dL    BUN 15 6 - 20 mg/dL    CREATININE 1.0 0.5 - 1.0 mg/dL    GFR Non-African American 59 >=60 mL/min/1.73    GFR African American >60     Calcium 8.9 8.6 - 10.2 mg/dL    Total Protein 6.4 6.4 - 8.3 g/dL    Albumin 3.8 3.5 - 5.2 g/dL    Total Bilirubin <0.2 0.0 - 1.2 mg/dL    Alkaline Phosphatase 81 35 - 104 U/L    ALT 15 0 - 32 U/L    AST 18 0 - 31 U/L   Troponin   Result Value Ref Range    Troponin <0.01 0.00 - 0.03 ng/mL   Brain Natriuretic Peptide   Result Value Ref Range    Pro- 0 - 125 pg/mL   Urinalysis, reflex to microscopic   Result Value Ref Range    Color, UA Yellow Straw/Yellow    Clarity, UA Clear Clear    Glucose, Ur Negative Negative mg/dL    Bilirubin Urine Negative Negative    Ketones, Urine Negative Negative mg/dL    Specific Gravity, UA 1.025 1.005 - 1.030    Blood, Urine Negative Negative    pH, UA 6.0 5.0 - 9.0    Protein, UA Negative Negative mg/dL    Urobilinogen, Urine 0.2 <2.0 E.U./dL    Nitrite, Urine Negative Negative    Leukocyte Esterase, Urine SMALL (A) Negative   Microscopic Urinalysis   Result Value Ref Range    WBC, UA 2-5 0 - 5 /HPF    RBC, UA NONE 0 - 2 /HPF    Bacteria, UA RARE (A) None Seen /HPF   POCT Glucose   Result Value Ref Range    Glucose 119 mg/dL    QC OK? yes    POCT Glucose   Result Value Ref Range    Meter Glucose 119 (H) 74 - 99 mg/dL   EKG 12 Lead   Result Value Ref Range    Ventricular Rate 81 BPM    Atrial Rate 81 BPM    P-R Interval 172 ms    QRS Duration 86 ms    Q-T Interval 384 ms    QTc Calculation (Bazett) 446 ms    P Axis 57 degrees    R Axis 30 degrees    T Axis 17 degrees       Radiology:  CT Head WO Contrast   Final Result   No acute intracranial abnormality. MRI would be useful if symptoms persist.         XR CHEST PORTABLE   Final Result   No significant change. PA and lateral views would be useful for further   assessment, if symptoms persist.             ------------------------- NURSING NOTES AND VITALS REVIEWED ---------------------------  Date / Time Roomed:  5/5/2021  4:28 AM  ED Bed Assignment:  25/25    The nursing notes within the ED encounter and vital signs as below have been reviewed. /70   Pulse 64   Temp 97.3 °F (36.3 °C) (Temporal)   Resp 16   Ht 5' 2\" (1.575 m)   Wt 233 lb (105.7 kg)   SpO2 97%   BMI 42.62 kg/m²   Oxygen Saturation Interpretation: Normal      ------------------------------------------ PROGRESS NOTES ------------------------------------------  11:28 PM EDT  I have spoken with the patient and discussed todays results, in addition to providing specific details for the plan of care and counseling regarding the diagnosis and prognosis. Their questions are answered at this time and they are agreeable with the plan. I discussed at length with them reasons for immediate return here for re evaluation.  They will followup with their primary care physician by calling their office tomorrow. --------------------------------- ADDITIONAL PROVIDER NOTES ---------------------------------  At this time the patient is without objective evidence of an acute process requiring hospitalization or inpatient management. They have remained hemodynamically stable throughout their entire ED visit and are stable for discharge with outpatient follow-up. The plan has been discussed in detail and they are aware of the specific conditions for emergent return, as well as the importance of follow-up. Discharge Medication List as of 5/5/2021  7:33 AM          Diagnosis:  1. Syncope, unspecified syncope type    2. Anxiety reaction        Disposition:  Patient's disposition: Discharge to home  Patient's condition is stable. Patient was seen and evaluated by myself and my attending Rajesh Diaz MD. Assessment and Plan discussed with attending provider, please see attestation for final plan of care.      DO Sarah Oliva DO  Resident  05/06/21 2665

## 2021-05-05 NOTE — ED NOTES
Report given to [facility] ([facility])     Dimitry Henson RN  05/05/21 0691

## 2021-05-05 NOTE — ED NOTES
Patient ambulated to bathroom with minimal assistance from this RN.      Franklin Avalos RN  05/05/21 2944

## 2021-05-07 ENCOUNTER — HOSPITAL ENCOUNTER (OUTPATIENT)
Dept: INFUSION THERAPY | Age: 51
Setting detail: INFUSION SERIES
Discharge: HOME | End: 2021-05-07
Payer: COMMERCIAL

## 2021-05-07 VITALS
TEMPERATURE: 98.2 F | OXYGEN SATURATION: 92 % | DIASTOLIC BLOOD PRESSURE: 77 MMHG | RESPIRATION RATE: 16 BRPM | SYSTOLIC BLOOD PRESSURE: 129 MMHG | HEART RATE: 89 BPM

## 2021-05-07 DIAGNOSIS — Z20.3 NEED FOR POST EXPOSURE PROPHYLAXIS FOR RABIES: Primary | ICD-10-CM

## 2021-05-07 PROCEDURE — 90471 IMMUNIZATION ADMIN: CPT | Performed by: PHYSICIAN ASSISTANT

## 2021-05-07 PROCEDURE — 90675 RABIES VACCINE IM: CPT | Performed by: PHYSICIAN ASSISTANT

## 2021-05-07 PROCEDURE — 6360000002 HC RX W HCPCS: Performed by: PHYSICIAN ASSISTANT

## 2021-05-07 RX ADMIN — RABIES VACCINE 1 ML: KIT at 14:21

## 2021-05-07 ASSESSMENT — PAIN SCALES - GENERAL: PAINLEVEL_OUTOF10: 0

## 2021-05-07 NOTE — PROGRESS NOTES
Patient tolerated injection well. Therapy plan reviewed with patient. Verbalizes understanding. Reviewed AVS with patient, reviewed medication information, verbalizes good knowledge of current plan, and has no signs or symptoms to report at this time. Declines copy of AVS.  Next appointment made. Patient did not stay 20 mins. Pt was asking if she needed to hold some of her home meds (like metoprolol) while she is getting her rabies vaccines. I told her she needs to check with her family Dr and I cannot tell her to hold any meds. She was also asking if she needed to get the  5th vaccine due to she is immunocompromised (lupus). I tod her to check with her family Dr whether she needs to get the 5th beacuse the order from ER clearly was for only the 4 shots and her health history is in Epic for them to see.

## 2021-05-11 ENCOUNTER — HOSPITAL ENCOUNTER (OUTPATIENT)
Dept: INFUSION THERAPY | Age: 51
Setting detail: INFUSION SERIES
Discharge: HOME | End: 2021-05-11
Payer: COMMERCIAL

## 2021-05-11 VITALS
WEIGHT: 233 LBS | DIASTOLIC BLOOD PRESSURE: 65 MMHG | SYSTOLIC BLOOD PRESSURE: 106 MMHG | OXYGEN SATURATION: 96 % | HEIGHT: 62 IN | HEART RATE: 94 BPM | BODY MASS INDEX: 42.88 KG/M2 | RESPIRATION RATE: 16 BRPM | TEMPERATURE: 98.3 F

## 2021-05-11 DIAGNOSIS — Z20.3 NEED FOR POST EXPOSURE PROPHYLAXIS FOR RABIES: Primary | ICD-10-CM

## 2021-05-11 PROCEDURE — 6360000002 HC RX W HCPCS: Performed by: PHYSICIAN ASSISTANT

## 2021-05-11 PROCEDURE — 90471 IMMUNIZATION ADMIN: CPT | Performed by: PHYSICIAN ASSISTANT

## 2021-05-11 PROCEDURE — 90675 RABIES VACCINE IM: CPT | Performed by: PHYSICIAN ASSISTANT

## 2021-05-11 PROCEDURE — 96372 THER/PROPH/DIAG INJ SC/IM: CPT

## 2021-05-11 RX ADMIN — RABIES VACCINE 1 ML: KIT at 13:22

## 2021-05-11 NOTE — PROGRESS NOTES
Patient tolerated injection well. Therapy plan reviewed with patient. Verbalizes understanding. Reviewed AVS with patient, reviewed medication information, verbalizes good knowledge of current plan, and has no signs or symptoms to report at this time. Declines copy of AVS.  Next appointment made. Patient declined to stay 20 minute wait after the injection instructed on importance of staying and patient declines .

## 2021-05-15 ENCOUNTER — HOSPITAL ENCOUNTER (EMERGENCY)
Age: 51
Discharge: HOME | End: 2021-05-15
Payer: COMMERCIAL

## 2021-05-15 PROCEDURE — 99282 EMERGENCY DEPT VISIT SF MDM: CPT

## 2021-05-15 RX ORDER — HYDROCODONE BITARTRATE AND ACETAMINOPHEN 5; 325 MG/1; MG/1
TABLET ORAL
Status: DISCONTINUED
Start: 2021-05-15 | End: 2021-05-15 | Stop reason: HOSPADM

## 2021-05-18 ENCOUNTER — HOSPITAL ENCOUNTER (OUTPATIENT)
Dept: INFUSION THERAPY | Age: 51
Setting detail: INFUSION SERIES
Discharge: HOME | End: 2021-05-18
Payer: COMMERCIAL

## 2021-05-18 NOTE — PROGRESS NOTES
Spoke with Dr Trey Bella regarding pts complaints of numbness and tingling in her face and her legs especially her left thigh from last rabies shots she had. Stated that after each shot her symptoms got worse. Doctor stated to hold last vaccine. Called pt and informed her.

## 2021-07-14 ENCOUNTER — OFFICE VISIT (OUTPATIENT)
Dept: NEUROLOGY | Age: 51
End: 2021-07-14
Payer: COMMERCIAL

## 2021-07-14 VITALS
RESPIRATION RATE: 16 BRPM | BODY MASS INDEX: 42.33 KG/M2 | SYSTOLIC BLOOD PRESSURE: 118 MMHG | DIASTOLIC BLOOD PRESSURE: 79 MMHG | TEMPERATURE: 98.2 F | OXYGEN SATURATION: 99 % | HEART RATE: 83 BPM | HEIGHT: 62 IN | WEIGHT: 230 LBS

## 2021-07-14 DIAGNOSIS — H53.8 BLURRED VISION: ICD-10-CM

## 2021-07-14 DIAGNOSIS — R41.0 CONFUSION: ICD-10-CM

## 2021-07-14 DIAGNOSIS — G44.52 NEW DAILY PERSISTENT HEADACHE: Primary | ICD-10-CM

## 2021-07-14 PROCEDURE — G8417 CALC BMI ABV UP PARAM F/U: HCPCS | Performed by: PSYCHIATRY & NEUROLOGY

## 2021-07-14 PROCEDURE — 99214 OFFICE O/P EST MOD 30 MIN: CPT | Performed by: PSYCHIATRY & NEUROLOGY

## 2021-07-14 PROCEDURE — 1036F TOBACCO NON-USER: CPT | Performed by: PSYCHIATRY & NEUROLOGY

## 2021-07-14 PROCEDURE — G8427 DOCREV CUR MEDS BY ELIG CLIN: HCPCS | Performed by: PSYCHIATRY & NEUROLOGY

## 2021-07-14 RX ORDER — TOPIRAMATE 25 MG/1
TABLET ORAL
Qty: 74 TABLET | Refills: 0 | Status: SHIPPED
Start: 2021-07-14 | End: 2021-09-21 | Stop reason: ALTCHOICE

## 2021-07-14 NOTE — PROGRESS NOTES
NEUROLOGY CLINIC PROGRESS NOTE     Name: Corinne Peon  Date: 7/14/2021  FBU:[id number]  Patient's PCP: Christianne Landin DO     REASON FOR VISIT/CHIEF COMPLAINT: Follow-up visit for multiple complaints     HISTORY OF PRESENT ILLNESS: The patient is coming in for a follow-up visit for multiple complaints. Confusion and memory loss is getting worse. The patient was previously recommended to undergo neuropsychological testing however it has not been completed. The patient was previously seen for numbness and tingling in bilateral hands for which she had a EMG which was normal. She also had blood work in the past which was normal.  Currently she is coming in with complaints of full body aches, pains, continued worsening numbness and tingling also the patient reports new symptoms of headache. She describes headache as new, located all over the head and bilateral frontal and parietal regions, aching throbbing pressure type, 8/10 intensity lasting for 4 hours or 24 hours associated with nausea, vomiting, blurred vision and dizziness. She did not have any history of migraines. The patient also reports as episodes of déjà vu, she has had multiple episodes. She also is complaining of joint pain, numbness tingling and weakness. She has lupus and will be starting Benlysta shortly. She has been on and off of Plaquenil. Last MRI brain showed nonspecific white matter abnormalities.   No other aggravating relieving factors  No other associated symptoms        PAST MEDICAL HISTORY:   Past Medical History:   Diagnosis Date    ARDS (adult respiratory distress syndrome) (HCC)     Arthritis     Asthma     Chest pain     Depression     Essential tremor     Gallstones     Generalized abdominal pain     Head trauma     multiple history of    History of blood transfusion     Hx of blood clots     Hyperlipidemia     Hypertension     Infectious colitis, enteritis and gastroenteritis     Lupus [facility]systemic lupus erythematosus) ([facility] [address]     Obesity     Palpitations     Prolonged emergence from general anesthesia     SOB (shortness of breath)     Systemic lupus erythematosus ([facility] [address]     Tachycardia      PAST SURGICAL HISTORY:   Past Surgical History:   Procedure Laterality Date    CHOLECYSTECTOMY, LAPAROSCOPIC  2018    DIAGNOSTIC CARDIAC CATH LAB PROCEDURE      RETINAL DETACHMENT SURGERY      retina's reattached     FAMILY MEDICAL HISTORY:   Family History   Problem Relation Age of Onset    High Blood Pressure Mother     Diabetes Mother     Stroke Mother     Arthritis Mother     Diabetes Father     High Blood Pressure Father     High Blood Pressure Sister     Diabetes Sister     Celiac Disease Sister     High Blood Pressure Brother     Crohn's Disease Brother     Uterine Cancer Maternal Grandmother      SOCIAL HISTORY:   Social History     Socioeconomic History    Marital status: Single     Spouse name: None    Number of children: None    Years of education: None    Highest education level: None   Occupational History    None   Tobacco Use    Smoking status: Former Smoker     Types: Cigarettes     Quit date: 10/31/2012     Years since quittin.7    Smokeless tobacco: Never Used   Vaping Use    Vaping Use: Never used   Substance and Sexual Activity    Alcohol use: No    Drug use: No    Sexual activity: None   Other Topics Concern    None   Social History Narrative    None     Social Determinants of Health     Financial Resource Strain:     Difficulty of Paying Living Expenses:    Food Insecurity:     Worried About Running Out of Food in the Last Year:     Ran Out of Food in the Last Year:    Transportation Needs:     Lack of Transportation (Medical):      Lack of Transportation (Non-Medical):    Physical Activity:     Days of Exercise per Week:     Minutes of Exercise per Session:    Stress:     Feeling of Stress :    Social Connections:     Frequency of Communication with Friends and Family:     Frequency of Social Gatherings with Friends and Family:     Attends Gnosticist Services:     Active Member of Clubs or Organizations:     Attends Club or Organization Meetings:     Marital Status:    Intimate Partner Violence:     Fear of Current or Ex-Partner:     Emotionally Abused:     Physically Abused:     Sexually Abused:       E-Cigarettes/Vaping Use     Questions Responses    E-Cigarette/Vaping Use Never User    Start Date     Passive Exposure     Quit Date     Counseling Given     Comments          Allergy:   Allergies   Allergen Reactions    Cefdinir Rash     Rash, Blisters. Rash, Blisters.     Cephalosporins Rash     hallucinations    Fish Allergy Anaphylaxis    Fish-Derived Products Anaphylaxis     anaphylaxis    Nsaids      Vomiting    Ciprofloxacin     Codeine Nausea Only    Folic Acid Hives    Levaquin [Levofloxacin In D5w] Hives    Methotrexate     Methotrexate Derivatives     Metronidazole Hives     throat closing, hives  throat closing, hives    Other      Allergic to Midrin    Prednisone Hives    Wellbutrin [Bupropion]     Pce [Erythromycin] Rash     MEDS:   Current Outpatient Medications:     metoprolol tartrate (LOPRESSOR) 25 MG tablet, Take 25 mg by mouth 2 times daily 25 am 12.5 pm, Disp: , Rfl:     topiramate (TOPAMAX) 25 MG tablet, Take 1 tablet by mouth nightly for 14 days, THEN 2 tablets nightly., Disp: 74 tablet, Rfl: 0    guaiFENesin (MUCINEX) 600 MG extended release tablet, Take 1,200 mg by mouth as needed for Congestion, Disp: , Rfl:     Multiple Vitamins-Minerals (MULTIVITAMIN ADULT PO), Take by mouth daily, Disp: , Rfl:     albuterol sulfate  (90 Base) MCG/ACT inhaler, Inhale 2 puffs into the lungs every 6 hours as needed for Wheezing, Disp: , Rfl:     ipratropium-albuterol (DUONEB) 0.5-2.5 (3) MG/3ML SOLN nebulizer solution, Inhale 1 vial into the lungs every 6 hours as needed for Shortness of Breath, Disp: , Rfl:     Probiotic Product (PROBIOTIC-10) CAPS, Take 1 capsule by mouth daily , Disp: , Rfl:     aspirin 81 MG tablet, Take 81 mg by mouth daily, Disp: , Rfl:     ALPRAZolam (XANAX) 1 MG tablet, Take 0.5 mg by mouth 3 times daily as needed for Sleep or Anxiety. , Disp: , Rfl:     Fluticasone Furoate-Vilanterol (BREO ELLIPTA) 200-25 MCG/INH AEPB, Inhale 1 puff into the lungs daily as needed (SOB) , Disp: , Rfl:     REVIEW OF SYSTEMS  Review of Systems      PHYSICAL EXAM:   /79   Pulse 83   Temp 98.2 °F (36.8 °C) (Temporal)   Resp 16   Ht 5' 2\" (1.575 m)   Wt 230 lb (104.3 kg)   SpO2 99%   BMI 42.07 kg/m²     Neurological examination     MENTAL STATUS: Patient awake and oriented to time, place, and person. Recent/remote memory normal. Attention span/concentration normal. Speech fluent. Good comprehension, naming, and repetition. Fund of knowledge appropriate for patient's level of education. Affect normal.    CRANIAL NERVES:  CN I: Not tested. CN II: Fundoscopic exam not performed. CN III, IV, VI: Pupils equal, round and reactive to light; extra ocular movements full and intact. CN V: Facial sensation normal.  CN VII: No facial asymmetry. CN VIII:  Hearing grossly normal bilaterally. No pathologic nystagmus or skew deviation. CN IX, X: Palate elevates symmetrically. CN XI: Shoulder shrug and chin rotation equal with intact strength. CN XII: Tongue protrusion midline. MOTOR: Normal bulk. Tone normal and symmetric throughout. Strength 5/5 throughout. ABNORMAL MOVEMENTS/TREMORS: No     REFLEXES: DTRs 2+; normal and symmetric throughout. Plantar response downgoing. SENSATION: Sensation grossly intact to fine touch, pain/temperature, vibration and position. COORDINATION: Finger-to-nose and heel to shin normal for age and symmetric. Finger tapping and alternating movements normal.    STATION: Negative Romberg. GAIT:  Normal heel, toe and tandem; no ataxia.        DIAGNOSTIC TESTS:     I have personally reviewed the most recent lab results     ASSESSMENT:     Jacklyn Camacho was seen today for headache. Diagnoses and all orders for this visit:    New daily persistent headache    Episodes of déjà vu    -     EEG awake or drowsy routine; Future  -     MRI BRAIN WO CONTRAST; Future    · New daily persistent headache with features of migraine type headaches. · Check MRI brain and MRV to look for any underlying intracranial abnormalities, rule out venous sinus thrombosis and an underlying demyelinating etiology. · Check EEG awake and drowsy state to look for any interictal epileptiform abnormality and focal seizures. · For her headaches, start on Topamax, titrate to a dose of 50 mg twice daily. · Side effects of Topamax including confusion, numbness and tingling were discussed with the patient. The patient verbalized understanding.        Rene Centeno MD    [facility] Neurology

## 2021-07-17 ENCOUNTER — APPOINTMENT (OUTPATIENT)
Dept: GENERAL RADIOLOGY | Age: 51
End: 2021-07-17
Payer: COMMERCIAL

## 2021-07-17 ENCOUNTER — HOSPITAL ENCOUNTER (EMERGENCY)
Age: 51
Discharge: HOME | End: 2021-07-17
Attending: EMERGENCY MEDICINE
Payer: COMMERCIAL

## 2021-07-17 ENCOUNTER — APPOINTMENT (OUTPATIENT)
Dept: CT IMAGING | Age: 51
End: 2021-07-17
Payer: COMMERCIAL

## 2021-07-17 VITALS
TEMPERATURE: 98.2 F | RESPIRATION RATE: 15 BRPM | BODY MASS INDEX: 42.33 KG/M2 | SYSTOLIC BLOOD PRESSURE: 124 MMHG | DIASTOLIC BLOOD PRESSURE: 74 MMHG | HEART RATE: 78 BPM | OXYGEN SATURATION: 100 % | WEIGHT: 230 LBS | HEIGHT: 62 IN

## 2021-07-17 DIAGNOSIS — R07.9 CHEST PAIN, UNSPECIFIED TYPE: Primary | ICD-10-CM

## 2021-07-17 LAB
ALBUMIN SERPL-MCNC: 3.8 G/DL (ref 3.5–5.2)
ALP BLD-CCNC: 87 U/L (ref 35–104)
ALT SERPL-CCNC: 13 U/L (ref 0–32)
ANION GAP SERPL CALCULATED.3IONS-SCNC: 10 MMOL/L (ref 7–16)
AST SERPL-CCNC: 14 U/L (ref 0–31)
BASOPHILS ABSOLUTE: 0.02 E9/L (ref 0–0.2)
BASOPHILS RELATIVE PERCENT: 0.4 % (ref 0–2)
BILIRUB SERPL-MCNC: 0.3 MG/DL (ref 0–1.2)
BUN BLDV-MCNC: 12 MG/DL (ref 6–20)
CALCIUM SERPL-MCNC: 8.9 MG/DL (ref 8.6–10.2)
CHLORIDE BLD-SCNC: 105 MMOL/L (ref 98–107)
CO2: 25 MMOL/L (ref 22–29)
CREAT SERPL-MCNC: 0.9 MG/DL (ref 0.5–1)
D DIMER: 321 NG/ML DDU
EOSINOPHILS ABSOLUTE: 0.14 E9/L (ref 0.05–0.5)
EOSINOPHILS RELATIVE PERCENT: 2.6 % (ref 0–6)
GFR AFRICAN AMERICAN: >60
GFR NON-AFRICAN AMERICAN: >60 ML/MIN/1.73
GLUCOSE BLD-MCNC: 94 MG/DL (ref 74–99)
HCG, URINE, POC: NEGATIVE
HCT VFR BLD CALC: 38.6 % (ref 34–48)
HEMOGLOBIN: 12.8 G/DL (ref 11.5–15.5)
IMMATURE GRANULOCYTES #: 0.01 E9/L
IMMATURE GRANULOCYTES %: 0.2 % (ref 0–5)
LIPASE: 46 U/L (ref 13–60)
LYMPHOCYTES ABSOLUTE: 1.13 E9/L (ref 1.5–4)
LYMPHOCYTES RELATIVE PERCENT: 20.8 % (ref 20–42)
Lab: NORMAL
MCH RBC QN AUTO: 27.2 PG (ref 26–35)
MCHC RBC AUTO-ENTMCNC: 33.2 % (ref 32–34.5)
MCV RBC AUTO: 82 FL (ref 80–99.9)
MONOCYTES ABSOLUTE: 0.45 E9/L (ref 0.1–0.95)
MONOCYTES RELATIVE PERCENT: 8.3 % (ref 2–12)
NEGATIVE QC PASS/FAIL: NORMAL
NEUTROPHILS ABSOLUTE: 3.68 E9/L (ref 1.8–7.3)
NEUTROPHILS RELATIVE PERCENT: 67.7 % (ref 43–80)
PDW BLD-RTO: 14 FL (ref 11.5–15)
PLATELET # BLD: 148 E9/L (ref 130–450)
PMV BLD AUTO: 8.9 FL (ref 7–12)
POSITIVE QC PASS/FAIL: NORMAL
POTASSIUM SERPL-SCNC: 3.6 MMOL/L (ref 3.5–5)
RBC # BLD: 4.71 E12/L (ref 3.5–5.5)
SODIUM BLD-SCNC: 140 MMOL/L (ref 132–146)
TOTAL PROTEIN: 6.9 G/DL (ref 6.4–8.3)
TROPONIN, HIGH SENSITIVITY: 11 NG/L (ref 0–9)
TROPONIN, HIGH SENSITIVITY: 9 NG/L (ref 0–9)
WBC # BLD: 5.4 E9/L (ref 4.5–11.5)

## 2021-07-17 PROCEDURE — 85378 FIBRIN DEGRADE SEMIQUANT: CPT

## 2021-07-17 PROCEDURE — 6370000000 HC RX 637 (ALT 250 FOR IP): Performed by: STUDENT IN AN ORGANIZED HEALTH CARE EDUCATION/TRAINING PROGRAM

## 2021-07-17 PROCEDURE — 99284 EMERGENCY DEPT VISIT MOD MDM: CPT

## 2021-07-17 PROCEDURE — 85025 COMPLETE CBC W/AUTO DIFF WBC: CPT

## 2021-07-17 PROCEDURE — 71045 X-RAY EXAM CHEST 1 VIEW: CPT

## 2021-07-17 PROCEDURE — 84484 ASSAY OF TROPONIN QUANT: CPT

## 2021-07-17 PROCEDURE — 71275 CT ANGIOGRAPHY CHEST: CPT

## 2021-07-17 PROCEDURE — 6360000004 HC RX CONTRAST MEDICATION: Performed by: RADIOLOGY

## 2021-07-17 PROCEDURE — 93005 ELECTROCARDIOGRAM TRACING: CPT | Performed by: STUDENT IN AN ORGANIZED HEALTH CARE EDUCATION/TRAINING PROGRAM

## 2021-07-17 PROCEDURE — 83690 ASSAY OF LIPASE: CPT

## 2021-07-17 PROCEDURE — 80053 COMPREHEN METABOLIC PANEL: CPT

## 2021-07-17 RX ORDER — MECLIZINE HCL 12.5 MG/1
12.5 TABLET ORAL ONCE
Status: COMPLETED | OUTPATIENT
Start: 2021-07-17 | End: 2021-07-17

## 2021-07-17 RX ADMIN — IOPAMIDOL 75 ML: 755 INJECTION, SOLUTION INTRAVENOUS at 19:15

## 2021-07-17 RX ADMIN — MECLIZINE HCL 12.5 MG 12.5 MG: 12.5 TABLET ORAL at 20:03

## 2021-07-17 ASSESSMENT — PAIN DESCRIPTION - PAIN TYPE: TYPE: ACUTE PAIN

## 2021-07-17 ASSESSMENT — ENCOUNTER SYMPTOMS
DIARRHEA: 1
SHORTNESS OF BREATH: 1
COUGH: 0
CONSTIPATION: 0
TROUBLE SWALLOWING: 0
BACK PAIN: 1
VOMITING: 1
ABDOMINAL PAIN: 1
SORE THROAT: 0
BLOOD IN STOOL: 0
CHOKING: 0
COLOR CHANGE: 0

## 2021-07-17 ASSESSMENT — PAIN SCALES - GENERAL: PAINLEVEL_OUTOF10: 7

## 2021-07-17 ASSESSMENT — PAIN DESCRIPTION - LOCATION: LOCATION: CHEST

## 2021-07-17 NOTE — ED PROVIDER NOTES
[address]      Pt Name: Fausto Spring  MRN: [id number]  [place] 1970  Date of evaluation: 7/17/2021      CHIEF COMPLAINT       Chief Complaint   Patient presents with    Chest Pain     Radiates LUE and Neck        HPI  Fausto Spring is a 52 y.o. female who presents with a complaint of chest pain for the past week with radiation to bilateral upper extremities and her neck. The pain is substernal and has been getting worse throughout the week. She describes the pain as a pressure sensation that is intermittent with episodes lasting from minutes up to hours. The pain is worsened with any movement. It is slightly improved with Pepcid. She has a history of GERD, lupus, and costochondritis. She denies any fever, chills, shortness of breath. She admits to one episode of emesis and multiple bowel movements of diarrhea this morning. Except as noted above the remainder of the review of systems was reviewed and negative. Review of Systems   Constitutional: Negative for fever. HENT: Negative for sore throat and trouble swallowing. Respiratory: Positive for shortness of breath. Negative for cough and choking. Cardiovascular: Positive for chest pain. Negative for palpitations and leg swelling. Gastrointestinal: Positive for abdominal pain, diarrhea and vomiting. Negative for blood in stool and constipation. Genitourinary: Negative for dysuria. Musculoskeletal: Positive for back pain and neck pain. Skin: Negative for color change and wound. Neurological: Positive for dizziness, light-headedness and headaches. Negative for weakness. Physical Exam  Constitutional:       Appearance: Normal appearance. She is obese. HENT:      Head: Normocephalic and atraumatic. Mouth/Throat:      Mouth: Mucous membranes are dry. Eyes:      Extraocular Movements: Extraocular movements intact. signed and interpreted by me. Rate: 77  Rhythm: Sinus  Interpretation: Normal axis, normal TN and QRS intervals, and no ST segment changes  Comparison: stable as compared to patient's most recent EKG    --------------------------------------------- PAST HISTORY ---------------------------------------------  Past Medical History:  has a past medical history of ARDS (adult respiratory distress syndrome) (HCC), Arthritis, Asthma, Chest pain, Depression, Essential tremor, Gallstones, Generalized abdominal pain, Head trauma, History of blood transfusion, Hx of blood clots, Hyperlipidemia, Hypertension, Infectious colitis, enteritis and gastroenteritis, Lupus (systemic lupus erythematosus) ([facility] [address] Obesity, Palpitations, Prolonged emergence from general anesthesia, SOB (shortness of breath), Systemic lupus erythematosus ([address] and Tachycardia. Past Surgical History:  has a past surgical history that includes Cholecystectomy, laparoscopic (03/2018); Retinal detachment surgery; and Diagnostic Cardiac Cath Lab Procedure. Social History:  reports that she quit smoking about 8 years ago. Her smoking use included cigarettes. She has never used smokeless tobacco. She reports that she does not drink alcohol and does not use drugs. Family History: family history includes Arthritis in her mother; Celiac Disease in her sister; Crohn's Disease in her brother; Diabetes in her father, mother, and sister; High Blood Pressure in her brother, father, mother, and sister; Stroke in her mother; Uterine Cancer in her maternal grandmother. The patients home medications have been reviewed.     Allergies: Cefdinir, Cephalosporins, Fish allergy, Fish-derived products, Nsaids, Ciprofloxacin, Codeine, Folic acid, Levaquin [levofloxacin in d5w], Methotrexate, Methotrexate derivatives, Metronidazole, Other, Prednisone, Wellbutrin [bupropion], and Pce [erythromycin]    -------------------------------------------------- RESULTS -------------------------------------------------  Labs:  Results for orders placed or performed during the hospital encounter of 07/17/21   D-Dimer, Quantitative   Result Value Ref Range    D-Dimer, Quant 321 ng/mL DDU   CBC Auto Differential   Result Value Ref Range    WBC 5.4 4.5 - 11.5 E9/L    RBC 4.71 3.50 - 5.50 E12/L    Hemoglobin 12.8 11.5 - 15.5 g/dL    Hematocrit 38.6 34.0 - 48.0 %    MCV 82.0 80.0 - 99.9 fL    MCH 27.2 26.0 - 35.0 pg    MCHC 33.2 32.0 - 34.5 %    RDW 14.0 11.5 - 15.0 fL    Platelets 959 767 - 060 E9/L    MPV 8.9 7.0 - 12.0 fL    Neutrophils % 67.7 43.0 - 80.0 %    Immature Granulocytes % 0.2 0.0 - 5.0 %    Lymphocytes % 20.8 20.0 - 42.0 %    Monocytes % 8.3 2.0 - 12.0 %    Eosinophils % 2.6 0.0 - 6.0 %    Basophils % 0.4 0.0 - 2.0 %    Neutrophils Absolute 3.68 1.80 - 7.30 E9/L    Immature Granulocytes # 0.01 E9/L    Lymphocytes Absolute 1.13 (L) 1.50 - 4.00 E9/L    Monocytes Absolute 0.45 0.10 - 0.95 E9/L    Eosinophils Absolute 0.14 0.05 - 0.50 E9/L    Basophils Absolute 0.02 0.00 - 0.20 E9/L   Lipase   Result Value Ref Range    Lipase 46 13 - 60 U/L   Troponin   Result Value Ref Range    Troponin, High Sensitivity 11 (H) 0 - 9 ng/L   Comprehensive Metabolic Panel   Result Value Ref Range    Sodium 140 132 - 146 mmol/L    Chloride 105 98 - 107 mmol/L    CO2 25 22 - 29 mmol/L    Anion Gap 10 7 - 16 mmol/L    Glucose 94 74 - 99 mg/dL    BUN 12 6 - 20 mg/dL    CREATININE 0.9 0.5 - 1.0 mg/dL    GFR Non-African American >60 >=60 mL/min/1.73    GFR African American >60     Calcium 8.9 8.6 - 10.2 mg/dL    Potassium 3.6 3.5 - 5.0 mmol/L    Total Protein 6.9 6.4 - 8.3 g/dL    Albumin 3.8 3.5 - 5.2 g/dL    Total Bilirubin 0.3 0.0 - 1.2 mg/dL    Alkaline Phosphatase 87 35 - 104 U/L    ALT 13 0 - 32 U/L    AST 14 0 - 31 U/L   Troponin   Result Value Ref Range    Troponin, High Sensitivity 9 0 - 9 ng/L   POC Pregnancy Urine Qual   Result Value Ref Range    HCG, Urine, POC Negative Negative    Lot Number [id number]     Positive QC Pass/Fail Pass     Negative QC Pass/Fail Pass    EKG 12 Lead   Result Value Ref Range    Ventricular Rate 77 BPM    Atrial Rate 77 BPM    P-R Interval 164 ms    QRS Duration 80 ms    Q-T Interval 392 ms    QTc Calculation (Bazett) 443 ms    P Axis 50 degrees    R Axis 7 degrees       Radiology:  CTA PULMONARY W CONTRAST   Final Result   No evidence of pulmonary embolism or acute pulmonary abnormality. XR CHEST PORTABLE   Final Result   No acute process. ------------------------- NURSING NOTES AND VITALS REVIEWED ---------------------------  Date / Time Roomed:  7/17/2021  4:43 PM  ED Bed Assignment:  22/22    The nursing notes within the ED encounter and vital signs as below have been reviewed. /74   Pulse 78   Temp 98.2 °F (36.8 °C)   Resp 15   Ht 5' 2\" (1.575 m)   Wt 230 lb (104.3 kg)   SpO2 100%   BMI 42.07 kg/m²   Oxygen Saturation Interpretation: normal      ------------------------------------------ PROGRESS NOTES ------------------------------------------    I have spoken with the patient and discussed todays results, in addition to providing specific details for the plan of care and counseling regarding the diagnosis and prognosis. Their questions are answered at this time and they are agreeable with the plan. I discussed at length with them reasons for immediate return here for re evaluation. They will followup with their PCP by calling their office tomorrow. --------------------------------- ADDITIONAL PROVIDER NOTES ---------------------------------  At this time the patient is without objective evidence of an acute process requiring hospitalization or inpatient management. They have remained hemodynamically stable throughout their entire ED visit and are stable for discharge with outpatient follow-up.      The plan has been discussed in detail and they are aware of the specific conditions for emergent return, as well as the importance of follow-up. Discharge Medication List as of 7/17/2021  8:21 PM          Diagnosis:  1. Chest pain, unspecified type        Disposition:  Patient's disposition: Discharge to home  Patient's condition is stable.       Eran Alvarez DO  Resident  07/17/21 [id number]

## 2021-07-18 LAB
EKG ATRIAL RATE: 77 BPM
EKG P AXIS: 50 DEGREES
EKG P-R INTERVAL: 164 MS
EKG Q-T INTERVAL: 392 MS
EKG QRS DURATION: 80 MS
EKG QTC CALCULATION (BAZETT): 443 MS
EKG R AXIS: 7 DEGREES
EKG VENTRICULAR RATE: 77 BPM

## 2021-07-18 PROCEDURE — 93010 ELECTROCARDIOGRAM REPORT: CPT | Performed by: INTERNAL MEDICINE

## 2021-08-10 ENCOUNTER — HOSPITAL ENCOUNTER (OUTPATIENT)
Dept: MRI IMAGING | Age: 51
Discharge: HOME | End: 2021-08-12
Payer: COMMERCIAL

## 2021-08-10 DIAGNOSIS — H53.8 BLURRED VISION: ICD-10-CM

## 2021-08-10 DIAGNOSIS — R41.0 CONFUSION: ICD-10-CM

## 2021-08-10 DIAGNOSIS — G44.52 NEW DAILY PERSISTENT HEADACHE: ICD-10-CM

## 2021-08-10 PROCEDURE — 70546 MR ANGIOGRAPH HEAD W/O&W/DYE: CPT

## 2021-08-10 PROCEDURE — 6360000004 HC RX CONTRAST MEDICATION: Performed by: RADIOLOGY

## 2021-08-10 PROCEDURE — A9577 INJ MULTIHANCE: HCPCS | Performed by: RADIOLOGY

## 2021-08-10 PROCEDURE — 70551 MRI BRAIN STEM W/O DYE: CPT

## 2021-08-10 RX ADMIN — GADOBENATE DIMEGLUMINE 20 ML: 529 INJECTION, SOLUTION INTRAVENOUS at 19:05

## 2021-08-12 ENCOUNTER — TELEPHONE (OUTPATIENT)
Dept: NEUROLOGY | Age: 51
End: 2021-08-12

## 2021-08-24 ENCOUNTER — HOSPITAL ENCOUNTER (EMERGENCY)
Age: 51
Discharge: LEFT BEFORE DISCHARGE | End: 2021-08-24
Payer: COMMERCIAL

## 2021-08-24 ENCOUNTER — APPOINTMENT (OUTPATIENT)
Dept: GENERAL RADIOLOGY | Age: 51
End: 2021-08-24
Payer: COMMERCIAL

## 2021-08-24 ENCOUNTER — HOSPITAL ENCOUNTER (OUTPATIENT)
Dept: NEUROLOGY | Age: 51
Discharge: HOME | End: 2021-08-24
Payer: COMMERCIAL

## 2021-08-24 VITALS
DIASTOLIC BLOOD PRESSURE: 88 MMHG | TEMPERATURE: 97.8 F | SYSTOLIC BLOOD PRESSURE: 152 MMHG | RESPIRATION RATE: 18 BRPM | OXYGEN SATURATION: 97 % | HEART RATE: 77 BPM

## 2021-08-24 LAB
ALBUMIN SERPL-MCNC: 4.1 G/DL (ref 3.5–5.2)
ALP BLD-CCNC: 89 U/L (ref 35–104)
ALT SERPL-CCNC: 14 U/L (ref 0–32)
ANION GAP SERPL CALCULATED.3IONS-SCNC: 9 MMOL/L (ref 7–16)
AST SERPL-CCNC: 15 U/L (ref 0–31)
BASOPHILS ABSOLUTE: 0.02 E9/L (ref 0–0.2)
BASOPHILS RELATIVE PERCENT: 0.3 % (ref 0–2)
BILIRUB SERPL-MCNC: 0.5 MG/DL (ref 0–1.2)
BUN BLDV-MCNC: 8 MG/DL (ref 6–20)
CALCIUM SERPL-MCNC: 9.1 MG/DL (ref 8.6–10.2)
CHLORIDE BLD-SCNC: 104 MMOL/L (ref 98–107)
CO2: 25 MMOL/L (ref 22–29)
CREAT SERPL-MCNC: 0.8 MG/DL (ref 0.5–1)
EOSINOPHILS ABSOLUTE: 0.12 E9/L (ref 0.05–0.5)
EOSINOPHILS RELATIVE PERCENT: 2 % (ref 0–6)
GFR AFRICAN AMERICAN: >60
GFR NON-AFRICAN AMERICAN: >60 ML/MIN/1.73
GLUCOSE BLD-MCNC: 97 MG/DL (ref 74–99)
HCT VFR BLD CALC: 39.7 % (ref 34–48)
HEMOGLOBIN: 13.3 G/DL (ref 11.5–15.5)
IMMATURE GRANULOCYTES #: 0.02 E9/L
IMMATURE GRANULOCYTES %: 0.3 % (ref 0–5)
LYMPHOCYTES ABSOLUTE: 0.96 E9/L (ref 1.5–4)
LYMPHOCYTES RELATIVE PERCENT: 15.6 % (ref 20–42)
MCH RBC QN AUTO: 26.9 PG (ref 26–35)
MCHC RBC AUTO-ENTMCNC: 33.5 % (ref 32–34.5)
MCV RBC AUTO: 80.4 FL (ref 80–99.9)
MONOCYTES ABSOLUTE: 0.47 E9/L (ref 0.1–0.95)
MONOCYTES RELATIVE PERCENT: 7.6 % (ref 2–12)
NEUTROPHILS ABSOLUTE: 4.56 E9/L (ref 1.8–7.3)
NEUTROPHILS RELATIVE PERCENT: 74.2 % (ref 43–80)
PDW BLD-RTO: 14.2 FL (ref 11.5–15)
PLATELET # BLD: 183 E9/L (ref 130–450)
PMV BLD AUTO: 8.5 FL (ref 7–12)
POTASSIUM SERPL-SCNC: 4.3 MMOL/L (ref 3.5–5)
PRO-BNP: 122 PG/ML (ref 0–125)
RBC # BLD: 4.94 E12/L (ref 3.5–5.5)
SODIUM BLD-SCNC: 138 MMOL/L (ref 132–146)
TOTAL PROTEIN: 7.2 G/DL (ref 6.4–8.3)
TROPONIN, HIGH SENSITIVITY: <6 NG/L (ref 0–9)
WBC # BLD: 6.2 E9/L (ref 4.5–11.5)

## 2021-08-24 PROCEDURE — 4500000002 HC ER NO CHARGE

## 2021-08-24 PROCEDURE — 93005 ELECTROCARDIOGRAM TRACING: CPT | Performed by: PHYSICIAN ASSISTANT

## 2021-08-24 PROCEDURE — 6370000000 HC RX 637 (ALT 250 FOR IP): Performed by: PHYSICIAN ASSISTANT

## 2021-08-24 PROCEDURE — 80053 COMPREHEN METABOLIC PANEL: CPT

## 2021-08-24 PROCEDURE — 84484 ASSAY OF TROPONIN QUANT: CPT

## 2021-08-24 PROCEDURE — 85025 COMPLETE CBC W/AUTO DIFF WBC: CPT

## 2021-08-24 PROCEDURE — 71045 X-RAY EXAM CHEST 1 VIEW: CPT

## 2021-08-24 PROCEDURE — 83880 ASSAY OF NATRIURETIC PEPTIDE: CPT

## 2021-08-24 RX ORDER — ASPIRIN 325 MG
325 TABLET ORAL ONCE
Status: COMPLETED | OUTPATIENT
Start: 2021-08-24 | End: 2021-08-24

## 2021-08-24 RX ADMIN — ASPIRIN 325 MG: 325 TABLET ORAL at 11:50

## 2021-08-24 NOTE — ED NOTES
FIRST PROVIDER CONTACT ASSESSMENT NOTE                                                                                                Department of Emergency Medicine                                                      First Provider Note  21  11:44 AM EDT  NAME: Miguel A Urena  : 1970  MRN: [id number]    Chief Complaint: Chest Pain (was having EEG today, pt reports midsternal chest pain, denies pain at time of triage)      History of Present Illness:   Miguel A Urena is a 52 y.o. female who presents to the ED for pain. Patient states he was having EEG and reported midsternal chest pain but denies any chest pain at this time in triage    Focused Physical Exam:  VS:    ED Triage Vitals   BP Temp Temp src Pulse Resp SpO2 Height Weight   21 1139 21 1106 -- 21 1106 21 1139 21 1106 -- --   (!) 152/88 97.8 °F (36.6 °C)  77 18 97 %          General: Alert and in no apparent distress. Medical History:  has a past medical history of ARDS (adult respiratory distress syndrome) ([address] Arthritis, Asthma, Chest pain, Depression, Essential tremor, Gallstones, Generalized abdominal pain, Head trauma, History of blood transfusion, Hx of blood clots, Hyperlipidemia, Hypertension, Infectious colitis, enteritis and gastroenteritis, Lupus (systemic lupus erythematosus) ([address] Obesity, Palpitations, Prolonged emergence from general anesthesia, SOB (shortness of breath), Systemic lupus erythematosus ([address] and Tachycardia. Surgical History:  has a past surgical history that includes Cholecystectomy, laparoscopic (2018); Retinal detachment surgery; and Diagnostic Cardiac Cath Lab Procedure. Social History:  reports that she quit smoking about 8 years ago. Her smoking use included cigarettes. She has never used smokeless tobacco. She reports that she does not drink alcohol and does not use drugs.     Family History: family history includes Arthritis in her mother; Celiac Disease in her sister; Crohn's Disease in her brother; Diabetes in her father, mother, and sister; High Blood Pressure in her brother, father, mother, and sister; Stroke in her mother; Uterine Cancer in her maternal grandmother.     Allergies: Cefdinir, Cephalosporins, Fish allergy, Fish-derived products, Nsaids, Ciprofloxacin, Codeine, Folic acid, Levaquin [levofloxacin in d5w], Methotrexate, Methotrexate derivatives, Metronidazole, Other, Prednisone, Wellbutrin [bupropion], and Pce [erythromycin]     Initial Plan of Care:  Initiate Treatment-Testing, Proceed toTreatment Area When Bed Available for ED Attending/MLP to Continue Care    -------------------------------------------------END OF FIRST PROVIDER CONTACT ASSESSMENT NOTE--------------------------------------------------------  Electronically signed by Katie Ramirez PA-C   DD: 8/24/21       Katie Ramirez PA-C  08/24/21 1144

## 2021-08-24 NOTE — PROGRESS NOTES
Patient arrived to neurology department @ [phone] for outpatient testing. Tech transferred pt to [facility] for EEG, and patient informed tech she wasn't feeling well and felt very dizzy. RN from [address] came over to help eval pt. Pt stated she was getting more ill and more dizzy, and did not think she could do EEG. So pt was transported to ER for admit. EEG was not completed.    Jackie Hill Conchita  8/24/2021

## 2021-08-25 LAB
EKG ATRIAL RATE: 61 BPM
EKG P AXIS: 64 DEGREES
EKG P-R INTERVAL: 158 MS
EKG Q-T INTERVAL: 420 MS
EKG QRS DURATION: 86 MS
EKG QTC CALCULATION (BAZETT): 422 MS
EKG R AXIS: 67 DEGREES
EKG T AXIS: 49 DEGREES
EKG VENTRICULAR RATE: 61 BPM

## 2021-08-25 PROCEDURE — 93010 ELECTROCARDIOGRAM REPORT: CPT | Performed by: INTERNAL MEDICINE

## 2021-09-20 ENCOUNTER — APPOINTMENT (OUTPATIENT)
Dept: CT IMAGING | Age: 51
End: 2021-09-20
Payer: COMMERCIAL

## 2021-09-20 ENCOUNTER — HOSPITAL ENCOUNTER (EMERGENCY)
Age: 51
Discharge: HOME | End: 2021-09-20
Attending: EMERGENCY MEDICINE
Payer: COMMERCIAL

## 2021-09-20 ENCOUNTER — APPOINTMENT (OUTPATIENT)
Dept: GENERAL RADIOLOGY | Age: 51
End: 2021-09-20
Payer: COMMERCIAL

## 2021-09-20 VITALS
HEART RATE: 82 BPM | OXYGEN SATURATION: 97 % | WEIGHT: 230 LBS | TEMPERATURE: 97.5 F | BODY MASS INDEX: 42.33 KG/M2 | SYSTOLIC BLOOD PRESSURE: 137 MMHG | DIASTOLIC BLOOD PRESSURE: 67 MMHG | HEIGHT: 62 IN | RESPIRATION RATE: 16 BRPM

## 2021-09-20 DIAGNOSIS — R56.9 SEIZURE-LIKE ACTIVITY (HCC): Primary | ICD-10-CM

## 2021-09-20 LAB
ALBUMIN SERPL-MCNC: 3.9 G/DL (ref 3.5–5.2)
ALP BLD-CCNC: 84 U/L (ref 35–104)
ALT SERPL-CCNC: 14 U/L (ref 0–32)
ANION GAP SERPL CALCULATED.3IONS-SCNC: 9 MMOL/L (ref 7–16)
AST SERPL-CCNC: 13 U/L (ref 0–31)
BASOPHILS ABSOLUTE: 0.03 E9/L (ref 0–0.2)
BASOPHILS RELATIVE PERCENT: 0.5 % (ref 0–2)
BILIRUB SERPL-MCNC: 0.3 MG/DL (ref 0–1.2)
BILIRUBIN URINE: NEGATIVE
BLOOD, URINE: NEGATIVE
BUN BLDV-MCNC: 10 MG/DL (ref 6–20)
CALCIUM SERPL-MCNC: 9 MG/DL (ref 8.6–10.2)
CHLORIDE BLD-SCNC: 105 MMOL/L (ref 98–107)
CLARITY: CLEAR
CO2: 26 MMOL/L (ref 22–29)
COLOR: YELLOW
CREAT SERPL-MCNC: 0.8 MG/DL (ref 0.5–1)
EOSINOPHILS ABSOLUTE: 0.11 E9/L (ref 0.05–0.5)
EOSINOPHILS RELATIVE PERCENT: 1.7 % (ref 0–6)
GFR AFRICAN AMERICAN: >60
GFR NON-AFRICAN AMERICAN: >60 ML/MIN/1.73
GLUCOSE BLD-MCNC: 110 MG/DL (ref 74–99)
GLUCOSE URINE: NEGATIVE MG/DL
HCT VFR BLD CALC: 37.7 % (ref 34–48)
HEMOGLOBIN: 12.5 G/DL (ref 11.5–15.5)
IMMATURE GRANULOCYTES #: 0.03 E9/L
IMMATURE GRANULOCYTES %: 0.5 % (ref 0–5)
KETONES, URINE: ABNORMAL MG/DL
LEUKOCYTE ESTERASE, URINE: NEGATIVE
LIPASE: 46 U/L (ref 13–60)
LYMPHOCYTES ABSOLUTE: 1.12 E9/L (ref 1.5–4)
LYMPHOCYTES RELATIVE PERCENT: 17 % (ref 20–42)
MCH RBC QN AUTO: 27.3 PG (ref 26–35)
MCHC RBC AUTO-ENTMCNC: 33.2 % (ref 32–34.5)
MCV RBC AUTO: 82.3 FL (ref 80–99.9)
MONOCYTES ABSOLUTE: 0.63 E9/L (ref 0.1–0.95)
MONOCYTES RELATIVE PERCENT: 9.5 % (ref 2–12)
NEUTROPHILS ABSOLUTE: 4.68 E9/L (ref 1.8–7.3)
NEUTROPHILS RELATIVE PERCENT: 70.8 % (ref 43–80)
NITRITE, URINE: NEGATIVE
PDW BLD-RTO: 14.3 FL (ref 11.5–15)
PH UA: 6.5 (ref 5–9)
PLATELET # BLD: 166 E9/L (ref 130–450)
PMV BLD AUTO: 8.6 FL (ref 7–12)
POTASSIUM REFLEX MAGNESIUM: 3.7 MMOL/L (ref 3.5–5)
PRO-BNP: 112 PG/ML (ref 0–125)
PROTEIN UA: NEGATIVE MG/DL
RBC # BLD: 4.58 E12/L (ref 3.5–5.5)
SODIUM BLD-SCNC: 140 MMOL/L (ref 132–146)
SPECIFIC GRAVITY UA: 1.02 (ref 1–1.03)
TOTAL PROTEIN: 6.7 G/DL (ref 6.4–8.3)
TROPONIN, HIGH SENSITIVITY: <6 NG/L (ref 0–9)
UROBILINOGEN, URINE: 0.2 E.U./DL
WBC # BLD: 6.6 E9/L (ref 4.5–11.5)

## 2021-09-20 PROCEDURE — 2580000003 HC RX 258: Performed by: STUDENT IN AN ORGANIZED HEALTH CARE EDUCATION/TRAINING PROGRAM

## 2021-09-20 PROCEDURE — 71045 X-RAY EXAM CHEST 1 VIEW: CPT

## 2021-09-20 PROCEDURE — 83690 ASSAY OF LIPASE: CPT

## 2021-09-20 PROCEDURE — 99285 EMERGENCY DEPT VISIT HI MDM: CPT

## 2021-09-20 PROCEDURE — G0426 INPT/ED TELECONSULT50: HCPCS | Performed by: PSYCHIATRY & NEUROLOGY

## 2021-09-20 PROCEDURE — 84484 ASSAY OF TROPONIN QUANT: CPT

## 2021-09-20 PROCEDURE — 80053 COMPREHEN METABOLIC PANEL: CPT

## 2021-09-20 PROCEDURE — 70450 CT HEAD/BRAIN W/O DYE: CPT

## 2021-09-20 PROCEDURE — 81003 URINALYSIS AUTO W/O SCOPE: CPT

## 2021-09-20 PROCEDURE — 6370000000 HC RX 637 (ALT 250 FOR IP): Performed by: STUDENT IN AN ORGANIZED HEALTH CARE EDUCATION/TRAINING PROGRAM

## 2021-09-20 PROCEDURE — 83880 ASSAY OF NATRIURETIC PEPTIDE: CPT

## 2021-09-20 PROCEDURE — 93005 ELECTROCARDIOGRAM TRACING: CPT | Performed by: STUDENT IN AN ORGANIZED HEALTH CARE EDUCATION/TRAINING PROGRAM

## 2021-09-20 PROCEDURE — 85025 COMPLETE CBC W/AUTO DIFF WBC: CPT

## 2021-09-20 RX ORDER — 0.9 % SODIUM CHLORIDE 0.9 %
1000 INTRAVENOUS SOLUTION INTRAVENOUS ONCE
Status: COMPLETED | OUTPATIENT
Start: 2021-09-20 | End: 2021-09-20

## 2021-09-20 RX ORDER — TOPIRAMATE 25 MG/1
50 TABLET ORAL ONCE
Status: COMPLETED | OUTPATIENT
Start: 2021-09-20 | End: 2021-09-20

## 2021-09-20 RX ADMIN — TOPIRAMATE 50 MG: 25 TABLET, FILM COATED ORAL at 21:18

## 2021-09-20 RX ADMIN — SODIUM CHLORIDE 1000 ML: 9 INJECTION, SOLUTION INTRAVENOUS at 17:50

## 2021-09-20 NOTE — VIRTUAL HEALTH
Consults  Patient Location:  [id number] [facility] Department    Provider Location ([facility]/State): [place], [place]    This virtual visit was conducted via interactive/real-time audio/video. [facility] AT [place] [facility] Neurology Consult for  [address] Stroke Alert through [address] @ 7:48pm  9/20/2021 7:50 PM  Pt Name: Briseyda Villalba  MRN: [id number]  [place]: 1970  Date of evaluation: 9/20/2021  Primary Care Physician: Waqar Downs DO  Reason for Evaluation: Stroke Evaluation with Discussion with Ed or primary team with Telemedicine and stroke evaluation with Review of imaging and labs    Briseyda Villalba is a 52 y.o. female with PTSD, hx of seizure and migraine was seen by neurologist and placed on topamax 50mg, but pt has not been taking it because she claims to have anxiety with medication. While at a therapy session for her PTSD today in the psychologist office, pt had a seizure, she remembers everything, but had a headache later, initial exam in the ED was she left arm was weak, but this quickly recover and patient is back to baseline now. Pt stated she feels normal. Pt is going to follow up with neurologist this week. LKW: 4pm?  NIH:  0    Allergies  is allergic to cefdinir, cephalosporins, fish allergy, fish-derived products, nsaids, ciprofloxacin, codeine, folic acid, levaquin [levofloxacin in d5w], methotrexate, methotrexate derivatives, metronidazole, other, prednisone, wellbutrin [bupropion], and pce [erythromycin]. Medications  Prior to Admission medications    Medication Sig Start Date End Date Taking? Authorizing Provider   metoprolol tartrate (LOPRESSOR) 25 MG tablet Take 25 mg by mouth 2 times daily 25 am 12.5 pm    Historical Provider, MD   topiramate (TOPAMAX) 25 MG tablet Take 1 tablet by mouth nightly for 14 days, THEN 2 tablets nightly.  7/14/21 8/27/21  Balta Proctor MD guaiFENesin (MUCINEX) 600 MG extended release tablet Take 1,200 mg by mouth as needed for Congestion    Historical Provider, MD   Multiple Vitamins-Minerals (MULTIVITAMIN ADULT PO) Take by mouth daily    Historical Provider, MD   albuterol sulfate  (90 Base) MCG/ACT inhaler Inhale 2 puffs into the lungs every 6 hours as needed for Wheezing    Historical Provider, MD   ipratropium-albuterol (DUONEB) 0.5-2.5 (3) MG/3ML SOLN nebulizer solution Inhale 1 vial into the lungs every 6 hours as needed for Shortness of Breath    Historical Provider, MD   Probiotic Product (PROBIOTIC-10) CAPS Take 1 capsule by mouth daily     Historical Provider, MD   aspirin 81 MG tablet Take 81 mg by mouth daily    Historical Provider, MD   ALPRAZolam (XANAX) 1 MG tablet Take 0.5 mg by mouth 3 times daily as needed for Sleep or Anxiety. Historical Provider, MD   Fluticasone Furoate-Vilanterol (BREO ELLIPTA) 200-25 MCG/INH AEPB Inhale 1 puff into the lungs daily as needed (SOB)     Historical Provider, MD    Scheduled Meds:  Continuous Infusions:  PRN Meds:.  Past Medical History   has a past medical history of ARDS (adult respiratory distress syndrome) ([facility] Arthritis, Asthma, Chest pain, Depression, Essential tremor, Gallstones, Generalized abdominal pain, Head trauma, History of blood transfusion, Hx of blood clots, Hyperlipidemia, Hypertension, Infectious colitis, enteritis and gastroenteritis, Lupus (systemic lupus erythematosus) ([facility] [address] [facility] Obesity, Palpitations, Prolonged emergence from general anesthesia, SOB (shortness of breath), Systemic lupus erythematosus ([facility] [address] and Tachycardia.   Social History  Social History     Socioeconomic History    Marital status: Single     Spouse name: Not on file    Number of children: Not on file    Years of education: Not on file    Highest education level: Not on file   Occupational History    Not on file   Tobacco Use    Smoking status: Former Smoker     Types: Cigarettes     Quit date: 10/31/2012     Years since quittin.8    Smokeless tobacco: Never Used   Vaping Use    Vaping Use: Never used   Substance and Sexual Activity    Alcohol use: No    Drug use: No    Sexual activity: Not on file   Other Topics Concern    Not on file   Social History Narrative    Not on file     Social Determinants of Health     Financial Resource Strain:     Difficulty of Paying Living Expenses:    Food Insecurity:     Worried About Running Out of Food in the Last Year:     Ran Out of Food in the Last Year:    Transportation Needs:     Lack of Transportation (Medical):      Lack of Transportation (Non-Medical):    Physical Activity:     Days of Exercise per Week:     Minutes of Exercise per Session:    Stress:     Feeling of Stress :    Social Connections:     Frequency of Communication with Friends and Family:     Frequency of Social Gatherings with Friends and Family:     Attends Judaism Services:     Active Member of Clubs or Organizations:     Attends Club or Organization Meetings:     Marital Status:    Intimate Partner Violence:     Fear of Current or Ex-Partner:     Emotionally Abused:     Physically Abused:     Sexually Abused:      Family History      Problem Relation Age of Onset    High Blood Pressure Mother     Diabetes Mother     Stroke Mother     Arthritis Mother     Diabetes Father     High Blood Pressure Father     High Blood Pressure Sister     Diabetes Sister     Celiac Disease Sister     High Blood Pressure Brother     Crohn's Disease Brother     Uterine Cancer Maternal Grandmother        OBJECTIVE  /72   Pulse 89   Temp 97.5 °F (36.4 °C) (Oral)   Resp 16   Ht 5' 2\" (1.575 m)   Wt 230 lb (104.3 kg)   SpO2 100%   BMI 42.07 kg/m²        Pre-Morbid mRS: 0    Imaging:  Images were personally reviewed with PACS used to review images including:  CT brain without contrast: normal CT  CTA imaging: n/a    Assessment    52year old woman with hx of seizure/migraine, hx of PTSD, and not compliant with her topamax, had a breakthrough seizure while having a psychotherapy session. Recommendations:  1. NIH 0  2. Recommend Outpatient Neurology Consult for further assessment and evaluation   3.  consider . [id number]  4. This is not a stroke, this is a breakthrough seizure   5. Pt is on topamax but not compliant, will give her topamax 50mg right now and continue it nightly at home. 6. Pt will f/u with her neurologist this week  7. No further workup, no CTP or CTA needed, this is not a stroke  Pt can be discharged from neuro standpoint. Pt will need long term EEG in the future to see if this is non epileptic psychogenic seizure. Discussed with ED Physician    At least 55 min of Telemedicine and time in conversation directly with ED staff and physician for the patient who is in imminent and life threatening deterioration without further treatment and evaluation. This Virtual Visit was conducted with patient's (and/or legal guardian's) consent, to provide telestroke consultation and necessary medical care.   Time spent examining patient, reviewing the images personally, reviewing the chart, perform high complexity decision making and speaking with the nursing staff regarding recommendations    Myra Ruiz MD  Stroke, Neurocritical Care And/or [address] [place] [address] Stroke [id number] [place]  Electronically signed 9/20/2021 at 7:50 PM

## 2021-09-20 NOTE — ED NOTES
Bed: 18  Expected date:   Expected time:   Means of arrival:   Comments:  joselo Hughes, VICENTE  09/20/21 [id number]

## 2021-09-20 NOTE — ED PROVIDER NOTES
43-year-old female with a history of migraines on Topamax for concern for brain seizure, per patient, presenting for body wide shaking. She states she was in counsling session, when she developed migraine preceded by halo. She then states she had difficulty thinking and had body wide shaking. She did not fall, hit her head, or lose consciousness. She did not bite her tongue or lose continence of bowels or bladder. She does complain of nausea. She denies any recent illnesses. She is a poor historian at this time, frequently stating that she cannot remember. Review of Systems   Constitutional: Negative for chills and fever. HENT: Negative for congestion and sore throat. Eyes: Negative for photophobia. Respiratory: Negative for cough and shortness of breath. Cardiovascular: Negative for chest pain. Gastrointestinal: Positive for nausea. Negative for abdominal pain. Genitourinary: Negative for dysuria and flank pain. Musculoskeletal: Negative for back pain and neck pain. Skin: Negative for rash and wound. Neurological: Positive for seizures and headaches. Physical Exam  Constitutional:       Comments: Mildly tremulous, appears tired, frequent slurring and stuttering of words   HENT:      Head: Normocephalic and atraumatic. Right Ear: External ear normal.      Left Ear: External ear normal.      Nose: Nose normal.   Eyes:      Extraocular Movements: Extraocular movements intact. Conjunctiva/sclera: Conjunctivae normal.      Pupils: Pupils are equal, round, and reactive to light. Cardiovascular:      Rate and Rhythm: Normal rate and regular rhythm. Pulmonary:      Effort: Pulmonary effort is normal.      Breath sounds: Normal breath sounds. Abdominal:      General: There is no distension. Palpations: Abdomen is soft. Tenderness: There is no abdominal tenderness. Musculoskeletal:         General: No swelling or deformity.    Skin:     General: Skin is warm and dry. Neurological:      Mental Status: She is alert. Comments: Decreased sensation to touch left side face, chronic per patient  Decreased  strength left hand, chronic per patient  Decrease sensation to touch right upper extremity, new per patient    NIH Stroke Scale/Score at time of initial evaluation:      1A: Level of Consciousness 0 - alert; keenly responsive  1B: Ask Month and Age 0 - answers both questions correctly  1C: Tell Patient To Open and Close Eyes, then Hand  Squeeze 0 - performs both tasks correctly  2: Test Horizontal Extraocular Movements 0 - normal  3: Test Visual Fields 0 - no visual loss  4: Test Facial Palsy 0 - normal symmetric movement  5A: Test Left Arm Motor Drift 1 - drift, limb holds 90 (or 45) degrees but drifts down before full 10 seconds: does not hit bed  5B: Test Right Arm Motor Drift 0 - no drift, limb holds 90 (or 45) degrees for full 10 seconds  6A: Test Left Leg Motor Drift 0 - no drift; leg holds 30 degree position for full 5 seconds  6B: Test Right Leg Motor Drift 0 - no drift; leg holds 30 degree position for full 5 seconds  7: Test Limb Ataxia   (FNF/Heel-Shin) 0 - absent  8: Test Sensation 1 - mild to moderate sensory loss; patient feels pinprick is less sharp or is dull on the affected side; there is a loss of superficial pain with pinprick but patient is aware of being touched   9: Test Language/Aphasia 1 - mild to moderate aphasia; some obvious loss of fluency or facility of comprehension without significant limitation on ideas expressed or form of expression. Reduction of speech and/or comprehension, however, makes conversation about provided materials difficult or impossible. For example, in conversation about provided materials, examiner can identify picture or naming card content from patient's response.    10: Test Dysarthria 1 - mild to moderate, patient slurs at least some words and at worst, can be understood with some difficulty  11: Test Extinction/Inattention 0 - no abnormality  Total 4     Psychiatric:      Comments: Appears anxious          Procedures     MDM  Number of Diagnoses or Management Options  Se[facility] ([facility]  Diagnosis management comments: 68-year-old female with a history of migraines on Topamax for concern for brain seizure, per patient, presenting for body wide shaking. On arrival, patient seemed postictal. CT head was unremarkable. Labs unremarkable. However, on repeat exam, she had left  weakness and some pronator drift. Therefore, telestroke neurologist was consulted. He assessed patient and determined that further imaging was not required. He suspects pseudoseizure vs seizure at this time. Repeat neuro exam was normal. He recommended patient be started back on her topamax as it was revealed that she has not been taking it. He felt that she could be discharged with neurology follow up. Patient does have appointment with her neurologist on Thursday. She ambulated without difficulty in the ED and was given dose of topamax. She was discharged home in stable condition with instructions to resume taking topamax as prescribed and to follow up with neurologist.       ED Course as of Sep 20 2010   Mon Sep 20, 2021   1955 Spoke to Dr. Francis Godfrey with [employer], discussed case. He would like CTA held at time.  He will come assess patient.    [AP]      ED Course User Index  [AP] Ariana Ibarra MD            --------------------------------------------- PAST HISTORY ---------------------------------------------  Past Medical History:  has a past medical history of ARDS (adult respiratory distress syndrome) ([facility] [address] Arthritis, Asthma, Chest pain, Depression, Essential tremor, Gallstones, Generalized abdominal pain, Head trauma, History of blood transfusion, Hx of blood clots, Hyperlipidemia, Hypertension, Infectious colitis, enteritis and gastroenteritis, Lupus (systemic lupus erythematosus) ([facility] [address] Obesity, Palpitations, Prolonged emergence from general anesthesia, SOB (shortness of breath), Systemic lupus erythematosus ([address] and Tachycardia. Past Surgical History:  has a past surgical history that includes Cholecystectomy, laparoscopic (03/2018); Retinal detachment surgery; and Diagnostic Cardiac Cath Lab Procedure. Social History:  reports that she quit smoking about 8 years ago. Her smoking use included cigarettes. She has never used smokeless tobacco. She reports that she does not drink alcohol and does not use drugs. Family History: family history includes Arthritis in her mother; Celiac Disease in her sister; Crohn's Disease in her brother; Diabetes in her father, mother, and sister; High Blood Pressure in her brother, father, mother, and sister; Stroke in her mother; Uterine Cancer in her maternal grandmother. The patients home medications have been reviewed.     Allergies: Cefdinir, Cephalosporins, Fish allergy, Fish-derived products, Nsaids, Ciprofloxacin, Codeine, Folic acid, Levaquin [levofloxacin in d5w], Methotrexate, Methotrexate derivatives, Metronidazole, Other, Prednisone, Wellbutrin [bupropion], and Pce [erythromycin]    -------------------------------------------------- RESULTS -------------------------------------------------  Labs:  Results for orders placed or performed during the hospital encounter of 09/20/21   CBC Auto Differential   Result Value Ref Range    WBC 6.6 4.5 - 11.5 E9/L    RBC 4.58 3.50 - 5.50 E12/L    Hemoglobin 12.5 11.5 - 15.5 g/dL    Hematocrit 37.7 34.0 - 48.0 %    MCV 82.3 80.0 - 99.9 fL    MCH 27.3 26.0 - 35.0 pg    MCHC 33.2 32.0 - 34.5 %    RDW 14.3 11.5 - 15.0 fL    Platelets 104 170 - 740 E9/L    MPV 8.6 7.0 - 12.0 fL    Neutrophils % 70.8 43.0 - 80.0 %    Immature Granulocytes % 0.5 0.0 - 5.0 %    Lymphocytes % 17.0 (L) 20.0 - 42.0 %    Monocytes % 9.5 2.0 - 12.0 %    Eosinophils % 1.7 0.0 - 6.0 %    Basophils % 0.5 0.0 - 2.0 %    Neutrophils Absolute 4.68 1.80 - 7.30 E9/L Immature Granulocytes # 0.03 E9/L    Lymphocytes Absolute 1.12 (L) 1.50 - 4.00 E9/L    Monocytes Absolute 0.63 0.10 - 0.95 E9/L    Eosinophils Absolute 0.11 0.05 - 0.50 E9/L    Basophils Absolute 0.03 0.00 - 0.20 E9/L   Comprehensive Metabolic Panel w/ Reflex to MG   Result Value Ref Range    Sodium 140 132 - 146 mmol/L    Potassium reflex Magnesium 3.7 3.5 - 5.0 mmol/L    Chloride 105 98 - 107 mmol/L    CO2 26 22 - 29 mmol/L    Anion Gap 9 7 - 16 mmol/L    Glucose 110 (H) 74 - 99 mg/dL    BUN 10 6 - 20 mg/dL    CREATININE 0.8 0.5 - 1.0 mg/dL    GFR Non-African American >60 >=60 mL/min/1.73    GFR African American >60     Calcium 9.0 8.6 - 10.2 mg/dL    Total Protein 6.7 6.4 - 8.3 g/dL    Albumin 3.9 3.5 - 5.2 g/dL    Total Bilirubin 0.3 0.0 - 1.2 mg/dL    Alkaline Phosphatase 84 35 - 104 U/L    ALT 14 0 - 32 U/L    AST 13 0 - 31 U/L   Troponin   Result Value Ref Range    Troponin, High Sensitivity <6 0 - 9 ng/L   Lipase   Result Value Ref Range    Lipase 46 13 - 60 U/L   Brain Natriuretic Peptide   Result Value Ref Range    Pro- 0 - 125 pg/mL   Urinalysis, reflex to microscopic   Result Value Ref Range    Color, UA Yellow Straw/Yellow    Clarity, UA Clear Clear    Glucose, Ur Negative Negative mg/dL    Bilirubin Urine Negative Negative    Ketones, Urine TRACE (A) Negative mg/dL    Specific Gravity, UA 1.020 1.005 - 1.030    Blood, Urine Negative Negative    pH, UA 6.5 5.0 - 9.0    Protein, UA Negative Negative mg/dL    Urobilinogen, Urine 0.2 <2.0 E.U./dL    Nitrite, Urine Negative Negative    Leukocyte Esterase, Urine Negative Negative   EKG 12 Lead   Result Value Ref Range    Ventricular Rate 85 BPM    Atrial Rate 85 BPM    P-R Interval 170 ms    QRS Duration 78 ms    Q-T Interval 392 ms    QTc Calculation (Bazett) 466 ms    P Axis 58 degrees    R Axis 27 degrees    T Axis 14 degrees       Radiology:  XR CHEST PORTABLE   Final Result   No acute process.          CT Head WO Contrast   Final Result   No acute

## 2021-09-21 ENCOUNTER — APPOINTMENT (OUTPATIENT)
Dept: CT IMAGING | Age: 51
End: 2021-09-21
Payer: COMMERCIAL

## 2021-09-21 ENCOUNTER — HOSPITAL ENCOUNTER (EMERGENCY)
Age: 51
Discharge: ANOTHER HOSPITAL | End: 2021-09-22
Attending: EMERGENCY MEDICINE
Payer: COMMERCIAL

## 2021-09-21 DIAGNOSIS — R44.0 AUDITORY HALLUCINATIONS: ICD-10-CM

## 2021-09-21 DIAGNOSIS — Z76.89 ENCOUNTER FOR PSYCHIATRIC ASSESSMENT: Primary | ICD-10-CM

## 2021-09-21 LAB
ACETAMINOPHEN LEVEL: <5 MCG/ML (ref 10–30)
AMPHETAMINE SCREEN, URINE: NOT DETECTED
ANION GAP SERPL CALCULATED.3IONS-SCNC: 10 MMOL/L (ref 7–16)
BACTERIA: ABNORMAL /HPF
BARBITURATE SCREEN URINE: NOT DETECTED
BASOPHILS ABSOLUTE: 0.03 E9/L (ref 0–0.2)
BASOPHILS RELATIVE PERCENT: 0.5 % (ref 0–2)
BENZODIAZEPINE SCREEN, URINE: NOT DETECTED
BILIRUBIN URINE: NEGATIVE
BLOOD, URINE: NEGATIVE
BUN BLDV-MCNC: 11 MG/DL (ref 6–20)
CALCIUM SERPL-MCNC: 8.7 MG/DL (ref 8.6–10.2)
CANNABINOID SCREEN URINE: NOT DETECTED
CHLORIDE BLD-SCNC: 108 MMOL/L (ref 98–107)
CLARITY: ABNORMAL
CO2: 23 MMOL/L (ref 22–29)
COCAINE METABOLITE SCREEN URINE: NOT DETECTED
COLOR: ABNORMAL
CREAT SERPL-MCNC: 0.9 MG/DL (ref 0.5–1)
EKG ATRIAL RATE: 73 BPM
EKG ATRIAL RATE: 85 BPM
EKG P AXIS: 26 DEGREES
EKG P AXIS: 58 DEGREES
EKG P-R INTERVAL: 166 MS
EKG P-R INTERVAL: 170 MS
EKG Q-T INTERVAL: 392 MS
EKG Q-T INTERVAL: 412 MS
EKG QRS DURATION: 78 MS
EKG QRS DURATION: 84 MS
EKG QTC CALCULATION (BAZETT): 453 MS
EKG QTC CALCULATION (BAZETT): 466 MS
EKG R AXIS: 11 DEGREES
EKG R AXIS: 27 DEGREES
EKG T AXIS: 0 DEGREES
EKG T AXIS: 14 DEGREES
EKG VENTRICULAR RATE: 73 BPM
EKG VENTRICULAR RATE: 85 BPM
EOSINOPHILS ABSOLUTE: 0.09 E9/L (ref 0.05–0.5)
EOSINOPHILS RELATIVE PERCENT: 1.6 % (ref 0–6)
ETHANOL: <10 MG/DL (ref 0–0.08)
FENTANYL SCREEN, URINE: NOT DETECTED
GFR AFRICAN AMERICAN: >60
GFR NON-AFRICAN AMERICAN: >60 ML/MIN/1.73
GLUCOSE BLD-MCNC: 118 MG/DL (ref 74–99)
GLUCOSE URINE: NEGATIVE MG/DL
HCT VFR BLD CALC: 37.9 % (ref 34–48)
HEMOGLOBIN: 12 G/DL (ref 11.5–15.5)
IMMATURE GRANULOCYTES #: 0.01 E9/L
IMMATURE GRANULOCYTES %: 0.2 % (ref 0–5)
KETONES, URINE: 15 MG/DL
LEUKOCYTE ESTERASE, URINE: NEGATIVE
LYMPHOCYTES ABSOLUTE: 0.87 E9/L (ref 1.5–4)
LYMPHOCYTES RELATIVE PERCENT: 15.1 % (ref 20–42)
Lab: NORMAL
MCH RBC QN AUTO: 27.6 PG (ref 26–35)
MCHC RBC AUTO-ENTMCNC: 31.7 % (ref 32–34.5)
MCV RBC AUTO: 87.1 FL (ref 80–99.9)
METHADONE SCREEN, URINE: NOT DETECTED
MONOCYTES ABSOLUTE: 0.49 E9/L (ref 0.1–0.95)
MONOCYTES RELATIVE PERCENT: 8.5 % (ref 2–12)
NEUTROPHILS ABSOLUTE: 4.26 E9/L (ref 1.8–7.3)
NEUTROPHILS RELATIVE PERCENT: 74.1 % (ref 43–80)
NITRITE, URINE: NEGATIVE
OPIATE SCREEN URINE: NOT DETECTED
OXYCODONE URINE: NOT DETECTED
PDW BLD-RTO: 14.6 FL (ref 11.5–15)
PH UA: 8.5 (ref 5–9)
PHENCYCLIDINE SCREEN URINE: NOT DETECTED
PLATELET # BLD: 162 E9/L (ref 130–450)
PMV BLD AUTO: 9.5 FL (ref 7–12)
POTASSIUM REFLEX MAGNESIUM: 3.6 MMOL/L (ref 3.5–5)
PROTEIN UA: ABNORMAL MG/DL
RBC # BLD: 4.35 E12/L (ref 3.5–5.5)
RBC UA: ABNORMAL /HPF (ref 0–2)
SALICYLATE, SERUM: <0.3 MG/DL (ref 0–30)
SARS-COV-2, NAAT: NOT DETECTED
SODIUM BLD-SCNC: 141 MMOL/L (ref 132–146)
SPECIFIC GRAVITY UA: 1.01 (ref 1–1.03)
TRICYCLIC ANTIDEPRESSANTS SCREEN SERUM: NEGATIVE NG/ML
UROBILINOGEN, URINE: 0.2 E.U./DL
WBC # BLD: 5.8 E9/L (ref 4.5–11.5)
WBC UA: ABNORMAL /HPF (ref 0–5)

## 2021-09-21 PROCEDURE — 80048 BASIC METABOLIC PNL TOTAL CA: CPT

## 2021-09-21 PROCEDURE — 70450 CT HEAD/BRAIN W/O DYE: CPT

## 2021-09-21 PROCEDURE — 80179 DRUG ASSAY SALICYLATE: CPT

## 2021-09-21 PROCEDURE — 82077 ASSAY SPEC XCP UR&BREATH IA: CPT

## 2021-09-21 PROCEDURE — 80307 DRUG TEST PRSMV CHEM ANLYZR: CPT

## 2021-09-21 PROCEDURE — 6370000000 HC RX 637 (ALT 250 FOR IP): Performed by: EMERGENCY MEDICINE

## 2021-09-21 PROCEDURE — 80143 DRUG ASSAY ACETAMINOPHEN: CPT

## 2021-09-21 PROCEDURE — 87635 SARS-COV-2 COVID-19 AMP PRB: CPT

## 2021-09-21 PROCEDURE — 99285 EMERGENCY DEPT VISIT HI MDM: CPT

## 2021-09-21 PROCEDURE — 93005 ELECTROCARDIOGRAM TRACING: CPT | Performed by: EMERGENCY MEDICINE

## 2021-09-21 PROCEDURE — 85025 COMPLETE CBC W/AUTO DIFF WBC: CPT

## 2021-09-21 PROCEDURE — 81001 URINALYSIS AUTO W/SCOPE: CPT

## 2021-09-21 RX ORDER — ALPRAZOLAM 0.25 MG/1
0.5 TABLET ORAL ONCE
Status: COMPLETED | OUTPATIENT
Start: 2021-09-21 | End: 2021-09-21

## 2021-09-21 RX ORDER — MONTELUKAST SODIUM 10 MG/1
10 TABLET ORAL NIGHTLY
COMMUNITY

## 2021-09-21 RX ORDER — CETIRIZINE HYDROCHLORIDE 10 MG/1
10 TABLET ORAL DAILY PRN
COMMUNITY

## 2021-09-21 RX ORDER — NITROGLYCERIN 0.4 MG/1
0.4 TABLET SUBLINGUAL EVERY 5 MIN PRN
COMMUNITY

## 2021-09-21 RX ORDER — ISOSORBIDE DINITRATE 5 MG/1
5 TABLET ORAL EVERY 8 HOURS
COMMUNITY

## 2021-09-21 RX ADMIN — ALPRAZOLAM 0.5 MG: 0.25 TABLET ORAL at 09:54

## 2021-09-21 RX ADMIN — ALPRAZOLAM 0.5 MG: 0.25 TABLET ORAL at 19:05

## 2021-09-21 ASSESSMENT — ENCOUNTER SYMPTOMS
SORE THROAT: 0
SHORTNESS OF BREATH: 0
COUGH: 0
ABDOMINAL PAIN: 0
PHOTOPHOBIA: 0
BACK PAIN: 0
NAUSEA: 1

## 2021-09-21 NOTE — CARE COORDINATION
Social Work/Transition of Care:    Pt has been medically cleared and in need of Telehealth assessment, consent has been signed and placed in the pt paper chart. DEBBIE Vaughan., Notified.     Electronically signed by Amanda Vogt on 0/53/1380 at 11:23 AM

## 2021-09-21 NOTE — ED PROVIDER NOTES
HPI: Sapna Dias 52 y.o. female presents with a complaint of psychiatric evaluation. Patient complaining of auditory hallucinations. Triage reported this began after seizure today, she states she has been having auditory hallucinations for several days. She was seen in this emergency department a few hours ago after possible seizure, had negative head CT at that time. States voices intensified they became scary so she sought additional treatment. She specifically denies suicidal homicidal ideation.      --------------------------------------------- PAST HISTORY ---------------------------------------------  Past Medical History:  has a past medical history of ARDS (adult respiratory distress syndrome) ([facility] [address] Arthritis, Asthma, Chest pain, Depression, Essential tremor, Gallstones, Generalized abdominal pain, Head trauma, History of blood transfusion, Hx of blood clots, Hyperlipidemia, Hypertension, Infectious colitis, enteritis and gastroenteritis, Lupus (systemic lupus erythematosus) ([address] Obesity, Palpitations, Prolonged emergence from general anesthesia, SOB (shortness of breath), Systemic lupus erythematosus ([address] and Tachycardia. Past Surgical History:  has a past surgical history that includes Cholecystectomy, laparoscopic (03/2018); Retinal detachment surgery; and Diagnostic Cardiac Cath Lab Procedure. Social History:  reports that she quit smoking about 8 years ago. Her smoking use included cigarettes. She has never used smokeless tobacco. She reports that she does not drink alcohol and does not use drugs. Family History: family history includes Arthritis in her mother; Celiac Disease in her sister; Crohn's Disease in her brother; Diabetes in her father, mother, and sister; High Blood Pressure in her brother, father, mother, and sister; Stroke in her mother; Uterine Cancer in her maternal grandmother. The patients home medications have been reviewed.     Allergies: Cefdinir, Cephalosporins, Fish allergy, Fish-derived products, Nsaids, Ciprofloxacin, Codeine, Folic acid, Levaquin [levofloxacin in d5w], Methotrexate, Methotrexate derivatives, Metronidazole, Other, Prednisone, Wellbutrin [bupropion], and Pce [erythromycin]    -------------------------------------------------- RESULTS -------------------------------------------------    LABS:  Results for orders placed or performed during the hospital encounter of 09/21/21   COVID-19, Rapid    Specimen: Nasopharyngeal Swab   Result Value Ref Range    SARS-CoV-2, NAAT Not Detected Not Detected   Basic Metabolic Panel w/ Reflex to MG   Result Value Ref Range    Sodium 141 132 - 146 mmol/L    Potassium reflex Magnesium 3.6 3.5 - 5.0 mmol/L    Chloride 108 (H) 98 - 107 mmol/L    CO2 23 22 - 29 mmol/L    Anion Gap 10 7 - 16 mmol/L    Glucose 118 (H) 74 - 99 mg/dL    BUN 11 6 - 20 mg/dL    CREATININE 0.9 0.5 - 1.0 mg/dL    GFR Non-African American >60 >=60 mL/min/1.73    GFR African American >60     Calcium 8.7 8.6 - 10.2 mg/dL   CBC auto differential   Result Value Ref Range    WBC 5.8 4.5 - 11.5 E9/L    RBC 4.35 3.50 - 5.50 E12/L    Hemoglobin 12.0 11.5 - 15.5 g/dL    Hematocrit 37.9 34.0 - 48.0 %    MCV 87.1 80.0 - 99.9 fL    MCH 27.6 26.0 - 35.0 pg    MCHC 31.7 (L) 32.0 - 34.5 %    RDW 14.6 11.5 - 15.0 fL    Platelets 237 652 - 404 E9/L    MPV 9.5 7.0 - 12.0 fL    Neutrophils % 74.1 43.0 - 80.0 %    Immature Granulocytes % 0.2 0.0 - 5.0 %    Lymphocytes % 15.1 (L) 20.0 - 42.0 %    Monocytes % 8.5 2.0 - 12.0 %    Eosinophils % 1.6 0.0 - 6.0 %    Basophils % 0.5 0.0 - 2.0 %    Neutrophils Absolute 4.26 1.80 - 7.30 E9/L    Immature Granulocytes # 0.01 E9/L    Lymphocytes Absolute 0.87 (L) 1.50 - 4.00 E9/L    Monocytes Absolute 0.49 0.10 - 0.95 E9/L    Eosinophils Absolute 0.09 0.05 - 0.50 E9/L    Basophils Absolute 0.03 0.00 - 0.20 E9/L   Serum Drug Screen   Result Value Ref Range    Ethanol Lvl <10 mg/dL    Acetaminophen Level <5.0 (L) 10.0 - 84.9 mcg/mL    Salicylate, Serum <1.3 0.0 - 30.0 mg/dL       RADIOLOGY:  Interpreted by Radiologist.  Mar Quinn    (Results Pending)       EKG: This EKG is signed and interpreted by the EP. Rate: 73  Rhythm: Sinus  Interpretation: Sinus rhythm, normal axis, OK is 166, QRS is 84, QTc is 453. Nonspecific ST changes  Comparison: None    --------------------------------------------- PAST HISTORY ---------------------------------------------  Past Medical History:  has a past medical history of ARDS (adult respiratory distress syndrome) ([facility] [address] Arthritis, Asthma, Chest pain, Depression, Essential tremor, Gallstones, Generalized abdominal pain, Head trauma, History of blood transfusion, Hx of blood clots, Hyperlipidemia, Hypertension, Infectious colitis, enteritis and gastroenteritis, Lupus (systemic lupus erythematosus) ([facility] [address] Obesity, Palpitations, Prolonged emergence from general anesthesia, SOB (shortness of breath), Systemic lupus erythematosus ([facility] [address] and Tachycardia. Past Surgical History:  has a past surgical history that includes Cholecystectomy, laparoscopic (03/2018); Retinal detachment surgery; and Diagnostic Cardiac Cath Lab Procedure. Social History:  reports that she quit smoking about 8 years ago. Her smoking use included cigarettes. She has never used smokeless tobacco. She reports that she does not drink alcohol and does not use drugs. Family History: family history includes Arthritis in her mother; Celiac Disease in her sister; Crohn's Disease in her brother; Diabetes in her father, mother, and sister; High Blood Pressure in her brother, father, mother, and sister; Stroke in her mother; Uterine Cancer in her maternal grandmother. The patients home medications have been reviewed.     Allergies: Cefdinir, Cephalosporins, Fish allergy, Fish-derived products, Nsaids, Ciprofloxacin, Codeine, Folic acid, Levaquin [levofloxacin in d5w], Methotrexate, Methotrexate derivatives, Metronidazole, Other, Prednisone, Wellbutrin [bupropion], and Pce [erythromycin]        ROS: 10 point review of systems was performed and the pertinent positives and negatives are documented in the history of present illness. ROS negative otherwise      ------------------------- NURSING NOTES AND VITALS REVIEWED ---------------------------   The nursing notes within the ED encounter and vital signs as below have been reviewed. /63   Pulse 76   Temp 97.9 °F (36.6 °C) (Oral)   Resp 16   Ht 5' 2\" (1.575 m)   Wt 225 lb (102.1 kg)   SpO2 95%   BMI 41.15 kg/m²   Oxygen Saturation Interpretation: Normal        ---------------------------------------------------PHYSICAL EXAM--------------------------------------      Constitutional/General: Alert and oriented x3,   Head: NC/AT  Eyes: PERRL, EOMI pupils are 6 mm react bilaterally, there is no nystagmus  Mouth: Oropharynx clear, handling secretions, no trismus  Neck: Supple, full ROM, non tender to palpation in the midline, no stridor, no crepitus, no meningeal signs  Pulmonary: Lungs clear to auscultation bilaterally, no wheezes, rales, or rhonchi. Not in respiratory distress  Cardiovascular:  Regular rate and rhythm, no murmurs, gallops, or rubs. 2+ distal pulses  Abdomen: Soft, non tender, non distended, +BS, no rebound, guarding, or rigidity. No pulsatile masses appreciated  Extremities: Moves all extremities x 4. Warm and well perfused, no clubbing, cyanosis, or edema. Capillary refill <3 seconds  Skin: warm and dry without rash  Neurologic: GCS 15, CN 2-12 grossly intact, no focal deficits,    Psych: Calm and cooperative affect      ------------------------------------------ PROGRESS NOTES ------------------------------------------     Consultations:   Social work              Counseling:    The emergency provider has spoken with thepatient and discussed todays results, in addition to providing specific details for the plan of care and counseling regarding the diagnosis and prognosis. Questions are answered at this time and they are agreeable with the plan.      --------------------------------- ADDITIONAL PROVIDER NOTES ---------------------------------     This patient's ED course included: a personal history and physicial eaxmination, multiple bedside re-evaluations, IV medications, cardiac monitoring, continuous pulse oximetry and complex medical decision making and emergency management    This patient has been closely monitored during their ED course.     --------------------------------- IMPRESSION AND DISPOSITION ---------------------------------    IMPRESSION  1. Encounter for psychiatric assessment    2. Auditory hallucinations        DISPOSITION  Disposition: as per consultation - medically clear for admission to psychiatric facility  Patient condition is stable    [unfilled]     Please note this note was transcribed using voice recognition software.  Every effort was to ensure accuracy however inadvertent transcription errors may be present       Vivi Carroll DO  09/21/21 [id number]

## 2021-09-21 NOTE — ED NOTES
Pt ambulated around unit steady on feet and with minimal assistance. Denies sob/chest pain/dizziness. Notified provider.      Rhona Burns RN  09/20/21 [id number]

## 2021-09-22 ENCOUNTER — HOSPITAL ENCOUNTER (INPATIENT)
Age: 51
LOS: 12 days | Discharge: HOME | DRG: 753 | End: 2021-10-04
Attending: PSYCHIATRY & NEUROLOGY | Admitting: PSYCHIATRY & NEUROLOGY
Payer: COMMERCIAL

## 2021-09-22 VITALS
BODY MASS INDEX: 41.41 KG/M2 | TEMPERATURE: 97.6 F | SYSTOLIC BLOOD PRESSURE: 125 MMHG | OXYGEN SATURATION: 96 % | DIASTOLIC BLOOD PRESSURE: 82 MMHG | HEIGHT: 62 IN | WEIGHT: 225 LBS | RESPIRATION RATE: 16 BRPM | HEART RATE: 79 BPM

## 2021-09-22 DIAGNOSIS — F31.81 BIPOLAR 2 DISORDER, MAJOR DEPRESSIVE EPISODE (HCC): Primary | ICD-10-CM

## 2021-09-22 PROBLEM — F29 PSYCHOSIS (HCC): Status: ACTIVE | Noted: 2021-09-22

## 2021-09-22 LAB
HCG, URINE, POC: NEGATIVE
Lab: NORMAL
NEGATIVE QC PASS/FAIL: NORMAL
POSITIVE QC PASS/FAIL: NORMAL

## 2021-09-22 PROCEDURE — 99221 1ST HOSP IP/OBS SF/LOW 40: CPT | Performed by: NURSE PRACTITIONER

## 2021-09-22 PROCEDURE — 1240000000 HC EMOTIONAL WELLNESS R&B

## 2021-09-22 RX ORDER — HALOPERIDOL 5 MG
5 TABLET ORAL EVERY 6 HOURS PRN
Status: DISCONTINUED | OUTPATIENT
Start: 2021-09-22 | End: 2021-10-04 | Stop reason: HOSPADM

## 2021-09-22 RX ORDER — MAGNESIUM HYDROXIDE/ALUMINUM HYDROXICE/SIMETHICONE 120; 1200; 1200 MG/30ML; MG/30ML; MG/30ML
30 SUSPENSION ORAL PRN
Status: DISCONTINUED | OUTPATIENT
Start: 2021-09-22 | End: 2021-10-04 | Stop reason: HOSPADM

## 2021-09-22 RX ORDER — HYDROXYZINE PAMOATE 50 MG/1
50 CAPSULE ORAL 3 TIMES DAILY PRN
Status: DISCONTINUED | OUTPATIENT
Start: 2021-09-22 | End: 2021-09-25

## 2021-09-22 RX ORDER — TOPIRAMATE 25 MG/1
25 TABLET ORAL DAILY
COMMUNITY

## 2021-09-22 RX ORDER — HALOPERIDOL 5 MG/ML
5 INJECTION INTRAMUSCULAR EVERY 6 HOURS PRN
Status: DISCONTINUED | OUTPATIENT
Start: 2021-09-22 | End: 2021-10-04 | Stop reason: HOSPADM

## 2021-09-22 RX ORDER — AZATHIOPRINE 50 MG/1
50 TABLET ORAL DAILY
COMMUNITY

## 2021-09-22 RX ORDER — TRAZODONE HYDROCHLORIDE 50 MG/1
50 TABLET ORAL NIGHTLY PRN
Status: DISCONTINUED | OUTPATIENT
Start: 2021-09-22 | End: 2021-10-04 | Stop reason: HOSPADM

## 2021-09-22 RX ORDER — ACETAMINOPHEN 325 MG/1
650 TABLET ORAL EVERY 6 HOURS PRN
Status: DISCONTINUED | OUTPATIENT
Start: 2021-09-22 | End: 2021-10-04 | Stop reason: HOSPADM

## 2021-09-22 RX ORDER — NICOTINE 21 MG/24HR
1 PATCH, TRANSDERMAL 24 HOURS TRANSDERMAL DAILY
Status: DISCONTINUED | OUTPATIENT
Start: 2021-09-22 | End: 2021-09-27

## 2021-09-22 ASSESSMENT — PAIN SCALES - GENERAL
PAINLEVEL_OUTOF10: 0
PAINLEVEL_OUTOF10: 0

## 2021-09-22 ASSESSMENT — SLEEP AND FATIGUE QUESTIONNAIRES
DIFFICULTY STAYING ASLEEP: YES
SLEEP PATTERN: NIGHTMARES/TERRORS
DIFFICULTY ARISING: NO
DO YOU USE A SLEEP AID: YES
AVERAGE NUMBER OF SLEEP HOURS: 5
RESTFUL SLEEP: YES
DIFFICULTY ARISING: NO
RESTFUL SLEEP: NO
DO YOU HAVE DIFFICULTY SLEEPING: YES
DO YOU HAVE DIFFICULTY SLEEPING: YES
DIFFICULTY STAYING ASLEEP: NO
DIFFICULTY FALLING ASLEEP: NO
DO YOU USE A SLEEP AID: YES
DIFFICULTY FALLING ASLEEP: NO
SLEEP PATTERN: RESTLESSNESS;NIGHTMARES/TERRORS
AVERAGE NUMBER OF SLEEP HOURS: 5

## 2021-09-22 ASSESSMENT — LIFESTYLE VARIABLES
HISTORY_ALCOHOL_USE: NO
HISTORY_ALCOHOL_USE: NO

## 2021-09-22 ASSESSMENT — PATIENT HEALTH QUESTIONNAIRE - PHQ9: SUM OF ALL RESPONSES TO PHQ QUESTIONS 1-9: 4

## 2021-09-22 NOTE — PROGRESS NOTES
Attended morning community meeting. Updated on staffing and daily routine. Shared goal for the day as to read my bible.
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
## 2021-09-22 NOTE — PROGRESS NOTES
[facility]  Admission Note     Admission Type:   Admission Type:  Involuntary    Reason for admission:  Reason for Admission: get help/because i have been feeling out of control and my dx of DID i dont feel like it is DID    PATIENT STRENGTHS:  Strengths: Communication, Positive Support    Patient Strengths and Limitations:  Limitations: Multiple barriers to leisure interests, General negative or hopeless attitude about future/recovery    Addictive Behavior:   Addictive Behavior  In the past 3 months, have you felt or has someone told you that you have a problem with:  : None  Do you have a history of Chemical Use?: No  Do you have a history of Alcohol Use?: No  Do you have a history of Street Drug Abuse?: No  Histroy of Prescripton Drug Abuse?: No    Medical Problems:   Past Medical History:   Diagnosis Date    [facility]) ([facility] [address]     Arthritis     Asthma     Chest pain     Depression     Essential tremor     Gallstones     Generalized abdominal pain     Head trauma     multiple history of    Hx of blood clots     Hyperlipidemia     Hypertension     Infectious colitis, enteritis and gastroenteritis     Lupus (systemic lupus erythematosus) ([facility] [address]     Obesity     Palpitations     Prolonged emergence from general anesthesia     SOB (shortness of breath)     Systemic lupus erythematosus ([facility] [address]     Tachycardia        Status EXAM:  Status and Exam  Normal: No  Facial Expression: Sad, Flat, Expressionless  Affect: Blunt  Level of Consciousness: Alert  Mood:Normal: No  Mood: Depressed, Anxious, Sad, Suspicious  Motor Activity:Normal: No  Motor Activity: Decreased  Interview Behavior: Cooperative  Preception: [place] to Person, [place] to Time, [place] to Place  Attention:Normal: No  Attention: Distractible  Thought Processes: Circumstantial  Thought Content:Normal: No  Thought Content: Preoccupations  Hallucinations: None  Delusions: Yes  Delusions: Obsessions  Memory:Normal: No  Memory: Poor Recent, Poor Remote  Insight and Judgment: No  Insight and Judgment: Poor Judgment, Poor Insight, Unmotivated  Present Suicidal Ideation: No  Present Homicidal Ideation: No    Tobacco Screening:  Practical Counseling, on admission, hong X, if applicable and completed (first 3 are required if patient doesn't refuse):            ( )  Recognizing danger situations (included triggers and roadblocks)                    ( )  Coping skills (new ways to manage stress, exercise, relaxation techniques, changing routine, distraction)                                                           ( )  Basic information about quitting (benefits of quitting, techniques in how to quit, available resources  ( ) Referral for counseling faxed to [facility]                                           ( ) Patient refused counseling  ( ) Patient has not smoked in the last 30 days    Metabolic Screening:    No results found for: LABA1C    Lab Results   Component Value Date    CHOL 157 01/31/2021     Lab Results   Component Value Date    TRIG 133 01/31/2021     Lab Results   Component Value Date    HDL 32 01/31/2021     No components found for: [facility] EVALUATION AND TREATMENT [place]  Lab Results   Component Value Date    LABVLDL 27 01/31/2021         Body mass index is 40.97 kg/m². BP Readings from Last 2 Encounters:   09/22/21 120/84   09/22/21 125/82           Pt admitted with followings belongings:  Dentures: None  Vision - Corrective Lenses: Glasses  Hearing Aid: None  Jewelry: None  Body Piercings Removed: No  Clothing: Jacket / coat, Shirt, Other (Comment) (jeans, bra, tank top, coat, bible)  Were All Patient Medications Collected?: Not Applicable  Other Valuables: None   Patient oriented to surroundings and program expectations and copy of patient rights given. Received admission packet:  yes. Consents reviewed, signed yes Patient verbalize understanding:  yes.   Patient education on precautions: yes Gabriela Cook, RN

## 2021-09-22 NOTE — CARE COORDINATION
Biopsychosocial Assessment Note    Social work met with patient to complete the biopsychosocial assessment and CSSR-S. Mental Status Exam: Pt is alert and oriented x3. Pt's mood is depressed and flat. Pt is religiously preoccupied. Pt's thought process is linear. Pt is calm and cooperative. Pt's speech is clear, rate and volume is normal. Pt's eye contact is fair. Pt's insight and judgement is poor. Pt denies SI, HI, AVH. Pt stated that she was not hearing voices when she came to the hospital, she was dissociating. Chief Complaint: Per ED SW note \"pt states that she had a seizure and is now hearing voices and having dissociation\"     Patient Report: Pt reported that she is currently here because she had a nervous breakdown and she has been struggling with disassociating. Pt's main support is her parents and her sister who she is currently living with. Pt stated that she sometimes fights/argues with her parents and her sister. Pt reported to applying for SSDI. Pt stated her main medical problems are her Sharri, and disassociation. Pt is currently single with no children. Pt's parents are Buffy Eng and Joann Thompson. Pt has 1 brother and 2 sisters. [facility] and will need set up with a psychatrist. Pt stated that her brother and sister have mental illness but she is unsure what they have. Pt denies past abuse, denies legal history and denies the use of DOA. Pt graduated from [employer] and got some college education. Pt stated that she enjoys reading her bible and praying. Pt stated that her Jainism was helpful then it became stressful. Pt stated that she would never commit suicide because it is against her Jainism. Pt's main stressors are being in the hospital and wanting to start going back to [employer]. Per other documentation pt has a history of cutting at 22 yo, pt denied this to SW. Pt denied feeling hopeless/ helpless.       Gender  [] Male [x] Female [] Transgender  [] Other    Sexual Orientation    [x] Heterosexual [] Homosexual [] Bisexual [] Other    Suicidal Ideation  [] Past [] Present [x] Denies     Homicidal Ideation  [] Past [] Present [x] Denies     Hallucinations/Delusions (Specify type)  [] Reports [x] Denies     Substance Use/Alcohol Use/Addiction  [] Reports [x] Denies     Tobacco Use (within the last 6 months)  [x] Reports [] Denies     Trauma History  [] Reports [x] Denies     Collateral Contact (DILLAN signed)  Name:  Kyle Domínguez  Relationship: Mother  Number: [phone]    Collateral Information: SW called pt's mother for collateral. Mom stated that the pt was having a crisis based around her viraj. Pt feels that god has rejected her. Mom stated that the pt was hearing voices and they are telling her that she is going to hell. Pt is currently living with her parents and they will be picking her up at DC. Mom stated that the pt is unable to work and is applying for SSDi. Mom's only concern is that the pt gets better and gets medications that help her. JUANCHO explained that the pt will be set up with a psychatrist to continue getting medications after DC. There is a gun in the home, it is locked in a safe and pt does not have access to it.         Access to Weapons per Collateral Contact: [] Reports [x] Denies       Follow up provider preference[facility], needs a psychatrist      Plan for discharge  Location (where do they plan on discharging to?): Home with parents     Transportation (who will pick them up at discharge?) Parents    Medications (will they have money for copays at discharge?): Insurance

## 2021-09-22 NOTE — PLAN OF CARE
Problem: Discharge Planning:  Goal: Discharged to appropriate level of care  Description: Discharged to appropriate level of care  Outcome: Ongoing     Problem: Mood - Altered:  Goal: Mood stable  Description: Mood stable  Outcome: Ongoing

## 2021-09-22 NOTE — PROGRESS NOTES
Patient admitted to the unit to room 7524 with a diagnosis of psychosis NOS. She denies SI,HI, or any Hallucinations at this time. She rated depression 6/10 and anxiety 7/10 stating being here. States she is here because she feels she has some dissociations and feels she exhibits the following:   lost memory, lost conversations, people say she said things that she does not remember,and  head aches. She is flat, sad, and paranoid. She states she has some social anxiety and also does not like to be around people because she has Lupus. States she saw an NP named [facility]. Doesn't remember the name of the counseling facility but that she was reccommended from Dahlia Parks from [facility]. Patient tends to isolate to self. Tour of the unit given. Admission completed and all admission forms signed. Groups are offered and encouraged. Will continue to monitor.

## 2021-09-22 NOTE — ED NOTES
Attempt to wake pt to see if pt was able to provide urine. Pt arousable but states she is unable to give urine. Will attempt later.  CO at bedside     Mark Ojeda RN  09/21/21 801 [facility] [place], RN  09/21/21 3487
DEBBIE NURSE WILL REVIEW FOR ADMISSION     Rina Severin, [place]  09/21/21 2037
FAX PINK SLIP  [facility] Ruslan Rios, TROY  09/21/21 2053
Nurse to nurse report called to Hoa Carbajal, [address] [facility] at 1409.      Diego Driscoll RN  09/22/21 2617
[facility] called for patient transport to [facility]. ETA approximately 5-6 hours. 0600.      Katelin Sims RN  09/22/21 0005
Patient given snack. C/O continued.      Doreen La, VICENTE  09/21/21 6561
Patient handoff to Mari Rooney, [place].      Yahaira Love RN  09/22/21 9418
Patient is sitting in bed reading juana, c/o continued.      Bacilio Alves, RN  09/21/21 4565
Patient is sleeping in bed, c/o continued.      Channing Abrams RN  09/22/21 0145
Patient resting calmly in bed, c/o continued.      Farheen Conley RN  09/22/21 0967
Patient resting quietly in bed.      Reid Rojas RN  09/21/21 8447
Patient sleeping in bed, c/o continued.      Ezra Huang RN  09/22/21 0997
Patient sleeping in bed. C/O continued.      Vladimir Arvizu RN  09/21/21 1405 [facility] Nilay Mckeon RN  09/21/21 6332
Patient talking to sister on telephone. Patient given medication as ordered. C/O continued.      Orlin Urbina RN  09/21/21 7875
Patient up to restroom with sitter Nonnie Pencil.      Alona Genao RN  09/22/21 0896
Pt attempted to provide urine and was unable. Urine cup at bedside pt to provide specimen when able to.       Janet Ledesma RN  09/21/21 0937
Pt medicated for anxiety at this time. No further needs.  CO continued      Kishore Calvo, RN  09/21/21 5302
Pt resting in bed reading a book.  CO continued     Frankie Bella RN  09/21/21 6361
Pt resting in bed reading a book.  CO continued     Jamee Panchal RN  09/21/21 6711
Pt resting in bed reading a book.  CO continued     Maximiano Castleman, RN  09/21/21 2987
Pt resting in bed resp E/U. CO continued     Ian Root RN  09/21/21 9745
Pt resting resp e/u. CO continued     Jaent Ledesma, VICENTE  09/21/21 6989
Pt states that she had a brain seizure and was seen here earlier in the day and states that since she was sent home she has been hearing voices. Pt states that the voices are not telling her to harm her self or others, but that the voices are telling her that she had been bad and god is going to punish her and that god is angry with her and sh is going to hell. Pt states that she believes that she is dissociating. She also says she is seeing a therapist and they are working through the dissociation. Pt states that she is concerned that her new medication may be causing this to happen or that she may have another diagnosis. Pt states that she would like to go downtown to speak with someone.      Andre Benjamin RN  09/21/21 3576
Returned call to pts mom, questions and concerns addressed and messages relayed to pt.  CO continued      Leena Bond RN  09/21/21 9759
This patient has been accepted by Dr. Deric Huertas to 7S at this time.      Room #7521a    N2N # [phone]     Rafael Hanson RN  09/21/21 7774
WELL BEING.        Level of Care/Disposition Plan  [] Home:   [] Outpatient Provider:   [] Crisis Unit:   [x] Inpatient Psychiatric Unit:  [] Other:        [facility]  09/21/21 2034

## 2021-09-22 NOTE — PROGRESS NOTES
Attended afternoon meet and greet. Updated on evening expectations and staffing changes. Patients requested comedy to watch. Recreation assessment completed.

## 2021-09-23 PROBLEM — F60.89 CLUSTER B PERSONALITY DISORDER (HCC): Status: ACTIVE | Noted: 2021-09-23

## 2021-09-23 PROBLEM — F31.81 BIPOLAR 2 DISORDER, MAJOR DEPRESSIVE EPISODE (HCC): Status: ACTIVE | Noted: 2021-09-23

## 2021-09-23 PROCEDURE — 99232 SBSQ HOSP IP/OBS MODERATE 35: CPT | Performed by: NURSE PRACTITIONER

## 2021-09-23 PROCEDURE — 6370000000 HC RX 637 (ALT 250 FOR IP): Performed by: NURSE PRACTITIONER

## 2021-09-23 PROCEDURE — 1240000000 HC EMOTIONAL WELLNESS R&B

## 2021-09-23 RX ORDER — NITROGLYCERIN 0.4 MG/1
0.4 TABLET SUBLINGUAL EVERY 5 MIN PRN
Status: DISCONTINUED | OUTPATIENT
Start: 2021-09-23 | End: 2021-10-04 | Stop reason: HOSPADM

## 2021-09-23 RX ORDER — IPRATROPIUM BROMIDE AND ALBUTEROL SULFATE 2.5; .5 MG/3ML; MG/3ML
1 SOLUTION RESPIRATORY (INHALATION) EVERY 6 HOURS PRN
Status: DISCONTINUED | OUTPATIENT
Start: 2021-09-23 | End: 2021-10-04 | Stop reason: HOSPADM

## 2021-09-23 RX ORDER — OXCARBAZEPINE 300 MG/1
150 TABLET, FILM COATED ORAL 2 TIMES DAILY
Status: DISCONTINUED | OUTPATIENT
Start: 2021-09-23 | End: 2021-09-25

## 2021-09-23 RX ADMIN — OXCARBAZEPINE 150 MG: 300 TABLET, FILM COATED ORAL at 11:15

## 2021-09-23 RX ADMIN — SERTRALINE 25 MG: 50 TABLET, FILM COATED ORAL at 11:15

## 2021-09-23 ASSESSMENT — PAIN SCALES - GENERAL
PAINLEVEL_OUTOF10: 0
PAINLEVEL_OUTOF10: 0

## 2021-09-23 NOTE — CARE COORDINATION
Received a call from pt mom Merline Clifford requesting an update on pt. Sw answered moms questions, Merline Clifford requested Sw to ask pt what clothes she would like her to bring, Sw spoke with pt who reported no preference, informed mom. Pt reports she does not feel safe here, reports she feels more comfortable staying in her room.

## 2021-09-23 NOTE — PLAN OF CARE
Patient denies suicidal ideation, homicidal ideations and AVH. Patient denies depression but rates anxiety a 10 out of 10 due to \"probably being here. \"  Patient presents flat, sad, anxious, depressed and expressionless. Presents calm and cooperative during assessment. Patient requesting something for sleep; let her know that the LPN was pulling medications now and I could let her know that she would let some Trazodone. Patient states \"I can't take Trazodone. \"  When asked why; patient states \"my doctor told me not to take it because it interacts with my medications. \"  Reassured patient that the doctor and NP check that before prescribing medications. Patient dismissive to information; asked patient what she takes at home for sleep, patient states \"Xanax. \"  Let patient know that Xanax was not ordered but could speak with the doctor and NP in the morning. Patient is isolative to room and does not appear to be social with peers. Medications taken without issue. No complaints or concerns verbalized at this time. No unit problems reported. Will continue to observe and support.     Problem: Discharge Planning:  Goal: Discharged to appropriate level of care  Description: Discharged to appropriate level of care  9/22/2021 2202 by Dago Lomax RN  Outcome: Ongoing     Problem: Mood - Altered:  Goal: Mood stable  Description: Mood stable  9/22/2021 2202 by Dago Lomax RN  Outcome: Ongoing

## 2021-09-23 NOTE — BH NOTE
Awake in room mood is irritable \"Im not goimg to get better\" somatic and histrionic denies any SI HI or guzman emotional support given

## 2021-09-23 NOTE — GROUP NOTE
Group Therapy Note    Date: 9/23/2021    Group Start Time: 1000  Group End Time: 1632  Group Topic: Psychoeducation    [facility] ACUTE BH 1    Sandra Malik, CTRS        Group Therapy Note      Number of participants: 11  Type of group: Psychoeducation  Mode of intervention: Education, Support, Socialization, Exploration, Clarifying, and Problem-solving  Topic: Time Management Skills   Objective: Pt will identify 1 way to mange time better in recovery. Patient's Goal:  \"Talk to a counselor\"     Notes:  Pt was interactive during group sharing 1 way to manage time better in recovery. Pt gave support and feedback to others. Status After Intervention:  Improved    Participation Level:  Active Listener and Interactive    Participation Quality: Appropriate, Attentive, Sharing and Supportive      Speech:  normal      Thought Process/Content: Logical      Affective Functioning: Congruent      Mood: euthymic      Level of consciousness:  Alert, Oriented x4 and Attentive      Response to Learning: Able to verbalize current knowledge/experience, Able to verbalize/acknowledge new learning, Able to retain information, Capable of insight, Able to change behavior and Progressing to goal      Endings: None Reported    Modes of Intervention: Education, Support, Socialization, Exploration, Clarifying, Problem-solving and Activity

## 2021-09-23 NOTE — PROGRESS NOTES
BEHAVIORAL HEALTH FOLLOW-UP NOTE     9/23/2021     Patient was seen and examined in person, Chart reviewed   Patient's case discussed with staff/team    Chief Complaint: \" I am feeling better. \"    Interim History: Patient seen up on the unit she is attending groups and socialized with peers. She states the current medications are \"working well. \"  She states she is no longer hearing any demonic voices. \"  She states that she has been having a spiritual cramer but states that it is getting better. She is up on the unit bright pleasant eating well sleeping well no neurovegetative signs of depression there are no overt or covert signs psychosis.   She denies SI/HI intent or plan denies auditory visual loose Nations      Appetite:   [x] Normal/Unchanged  [] Increased  [] Decreased      Sleep:       [x] Normal/Unchanged  [] Fair       [] Poor              Energy:    [x] Normal/Unchanged  [] Increased  [] Decreased        SI [] Present  [x] Absent    HI  []Present  [x] Absent     Aggression:  [] yes  [x] no    Patient is [x] able  [] unable to CONTRACT FOR SAFETY     PAST MEDICAL/PSYCHIATRIC HISTORY:   Past Medical History:   Diagnosis Date    [facility]DS [facility]adult respiratory distress syndrome) ([facility]     Arthritis     Asthma     Chest pain     Depression     Essential tremor     Gallstones     Generalized abdominal pain     Head trauma     multiple history of    Hx of blood clots     Hyperlipidemia     Hypertension     Infectious colitis, enteritis and gastroenteritis     Lupus (systemic lupus erythematosus) ([facility])     Obesity     Palpitations     Prolonged emergence from general anesthesia     SOB (shortness of breath)     Systemic lupus erythematosus ([facility] [address]     Tachycardia        FAMILY/SOCIAL HISTORY:  Family History   Problem Relation Age of Onset    High Blood Pressure Mother     Diabetes Mother     Stroke Mother     Arthritis Mother     Diabetes Father     High Blood Pressure Father     High Blood Pressure Sister     Diabetes Sister     Celiac Disease Sister     High Blood Pressure Brother     Crohn's Disease Brother     Uterine Cancer Maternal Grandmother      Social History     Socioeconomic History    Marital status: Single     Spouse name: Not on file    Number of children: Not on file    Years of education: 13    Highest education level: Not on file   Occupational History    Not on file   Tobacco Use    Smoking status: Former Smoker     Types: Cigarettes     Quit date: 10/31/2012     Years since quittin.9    Smokeless tobacco: Never Used   Vaping Use    Vaping Use: Never used   Substance and Sexual Activity    Alcohol use: No    Drug use: No    Sexual activity: Not on file   Other Topics Concern    Not on file   Social History Narrative    Not on file     Social Determinants of Health     Financial Resource Strain:     Difficulty of Paying Living Expenses:    Food Insecurity:     Worried About Running Out of Food in the Last Year:     [address] in the Last Year:    Transportation Needs:     Lack of Transportation (Medical):  Lack of Transportation (Non-Medical):    Physical Activity:     Days of Exercise per Week:     Minutes of Exercise per Session:    Stress:     Feeling of Stress :    Social Connections:     Frequency of Communication with Friends and Family:     Frequency of Social Gatherings with Friends and Family:     Attends Oriental orthodox Services:     Active Member of Clubs or Organizations:     Attends Club or Organization Meetings:     Marital Status:    Intimate Partner Violence:     Fear of Current or Ex-Partner:     Emotionally Abused:     Physically Abused:     Sexually Abused:            ROS:  [x] All negative/unchanged except if checked.  Explain positive(checked items) below:  [] Constitutional  [] Eyes  [] Ear/Nose/Mouth/Throat  [] Respiratory  [] CV  [] GI  []   [] Musculoskeletal  [] Skin/Breast  [] Neurological  [] Endocrine  [] Heme/Lymph  [] Allergic/Immunologic    Explanation:     MEDICATIONS:    Current Facility-Administered Medications:     sertraline (ZOLOFT) tablet 25 mg, 25 mg, Oral, Daily, SUZANNE Jerez CNP    OXcarbazepine (TRILEPTAL) tablet 150 mg, 150 mg, Oral, BID, Elisabeth B SUZANNE Gonzalez CNP    ipratropium-albuterol (DUONEB) nebulizer solution 3 mL, 1 vial, Inhalation, U4O PRN, SUZANNE Del Toro CNP    nitroGLYCERIN (NITROSTAT) SL tablet 0.4 mg, 0.4 mg, SubLINGual, Q5 Min PRN, SUZANNE Del Toro CNP    acetaminophen (TYLENOL) tablet 650 mg, 650 mg, Oral, F3X PRN, SUZANNE Del Toro CNP    magnesium hydroxide (MILK OF MAGNESIA) 400 MG/5ML suspension 30 mL, 30 mL, Oral, Daily PRN, SUZANNE Del Toro CNP    nicotine (NICODERM CQ) 21 MG/24HR 1 patch, 1 patch, TransDERmal, Daily, ElisabethSUZANNE De La Cruz CNP    aluminum & magnesium hydroxide-simethicone (MAALOX) 200-200-20 MG/5ML suspension 30 mL, 30 mL, Oral, PRN, SUZANNE Del Toro CNP    hydrOXYzine (VISTARIL) capsule 50 mg, 50 mg, Oral, TID PRN, SUZANNE Del Toro CNP    haloperidol (HALDOL) tablet 5 mg, 5 mg, Oral, Q6H PRN **OR** haloperidol lactate (HALDOL) injection 5 mg, 5 mg, IntraMUSCular, G9Q PRN, SUZANNE Del Toro CNP    traZODone (DESYREL) tablet 50 mg, 50 mg, Oral, Nightly PRN, SUZANNE Del Toro CNP      Examination:  BP (!) 155/73   Pulse 77   Temp 98.2 °F (36.8 °C) (Temporal)   Resp 16   Ht 5' 2\" (1.575 m)   Wt 224 lb (101.6 kg)   LMP 08/11/2021 (Approximate)   SpO2 97%   Breastfeeding No   BMI 40.97 kg/m²   Gait - steady  Medication side effects(SE): denies    Mental Status Examination:    Level of consciousness:  within normal limits   Appearance:  fair grooming and fair hygiene  Behavior/Motor:  no abnormalities noted  Attitude toward examiner:  cooperative  Speech:  spontaneous, normal rate and normal volume   Mood: \" I am feeling much better.   Affect: Appropriate and pleasant  Thought processes: Linear without flight of ideas loose associations   Thought content: Devoid of any auditory visual loose Nations delusions or other perceptual normalities. Denies SI/HI intent or plan  Cognition:  oriented to person, place, and time   Concentration intact  Insight fair   Judgement fair     Patient symptoms are:  [] Well controlled  [x] Improving  [] Worsening  [] No change      Diagnosis:   Principal Problem:    Bipolar 2 disorder, major depressive episode ([facility] [address]  Active Problems:    Cluster B personality disorder ([facility]  Resolved Problems:    * No resolved hospital problems. *      LABS:    Recent Labs     09/20/21  1745 09/21/21  0332   WBC 6.6 5.8   HGB 12.5 12.0    162     Recent Labs     09/20/21  1745 09/21/21  0332    141   K 3.7 3.6    108*   CO2 26 23   BUN 10 11   CREATININE 0.8 0.9   GLUCOSE 110* 118*     Recent Labs     09/20/21  1745   BILITOT 0.3   ALKPHOS 84   AST 13   ALT 14     Lab Results   Component Value Date    LABAMPH NOT DETECTED 09/21/2021    BARBSCNU NOT DETECTED 09/21/2021    LABBENZ NOT DETECTED 09/21/2021    LABMETH NOT DETECTED 09/21/2021    OPIATESCREENURINE NOT DETECTED 09/21/2021    PHENCYCLIDINESCREENURINE NOT DETECTED 09/21/2021    ETOH <10 09/21/2021     Lab Results   Component Value Date    TSH 2.000 12/23/2019     No results found for: LITHIUM  No results found for: VALPROATE, CBMZ        Treatment Plan:  Reviewed current Medications with the patient. Risks, benefits, side effects, drug-to-drug interactions and alternatives to treatment were discussed. Collateral information:   CD evaluation  Encourage patient to attend group and other milieu activities.   Discharge planning discussed with the patient and treatment team.    Continue Trileptal 150 mg twice daily for mood stabilization  Continue Zoloft 25 mg daily    PSYCHOTHERAPY/COUNSELING:  [x] Therapeutic interview  [x] Supportive  [] CBT  [] Ongoing  [] Other    [x] Patient continues to need, on a daily basis, active treatment furnished directly by or requiring the supervision of inpatient psychiatric personnel      Anticipated Length of stay: 3 to 7 days based on stability            Electronically signed by SUZANNE Gonsales CNP on 1/74/3824 at 9:46 AM

## 2021-09-23 NOTE — ED PROVIDER NOTES
Pt was seen with resident dr Francesca Bruce for shaking has history of migraine. Is on topamax for \"brain seizure\" pt states. Pt follows w neurology, she had not urinated herself or bit her tounge. Sx was brief. Pt denies headache, cp sob, cough or cold sx, lost of taste or smell, visual complaintw, speech complaints, weakness or numbness, neck stiffness, fever or chills, uti sx, abdominal paoin. Pt does admit to chronic left sided weakness. Pt appeared post ictal on initial evaluation. Tele stroke neurologist was consulted and had talked and evaluated pt felt her sx were due to noncompliance of topamax and she had a breakthrough seizure or pseudoseizure. She was discharged to f/w neuro as scheduled on Thursday.  Pt seen by dr Leah Oakes on teleneuro consult     Wade Huynh DO  09/23/21 2484

## 2021-09-23 NOTE — PROGRESS NOTES
Assisted patient with calling sister with patient's permission. Patient appears paranoid. Overheard patient stating to sister on the telephone \"I just want to tell you real quick that I will probably be dead tomorrow\". When patient was giving sister the PIN number for future calls, patient continued to appear paranoid and stated \"It doesn't matter, they won't let you through\". Repeats \"Just remember our conversation\".

## 2021-09-23 NOTE — PLAN OF CARE
Denies SI/HI  denies hallucinations but appears preoccupied   attending groups  isolative to self and room most of this shift  cooperative with meds   denies any delusional thoughts   affect is blunt   will continue to monitor

## 2021-09-24 PROCEDURE — 1240000000 HC EMOTIONAL WELLNESS R&B

## 2021-09-24 PROCEDURE — 99232 SBSQ HOSP IP/OBS MODERATE 35: CPT | Performed by: NURSE PRACTITIONER

## 2021-09-24 PROCEDURE — 6370000000 HC RX 637 (ALT 250 FOR IP): Performed by: NURSE PRACTITIONER

## 2021-09-24 RX ORDER — RISPERIDONE 0.5 MG/1
0.5 TABLET, ORALLY DISINTEGRATING ORAL 2 TIMES DAILY
Status: DISCONTINUED | OUTPATIENT
Start: 2021-09-24 | End: 2021-09-25

## 2021-09-24 RX ADMIN — RISPERIDONE 0.5 MG: 0.5 TABLET, ORALLY DISINTEGRATING ORAL at 10:52

## 2021-09-24 ASSESSMENT — PAIN SCALES - GENERAL
PAINLEVEL_OUTOF10: 0
PAINLEVEL_OUTOF10: 0

## 2021-09-24 NOTE — PROGRESS NOTES
BEHAVIORAL HEALTH FOLLOW-UP NOTE     9/24/2021     Patient was seen and examined in person, Chart reviewed   Patient's case discussed with staff/team    Chief Complaint: Paranoid and psychotic    Interim History: Patient seen during treatment team she refuses to sit down she is appears very guarded and paranoid and psychotic. She reportedly will currently remain out multiple times all the night asking her she is [employer]. \"  She is unable to tell medication she has been taking states she takes medications for \"schizophrenia. \"  She is extremely paranoid appears internally stimulated no insight or judgment per staff patient has been spitting out her medications    Appetite:   [x] Normal/Unchanged  [] Increased  [] Decreased      Sleep:       [x] Normal/Unchanged  [] Fair       [] Poor              Energy:    [x] Normal/Unchanged  [] Increased  [] Decreased        SI [] Present  [x] Absent    HI  []Present  [x] Absent     Aggression:  [] yes  [x] no    Patient is [x] able  [] unable to CONTRACT FOR SAFETY     PAST MEDICAL/PSYCHIATRIC HISTORY:   Past Medical History:   Diagnosis Date    ARDS [facility]adult respiratory distress syndrome) ([facility]     Arthritis     Asthma     Chest pain     Depression     Essential tremor     Gallstones     Generalized abdominal pain     Head trauma     multiple history of    Hx of blood clots     Hyperlipidemia     Hypertension     Infectious colitis, enteritis and gastroenteritis     Lupus (systemic lupus erythematosus) (HCC)     Obesity     Palpitations     Prolonged emergence from general anesthesia     SOB (shortness of breath)     Systemic lupus erythematosus ([address]     Tachycardia        FAMILY/SOCIAL HISTORY:  Family History   Problem Relation Age of Onset    High Blood Pressure Mother     Diabetes Mother     Stroke Mother     Arthritis Mother     Diabetes Father     High Blood Pressure Father     High Blood Pressure Sister     Diabetes Sister     Celiac Disease Sister     High Blood Pressure Brother     Crohn's Disease Brother     Uterine Cancer Maternal Grandmother      Social History     Socioeconomic History    Marital status: Single     Spouse name: Not on file    Number of children: Not on file    Years of education: 13    Highest education level: Not on file   Occupational History    Not on file   Tobacco Use    Smoking status: Former Smoker     Types: Cigarettes     Quit date: 10/31/2012     Years since quittin.9    Smokeless tobacco: Never Used   Vaping Use    Vaping Use: Never used   Substance and Sexual Activity    Alcohol use: No    Drug use: No    Sexual activity: Not on file   Other Topics Concern    Not on file   Social History Narrative    Not on file     Social Determinants of Health     Financial Resource Strain:     Difficulty of Paying Living Expenses:    Food Insecurity:     Worried About Running Out of Food in the Last Year:     Ran Out of Food in the Last Year:    Transportation Needs:     Lack of Transportation (Medical):  Lack of Transportation (Non-Medical):    Physical Activity:     Days of Exercise per Week:     Minutes of Exercise per Session:    Stress:     Feeling of Stress :    Social Connections:     Frequency of Communication with Friends and Family:     Frequency of Social Gatherings with Friends and Family:     Attends [employer] Services:     Active Member of Clubs or Organizations:     Attends Club or Organization Meetings:     Marital Status:    Intimate Partner Violence:     Fear of Current or Ex-Partner:     Emotionally Abused:     Physically Abused:     Sexually Abused:            ROS:  [x] All negative/unchanged except if checked.  Explain positive(checked items) below:  [] Constitutional  [] Eyes  [] Ear/Nose/Mouth/Throat  [] Respiratory  [] CV  [] GI  []   [] Musculoskeletal  [] Skin/Breast  [] Neurological  [] Endocrine  [] Heme/Lymph  [] Allergic/Immunologic    Explanation: pleasant  Thought processes: Linear without flight of ideas loose associations   Thought content: Paranoid and internally stimulated. Denies SI/HI intent or plan  Cognition:  oriented to person, place, and time   Concentration intact  Insight fair   Judgement fair     Patient symptoms are:  [] Well controlled  [x] Improving  [] Worsening  [] No change      Diagnosis:   Principal Problem:    Bipolar 2 disorder, major depressive episode ([facility] [address]  Active Problems:    Cluster B personality disorder ([facility]  Resolved Problems:    * No resolved hospital problems. *      LABS:    No results for input(s): WBC, HGB, PLT in the last 72 hours. No results for input(s): NA, K, CL, CO2, BUN, CREATININE, GLUCOSE in the last 72 hours. No results for input(s): BILITOT, ALKPHOS, AST, ALT in the last 72 hours. Lab Results   Component Value Date    LABAMPH NOT DETECTED 09/21/2021    BARBSCNU NOT DETECTED 09/21/2021    LABBENZ NOT DETECTED 09/21/2021    LABMETH NOT DETECTED 09/21/2021    OPIATESCREENURINE NOT DETECTED 09/21/2021    PHENCYCLIDINESCREENURINE NOT DETECTED 09/21/2021    ETOH <10 09/21/2021     Lab Results   Component Value Date    TSH 2.000 12/23/2019     No results found for: LITHIUM  No results found for: VALPROATE, CBMZ        Treatment Plan:  Reviewed current Medications with the patient. Risks, benefits, side effects, drug-to-drug interactions and alternatives to treatment were discussed. Collateral information:   CD evaluation  Encourage patient to attend group and other milieu activities.   Discharge planning discussed with the patient and treatment team.    We will discontinue Zoloft continue Trileptal 150 mg twice daily for mood stabilization start Risperdal M tab 0.5 mg twice daily for psychosis    Patient psychosis and noncompliance of medications we did send notes to the probate court for hearing    PSYCHOTHERAPY/COUNSELING:  [x] Therapeutic interview  [x] Supportive  [] CBT  [] Ongoing  [] Other    [x] Patient continues to need, on a daily basis, active treatment furnished directly by or requiring the supervision of inpatient psychiatric personnel      Anticipated Length of stay: 3 to 7 days based on stability            Electronically signed by SUZANNE Miller CNP on 5/84/7594 at 1:35 PM

## 2021-09-24 NOTE — CARE COORDINATION
Received a call from pt mom requesting an update on pt, sw provided mom with updates. Received a call from [address] [place] [address] requesting an update on pt, sw provided minimal information, pt counselor requesting Sw to fax updates to her.

## 2021-09-24 NOTE — BH NOTE
Patient's sister called to speak with patient however patient is unwilling to talk and is paranoid of staff lying to her about her sister. Patient is gets the phone and immediately hangs it up. Patient refuses to cooperate with staff to record blood pressure.

## 2021-09-24 NOTE — PLAN OF CARE
Problem: Mood - Altered:  Goal: Mood stable  Description: Mood stable  Outcome: Ongoing   Patient is paranoid and bizarre.  Patient flat affect

## 2021-09-24 NOTE — BH NOTE
Patient pretended to take PO meds, but upon inspection, spit them into the cup and did not take them.

## 2021-09-24 NOTE — PROGRESS NOTES
Attended morning community meeting. Updated on staffing and daily expectations. Shared goal for the day as to find mercy.

## 2021-09-25 PROCEDURE — 6370000000 HC RX 637 (ALT 250 FOR IP): Performed by: NURSE PRACTITIONER

## 2021-09-25 PROCEDURE — 2580000003 HC RX 258

## 2021-09-25 PROCEDURE — 1240000000 HC EMOTIONAL WELLNESS R&B

## 2021-09-25 PROCEDURE — 6360000002 HC RX W HCPCS: Performed by: PSYCHIATRY & NEUROLOGY

## 2021-09-25 PROCEDURE — 99231 SBSQ HOSP IP/OBS SF/LOW 25: CPT | Performed by: NURSE PRACTITIONER

## 2021-09-25 PROCEDURE — 6360000002 HC RX W HCPCS

## 2021-09-25 PROCEDURE — 6360000002 HC RX W HCPCS: Performed by: NURSE PRACTITIONER

## 2021-09-25 RX ORDER — DIPHENHYDRAMINE HYDROCHLORIDE 50 MG/ML
INJECTION INTRAMUSCULAR; INTRAVENOUS
Status: COMPLETED
Start: 2021-09-25 | End: 2021-09-25

## 2021-09-25 RX ORDER — OXCARBAZEPINE 300 MG/1
300 TABLET, FILM COATED ORAL 2 TIMES DAILY
Status: DISCONTINUED | OUTPATIENT
Start: 2021-09-25 | End: 2021-09-30

## 2021-09-25 RX ORDER — DIPHENHYDRAMINE HCL 25 MG
50 TABLET ORAL EVERY 6 HOURS PRN
Status: DISCONTINUED | OUTPATIENT
Start: 2021-09-25 | End: 2021-10-04 | Stop reason: HOSPADM

## 2021-09-25 RX ORDER — RISPERIDONE 0.5 MG/1
1 TABLET, ORALLY DISINTEGRATING ORAL 2 TIMES DAILY
Status: DISCONTINUED | OUTPATIENT
Start: 2021-09-25 | End: 2021-09-30

## 2021-09-25 RX ORDER — DIPHENHYDRAMINE HYDROCHLORIDE 50 MG/ML
50 INJECTION INTRAMUSCULAR; INTRAVENOUS EVERY 6 HOURS PRN
Status: DISCONTINUED | OUTPATIENT
Start: 2021-09-25 | End: 2021-10-04 | Stop reason: HOSPADM

## 2021-09-25 RX ORDER — ZIPRASIDONE MESYLATE 20 MG/ML
10 INJECTION, POWDER, LYOPHILIZED, FOR SOLUTION INTRAMUSCULAR
Status: COMPLETED | OUTPATIENT
Start: 2021-09-25 | End: 2021-09-25

## 2021-09-25 RX ORDER — LORAZEPAM 2 MG/ML
2 INJECTION INTRAMUSCULAR
Status: COMPLETED | OUTPATIENT
Start: 2021-09-25 | End: 2021-09-25

## 2021-09-25 RX ADMIN — DIPHENHYDRAMINE HYDROCHLORIDE 50 MG: 50 INJECTION, SOLUTION INTRAMUSCULAR; INTRAVENOUS at 21:03

## 2021-09-25 RX ADMIN — DIPHENHYDRAMINE HYDROCHLORIDE 50 MG: 50 INJECTION, SOLUTION INTRAMUSCULAR; INTRAVENOUS at 15:26

## 2021-09-25 RX ADMIN — ZIPRASIDONE MESYLATE 10 MG: 20 INJECTION, POWDER, LYOPHILIZED, FOR SOLUTION INTRAMUSCULAR at 22:42

## 2021-09-25 RX ADMIN — HALOPERIDOL LACTATE 5 MG: 5 INJECTION, SOLUTION INTRAMUSCULAR at 13:10

## 2021-09-25 RX ADMIN — LORAZEPAM 2 MG: 2 INJECTION INTRAMUSCULAR; INTRAVENOUS at 22:42

## 2021-09-25 RX ADMIN — HALOPERIDOL LACTATE 5 MG: 5 INJECTION, SOLUTION INTRAMUSCULAR at 21:02

## 2021-09-25 RX ADMIN — WATER 10 ML: 1 INJECTION INTRAMUSCULAR; INTRAVENOUS; SUBCUTANEOUS at 22:42

## 2021-09-25 RX ADMIN — OXCARBAZEPINE 150 MG: 300 TABLET, FILM COATED ORAL at 10:13

## 2021-09-25 RX ADMIN — RISPERIDONE 0.5 MG: 0.5 TABLET, ORALLY DISINTEGRATING ORAL at 10:13

## 2021-09-25 ASSESSMENT — PAIN SCALES - GENERAL
PAINLEVEL_OUTOF10: 0
PAINLEVEL_OUTOF10: 0

## 2021-09-25 NOTE — PROGRESS NOTES
BEHAVIORAL SERVICES: One - Hour In- Person Review  For Management of Violent or Self - Destructive Behavior    Seclusion/Restraint:  seclusion    Reason for Intervention: pt attempted to climb over nurses station demanding her belongings.     Response to Intervention:  Pt remains angry, agitated, rocking back and forth and sarcastic    Medical Record reviewed and discussed precipitating events/behaviors with RN initiating Intervention:  Yes    Patient Medical Status:  Vital Signs: 156/83  98.2/92/18  Respiratory Status: regular, unlabored  Circulatory Status: moves extremities freely  Skin Integrity:warm, dry, pink    Orientation: oriented to person, place, time/date and situation    Mood/Affect: angry, anxious and irritable    Speech: Soft and Pressured    Thought Content: delusions and paranoid ideation    Thought Processes: Loose Associations and Evasive    Rationale for continued use of intervention: pt is unable to verbalize behavioral expectations    Rationale for discontinuing intervention: Patient is able to: verbalize behavioral expectations, is calm and in control    One Hour Review Evaluation Physician Notification:  N/A

## 2021-09-25 NOTE — PLAN OF CARE
Patient denies SI, HI, hallucinations, and physical complaints currently. Patient remains in seclusion room as asked for quiet time.

## 2021-09-25 NOTE — PROGRESS NOTES
BEHAVIORAL HEALTH FOLLOW-UP NOTE     9/25/2021     Patient was seen and examined in person, Chart reviewed   Patient's case discussed with staff/team    Chief Complaint: Paranoid, psychotic Bizarre affect     Interim History: Patient seen in her room today, she is appears very guarded and paranoid and psychotic. Has a blank stare. She cannot answer questions with relevance and is angry required PRN PO medications this morning. She has difficulty following direction, is possibly misinterpreting. She reportedly will currently remain out multiple times all the night asking her she is [employer]. \"  She is unable to tell medication she has been taking states she takes medications for \"schizophrenia. \"  She is extremely paranoid appears internally stimulated no insight or judgment per staff patient has been spitting out her medications    Appetite:   [x] Normal/Unchanged  [] Increased  [] Decreased      Sleep:       [x] Normal/Unchanged  [] Fair       [] Poor              Energy:    [x] Normal/Unchanged  [] Increased  [] Decreased        SI [] Present  [x] Absent    HI  []Present  [x] Absent     Aggression:  [] yes  [x] no    Patient is [x] able  [] unable to CONTRACT FOR SAFETY     PAST MEDICAL/PSYCHIATRIC HISTORY:   Past Medical History:   Diagnosis Date    ARDS (adult respiratory distress syndrome) ([address]     Arthritis     Asthma     Chest pain     Depression     Essential tremor     Gallstones     Generalized abdominal pain     Head trauma     multiple history of    Hx of blood clots     Hyperlipidemia     Hypertension     Infectious colitis, enteritis and gastroenteritis     Lupus (systemic lupus erythematosus) (HCC)     Obesity     Palpitations     Prolonged emergence from general anesthesia     SOB (shortness of breath)     Systemic lupus erythematosus ([address]     Tachycardia        FAMILY/SOCIAL HISTORY:  Family History   Problem Relation Age of Onset    High Blood Pressure Mother    Tamir Vasquez Diabetes Mother     Stroke Mother     Arthritis Mother     Diabetes Father     High Blood Pressure Father     High Blood Pressure Sister     Diabetes Sister     Celiac Disease Sister     High Blood Pressure Brother     Crohn's Disease Brother     Uterine Cancer Maternal Grandmother      Social History     Socioeconomic History    Marital status: Single     Spouse name: Not on file    Number of children: Not on file    Years of education: 13    Highest education level: Not on file   Occupational History    Not on file   Tobacco Use    Smoking status: Former Smoker     Types: Cigarettes     Quit date: 10/31/2012     Years since quittin.9    Smokeless tobacco: Never Used   Vaping Use    Vaping Use: Never used   Substance and Sexual Activity    Alcohol use: No    Drug use: No    Sexual activity: Not on file   Other Topics Concern    Not on file   Social History Narrative    Not on file     Social Determinants of Health     Financial Resource Strain:     Difficulty of Paying Living Expenses:    Food Insecurity:     Worried About Running Out of Food in the Last Year:     Ran Out of Food in the Last Year:    Transportation Needs:     Lack of Transportation (Medical):  Lack of Transportation (Non-Medical):    Physical Activity:     Days of Exercise per Week:     Minutes of Exercise per Session:    Stress:     Feeling of Stress :    Social Connections:     Frequency of Communication with Friends and Family:     Frequency of Social Gatherings with Friends and Family:     Attends [employer] Services:     Active Member of Clubs or Organizations:     Attends Club or Organization Meetings:     Marital Status:    Intimate Partner Violence:     Fear of Current or Ex-Partner:     Emotionally Abused:     Physically Abused:     Sexually Abused:            ROS:  [x] All negative/unchanged except if checked.  Explain positive(checked items) below:  [] Constitutional  [] Eyes  [] Ear/Nose/Mouth/Throat  [] Respiratory  [] CV  [] GI  []   [] Musculoskeletal  [] Skin/Breast  [] Neurological  [] Endocrine  [] Heme/Lymph  [] Allergic/Immunologic    Explanation:     MEDICATIONS:    Current Facility-Administered Medications:     risperiDONE (RISPERDAL M-TABS) disintegrating tablet 0.5 mg, 0.5 mg, Oral, BID, Montserrat Lung Dellick, APRN - CNP, 0.5 mg at 09/25/21 1013    OXcarbazepine (TRILEPTAL) tablet 150 mg, 150 mg, Oral, BID, Montserrat Lung Dellick, APRN - CNP, 317 mg at 09/25/21 1013    ipratropium-albuterol (DUONEB) nebulizer solution 3 mL, 1 vial, Inhalation, D2A PRN, Montserrat Lung Dellick, APRN - CNP    nitroGLYCERIN (NITROSTAT) SL tablet 0.4 mg, 0.4 mg, SubLINGual, Q5 Min PRN, Montserrat Lung Dellick, APRN - CNP    acetaminophen (TYLENOL) tablet 650 mg, 650 mg, Oral, Q8D PRN, Montserrat Lung Dellick, APRN - CNP    magnesium hydroxide (MILK OF MAGNESIA) 400 MG/5ML suspension 30 mL, 30 mL, Oral, Daily PRN, Montserrat Lung Dellick, APRN - CNP    nicotine (NICODERM CQ) 21 MG/24HR 1 patch, 1 patch, TransDERmal, Daily, Elisabeth B Dellick, APRN - CNP    aluminum & magnesium hydroxide-simethicone (MAALOX) 200-200-20 MG/5ML suspension 30 mL, 30 mL, Oral, PRN, Montserrat Lung Dellick, APRN - CNP    hydrOXYzine (VISTARIL) capsule 50 mg, 50 mg, Oral, TID PRN, Montserrat Lung Dellick, APRN - CNP    haloperidol (HALDOL) tablet 5 mg, 5 mg, Oral, Q6H PRN **OR** haloperidol lactate (HALDOL) injection 5 mg, 5 mg, IntraMUSCular, Q2B PRN, Montserrat Lung Dellick, APRN - CNP, 5 mg at 09/25/21 1310    traZODone (DESYREL) tablet 50 mg, 50 mg, Oral, Nightly PRN, Montserrat Lung Dellick, APRN - CNP      Examination:  BP (!) 117/58   Pulse 99   Temp 98.2 °F (36.8 °C) (Temporal)   Resp 14   Ht 5' 2\" (1.575 m)   Wt 224 lb (101.6 kg)   LMP 08/11/2021 (Approximate)   SpO2 97%   Breastfeeding No   BMI 40.97 kg/m²   Gait - steady  Medication side effects(SE): denies    Mental Status Examination:    Level of consciousness:  within normal limits   Appearance:  fair grooming and fair psychosis and noncompliance of medications we did send notes to the probate court for hearing    PSYCHOTHERAPY/COUNSELING:  [x] Therapeutic interview  [x] Supportive  [] CBT  [] Ongoing  [] Other    [x] Patient continues to need, on a daily basis, active treatment furnished directly by or requiring the supervision of inpatient psychiatric personnel      Anticipated Length of stay: 3 to 7 days based on stability            Electronically signed by Frankey Slice, APRN - CNP on 9/25/2021 at 1:45 PM

## 2021-09-25 NOTE — PROGRESS NOTES
Patient remains agitated and continues to refuse education and displays no insight to why she remains in seclusion.

## 2021-09-25 NOTE — PROGRESS NOTES
Post Restraint and Seclusion Patient Debriefing    Did debriefing occur? yes, patient verbalized understanding and stated she not attempt to jump the nurses station again. If No, why not? [] Patient refused  [] Patient is unavailable for debriefing due to:  [] Patient debriefing not completed due to clinical contraindication of:    What events led to the seclusion/restraint incident? Did being restrained or in seclusion help you regain control of your behavior? yes, patient states she does not want to be locked in room, that it produces anxiety. Did you feel safe while you were in restraint or seclusion:      [] Very Safe  [] Safe  [] Somewhat Safe  [] Not Safe     Did you have the chance to gain control of your behavior before you were secluded or restrained? yes, patient offered numerous times to regain control and was redirected. If Yes, how:    [] Offered Medication  [] Talked with  [] Given Time Out      [] Offered alternatives to restraint/seclusion     During the restraint or seclusion process were you offered medicine to help you gain control? yes, patient was given two PRNs, see MAr      Were your physical and emotional needs met, and your privacy rights addressed while you were in restraints/seclusion? yes,       How can we assist you in remaining restraint or seclusion free in the future? Is there anything else you would like to share regarding this restraint/seclusion episode? Patient consented to family or significant other to participate in debriefing no    Patient's guardian participated in debriefing (when applicable) no    Patient's guardian unable to participate in debriefing (when applicable) no    Staff: Were modifications to the treatment plan completed?  yes

## 2021-09-25 NOTE — PROGRESS NOTES
Patient woke up from rest in seclusion room, came over to nurses station and stated \"give me those bags now! \" Patient grabbed chair and attempted to jump over nurses. ENEDINA intervention used and patient removed from nurses station and escorted to seclusion. Order received for locked seclusion for safety of patient and unit, no prns able to be administered currenlty. Patient currently standing at door in seclsuion.

## 2021-09-25 NOTE — PROGRESS NOTES
Patient repeatedly asked to stay out of patients rooms. Patient was redirected numerous times. Patient entered a male patients room and began to yell at male patient and was not directable. Patient escorted to seclusion and medicated per [facility] with as needed medication. Medication was administered without issue and patient was asked to remain in seclusion room for a time out with door open. After staff left vacinity, patient left seclusion room, patient redirected back to seclsusion room and door remains open, will continue to monitor.

## 2021-09-26 PROCEDURE — 2580000003 HC RX 258

## 2021-09-26 PROCEDURE — 6360000002 HC RX W HCPCS: Performed by: PSYCHIATRY & NEUROLOGY

## 2021-09-26 PROCEDURE — 99231 SBSQ HOSP IP/OBS SF/LOW 25: CPT | Performed by: NURSE PRACTITIONER

## 2021-09-26 PROCEDURE — 1240000000 HC EMOTIONAL WELLNESS R&B

## 2021-09-26 RX ORDER — ZIPRASIDONE MESYLATE 20 MG/ML
10 INJECTION, POWDER, LYOPHILIZED, FOR SOLUTION INTRAMUSCULAR EVERY 6 HOURS PRN
Status: DISCONTINUED | OUTPATIENT
Start: 2021-09-26 | End: 2021-10-04 | Stop reason: HOSPADM

## 2021-09-26 RX ORDER — LORAZEPAM 2 MG/ML
2 INJECTION INTRAMUSCULAR EVERY 6 HOURS PRN
Status: DISCONTINUED | OUTPATIENT
Start: 2021-09-26 | End: 2021-10-04 | Stop reason: HOSPADM

## 2021-09-26 RX ADMIN — WATER 1 ML: 1 INJECTION INTRAMUSCULAR; INTRAVENOUS; SUBCUTANEOUS at 13:51

## 2021-09-26 RX ADMIN — LORAZEPAM 2 MG: 2 INJECTION INTRAMUSCULAR; INTRAVENOUS at 13:51

## 2021-09-26 RX ADMIN — ZIPRASIDONE MESYLATE 10 MG: 20 INJECTION, POWDER, LYOPHILIZED, FOR SOLUTION INTRAMUSCULAR at 13:51

## 2021-09-26 RX ADMIN — ZIPRASIDONE MESYLATE 10 MG: 20 INJECTION, POWDER, LYOPHILIZED, FOR SOLUTION INTRAMUSCULAR at 07:49

## 2021-09-26 RX ADMIN — LORAZEPAM 2 MG: 2 INJECTION INTRAMUSCULAR; INTRAVENOUS at 07:48

## 2021-09-26 RX ADMIN — WATER 1 ML: 1 INJECTION INTRAMUSCULAR; INTRAVENOUS; SUBCUTANEOUS at 07:48

## 2021-09-26 ASSESSMENT — PAIN SCALES - GENERAL
PAINLEVEL_OUTOF10: 0
PAINLEVEL_OUTOF10: 0

## 2021-09-26 NOTE — PROGRESS NOTES
While in report, patient made multiple attempts to jump the nurses station. Patient was not able to be directed per staff. Once this staff was out patient remained at nurses station and stated she would attempt to climb over again.  Patient medicated per MAr.

## 2021-09-26 NOTE — PROGRESS NOTES
Patient continues multiple times to jump nurses station,nonredirectable DR. Neal notified new orders received.

## 2021-09-26 NOTE — PROGRESS NOTES
Patient came out of seclusion and when staff asked did she understand why she was asked to have a timeout, she stated no. Patient was informed it was due to patient jumping over the nurses station as well as other negative behaviors. This staff asked if patient will continue negative behaviors such as attempting to jump the nurses station. Patient refused and stated she will attempt again. Patient escorted back to seclusion and adminstered PRNs for patients safety.

## 2021-09-26 NOTE — PROGRESS NOTES
Patient was in hallway walking and denies SI,HI or AV hallucinations. But refused to answer any further questions walking away. Will continue to monitor.

## 2021-09-26 NOTE — PROGRESS NOTES
BEHAVIORAL HEALTH FOLLOW-UP NOTE     9/26/2021     Patient was seen and examined in person, Chart reviewed   Patient's case discussed with staff/team    Chief Complaint: Paranoid, psychotic Bizarre affect     Interim History: Patient seen in the seclusion room after receiving stat IM injections after she jumped the nurses station again. She received several PRN medications throughout the night due to behaviors. She is sleeping soundly on this encounter due to receiving prn medications. She is in no distress.   Staff reports that she is extremely paranoid appears internally stimulated no insight or judgment per staff patient has been spitting out her medications    Appetite:   [x] Normal/Unchanged  [] Increased  [] Decreased      Sleep:       [x] Normal/Unchanged  [] Fair       [] Poor              Energy:    [x] Normal/Unchanged  [] Increased  [] Decreased        SI [] Present  [x] Absent    HI  []Present  [x] Absent     Aggression:  [] yes  [x] no    Patient is [x] able  [] unable to CONTRACT FOR SAFETY     PAST MEDICAL/PSYCHIATRIC HISTORY:   Past Medical History:   Diagnosis Date    ARDS (adult respiratory distress syndrome) ([address]     Arthritis     Asthma     Chest pain     Depression     Essential tremor     Gallstones     Generalized abdominal pain     Head trauma     multiple history of    Hx of blood clots     Hyperlipidemia     Hypertension     Infectious colitis, enteritis and gastroenteritis     Lupus (systemic lupus erythematosus) (HCC)     Obesity     Palpitations     Prolonged emergence from general anesthesia     SOB (shortness of breath)     Systemic lupus erythematosus ([address]     Tachycardia        FAMILY/SOCIAL HISTORY:  Family History   Problem Relation Age of Onset    High Blood Pressure Mother     Diabetes Mother     Stroke Mother     Arthritis Mother     Diabetes Father     High Blood Pressure Father     High Blood Pressure Sister     Diabetes Sister     Celiac Disease Sister     High Blood Pressure Brother     Crohn's Disease Brother     Uterine Cancer Maternal Grandmother      Social History     Socioeconomic History    Marital status: Single     Spouse name: Not on file    Number of children: Not on file    Years of education: 13    Highest education level: Not on file   Occupational History    Not on file   Tobacco Use    Smoking status: Former Smoker     Types: Cigarettes     Quit date: 10/31/2012     Years since quittin.9    Smokeless tobacco: Never Used   Vaping Use    Vaping Use: Never used   Substance and Sexual Activity    Alcohol use: No    Drug use: No    Sexual activity: Not on file   Other Topics Concern    Not on file   Social History Narrative    Not on file     Social Determinants of Health     Financial Resource Strain:     Difficulty of Paying Living Expenses:    Food Insecurity:     Worried About Running Out of Food in the Last Year:     [address] in the Last Year:    Transportation Needs:     Lack of Transportation (Medical):  Lack of Transportation (Non-Medical):    Physical Activity:     Days of Exercise per Week:     Minutes of Exercise per Session:    Stress:     Feeling of Stress :    Social Connections:     Frequency of Communication with Friends and Family:     Frequency of Social Gatherings with Friends and Family:     Attends Sabianism Services:     Active Member of Clubs or Organizations:     Attends Club or Organization Meetings:     Marital Status:    Intimate Partner Violence:     Fear of Current or Ex-Partner:     Emotionally Abused:     Physically Abused:     Sexually Abused:            ROS:  [x] All negative/unchanged except if checked.  Explain positive(checked items) below:  [] Constitutional  [] Eyes  [] Ear/Nose/Mouth/Throat  [] Respiratory  [] CV  [] GI  []   [] Musculoskeletal  [] Skin/Breast  [] Neurological  [] Endocrine  [] Heme/Lymph  [] Allergic/Immunologic    Explanation: MEDICATIONS:    Current Facility-Administered Medications:     ziprasidone (GEODON) injection 10 mg, 10 mg, IntraMUSCular, Q6H PRN, Danielle Mcpherson MD, 10 mg at 09/26/21 1351    LORazepam (ATIVAN) injection 2 mg, 2 mg, IntraMUSCular, Q6H PRN, Danielle Mcpherson MD, 2 mg at 09/26/21 1351    sterile water injection, , , ,     diphenhydrAMINE (BENADRYL) injection 50 mg, 50 mg, IntraMUSCular, Q6H PRN, 50 mg at 09/25/21 2103 **OR** diphenhydrAMINE (BENADRYL) tablet 50 mg, 50 mg, Oral, Q6H PRN, SUZANNE Moreira - CNP    OXcarbazepine (TRILEPTAL) tablet 300 mg, 300 mg, Oral, BID, SUZANNE Moreira - CNP    risperiDONE (RISPERDAL M-TABS) disintegrating tablet 1 mg, 1 mg, Oral, BID, SUZANNE Moreira - CNP    ipratropium-albuterol (DUONEB) nebulizer solution 3 mL, 1 vial, Inhalation, Y5E PRN, SUZANNE Clay CNP    nitroGLYCERIN (NITROSTAT) SL tablet 0.4 mg, 0.4 mg, SubLINGual, Q5 Min PRN, SUZANNE Clay CNP    acetaminophen (TYLENOL) tablet 650 mg, 650 mg, Oral, B5W PRN, SUZANNE Clay CNP    magnesium hydroxide (MILK OF MAGNESIA) 400 MG/5ML suspension 30 mL, 30 mL, Oral, Daily PRN, SUZANNE Clay CNP    nicotine (NICODERM CQ) 21 MG/24HR 1 patch, 1 patch, TransDERmal, Daily, Elisabeth B SUZANNE Gonzalez CNP    aluminum & magnesium hydroxide-simethicone (MAALOX) 200-200-20 MG/5ML suspension 30 mL, 30 mL, Oral, PRN, SUZANNE Clay - CNP    haloperidol (HALDOL) tablet 5 mg, 5 mg, Oral, Q6H PRN **OR** haloperidol lactate (HALDOL) injection 5 mg, 5 mg, IntraMUSCular, S5U PRN, Josephine Bonilla, APRN - CNP, 5 mg at 09/25/21 2102    traZODone (DESYREL) tablet 50 mg, 50 mg, Oral, Nightly PRN, John Gonzalez, APRN - CNP      Examination:  /74   Pulse 101   Temp 98.2 °F (36.8 °C) (Temporal)   Resp 20   Ht 5' 2\" (1.575 m)   Wt 224 lb (101.6 kg)   LMP 08/11/2021 (Approximate)   SpO2 99%   Breastfeeding No   BMI 40.97 kg/m²   Gait - steady  Medication side effects(SE): denies    Mental Status Examination:    Level of consciousness:  within normal limits   Appearance:  fair grooming and fair hygiene  Behavior/Motor:  no abnormalities noted  Attitude toward examiner:  cooperative  Speech:  spontaneous, normal rate and normal volume   Mood: \" I am feeling much better. Affect: Appropriate and pleasant  Thought processes: Linear without flight of ideas loose associations   Thought content: Paranoid and internally stimulated. Denies SI/HI intent or plan  Cognition:  oriented to person, place, and time   Concentration intact  Insight fair   Judgement fair     Patient symptoms are:  [] Well controlled  [x] Improving  [] Worsening  [] No change      Diagnosis:   Principal Problem:    Bipolar 2 disorder, major depressive episode ([facility]  Active Problems:    Cluster B personality disorder ([facility]  Resolved Problems:    * No resolved hospital problems. *      LABS:    No results for input(s): WBC, HGB, PLT in the last 72 hours. No results for input(s): NA, K, CL, CO2, BUN, CREATININE, GLUCOSE in the last 72 hours. No results for input(s): BILITOT, ALKPHOS, AST, ALT in the last 72 hours. Lab Results   Component Value Date    LABAMPH NOT DETECTED 09/21/2021    BARBSCNU NOT DETECTED 09/21/2021    LABBENZ NOT DETECTED 09/21/2021    LABMETH NOT DETECTED 09/21/2021    OPIATESCREENURINE NOT DETECTED 09/21/2021    PHENCYCLIDINESCREENURINE NOT DETECTED 09/21/2021    ETOH <10 09/21/2021     Lab Results   Component Value Date    TSH 2.000 12/23/2019     No results found for: LITHIUM  No results found for: VALPROATE, CBMZ        Treatment Plan:  Reviewed current Medications with the patient. Risks, benefits, side effects, drug-to-drug interactions and alternatives to treatment were discussed. Collateral information:   CD evaluation  Encourage patient to attend group and other milieu activities.   Discharge planning discussed with the patient and treatment team.    We will discontinue Zoloft continue Trileptal 150 mg twice daily for mood stabilization start Risperdal M tab 0.5 mg twice daily for psychosis    Patient psychosis and noncompliance of medications we did send notes to the probate court for hearing    PSYCHOTHERAPY/COUNSELING:  [x] Therapeutic interview  [x] Supportive  [] CBT  [] Ongoing  [] Other    [x] Patient continues to need, on a daily basis, active treatment furnished directly by or requiring the supervision of inpatient psychiatric personnel      Anticipated Length of stay: 3 to 7 days based on stability            Electronically signed by SUZANNE Dillon CNP on 9/26/2021 at 1:57 PM

## 2021-09-26 NOTE — PROGRESS NOTES
Patient pulled chair up to desk and jumped over nurses station. Patient was unable to redirect and became physically agitated with staff intervention, panic alarm initiated and staff from [facility] [employer] on unit for show of support. Patient escorted to seclusion room with door unsecured and patient advised to remain seated on bed in seclusion room for quiet time. Unable to medicate with PRNs at this time.

## 2021-09-26 NOTE — PROGRESS NOTES
Patient at nurses station once again attempted to climb over nurses , was walked down to quiet room and laid down.

## 2021-09-26 NOTE — PROGRESS NOTES
Patient resting quiet to self at this time, respirations are even and unlabored, no signs or symptoms of distress or discomfort. PRN medications given thus far this shift. For behavior and anxiety. Staff will continue to conduct environmental rounds to ensure the safety of everyone on the unit. Staff will provide support and interventions as requested or required.

## 2021-09-26 NOTE — PROGRESS NOTES
Patient pulled chair to nurses station after multiple attempts to redirect and attempted to crawl over nurses station. Dr. Princess Ruiz notified new orders received.

## 2021-09-26 NOTE — PROGRESS NOTES
Patient took chair and crawled over nurses station while LPN passing medications, this nurse out doing assessments, other rn doing rounds and additional RN attending to her patient. Was caught by LPN and taken from patient locker area and directed to seclusion room which was left unlocked, patient was very agitated atthispoint. German Bates LPN went to administer PO ordered medications patient refused, this nurse went to assisted explaining the importance of taking medications. Patient placed medications in mouth took drink of water and then proceeded to spit on floor. Patient continued to stat not going to take medication, non redirectable. Ordered prn medications given, see MAR.

## 2021-09-27 PROCEDURE — 99232 SBSQ HOSP IP/OBS MODERATE 35: CPT | Performed by: NURSE PRACTITIONER

## 2021-09-27 PROCEDURE — 6370000000 HC RX 637 (ALT 250 FOR IP): Performed by: NURSE PRACTITIONER

## 2021-09-27 PROCEDURE — 1240000000 HC EMOTIONAL WELLNESS R&B

## 2021-09-27 RX ADMIN — OXCARBAZEPINE 300 MG: 300 TABLET, FILM COATED ORAL at 09:39

## 2021-09-27 RX ADMIN — RISPERIDONE 1 MG: 0.5 TABLET, ORALLY DISINTEGRATING ORAL at 09:39

## 2021-09-27 RX ADMIN — RISPERIDONE 1 MG: 0.5 TABLET, ORALLY DISINTEGRATING ORAL at 21:14

## 2021-09-27 RX ADMIN — OXCARBAZEPINE 300 MG: 300 TABLET, FILM COATED ORAL at 21:14

## 2021-09-27 ASSESSMENT — PAIN SCALES - GENERAL
PAINLEVEL_OUTOF10: 0

## 2021-09-27 NOTE — PLAN OF CARE
Problem: Discharge Planning:  Goal: Discharged to appropriate level of care  Description: Discharged to appropriate level of care  9/27/2021 1054 by Booker Gilbert RN  Outcome: Ongoing  9/27/2021 0527 by Nic Box RN  Outcome: Ongoing     Problem: Mood - Altered:  Goal: Mood stable  Description: Mood stable  9/27/2021 1054 by Booker Gilbert RN  Outcome: Ongoing  9/27/2021 0527 by Nic Box RN  Outcome: Ongoing     Problem: Falls - Risk of:  Goal: Will remain free from falls  Description: Will remain free from falls  9/27/2021 1054 by Booker Gilbert RN  Outcome: Ongoing  9/27/2021 0527 by Nic Box RN  Outcome: Met This Shift  9/27/2021 0104 by Vinayak Irizarry RN  Outcome: Met This Shift  Goal: Absence of physical injury  Description: Absence of physical injury  9/27/2021 1054 by Booker Gilbert RN  Outcome: Ongoing  9/27/2021 0527 by Nic Box RN  Outcome: Met This Shift  9/27/2021 0104 by Vinayak Irizarry RN  Outcome: Met This Shift

## 2021-09-27 NOTE — PROGRESS NOTES
Patients sister, Tameka Bray, called update stating that when patient was at home became angry. She lives at home with her parents and felt responsible for caring for her elderly parents. States she has lived with them most her life. 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

## 2021-09-27 NOTE — GROUP NOTE
Group Therapy Note    Date: 9/27/2021    Group Start Time: 1100  Group End Time: 1130  Group Topic: Cognitive Skills    [facility]    OSCAR Philippe, [place]        Group Therapy Note    Attendees: 13         Patient's Goal:  To participate in the group discussion on the cognitive triangle and challenging negative thoughts. Notes:  Pt was an active listener in group. Status After Intervention:  Unchanged    Participation Level:  Active Listener    Participation Quality: Appropriate and Attentive      Speech:  normal      Thought Process/Content: Logical      Affective Functioning: Flat      Mood: depressed      Level of consciousness:  Alert and Oriented x4      Response to Learning: Able to verbalize current knowledge/experience      Endings: None Reported    Modes of Intervention: Education, Support, Socialization, Exploration, Clarifying and Problem-solving      Discipline Responsible: /Counselor      Signature:  OSCAR Silvestre, [place]
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
## 2021-09-28 PROCEDURE — 6370000000 HC RX 637 (ALT 250 FOR IP): Performed by: NURSE PRACTITIONER

## 2021-09-28 PROCEDURE — 1240000000 HC EMOTIONAL WELLNESS R&B

## 2021-09-28 PROCEDURE — 99232 SBSQ HOSP IP/OBS MODERATE 35: CPT | Performed by: NURSE PRACTITIONER

## 2021-09-28 RX ADMIN — OXCARBAZEPINE 300 MG: 300 TABLET, FILM COATED ORAL at 09:26

## 2021-09-28 RX ADMIN — RISPERIDONE 1 MG: 0.5 TABLET, ORALLY DISINTEGRATING ORAL at 09:26

## 2021-09-28 RX ADMIN — OXCARBAZEPINE 300 MG: 300 TABLET, FILM COATED ORAL at 20:54

## 2021-09-28 RX ADMIN — RISPERIDONE 1 MG: 0.5 TABLET, ORALLY DISINTEGRATING ORAL at 20:54

## 2021-09-28 ASSESSMENT — PAIN SCALES - GENERAL
PAINLEVEL_OUTOF10: 0

## 2021-09-28 NOTE — GROUP NOTE
Group Therapy Note    Date: 9/28/2021    Group Start Time: 1100  Group End Time: 3633  Group Topic: Cognitive Skills    [facility]  [address], [facility]        Group Therapy Note    Attendees: 13         Patient's Goal:  Pt will be able to identify current values and values they want to improve. Notes:  Pt participated in group and made connections. Status After Intervention:  Improved    Participation Level:  Active Listener    Participation Quality: Appropriate and Attentive      Speech:  normal      Thought Process/Content: Logical      Affective Functioning: Flat      Mood: depressed      Level of consciousness:  Alert and Oriented x4      Response to Learning: Able to verbalize current knowledge/experience and Capable of insight      Endings: None Reported    Modes of Intervention: Education, Support, Socialization, Exploration, Clarifying and Problem-solving      Discipline Responsible: /Counselor      Signature:  OSCAR Davis, [place]

## 2021-09-28 NOTE — PLAN OF CARE
Problem: Discharge Planning:  Goal: Discharged to appropriate level of care  Description: Discharged to appropriate level of care  9/27/2021 2015 by Paul Perez RN  Outcome: Ongoing     Problem: Mood - Altered:  Goal: Mood stable  Description: Mood stable  9/27/2021 2015 by Paul Perez RN  Outcome: Not Met This Shift     Problem: Falls - Risk of:  Goal: Will remain free from falls  Description: Will remain free from falls  9/27/2021 2015 by Paul Perez RN  Outcome: Ongoing     Problem: Falls - Risk of:  Goal: Absence of physical injury  Description: Absence of physical injury  9/27/2021 2015 by Paul Perez RN  Outcome: Ongoing

## 2021-09-28 NOTE — PROGRESS NOTES
BEHAVIORAL HEALTH FOLLOW-UP NOTE     9/28/2021     Patient was seen and examined in person, Chart reviewed   Patient's case discussed with staff/team    Chief Complaint: \" I am feeling better. \"    Interim History: Patient upon the unit attending group she is social with peers. She vehemently denies SI/HI intent or plan she denies any auditory visual hallucinations she has been eating well sleeping well there are no neurovegetative signs symptoms of depression.  She has been medication compliant    Appetite:   [x] Normal/Unchanged  [] Increased  [] Decreased      Sleep:       [x] Normal/Unchanged  [] Fair       [] Poor              Energy:    [x] Normal/Unchanged  [] Increased  [] Decreased        SI [] Present  [x] Absent    HI  []Present  [x] Absent     Aggression:  [] yes  [x] no    Patient is [x] able  [] unable to CONTRACT FOR SAFETY     PAST MEDICAL/PSYCHIATRIC HISTORY:   Past Medical History:   Diagnosis Date    ARDS (adult respiratory distress syndrome) ([address]     Arthritis     Asthma     Chest pain     Depression     Essential tremor     Gallstones     Generalized abdominal pain     Head trauma     multiple history of    Hx of blood clots     Hyperlipidemia     Hypertension     Infectious colitis, enteritis and gastroenteritis     Lupus (systemic lupus erythematosus) (HCC)     Obesity     Palpitations     Prolonged emergence from general anesthesia     SOB (shortness of breath)     Systemic lupus erythematosus ([address]     Tachycardia        FAMILY/SOCIAL HISTORY:  Family History   Problem Relation Age of Onset    High Blood Pressure Mother     Diabetes Mother     Stroke Mother     Arthritis Mother     Diabetes Father     High Blood Pressure Father     High Blood Pressure Sister     Diabetes Sister     Celiac Disease Sister     High Blood Pressure Brother     Crohn's Disease Brother     Uterine Cancer Maternal Grandmother      Social History     Socioeconomic History    Marital status: Single     Spouse name: Not on file    Number of children: Not on file    Years of education: 13    Highest education level: Not on file   Occupational History    Not on file   Tobacco Use    Smoking status: Former Smoker     Types: Cigarettes     Quit date: 10/31/2012     Years since quittin.9    Smokeless tobacco: Never Used   Vaping Use    Vaping Use: Never used   Substance and Sexual Activity    Alcohol use: No    Drug use: No    Sexual activity: Not on file   Other Topics Concern    Not on file   Social History Narrative    Not on file     Social Determinants of Health     Financial Resource Strain:     Difficulty of Paying Living Expenses:    Food Insecurity:     Worried About Running Out of Food in the Last Year:     [address] in the Last Year:    Transportation Needs:     Lack of Transportation (Medical):  Lack of Transportation (Non-Medical):    Physical Activity:     Days of Exercise per Week:     Minutes of Exercise per Session:    Stress:     Feeling of Stress :    Social Connections:     Frequency of Communication with Friends and Family:     Frequency of Social Gatherings with Friends and Family:     Attends Hinduism Services:     Active Member of Clubs or Organizations:     Attends Club or Organization Meetings:     Marital Status:    Intimate Partner Violence:     Fear of Current or Ex-Partner:     Emotionally Abused:     Physically Abused:     Sexually Abused:            ROS:  [x] All negative/unchanged except if checked.  Explain positive(checked items) below:  [] Constitutional  [] Eyes  [] Ear/Nose/Mouth/Throat  [] Respiratory  [] CV  [] GI  []   [] Musculoskeletal  [] Skin/Breast  [] Neurological  [] Endocrine  [] Heme/Lymph  [] Allergic/Immunologic    Explanation:     MEDICATIONS:    Current Facility-Administered Medications:     ziprasidone (GEODON) injection 10 mg, 10 mg, IntraMUSCular, Q6H PRN, Marquis Mcdonald MD, 10 mg at 21 1351    LORazepam (ATIVAN) injection 2 mg, 2 mg, IntraMUSCular, Q6H PRN, Flower Murrieta MD, 2 mg at 09/26/21 1351    diphenhydrAMINE (BENADRYL) injection 50 mg, 50 mg, IntraMUSCular, Q6H PRN, 50 mg at 09/25/21 2103 **OR** diphenhydrAMINE (BENADRYL) tablet 50 mg, 50 mg, Oral, Q6H PRN, SUZANNE Jon CNP    OXcarbazepine (TRILEPTAL) tablet 300 mg, 300 mg, Oral, BID, SUZANNE Jon CNP, 300 mg at 09/27/21 2114    risperiDONE (RISPERDAL M-TABS) disintegrating tablet 1 mg, 1 mg, Oral, BID, SUZANNE Jon CNP, 1 mg at 09/27/21 2114    ipratropium-albuterol (DUONEB) nebulizer solution 3 mL, 1 vial, Inhalation, D6R PRN, SUZANNE Lechuga CNP    nitroGLYCERIN (NITROSTAT) SL tablet 0.4 mg, 0.4 mg, SubLINGual, Q5 Min PRN, SUZANNE Diego CNP    acetaminophen (TYLENOL) tablet 650 mg, 650 mg, Oral, S3G PRN, SUZANNE Lechuga CNP    magnesium hydroxide (MILK OF MAGNESIA) 400 MG/5ML suspension 30 mL, 30 mL, Oral, Daily PRN, SUZANNE Lechuga CNP    aluminum & magnesium hydroxide-simethicone (MAALOX) 200-200-20 MG/5ML suspension 30 mL, 30 mL, Oral, PRN, SUZANNE Lechuga CNP    haloperidol (HALDOL) tablet 5 mg, 5 mg, Oral, Q6H PRN **OR** haloperidol lactate (HALDOL) injection 5 mg, 5 mg, IntraMUSCular, R7J PRN, SUZANNE Lechuga CNP, 5 mg at 09/25/21 2102    traZODone (DESYREL) tablet 50 mg, 50 mg, Oral, Nightly PRN, John Guitar Dellick, APRN - CNP      Examination:  /87   Pulse 118   Temp 97.4 °F (36.3 °C) (Oral)   Resp 20   Ht 5' 2\" (1.575 m)   Wt 224 lb (101.6 kg)   LMP 08/11/2021 (Approximate)   SpO2 100%   Breastfeeding No   BMI 40.97 kg/m²   Gait - steady  Medication side effects(SE): denies    Mental Status Examination:    Level of consciousness:  within normal limits   Appearance:  fair grooming and fair hygiene  Behavior/Motor:  no abnormalities noted  Attitude toward examiner:  cooperative  Speech:  spontaneous, normal rate and normal volume Mood: \" I am feeling much better. Affect: Appropriate and pleasant  Thought processes: Linear without flight of ideas loose associations   Thought content: Devoid of any auditory visualizations delusions or other perceptual abnormalities denies SI/HI intent or plan  Cognition:  oriented to person, place, and time   Concentration intact  Insight fair   Judgement fair     Patient symptoms are:  [] Well controlled  [x] Improving  [] Worsening  [] No change      Diagnosis:   Principal Problem:    Bipolar 2 disorder, major depressive episode ([facility]  Active Problems:    Cluster B personality disorder ([facility]  Resolved Problems:    * No resolved hospital problems. *      LABS:    No results for input(s): WBC, HGB, PLT in the last 72 hours. No results for input(s): NA, K, CL, CO2, BUN, CREATININE, GLUCOSE in the last 72 hours. No results for input(s): BILITOT, ALKPHOS, AST, ALT in the last 72 hours. Lab Results   Component Value Date    LABAMPH NOT DETECTED 09/21/2021    BARBSCNU NOT DETECTED 09/21/2021    LABBENZ NOT DETECTED 09/21/2021    LABMETH NOT DETECTED 09/21/2021    OPIATESCREENURINE NOT DETECTED 09/21/2021    PHENCYCLIDINESCREENURINE NOT DETECTED 09/21/2021    ETOH <10 09/21/2021     Lab Results   Component Value Date    TSH 2.000 12/23/2019     No results found for: LITHIUM  No results found for: VALPROATE, CBMZ        Treatment Plan:  Reviewed current Medications with the patient. Risks, benefits, side effects, drug-to-drug interactions and alternatives to treatment were discussed. Collateral information:   CD evaluation  Encourage patient to attend group and other milieu activities.   Discharge planning discussed with the patient and treatment team.    continue Trileptal 300 mg twice daily for mood stabilization   continue Risperdal M tab 2 mg twice daily for psychosis    Patient psychosis and noncompliance of medications we did send notes to the probate court for hearing    PSYCHOTHERAPY/COUNSELING:  [x] Therapeutic interview  [x] Supportive  [] CBT  [] Ongoing  [] Other    [x] Patient continues to need, on a daily basis, active treatment furnished directly by or requiring the supervision of inpatient psychiatric personnel      Anticipated Length of stay: 3 to 7 days based on stability            Electronically signed by SUZANNE Ramírez CNP on 4/91/9193 at 9:26 AM

## 2021-09-28 NOTE — PLAN OF CARE
Patient denies suicidal ideation, homicidal ideations and AVH. Patient denies anxiety and depression. Patient appears flat, sad, worried, suspicious with blunt responses. Patient states \"I'm fine. \"  Presents calm and cooperative during assessment. Patient is isolative to room, reading her bible obsessively and does not appear to be social with peers. Medications taken without issue. No complaints or concerns verbalized at this time. No unit problems reported. Will continue to observe and support.     Problem: Discharge Planning:  Goal: Discharged to appropriate level of care  Description: Discharged to appropriate level of care  9/27/2021 2050 by Faye Lombardi RN  Outcome: Ongoing     Problem: Mood - Altered:  Goal: Mood stable  Description: Mood stable  9/27/2021 2050 by Faye Lombardi RN  Outcome: Ongoing     Problem: Falls - Risk of:  Goal: Will remain free from falls  Description: Will remain free from falls  9/27/2021 2050 by Faye Lombardi RN  Outcome: Ongoing     Problem: Falls - Risk of:  Goal: Absence of physical injury  Description: Absence of physical injury  9/27/2021 2050 by Faye Lombardi RN  Outcome: Ongoing

## 2021-09-28 NOTE — PROGRESS NOTES
Has been reading Bible constantly. Bed lines is dirty with stool  and asst with changing it. Denies having diarrhea at this time. . Did not eat supper.

## 2021-09-28 NOTE — GROUP NOTE
Group Therapy Note    Date: 9/28/2021    Group Start Time: 1030  Group End Time: 1100  Group Topic: Psychoeducation    [facility]    Sandra Malik, CTRS        Group Therapy Note      Number of participants: 15  Type of group: Psychoeducation  Mode of intervention: Education, Support, Socialization, Exploration, Clarifying, and Problem-solving  Topic: Healthy Boundaries  Objective: Pt will identify 1 way to implement healthy boundaries in recovery. Notes:  Pt interactive during group sharing 1 way to implement healthy boundaries in recovery. Pt gave support and feedback to others. Status After Intervention:  Improved    Participation Level:  Active Listener and Interactive    Participation Quality: Appropriate, Attentive, Sharing and Supportive      Speech:  normal      Thought Process/Content: Logical      Affective Functioning: Congruent      Mood: euthymic      Level of consciousness:  Alert, Oriented x4 and Attentive      Response to Learning: Able to verbalize current knowledge/experience, Able to verbalize/acknowledge new learning, Able to retain information, Capable of insight, Able to change behavior and Progressing to goal      Endings: None Reported    Modes of Intervention: Education, Support, Socialization, Exploration, Clarifying and Problem-solving

## 2021-09-29 PROCEDURE — 99232 SBSQ HOSP IP/OBS MODERATE 35: CPT | Performed by: NURSE PRACTITIONER

## 2021-09-29 PROCEDURE — 1240000000 HC EMOTIONAL WELLNESS R&B

## 2021-09-29 PROCEDURE — 6370000000 HC RX 637 (ALT 250 FOR IP): Performed by: NURSE PRACTITIONER

## 2021-09-29 RX ORDER — OXCARBAZEPINE 300 MG/1
300 TABLET, FILM COATED ORAL 2 TIMES DAILY
Qty: 60 TABLET | Refills: 3 | Status: SHIPPED | OUTPATIENT
Start: 2021-09-29 | End: 2021-10-04 | Stop reason: HOSPADM

## 2021-09-29 RX ORDER — RISPERIDONE 1 MG/1
1 TABLET, ORALLY DISINTEGRATING ORAL 2 TIMES DAILY
Qty: 60 TABLET | Refills: 3 | Status: SHIPPED | OUTPATIENT
Start: 2021-09-29

## 2021-09-29 RX ADMIN — RISPERIDONE 1 MG: 0.5 TABLET, ORALLY DISINTEGRATING ORAL at 09:41

## 2021-09-29 RX ADMIN — OXCARBAZEPINE 300 MG: 300 TABLET, FILM COATED ORAL at 09:40

## 2021-09-29 ASSESSMENT — PAIN SCALES - GENERAL
PAINLEVEL_OUTOF10: 0

## 2021-09-29 NOTE — CARE COORDINATION
Contacted jose To . Zuleika feels pt is doing much better and is disappointed she isnt coming home today. She has no concerns for pt discharging tomorrow. Mom had questions about pt meds, informed Zuleika that pt will be discharged with thirty days worth of meds and her outpatient provider will refer her for further med management. Mom is excited for pt to come home tomorrow.

## 2021-09-29 NOTE — PROGRESS NOTES
CLINICAL PHARMACY NOTE: MEDS TO BEDS    Total # of Prescriptions Filled: 2   The following medications were delivered to the patient:  · Oxcarbazepine 300 mg  · Risperidone 1 mg    Additional Documentation:  Delivered @11:40 am to [facility]

## 2021-09-29 NOTE — PROGRESS NOTES
BEHAVIORAL HEALTH FOLLOW-UP NOTE     9/29/2021     Patient was seen and examined in person, Chart reviewed   Patient's case discussed with staff/team    Chief Complaint: \" I am feeling better. \"    Interim History: Patient seen during treatment team she is very discharge focused. She vehemently denies SI/HI intent or plan she states that she has had auditory hallucinations \"ever since I been here. \"  She states mean voices and then states \"I have been diagnosed with DJD\" she is discharged focused staff reports she spoken with her family on the phone.   She is eating well sleeping well no neurovegetative signs symptoms of depression    Appetite:   [x] Normal/Unchanged  [] Increased  [] Decreased      Sleep:       [x] Normal/Unchanged  [] Fair       [] Poor              Energy:    [x] Normal/Unchanged  [] Increased  [] Decreased        SI [] Present  [x] Absent    HI  []Present  [x] Absent     Aggression:  [] yes  [x] no    Patient is [x] able  [] unable to CONTRACT FOR SAFETY     PAST MEDICAL/PSYCHIATRIC HISTORY:   Past Medical History:   Diagnosis Date    ARDS (adult respiratory distress syndrome) ([address]     Arthritis     Asthma     Chest pain     Depression     Essential tremor     Gallstones     Generalized abdominal pain     Head trauma     multiple history of    Hx of blood clots     Hyperlipidemia     Hypertension     Infectious colitis, enteritis and gastroenteritis     Lupus (systemic lupus erythematosus) (HCC)     Obesity     Palpitations     Prolonged emergence from general anesthesia     SOB (shortness of breath)     Systemic lupus erythematosus ([address]     Tachycardia        FAMILY/SOCIAL HISTORY:  Family History   Problem Relation Age of Onset    High Blood Pressure Mother     Diabetes Mother     Stroke Mother     Arthritis Mother     Diabetes Father     High Blood Pressure Father     High Blood Pressure Sister     Diabetes Sister     Celiac Disease Sister     High Blood Pressure Brother     Crohn's Disease Brother     Uterine Cancer Maternal Grandmother      Social History     Socioeconomic History    Marital status: Single     Spouse name: Not on file    Number of children: Not on file    Years of education: 13    Highest education level: Not on file   Occupational History    Not on file   Tobacco Use    Smoking status: Former Smoker     Types: Cigarettes     Quit date: 10/31/2012     Years since quittin.9    Smokeless tobacco: Never Used   Vaping Use    Vaping Use: Never used   Substance and Sexual Activity    Alcohol use: No    Drug use: No    Sexual activity: Not on file   Other Topics Concern    Not on file   Social History Narrative    Not on file     Social Determinants of Health     Financial Resource Strain:     Difficulty of Paying Living Expenses:    Food Insecurity:     Worried About Running Out of Food in the Last Year:     [address] in the Last Year:    Transportation Needs:     Lack of Transportation (Medical):  Lack of Transportation (Non-Medical):    Physical Activity:     Days of Exercise per Week:     Minutes of Exercise per Session:    Stress:     Feeling of Stress :    Social Connections:     Frequency of Communication with Friends and Family:     Frequency of Social Gatherings with Friends and Family:     Attends Adventism Services:     Active Member of Clubs or Organizations:     Attends Club or Organization Meetings:     Marital Status:    Intimate Partner Violence:     Fear of Current or Ex-Partner:     Emotionally Abused:     Physically Abused:     Sexually Abused:            ROS:  [x] All negative/unchanged except if checked.  Explain positive(checked items) below:  [] Constitutional  [] Eyes  [] Ear/Nose/Mouth/Throat  [] Respiratory  [] CV  [] GI  []   [] Musculoskeletal  [] Skin/Breast  [] Neurological  [] Endocrine  [] Heme/Lymph  [] Allergic/Immunologic    Explanation:     MEDICATIONS:    Current Facility-Administered Medications:     ziprasidone (GEODON) injection 10 mg, 10 mg, IntraMUSCular, Q6H PRN, Aminta Castro MD, 10 mg at 09/26/21 1351    LORazepam (ATIVAN) injection 2 mg, 2 mg, IntraMUSCular, Q6H PRN, Aminta Castro MD, 2 mg at 09/26/21 1351    diphenhydrAMINE (BENADRYL) injection 50 mg, 50 mg, IntraMUSCular, Q6H PRN, 50 mg at 09/25/21 2103 **OR** diphenhydrAMINE (BENADRYL) tablet 50 mg, 50 mg, Oral, Q6H PRN, Gricel AlexSUZANNE - CNP    OXcarbazepine (TRILEPTAL) tablet 300 mg, 300 mg, Oral, BID, Gricel Alex, APRN - CNP, 300 mg at 09/29/21 0940    risperiDONE (RISPERDAL M-TABS) disintegrating tablet 1 mg, 1 mg, Oral, BID, Gricel AlexSUZANNE - CNP, 1 mg at 09/29/21 0941    ipratropium-albuterol (DUONEB) nebulizer solution 3 mL, 1 vial, Inhalation, C4V PRN, SUZANNE Jarquin CNP    nitroGLYCERIN (NITROSTAT) SL tablet 0.4 mg, 0.4 mg, SubLINGual, Q5 Min PRN, SUZANNE Burton - CNP    acetaminophen (TYLENOL) tablet 650 mg, 650 mg, Oral, B6O PRN, SUZANNE Jarquin CNP    magnesium hydroxide (MILK OF MAGNESIA) 400 MG/5ML suspension 30 mL, 30 mL, Oral, Daily PRN, SUZANNE Jarquin CNP    aluminum & magnesium hydroxide-simethicone (MAALOX) 200-200-20 MG/5ML suspension 30 mL, 30 mL, Oral, PRN, SUZANNE Jarquin CNP    haloperidol (HALDOL) tablet 5 mg, 5 mg, Oral, Q6H PRN **OR** haloperidol lactate (HALDOL) injection 5 mg, 5 mg, IntraMUSCular, C1J PRN, SUZANNE Jarquin CNP, 5 mg at 09/25/21 2102    traZODone (DESYREL) tablet 50 mg, 50 mg, Oral, Nightly PRN, [place] SUZANNE Haddad CNP      Examination:  /64   Pulse 109   Temp 98 °F (36.7 °C) (Temporal)   Resp 18   Ht 5' 2\" (1.575 m)   Wt 224 lb (101.6 kg)   LMP 08/11/2021 (Approximate)   SpO2 100%   Breastfeeding No   BMI 40.97 kg/m²   Gait - steady  Medication side effects(SE): denies    Mental Status Examination:    Level of consciousness:  within normal limits   Appearance:  fair grooming and fair hygiene  Behavior/Motor:  no abnormalities noted  Attitude toward examiner:  cooperative  Speech:  spontaneous, normal rate and normal volume   Mood: \" I am feeling much better. Affect: Appropriate and pleasant  Thought processes: Linear without flight of ideas loose associations   Thought content: Devoid of any auditory visualizations delusions or other perceptual abnormalities denies SI/HI intent or plan  Cognition:  oriented to person, place, and time   Concentration intact  Insight fair   Judgement fair     Patient symptoms are:  [] Well controlled  [x] Improving  [] Worsening  [] No change      Diagnosis:   Principal Problem:    Bipolar 2 disorder, major depressive episode ([facility]  Active Problems:    Cluster B personality disorder ([facility]  Resolved Problems:    * No resolved hospital problems. *      LABS:    No results for input(s): WBC, HGB, PLT in the last 72 hours. No results for input(s): NA, K, CL, CO2, BUN, CREATININE, GLUCOSE in the last 72 hours. No results for input(s): BILITOT, ALKPHOS, AST, ALT in the last 72 hours. Lab Results   Component Value Date    LABAMPH NOT DETECTED 09/21/2021    BARBSCNU NOT DETECTED 09/21/2021    LABBENZ NOT DETECTED 09/21/2021    LABMETH NOT DETECTED 09/21/2021    OPIATESCREENURINE NOT DETECTED 09/21/2021    PHENCYCLIDINESCREENURINE NOT DETECTED 09/21/2021    ETOH <10 09/21/2021     Lab Results   Component Value Date    TSH 2.000 12/23/2019     No results found for: LITHIUM  No results found for: VALPROATE, CBMZ        Treatment Plan:  Reviewed current Medications with the patient. Risks, benefits, side effects, drug-to-drug interactions and alternatives to treatment were discussed. Collateral information:   CD evaluation  Encourage patient to attend group and other milieu activities.   Discharge planning discussed with the patient and treatment team.    continue Trileptal 300 mg twice daily for mood stabilization   continue Risperdal M tab 2 mg twice daily

## 2021-09-29 NOTE — PLAN OF CARE
Problem: Mood - Altered:  Goal: Mood stable  Description: Mood stable  Outcome: Ongoing     Patient has been withdrawn to her room. Affect is flat and mood is depressed. Hopeless and helpless. States her anxiety and depression are \"normal\" for her. Admits to not sleeping well and has a decreased appetite. Denies suicidal/homicidal ideations and hallucinations at this time. Purposeful rounding continued.

## 2021-09-30 PROBLEM — F23 ACUTE PSYCHOSIS (HCC): Status: ACTIVE | Noted: 2021-09-30

## 2021-09-30 PROCEDURE — 6370000000 HC RX 637 (ALT 250 FOR IP): Performed by: NURSE PRACTITIONER

## 2021-09-30 PROCEDURE — 1240000000 HC EMOTIONAL WELLNESS R&B

## 2021-09-30 PROCEDURE — 99232 SBSQ HOSP IP/OBS MODERATE 35: CPT | Performed by: NURSE PRACTITIONER

## 2021-09-30 RX ORDER — RISPERIDONE 2 MG/1
2 TABLET, ORALLY DISINTEGRATING ORAL 2 TIMES DAILY
Status: DISCONTINUED | OUTPATIENT
Start: 2021-09-30 | End: 2021-10-04 | Stop reason: HOSPADM

## 2021-09-30 RX ORDER — DIVALPROEX SODIUM 250 MG/1
250 TABLET, DELAYED RELEASE ORAL EVERY 12 HOURS SCHEDULED
Status: DISCONTINUED | OUTPATIENT
Start: 2021-09-30 | End: 2021-10-04 | Stop reason: HOSPADM

## 2021-09-30 RX ADMIN — DIVALPROEX SODIUM 250 MG: 250 TABLET, DELAYED RELEASE ORAL at 22:03

## 2021-09-30 RX ADMIN — TRAZODONE HYDROCHLORIDE 50 MG: 50 TABLET ORAL at 22:02

## 2021-09-30 RX ADMIN — DIVALPROEX SODIUM 250 MG: 250 TABLET, DELAYED RELEASE ORAL at 11:30

## 2021-09-30 RX ADMIN — RISPERIDONE 2 MG: 2 TABLET, ORALLY DISINTEGRATING ORAL at 22:02

## 2021-09-30 RX ADMIN — RISPERIDONE 1 MG: 0.5 TABLET, ORALLY DISINTEGRATING ORAL at 09:50

## 2021-09-30 RX ADMIN — OXCARBAZEPINE 300 MG: 300 TABLET, FILM COATED ORAL at 09:50

## 2021-09-30 ASSESSMENT — PAIN SCALES - GENERAL
PAINLEVEL_OUTOF10: 0
PAINLEVEL_OUTOF10: 0
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
## 2021-09-30 NOTE — PLAN OF CARE
Patient denies suicidal ideation, homicidal ideations and AVH. When asked about AVH, patient states \"not at the moment. \"  Patient denies depression but rates anxiety 5 out of 10; stating a \"little, nothing big. \"  Patient is flat, sad, and depressed with blunt responses. Presents calm and cooperative during assessment. Patient is isolative to room and does not appear to be social with peers. Medications taken without issue. No complaints or concerns verbalized at this time. No unit problems reported. Will continue to observe and support.     Problem: Discharge Planning:  Goal: Discharged to appropriate level of care  Description: Discharged to appropriate level of care  Outcome: Ongoing     Problem: Mood - Altered:  Goal: Mood stable  Description: Mood stable  Outcome: Ongoing     Problem: Falls - Risk of:  Goal: Will remain free from falls  Description: Will remain free from falls  Outcome: Ongoing     Problem: Falls - Risk of:  Goal: Absence of physical injury  Description: Absence of physical injury  Outcome: Ongoing

## 2021-09-30 NOTE — PROGRESS NOTES
Patient in [place] crying stating she wants to go home to her family. Patient also states she didn't want to take her meds because they were not the same as before. Patient stated she was afraid to eat the food because \"Dont trust the people who make it\" States \"My family is getting me today\" Rates depression 8/10 and anxiety 8/10. Groups offered. Patient took her meds.
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
## 2021-09-30 NOTE — PROGRESS NOTES
After  visited patient to explain possible court involvement tomorrow patient is seen smiling and happy in her room. When asked patient why she seems so happy, she responded \" I am just thinking about all today\".

## 2021-10-01 PROCEDURE — 6370000000 HC RX 637 (ALT 250 FOR IP): Performed by: NURSE PRACTITIONER

## 2021-10-01 PROCEDURE — 99232 SBSQ HOSP IP/OBS MODERATE 35: CPT | Performed by: NURSE PRACTITIONER

## 2021-10-01 PROCEDURE — 1240000000 HC EMOTIONAL WELLNESS R&B

## 2021-10-01 PROCEDURE — 6360000002 HC RX W HCPCS: Performed by: PSYCHIATRY & NEUROLOGY

## 2021-10-01 PROCEDURE — 6360000002 HC RX W HCPCS: Performed by: NURSE PRACTITIONER

## 2021-10-01 RX ADMIN — LORAZEPAM 2 MG: 2 INJECTION INTRAMUSCULAR; INTRAVENOUS at 20:14

## 2021-10-01 RX ADMIN — DIVALPROEX SODIUM 250 MG: 250 TABLET, DELAYED RELEASE ORAL at 22:01

## 2021-10-01 RX ADMIN — DIVALPROEX SODIUM 250 MG: 250 TABLET, DELAYED RELEASE ORAL at 10:09

## 2021-10-01 RX ADMIN — HALOPERIDOL LACTATE 5 MG: 5 INJECTION, SOLUTION INTRAMUSCULAR at 20:14

## 2021-10-01 RX ADMIN — RISPERIDONE 2 MG: 2 TABLET, ORALLY DISINTEGRATING ORAL at 22:01

## 2021-10-01 RX ADMIN — DIPHENHYDRAMINE HYDROCHLORIDE 50 MG: 50 INJECTION, SOLUTION INTRAMUSCULAR; INTRAVENOUS at 20:15

## 2021-10-01 RX ADMIN — RISPERIDONE 2 MG: 2 TABLET, ORALLY DISINTEGRATING ORAL at 10:09

## 2021-10-01 ASSESSMENT — PAIN SCALES - GENERAL
PAINLEVEL_OUTOF10: 0
PAINLEVEL_OUTOF10: 0

## 2021-10-01 NOTE — CARE COORDINATION
Appointments scheduled with [facility]. Pt counselor Yandy Young requested Sw to fax an update on pt progress, documentation was faxed to [phone].

## 2021-10-01 NOTE — PROGRESS NOTES
BEHAVIORAL HEALTH FOLLOW-UP NOTE     10/1/2021     Patient was seen and examined in person, Chart reviewed   Patient's case discussed with staff/team    Chief Complaint: \"I am actually doing okay today. \"    Interim History: Patient seen in her room states that she is doing [place] today. \"  She states she is feeling better she denies SI/HI intent or plan she denies any auditory visual hallucinations states that she is hearing voices \"no different than I ever do. \"  Staff reports that she was very happy and smiling after talking the  yesterday. Her affect is incongruent and bizarre.     Appetite:   [x] Normal/Unchanged  [] Increased  [] Decreased      Sleep:       [x] Normal/Unchanged  [] Fair       [] Poor              Energy:    [x] Normal/Unchanged  [] Increased  [] Decreased        SI [] Present  [x] Absent    HI  []Present  [x] Absent     Aggression:  [] yes  [x] no    Patient is [x] able  [] unable to CONTRACT FOR SAFETY     PAST MEDICAL/PSYCHIATRIC HISTORY:   Past Medical History:   Diagnosis Date    ARDS (adult respiratory distress syndrome) (HCC)     Arthritis     Asthma     Chest pain     Depression     Essential tremor     Gallstones     Generalized abdominal pain     Head trauma     multiple history of    Hx of blood clots     Hyperlipidemia     Hypertension     Infectious colitis, enteritis and gastroenteritis     Lupus (systemic lupus erythematosus) (HCC)     Obesity     Palpitations     Prolonged emergence from general anesthesia     SOB (shortness of breath)     Systemic lupus [facility]matosus ([facility]     Tachycardia        FAMILY/SOCIAL HISTORY:  Family History   Problem Relation Age of Onset    High Blood Pressure Mother     Diabetes Mother     Stroke Mother     Arthritis Mother     Diabetes Father     High Blood Pressure Father     High Blood Pressure Sister     Diabetes Sister     Celiac Disease Sister     High Blood Pressure Brother     Crohn's Disease Brother    [place] Uterine Cancer Maternal Grandmother      Social History     Socioeconomic History    Marital status: Single     Spouse name: Not on file    Number of children: Not on file    Years of education: 13    Highest education level: Not on file   Occupational History    Not on file   Tobacco Use    Smoking status: Former Smoker     Types: Cigarettes     Quit date: 10/31/2012     Years since quittin.9    Smokeless tobacco: Never Used   Vaping Use    Vaping Use: Never used   Substance and Sexual Activity    Alcohol use: No    Drug use: No    Sexual activity: Not on file   Other Topics Concern    Not on file   Social History Narrative    Not on file     Social Determinants of Health     Financial Resource Strain:     Difficulty of Paying Living Expenses:    Food Insecurity:     Worried About Running Out of Food in the Last Year:     [address] in the Last Year:    Transportation Needs:     Lack of Transportation (Medical):  Lack of Transportation (Non-Medical):    Physical Activity:     Days of Exercise per Week:     Minutes of Exercise per Session:    Stress:     Feeling of Stress :    Social Connections:     Frequency of Communication with Friends and Family:     Frequency of Social Gatherings with Friends and Family:     Attends Buddhism Services:     Active Member of Clubs or Organizations:     Attends Club or Organization Meetings:     Marital Status:    Intimate Partner Violence:     Fear of Current or Ex-Partner:     Emotionally Abused:     Physically Abused:     Sexually Abused:            ROS:  [x] All negative/unchanged except if checked.  Explain positive(checked items) below:  [] Constitutional  [] Eyes  [] Ear/Nose/Mouth/Throat  [] Respiratory  [] CV  [] GI  []   [] Musculoskeletal  [] Skin/Breast  [] Neurological  [] Endocrine  [] Heme/Lymph  [] Allergic/Immunologic    Explanation:     MEDICATIONS:    Current Facility-Administered Medications:     risperiDONE (RISPERDAL M-TABS) disintegrating tablet 2 mg, 2 mg, Oral, BID, Ova Staple Dellick, APRN - CNP, 2 mg at 09/30/21 2202    divalproex (DEPAKOTE) DR tablet 250 mg, 250 mg, Oral, 2 times per day, Artice Ramon, APRN - CNP, 575 mg at 09/30/21 2203    ziprasidone (GEODON) injection 10 mg, 10 mg, IntraMUSCular, Q6H PRN, Dee Butt MD, 10 mg at 09/26/21 1351    LORazepam (ATIVAN) injection 2 mg, 2 mg, IntraMUSCular, Q6H PRN, Dee Butt MD, 2 mg at 09/26/21 1351    diphenhydrAMINE (BENADRYL) injection 50 mg, 50 mg, IntraMUSCular, Q6H PRN, 50 mg at 09/25/21 2103 **OR** diphenhydrAMINE (BENADRYL) tablet 50 mg, 50 mg, Oral, Q6H PRN, Jorge Delgado APRN - CNP    ipratropium-albuterol (DUONEB) nebulizer solution 3 mL, 1 vial, Inhalation, S7K PRN, Ova Staple Dellick, APRN - CNP    nitroGLYCERIN (NITROSTAT) SL tablet 0.4 mg, 0.4 mg, SubLINGual, Q5 Min PRN, Ova Staple Dellick, APRN - CNP    acetaminophen (TYLENOL) tablet 650 mg, 650 mg, Oral, G8R PRN, Ova Staple Dellick, APRN - CNP    magnesium hydroxide (MILK OF MAGNESIA) 400 MG/5ML suspension 30 mL, 30 mL, Oral, Daily PRN, Ova Staple Dellick, APRN - CNP    aluminum & magnesium hydroxide-simethicone (MAALOX) 200-200-20 MG/5ML suspension 30 mL, 30 mL, Oral, PRN, Ova Staple Dellick, APRN - CNP    haloperidol (HALDOL) tablet 5 mg, 5 mg, Oral, Q6H PRN **OR** haloperidol lactate (HALDOL) injection 5 mg, 5 mg, IntraMUSCular, H8B PRN, Artice Ramon, APRN - CNP, 5 mg at 09/25/21 2102    traZODone (DESYREL) tablet 50 mg, 50 mg, Oral, Nightly PRN, Oren Navarro APRN - CNP, 50 mg at 09/30/21 2202      Examination:  BP (!) 144/94   Pulse 110   Temp 98.7 °F (37.1 °C) (Oral)   Resp 16   Ht 5' 2\" (1.575 m)   Wt 224 lb (101.6 kg)   LMP 08/11/2021 (Approximate)   SpO2 100%   Breastfeeding No   BMI 40.97 kg/m²   Gait - steady  Medication side effects(SE): denies    Mental Status Examination:    Level of consciousness:  within normal limits   Appearance:  fair grooming and fair hygiene  Behavior/Motor:  no abnormalities noted  Attitude toward examiner:  cooperative  Speech:  spontaneous, normal rate and normal volume   Mood: Crying  Affect: Labile   thought processes: Disorganized   thought content: Making multiple delusional statements paranoid denies auditory visualizations denies SI/HI intent or plan  Cognition:  oriented to person, place, and time   Concentration intact  Insight fair   Judgement fair     Patient symptoms are:  [] Well controlled  [x] Improving  [] Worsening  [] No change      Diagnosis:   Principal Problem:    Bipolar 2 disorder, major depressive episode ([facility]  Active Problems:    Cluster B personality disorder ([facility]    Acute psychosis ([facility]  Resolved Problems:    * No resolved hospital problems. *      LABS:    No results for input(s): WBC, HGB, PLT in the last 72 hours. No results for input(s): NA, K, CL, CO2, BUN, CREATININE, GLUCOSE in the last 72 hours. No results for input(s): BILITOT, ALKPHOS, AST, ALT in the last 72 hours. Lab Results   Component Value Date    LABAMPH NOT DETECTED 09/21/2021    BARBSCNU NOT DETECTED 09/21/2021    LABBENZ NOT DETECTED 09/21/2021    LABMETH NOT DETECTED 09/21/2021    OPIATESCREENURINE NOT DETECTED 09/21/2021    PHENCYCLIDINESCREENURINE NOT DETECTED 09/21/2021    ETOH <10 09/21/2021     Lab Results   Component Value Date    TSH 2.000 12/23/2019     No results found for: LITHIUM  No results found for: VALPROATE, CBMZ        Treatment Plan:  Reviewed current Medications with the patient. Risks, benefits, side effects, drug-to-drug interactions and alternatives to treatment were discussed. Collateral information:   CD evaluation  Encourage patient to attend group and other milieu activities.   Discharge planning discussed with the patient and treatment team.    Discontinue Trileptal and start Depakote turned 50 mg twice daily for mood stabilization  continue Risperdal M tab 2 mg twice daily for psychosis    PSYCHOTHERAPY/COUNSELING:  [x] Therapeutic interview  [x] Supportive  [] CBT  [] Ongoing  [] Other    [x] Patient continues to need, on a daily basis, active treatment furnished directly by or requiring the supervision of inpatient psychiatric personnel      Anticipated Length of stay: 3 to 7 days based on stability            Electronically signed by SUZANNE Hopper CNP on 64/4/6049 at 9:35 AM

## 2021-10-01 NOTE — PLAN OF CARE
Problem: VIOLENT RESTRAINTS  Goal: No harm/injury to patient while restraints in use  Outcome: Ongoing     Problem: VIOLENT RESTRAINTS  Goal: Removal from restraints as soon as assessed to be safe  Outcome: Ongoing

## 2021-10-01 NOTE — PLAN OF CARE
Patient denies suicidal ideation and homicidal ideations. Patient denies visual hallucinations but states \"I don't want to to hear the voices anymore. \"  Patient did not elaborate what the voices are saying but denies them being command. Patient rates anxiety and depression 10 out of 10. Patient flat, sad, depressed and crying on approach. Presents calm and cooperative during assessment. Patient is isolative to room and does not appear to be social with peers. Medications taken without issue. No complaints or concerns verbalized at this time. No unit problems reported. Will continue to observe and support.     Problem: Mood - Altered:  Goal: Mood stable  Description: Mood stable  9/30/2021 2045 by Zara Spear RN  Outcome: Ongoing     Problem: Falls - Risk of:  Goal: Will remain free from falls  Description: Will remain free from falls  Outcome: Ongoing     Problem: Falls - Risk of:  Goal: Absence of physical injury  Description: Absence of physical injury  Outcome: Ongoing

## 2021-10-02 PROCEDURE — 99232 SBSQ HOSP IP/OBS MODERATE 35: CPT | Performed by: NURSE PRACTITIONER

## 2021-10-02 PROCEDURE — 1240000000 HC EMOTIONAL WELLNESS R&B

## 2021-10-02 PROCEDURE — 6370000000 HC RX 637 (ALT 250 FOR IP): Performed by: NURSE PRACTITIONER

## 2021-10-02 RX ADMIN — RISPERIDONE 2 MG: 2 TABLET, ORALLY DISINTEGRATING ORAL at 09:49

## 2021-10-02 RX ADMIN — DIVALPROEX SODIUM 250 MG: 250 TABLET, DELAYED RELEASE ORAL at 21:14

## 2021-10-02 RX ADMIN — DIVALPROEX SODIUM 250 MG: 250 TABLET, DELAYED RELEASE ORAL at 09:49

## 2021-10-02 RX ADMIN — RISPERIDONE 2 MG: 2 TABLET, ORALLY DISINTEGRATING ORAL at 21:14

## 2021-10-02 RX ADMIN — TRAZODONE HYDROCHLORIDE 50 MG: 50 TABLET ORAL at 21:14

## 2021-10-02 RX ADMIN — RISPERIDONE 2 MG: 2 TABLET, ORALLY DISINTEGRATING ORAL at 21:15

## 2021-10-02 RX ADMIN — DIVALPROEX SODIUM 250 MG: 250 TABLET, DELAYED RELEASE ORAL at 21:16

## 2021-10-02 ASSESSMENT — PAIN SCALES - GENERAL: PAINLEVEL_OUTOF10: 0

## 2021-10-02 NOTE — PROGRESS NOTES
Pt alert but delayed and confused. Pt denies SI, HI, and VH. Pt states she \"still hears the voices. Pt had approached the desk and walked towards the chair as if she was going to throw it. When asked about it, pt denied and walked away. Pt is currently sleeping. Pt took night meds.  Will continue to monitor

## 2021-10-02 NOTE — PROGRESS NOTES
BEHAVIORAL HEALTH FOLLOW-UP NOTE     10/2/2021     Patient was seen and examined in person, Chart reviewed   Patient's case discussed with staff/team    Chief Complaint: \"I am okay. \"    Interim History: Patient seen in her room states that she is doing [place] today. \"  She denies SI/HI intent or plan denies any auditory visualizations she reportedly had an episode of bizarre behavior yesterday where she pretended like she was going to throw a chair. She continues to report that she has \"DID\" she has no insight or judgment eating well sleeping well no neurovegetative signs symptoms of depression she is not observed responding to unseen others.     Appetite:   [x] Normal/Unchanged  [] Increased  [] Decreased      Sleep:       [x] Normal/Unchanged  [] Fair       [] Poor              Energy:    [x] Normal/Unchanged  [] Increased  [] Decreased        SI [] Present  [x] Absent    HI  []Present  [x] Absent     Aggression:  [] yes  [x] no    Patient is [x] able  [] unable to CONTRACT FOR SAFETY     PAST MEDICAL/PSYCHIATRIC HISTORY:   Past Medical History:   Diagnosis Date    [facility]DS [facility] distress syndrome) [facility])     Arthritis     Asthma     Chest pain     Depression     Essential tremor     Gallstones     Generalized abdominal pain     Head trauma     multiple history of    Hx of blood clots     Hyperlipidemia     Hypertension     Infectious colitis, enteritis and gastroenteritis     Lupus [facility] lupus erythematosus) ([facility])     Obesity     Palpitations     Prolonged emergence from general anesthesia     SOB (shortness of breath)     Systemic lupus erythematosus [facility] [address]     Tachycardia        FAMILY/SOCIAL HISTORY:  Family History   Problem Relation Age of Onset    High Blood Pressure Mother     Diabetes Mother     Stroke Mother     Arthritis Mother     Diabetes Father     High Blood Pressure Father     High Blood Pressure Sister     Diabetes Sister     Celiac Disease Sister     High Blood Pressure Brother     Crohn's Disease Brother     Uterine Cancer Maternal Grandmother      Social History     Socioeconomic History    Marital status: Single     Spouse name: Not on file    Number of children: Not on file    Years of education: 13    Highest education level: Not on file   Occupational History    Not on file   Tobacco Use    Smoking status: Former Smoker     Types: Cigarettes     Quit date: 10/31/2012     Years since quittin.9    Smokeless tobacco: Never Used   Vaping Use    Vaping Use: Never used   Substance and Sexual Activity    Alcohol use: No    Drug use: No    Sexual activity: Not on file   Other Topics Concern    Not on file   Social History Narrative    Not on file     Social Determinants of Health     Financial Resource Strain:     Difficulty of Paying Living Expenses:    Food Insecurity:     Worried About Running Out of Food in the Last Year:     Ran Out of Food in the Last Year:    Transportation Needs:     Lack of Transportation (Medical):  Lack of Transportation (Non-Medical):    Physical Activity:     Days of Exercise per Week:     Minutes of Exercise per Session:    Stress:     Feeling of Stress :    Social Connections:     Frequency of Communication with Friends and Family:     Frequency of Social Gatherings with Friends and Family:     Attends Yarsani Services:     Active Member of Clubs or Organizations:     Attends Club or Organization Meetings:     Marital Status:    Intimate Partner Violence:     Fear of Current or Ex-Partner:     Emotionally Abused:     Physically Abused:     Sexually Abused:            ROS:  [x] All negative/unchanged except if checked.  Explain positive(checked items) below:  [] Constitutional  [] Eyes  [] Ear/Nose/Mouth/Throat  [] Respiratory  [] CV  [] GI  []   [] Musculoskeletal  [] Skin/Breast  [] Neurological  [] Endocrine  [] Heme/Lymph  [] Allergic/Immunologic    Explanation:     MEDICATIONS:    Current Facility-Administered Medications:     risperiDONE (RISPERDAL M-TABS) disintegrating tablet 2 mg, 2 mg, Oral, BID, Adelina Gonzalez APRN - CNP, 2 mg at 10/02/21 0949    divalproex (DEPAKOTE) DR tablet 250 mg, 250 mg, Oral, 2 times per day, Chaparrita Pastrana, APRN - CNP, 694 mg at 10/02/21 0949    ziprasidone (GEODON) injection 10 mg, 10 mg, IntraMUSCular, Q6H PRN, Sruthi Hawthorne MD, 10 mg at 09/26/21 1351    LORazepam (ATIVAN) injection 2 mg, 2 mg, IntraMUSCular, Q6H PRN, Sruthi Hawthorne MD, 2 mg at 10/01/21 2014    diphenhydrAMINE (BENADRYL) injection 50 mg, 50 mg, IntraMUSCular, Q6H PRN, 50 mg at 10/01/21 2015 **OR** diphenhydrAMINE (BENADRYL) tablet 50 mg, 50 mg, Oral, Q6H PRN, Chanetta Heimlich, APRN - CNP    ipratropium-albuterol (DUONEB) nebulizer solution 3 mL, 1 vial, Inhalation, X0L PRN, Adelina Martineslick, APRN - CNP    nitroGLYCERIN (NITROSTAT) SL tablet 0.4 mg, 0.4 mg, SubLINGual, Q5 Min PRN, Adelina Raw Conradlick, APRN - CNP    acetaminophen (TYLENOL) tablet 650 mg, 650 mg, Oral, S9A PRN, Adelina Raw Conradlick, APRN - CNP    magnesium hydroxide (MILK OF MAGNESIA) 400 MG/5ML suspension 30 mL, 30 mL, Oral, Daily PRN, Adelina Raw Dellick, APRN - CNP    aluminum & magnesium hydroxide-simethicone (MAALOX) 200-200-20 MG/5ML suspension 30 mL, 30 mL, Oral, PRN, Adelina Raw Dellick, APRN - CNP    haloperidol (HALDOL) tablet 5 mg, 5 mg, Oral, Q6H PRN **OR** haloperidol lactate (HALDOL) injection 5 mg, 5 mg, IntraMUSCular, E9R PRN, Adelina Raw Conradlick, APRN - CNP, 5 mg at 10/01/21 2014    traZODone (DESYREL) tablet 50 mg, 50 mg, Oral, Nightly PRN, Cheatham Zeina, APRN - CNP, 50 mg at 09/30/21 2202      Examination:  /69   Pulse 111   Temp 97.5 °F (36.4 °C)   Resp 15   Ht 5' 2\" (1.575 m)   Wt 224 lb (101.6 kg)   LMP 08/11/2021 (Approximate)   SpO2 100%   Breastfeeding No   BMI 40.97 kg/m²   Gait - steady  Medication side effects(SE): denies    Mental Status Examination:    Level of consciousness:  within normal limits Appearance:  fair grooming and fair hygiene  Behavior/Motor:  no abnormalities noted  Attitude toward examiner:  cooperative  Speech:  spontaneous, normal rate and normal volume   Mood: Crying  Affect: Labile   thought processes: Disorganized   thought content: Making multiple delusional statements paranoid denies auditory visualizations denies SI/HI intent or plan  Cognition:  oriented to person, place, and time   Concentration intact  Insight fair   Judgement fair     Patient symptoms are:  [] Well controlled  [x] Improving  [] Worsening  [] No change      Diagnosis:   Principal Problem:    Bipolar 2 disorder, major depressive episode ([facility]  Active Problems:    Cluster B personality disorder ([facility]    Acute psychosis ([facility]  Resolved Problems:    * No resolved hospital problems. *      LABS:    No results for input(s): WBC, HGB, PLT in the last 72 hours. No results for input(s): NA, K, CL, CO2, BUN, CREATININE, GLUCOSE in the last 72 hours. No results for input(s): BILITOT, ALKPHOS, AST, ALT in the last 72 hours. Lab Results   Component Value Date    LABAMPH NOT DETECTED 09/21/2021    BARBSCNU NOT DETECTED 09/21/2021    LABBENZ NOT DETECTED 09/21/2021    LABMETH NOT DETECTED 09/21/2021    OPIATESCREENURINE NOT DETECTED 09/21/2021    PHENCYCLIDINESCREENURINE NOT DETECTED 09/21/2021    ETOH <10 09/21/2021     Lab Results   Component Value Date    TSH 2.000 12/23/2019     No results found for: LITHIUM  No results found for: VALPROATE, CBMZ        Treatment Plan:  Reviewed current Medications with the patient. Risks, benefits, side effects, drug-to-drug interactions and alternatives to treatment were discussed. Collateral information:   CD evaluation  Encourage patient to attend group and other milieu activities.   Discharge planning discussed with the patient and treatment team.    Discontinue Trileptal and start Depakote turned 50 mg twice daily for mood stabilization  continue Risperdal M tab 2 mg twice daily for psychosis    PSYCHOTHERAPY/COUNSELING:  [x] Therapeutic interview  [x] Supportive  [] CBT  [] Ongoing  [] Other    [x] Patient continues to need, on a daily basis, active treatment furnished directly by or requiring the supervision of inpatient psychiatric personnel      Anticipated Length of stay: 3 to 7 days based on stability            Electronically signed by SUZANNE Marx CNP on 35/6/2087 at 1:45 PM

## 2021-10-03 PROCEDURE — 6370000000 HC RX 637 (ALT 250 FOR IP): Performed by: NURSE PRACTITIONER

## 2021-10-03 PROCEDURE — 1240000000 HC EMOTIONAL WELLNESS R&B

## 2021-10-03 PROCEDURE — 99232 SBSQ HOSP IP/OBS MODERATE 35: CPT | Performed by: NURSE PRACTITIONER

## 2021-10-03 RX ADMIN — TRAZODONE HYDROCHLORIDE 50 MG: 50 TABLET ORAL at 21:51

## 2021-10-03 RX ADMIN — DIVALPROEX SODIUM 250 MG: 250 TABLET, DELAYED RELEASE ORAL at 09:40

## 2021-10-03 RX ADMIN — RISPERIDONE 2 MG: 2 TABLET, ORALLY DISINTEGRATING ORAL at 21:51

## 2021-10-03 RX ADMIN — RISPERIDONE 2 MG: 2 TABLET, ORALLY DISINTEGRATING ORAL at 09:40

## 2021-10-03 RX ADMIN — DIVALPROEX SODIUM 250 MG: 250 TABLET, DELAYED RELEASE ORAL at 21:51

## 2021-10-03 ASSESSMENT — PAIN SCALES - GENERAL
PAINLEVEL_OUTOF10: 0
PAINLEVEL_OUTOF10: 0

## 2021-10-03 NOTE — PLAN OF CARE
[address] TO HER ROOM. OUT FOR DINNER, TOOK TRAY TO ROOM AND ATE WELL. TOOK MEDS WITH MUCH ENCOURAGEMENT. SLOW MOVING, HESITANT SPEECH. DENIES SUICIDAL THOUGHTS, DENIES INTENT TO HARM HERSELF OR OTHERS. STATES SHE STAYS IN HER ROOM BECAUSE OF ALL THE NOISE. DENIES HALLUCINATIONS.  NO VOICED DELUSIONS

## 2021-10-03 NOTE — PROGRESS NOTES
BEHAVIORAL HEALTH FOLLOW-UP NOTE     10/3/2021     Patient was seen and examined in person, Chart reviewed   Patient's case discussed with staff/team    Chief Complaint: \"I am okay. \"    Interim History: Patient seen in her room she denies SI/HI intent or plan she denies auditory or visual hallucinations. When asked if she had any suicidal thoughts or any auditory visualization she states \"everything is under control. \"  No behavioral disturbances noted. She is been out social select peers. She is eating well sleeping well there are no neurovegetative signs symptoms of depression.   There are no overt or covert signs of psychosis      Appetite:   [x] Normal/Unchanged  [] Increased  [] Decreased      Sleep:       [x] Normal/Unchanged  [] Fair       [] Poor              Energy:    [x] Normal/Unchanged  [] Increased  [] Decreased        SI [] Present  [x] Absent    HI  []Present  [x] Absent     Aggression:  [] yes  [x] no    Patient is [x] able  [] unable to CONTRACT FOR SAFETY     PAST MEDICAL/PSYCHIATRIC HISTORY:   Past Medical History:   Diagnosis Date    ARDS (adult respiratory distress syndrome) ([address]     Arthritis     Asthma     Chest pain     Depression     Essential tremor     Gallstones     Generalized abdominal pain     Head trauma     multiple history of    Hx of blood clots     Hyperlipidemia     Hypertension     Infectious colitis, enteritis and gastroenteritis     Lupus (systemic lupus erythematosus) ([facility])     Obesity     Palpitations     Prolonged emergence from general anesthesia     SOB (shortness of breath)     Systemic lupus erythematosus ([address]     Tachycardia        FAMILY/SOCIAL HISTORY:  Family History   Problem Relation Age of Onset    High Blood Pressure Mother     Diabetes Mother     Stroke Mother     Arthritis Mother     Diabetes Father     High Blood Pressure Father     High Blood Pressure Sister     Diabetes Sister     Celiac Disease Sister     High Blood Pressure Brother     Crohn's Disease Brother     Uterine Cancer Maternal Grandmother      Social History     Socioeconomic History    Marital status: Single     Spouse name: Not on file    Number of children: Not on file    Years of education: 13    Highest education level: Not on file   Occupational History    Not on file   Tobacco Use    Smoking status: Former Smoker     Types: Cigarettes     Quit date: 10/31/2012     Years since quittin.9    Smokeless tobacco: Never Used   Vaping Use    Vaping Use: Never used   Substance and Sexual Activity    Alcohol use: No    Drug use: No    Sexual activity: Not on file   Other Topics Concern    Not on file   Social History Narrative    Not on file     Social Determinants of Health     Financial Resource Strain:     Difficulty of Paying Living Expenses:    Food Insecurity:     Worried About Running Out of Food in the Last Year:     [address] in the Last Year:    Transportation Needs:     Lack of Transportation (Medical):  Lack of Transportation (Non-Medical):    Physical Activity:     Days of Exercise per Week:     Minutes of Exercise per Session:    Stress:     Feeling of Stress :    Social Connections:     Frequency of Communication with Friends and Family:     Frequency of Social Gatherings with Friends and Family:     Attends Zoroastrian Services:     Active Member of Clubs or Organizations:     Attends Club or Organization Meetings:     Marital Status:    Intimate Partner Violence:     Fear of Current or Ex-Partner:     Emotionally Abused:     Physically Abused:     Sexually Abused:            ROS:  [x] All negative/unchanged except if checked.  Explain positive(checked items) below:  [] Constitutional  [] Eyes  [] Ear/Nose/Mouth/Throat  [] Respiratory  [] CV  [] GI  []   [] Musculoskeletal  [] Skin/Breast  [] Neurological  [] Endocrine  [] Heme/Lymph  [] Allergic/Immunologic    Explanation:     MEDICATIONS:    Current Facility-Administered Medications:     risperiDONE (RISPERDAL M-TABS) disintegrating tablet 2 mg, 2 mg, Oral, BID, Montserrat Lung Delmelinak, APRN - CNP, 2 mg at 10/03/21 0940    divalproex (DEPAKOTE) DR tablet 250 mg, 250 mg, Oral, 2 times per day, Damion Quevedo, APRN - CNP, 047 mg at 10/03/21 0940    ziprasidone (GEODON) injection 10 mg, 10 mg, IntraMUSCular, Q6H PRN, Manuela Bashir MD, 10 mg at 09/26/21 1351    LORazepam (ATIVAN) injection 2 mg, 2 mg, IntraMUSCular, Q6H PRN, Manuela Bashir MD, 2 mg at 10/01/21 2014    diphenhydrAMINE (BENADRYL) injection 50 mg, 50 mg, IntraMUSCular, Q6H PRN, 50 mg at 10/01/21 2015 **OR** diphenhydrAMINE (BENADRYL) tablet 50 mg, 50 mg, Oral, Q6H PRN, Servando Mcgovern APRN - CNP    ipratropium-albuterol (DUONEB) nebulizer solution 3 mL, 1 vial, Inhalation, M6R PRN, Montserrat Lung Dellick APRN - CNP    nitroGLYCERIN (NITROSTAT) SL tablet 0.4 mg, 0.4 mg, SubLINGual, Q5 Min PRN, Montserrat Lung Dellick, APRN - CNP    acetaminophen (TYLENOL) tablet 650 mg, 650 mg, Oral, M2K PRN, Montserrat Lung Dellick, APRN - CNP    magnesium hydroxide (MILK OF MAGNESIA) 400 MG/5ML suspension 30 mL, 30 mL, Oral, Daily PRN, Montserrat Lung Dellick, APRN - CNP    aluminum & magnesium hydroxide-simethicone (MAALOX) 200-200-20 MG/5ML suspension 30 mL, 30 mL, Oral, PRN, Montserrat Lung Dellick, APRN - CNP    haloperidol (HALDOL) tablet 5 mg, 5 mg, Oral, Q6H PRN **OR** haloperidol lactate (HALDOL) injection 5 mg, 5 mg, IntraMUSCular, R5Q PRN, Montserrat Lung Dellick, APRN - CNP, 5 mg at 10/01/21 2014    traZODone (DESYREL) tablet 50 mg, 50 mg, Oral, Nightly PRN, Damion Fieldsrick, APRN - CNP, 50 mg at 10/02/21 2114      Examination:  /66   Pulse 111   Temp 97.1 °F (36.2 °C)   Resp 16   Ht 5' 2\" (1.575 m)   Wt 224 lb (101.6 kg)   LMP 08/11/2021 (Approximate)   SpO2 100%   Breastfeeding No   BMI 40.97 kg/m²   Gait - steady  Medication side effects(SE): denies    Mental Status Examination:    Level of consciousness:  within normal limits Appearance:  fair grooming and fair hygiene  Behavior/Motor:  no abnormalities noted  Attitude toward examiner:  cooperative  Speech:  spontaneous, normal rate and normal volume   Mood: Under control  Affect: Appropriate and pleasant   thought processes: Linear  thought content: Devoid of any auditory visualizations delusions or other perceptual abnormalities denies SI/HI intent or plan  Cognition:  oriented to person, place, and time   Concentration intact  Insight fair   Judgement fair     Patient symptoms are:  [] Well controlled  [x] Improving  [] Worsening  [] No change      Diagnosis:   Principal Problem:    Bipolar 2 disorder, major depressive episode ([facility]  Active Problems:    Cluster B personality disorder ([facility]    Acute psychosis ([facility]  Resolved Problems:    * No resolved hospital problems. *      LABS:    No results for input(s): WBC, HGB, PLT in the last 72 hours. No results for input(s): NA, K, CL, CO2, BUN, CREATININE, GLUCOSE in the last 72 hours. No results for input(s): BILITOT, ALKPHOS, AST, ALT in the last 72 hours. Lab Results   Component Value Date    LABAMPH NOT DETECTED 09/21/2021    BARBSCNU NOT DETECTED 09/21/2021    LABBENZ NOT DETECTED 09/21/2021    LABMETH NOT DETECTED 09/21/2021    OPIATESCREENURINE NOT DETECTED 09/21/2021    PHENCYCLIDINESCREENURINE NOT DETECTED 09/21/2021    ETOH <10 09/21/2021     Lab Results   Component Value Date    TSH 2.000 12/23/2019     No results found for: LITHIUM  No results found for: VALPROATE, CBMZ        Treatment Plan:  Reviewed current Medications with the patient. Risks, benefits, side effects, drug-to-drug interactions and alternatives to treatment were discussed. Collateral information:   CD evaluation  Encourage patient to attend group and other milieu activities.   Discharge planning discussed with the patient and treatment team.    Discontinue Trileptal and start Depakote turned 50 mg twice daily for mood stabilization  continue Risperdal M tab 2 mg twice daily for psychosis    PSYCHOTHERAPY/COUNSELING:  [x] Therapeutic interview  [x] Supportive  [] CBT  [] Ongoing  [] Other    [x] Patient continues to need, on a daily basis, active treatment furnished directly by or requiring the supervision of inpatient psychiatric personnel      Anticipated Length of stay: 3 to 7 days based on stability            Electronically signed by SUZANNE Cheung CNP on 99/5/5085 at 11:13 AM

## 2021-10-04 VITALS
SYSTOLIC BLOOD PRESSURE: 109 MMHG | RESPIRATION RATE: 14 BRPM | BODY MASS INDEX: 41.22 KG/M2 | HEIGHT: 62 IN | TEMPERATURE: 97.8 F | OXYGEN SATURATION: 100 % | DIASTOLIC BLOOD PRESSURE: 65 MMHG | HEART RATE: 94 BPM | WEIGHT: 224 LBS

## 2021-10-04 PROCEDURE — 99239 HOSP IP/OBS DSCHRG MGMT >30: CPT | Performed by: NURSE PRACTITIONER

## 2021-10-04 PROCEDURE — 6370000000 HC RX 637 (ALT 250 FOR IP): Performed by: NURSE PRACTITIONER

## 2021-10-04 RX ORDER — DIVALPROEX SODIUM 250 MG/1
250 TABLET, DELAYED RELEASE ORAL EVERY 12 HOURS SCHEDULED
Qty: 60 TABLET | Refills: 0 | Status: SHIPPED | OUTPATIENT
Start: 2021-10-04 | End: 2021-11-03

## 2021-10-04 RX ADMIN — RISPERIDONE 2 MG: 2 TABLET, ORALLY DISINTEGRATING ORAL at 11:20

## 2021-10-04 RX ADMIN — DIVALPROEX SODIUM 250 MG: 250 TABLET, DELAYED RELEASE ORAL at 11:20

## 2021-10-04 NOTE — PROGRESS NOTES
[facility]  Discharge Note    Pt discharged with followings belongings:   Dentures: None  Vision - Corrective Lenses: Glasses  Hearing Aid: None  Jewelry: None  Body Piercings Removed: No  Clothing: Jacket / coat, Shirt, Other (Comment) (jeans, bra, tank top, coat, bible)  Were All Patient Medications Collected?: Not Applicable  Other Valuables: None   Valuables sent home with patient or returned to patient. Patient education on aftercare instructions: yes. Patient verbalize understanding of AVS:  Yes and parents educated on discharge summary. Verbal understanding by mother.  .    Status EXAM upon discharge:  Status and Exam  Normal: No  Facial Expression: Flat, Sad, Worried  Affect: Inappropriate, Blunt, Unstable  Level of Consciousness: Confused  Mood:Normal: No  Mood: Depressed, Anxious, Suspicious, Sad  Motor Activity:Normal: No  Motor Activity: Decreased  Interview Behavior: Evasive  Preception: [place] to Person, [place] to Time, [place] to Place, [place] to Situation  Attention:Normal: No  Attention: Distractible  Thought Processes: Circumstantial, Tangential  Thought Content:Normal: No  Thought Content: Preoccupations, Paranoia, Obsessions  Hallucinations: None  Delusions: No  Delusions: Obsessions  Memory:Normal: No  Memory: Confabulation  Insight and Judgment: No  Insight and Judgment: Poor Judgment, Poor Insight, Unmotivated, Unrealistic  Present Suicidal Ideation: No  Present Homicidal Ideation: No      Metabolic Screening:    No results found for: LABA1C    Lab Results   Component Value Date    CHOL 157 01/31/2021     Lab Results   Component Value Date    TRIG 133 01/31/2021     Lab Results   Component Value Date    HDL 32 01/31/2021     No components found for: [facility] [place]  Lab Results   Component Value Date    LABVLDL 27 01/31/2021       Archibald Koyanagi, RN

## 2021-10-04 NOTE — PLAN OF CARE
Problem: Discharge Planning:  Goal: Discharged to appropriate level of care  Description: Discharged to appropriate level of care  Outcome: Ongoing     Problem: Mood - Altered:  Goal: Mood stable  Description: Mood stable  10/4/2021 1016 by Sunil Jacobsen RN  Outcome: Ongoing  10/3/2021 2116 by Glenn Jones RN  Outcome: Ongoing     Problem: Falls - Risk of:  Goal: Will remain free from falls  Description: Will remain free from falls  10/3/2021 2116 by Glenn Jones RN  Outcome: Ongoing  Goal: Absence of physical injury  Description: Absence of physical injury  10/3/2021 2116 by Glenn Jones RN  Outcome: Ongoing

## 2021-10-04 NOTE — DISCHARGE SUMMARY
DISCHARGE SUMMARY      Patient ID:  Nanette Soliz  [id number]  89 y.o.  1970    Admit date: 9/22/2021    Discharge date and time: 10/4/2021    Admitting Physician: Mona Sood MD     Discharge Physician: Dr Demian Sifuentes MD    Discharge Diagnoses:   Patient Active Problem List   Diagnosis    Moderate persistent asthma without complication    Anxiety    Depression    Chest pain    Lupus ([facility]    Essential hypertension    Hyperlipidemia    Need for post exposure prophylaxis for rabies    Bipolar 2 disorder, major depressive episode ([facility]    Cluster B personality disorder ([facility]    Acute psychosis ([facility]       Admission Condition: poor    Discharged Condition: stable    Admission Circumstance: Patient has been experiencing auditory hallucinations for several days. She was seeing her therapist for PTSD earlier in the day when she experienced a seizure. After going home, the voices in her head had intensified and she was feeling more confused and anxious. She was becoming fearful of the voices as they were telling her if she \"was being good or bad. \" She came to the ER by herself on 9/20/2021 due to the ongoing and intensified auditory hallucinations.        PAST MEDICAL/PSYCHIATRIC HISTORY:   Past Medical History:   Diagnosis Date    ARDS (adult respiratory distress syndrome) (HCC)     Arthritis     Asthma     Chest pain     Depression     Essential tremor     Gallstones     Generalized abdominal pain     Head trauma     multiple history of    Hx of blood clots     Hyperlipidemia     Hypertension     Infectious colitis, enteritis and gastroenteritis     Lupus (systemic lupus erythematosus) (HCC)     Obesity     Palpitations     Prolonged emergence from general anesthesia     SOB (shortness of breath)     Systemic lupus erythematosus (HCC)     Tachycardia        FAMILY/SOCIAL HISTORY:  Family History   Problem Relation Age of Onset    High Blood Pressure Mother     Diabetes Mother  Stroke Mother     Arthritis Mother     Diabetes Father     High Blood Pressure Father     High Blood Pressure Sister     Diabetes Sister     Celiac Disease Sister     High Blood Pressure Brother     Crohn's Disease Brother     Uterine Cancer Maternal Grandmother      Social History     Socioeconomic History    Marital status: Single     Spouse name: Not on file    Number of children: Not on file    Years of education: 13    Highest education level: Not on file   Occupational History    Not on file   Tobacco Use    Smoking status: Former Smoker     Types: Cigarettes     Quit date: 10/31/2012     Years since quittin.9    Smokeless tobacco: Never Used   Vaping Use    Vaping Use: Never used   Substance and Sexual Activity    Alcohol use: No    Drug use: No    Sexual activity: Not on file   Other Topics Concern    Not on file   Social History Narrative    Not on file     Social Determinants of Health     Financial Resource Strain:     Difficulty of Paying Living Expenses:    Food Insecurity:     Worried About Running Out of Food in the Last Year:     Ran Out of Food in the Last Year:    Transportation Needs:     Lack of Transportation (Medical):      Lack of Transportation (Non-Medical):    Physical Activity:     Days of Exercise per Week:     Minutes of Exercise per Session:    Stress:     Feeling of Stress :    Social Connections:     Frequency of Communication with Friends and Family:     Frequency of Social Gatherings with Friends and Family:     Attends Gnosticist Services:     Active Member of Clubs or Organizations:     Attends Club or Organization Meetings:     Marital Status:    Intimate Partner Violence:     Fear of Current or Ex-Partner:     Emotionally Abused:     Physically Abused:     Sexually Abused:        MEDICATIONS:    Current Facility-Administered Medications:     risperiDONE (RISPERDAL M-TABS) disintegrating tablet 2 mg, 2 mg, Oral, BID, Parminder Morales SUZANNE - CNP, 2 mg at 10/03/21 2151    divalproex (DEPAKOTE) DR tablet 250 mg, 250 mg, Oral, 2 times per day, [facility] SUZANNE  CNP, 209 mg at 10/03/21 2151    ziprasidone (GEODON) injection 10 mg, 10 mg, IntraMUSCular, Q6H PRN, Africa Rosado MD, 10 mg at 09/26/21 1351    LORazepam (ATIVAN) injection 2 mg, 2 mg, IntraMUSCular, Q6H PRN, Africa Rosado MD, 2 mg at 10/01/21 2014    diphenhydrAMINE (BENADRYL) injection 50 mg, 50 mg, IntraMUSCular, Q6H PRN, 50 mg at 10/01/21 2015 **OR** diphenhydrAMINE (BENADRYL) tablet 50 mg, 50 mg, Oral, Q6H PRN, SUZANNE Negron CNP    ipratropium-albuterol (DUONEB) nebulizer solution 3 mL, 1 vial, Inhalation, W5U PRN, [facility]    nitroGLYCERIN (NITROSTAT) SL tablet 0.4 mg, 0.4 mg, SubLINGual, Q5 Min PRN, [facility] SUZANNE - CNP    acetaminophen (TYLENOL) tablet 650 mg, 650 mg, Oral, L9Y PRN, [facility]    magnesium hydroxide (MILK OF MAGNESIA) 400 MG/5ML suspension 30 mL, 30 mL, Oral, Daily PRN, [facility] APRNAYLA - CNP    aluminum & magnesium hydroxide-simethicone (MAALOX) 200-200-20 MG/5ML suspension 30 mL, 30 mL, Oral, PRN, [facility] APRNAYLA - CNP    haloperidol (HALDOL) tablet 5 mg, 5 mg, Oral, Q6H PRN **OR** haloperidol lactate (HALDOL) injection 5 mg, 5 mg, IntraMUSCular, B0O PRN, [facility], APRN - CNP, 5 mg at 10/01/21 2014    traZODone (DESYREL) tablet 50 mg, 50 mg, Oral, Nightly PRN, [facility], APRN - CNP, 50 mg at 10/03/21 2151    Examination:  /65   Pulse 94   Temp 97.8 °F (36.6 °C)   Resp 14   Ht 5' 2\" (1.575 m)   Wt 224 lb (101.6 kg)   LMP 08/11/2021 (Approximate)   SpO2 100%   Breastfeeding No   BMI 40.97 kg/m²   Gait - steady    HOSPITAL COURSE[de-identified]  Patient was admitted to the unit on 9/22/2021 was closely monitored for psychosis. She was evaluated and treated with Depakote 250 mg twice daily and Risperdal 2 mg twice daily for psychosis.   Medical events were insignificant and patient continued to improve on the floor. She started coming out of her room she was attending groups to socializing with peers. She never made any suicidal statements or any suicidal gestures while in the unit. Social workers obtain confirmation patient's mother and sister who was able to voice any concerns that they had. They reported no safety concerns no access to any guns. Treatment team felt the patient obtain the maximum benefit for her hospitalization She was set up with an outpatient mental health agency for outpatient follow-up services . At the time of discharge patient  did not show impulsive behavior. She was up on the unit she was attending groups and socializing with peers. She vehemently denied any suicidal homicidal ideations intent or plan. She was eating well and sleeping well there are no neurovegetative signs or symptoms of depression she denied any auditory visualizations. There are no overt or covert signs psychosis. She was appreciative of the help that she received here. This patient no longer meets criteria for inpatient hospitalization. No AVH or paranoid thoughts  No hopeless or worthless feeling  No active SI/HI  Appetite:  [x] Normal  [] Increased  [] Decreased    Sleep:       [x] Normal  [] Fair       [] Poor            Energy:    [x] Normal  [] Increased  [] Decreased     SI [] Present  [x] Absent  HI  []Present  [x] Absent   Aggression:  [] yes  [x] no  Patient is [x] able  [] unable to CONTRACT FOR SAFETY   Medication side effects(SE):  [x] None(Psych. Meds.) [] Other      Mental Status Examination on discharge:    Level of consciousness:  within normal limits   Appearance:  well-appearing  Behavior/Motor:  no abnormalities noted  Attitude toward examiner:  attentive and good eye contact  Speech:  spontaneous, normal rate and normal volume   Mood: \" I feel much better. \"  Affect: Bright appropriate and pleasant  Thought processes: Linear without flight of ideas loose associations  Thought content: Devoid of any auditory visual hallucinations delusions or other perceptual denies. Denies SI/HI intent or plan  Cognition:  oriented to person, place, and time   Concentration intact  Memory intact  Insight good   Judgement fair   Fund of Knowledge adequate      ASSESSMENT:  Patient symptoms are:  [x] Well controlled  [x] Improving  [] Worsening  [] No change    Reason for more than one antipsychotic:  [] N/A  [] 3 Failed Monotherapy attempts (Drugs tried:)  [] Crossover to a new antipsychotic  [] Taper to Monotherapy from Polypharmacy  [] Augmentation of clozapine therapy due to treatment resistance to single therapy    Diagnosis:  Principal Problem:    Bipolar 2 disorder, major depressive episode ([facility]  Active Problems:    [facility]    Acute psychosis ([facility]  Resolved Problems:    * No resolved hospital problems. *      LABS:    No results for input(s): WBC, HGB, PLT in the last 72 hours. No results for input(s): NA, K, CL, CO2, BUN, CREATININE, GLUCOSE in the last 72 hours. No results for input(s): BILITOT, ALKPHOS, AST, ALT in the last 72 hours. Lab Results   Component Value Date    LABAMPH NOT DETECTED 09/21/2021    BARBSCNU NOT DETECTED 09/21/2021    LABBENZ NOT DETECTED 09/21/2021    LABMETH NOT DETECTED 09/21/2021    OPIATESCREENURINE NOT DETECTED 09/21/2021    PHENCYCLIDINESCREENURINE NOT DETECTED 09/21/2021    ETOH <10 09/21/2021     Lab Results   Component Value Date    TSH 2.000 12/23/2019     No results found for: LITHIUM  No results found for: VALPROATE, CBMZ    RISK ASSESSMENT AT DISCHARGE: Low risk for suicide and homicide. Treatment Plan:  Reviewed current Medications with the patient. Education provided on the complaince with treatment. Risks, benefits, side effects, drug-to-drug interactions and alternatives to treatment were discussed. Encourage patient to attend outpatient follow up appointment and therapy.     Patient was advised to call the outpatient provider, visit the nearest ED or call 911 if symptoms are not manageable. Patient's family member was contacted prior to the discharge.          Medication List      START taking these medications    divalproex 250 MG DR tablet  Commonly known as: DEPAKOTE  Take 1 tablet by mouth every 12 hours     risperiDONE 1 MG disintegrating tablet  Commonly known as: RISPERDAL M-TABS  Take 1 tablet by mouth 2 times daily        CONTINUE taking these medications    albuterol sulfate  (90 Base) MCG/ACT inhaler     azaTHIOprine 50 MG tablet  Commonly known as: IMURAN     ipratropium-albuterol 0.5-2.5 (3) MG/3ML Soln nebulizer solution  Commonly known as: DUONEB     nitroGLYCERIN 0.4 MG SL tablet  Commonly known as: NITROSTAT        STOP taking these medications    ALPRAZolam 1 MG tablet  Commonly known as: XANAX     aspirin 81 MG tablet        ASK your doctor about these medications    Breo Ellipta 200-25 MCG/INH Aepb inhaler  Generic drug: Fluticasone furoate-vilanterol     cetirizine 10 MG tablet  Commonly known as: ZYRTEC     guaiFENesin 600 MG extended release tablet  Commonly known as: MUCINEX     isosorbide dinitrate 5 MG tablet  Commonly known as: ISORDIL     metoprolol tartrate 25 MG tablet  Commonly known as: LOPRESSOR     montelukast 10 MG tablet  Commonly known as: SINGULAIR     Probiotic-10 Caps     topiramate 25 MG tablet  Commonly known as: TOPAMAX           Where to Get Your Medications      These medications were sent to [facility] [address] [address]    Phone: [phone]   · divalproex 250 MG DR tablet  · risperiDONE 1 MG disintegrating tablet       Patient counseled she was been compliant with all medications outpatient follow-up appointments    Patient is discharged home in stable condition    TIME SPEND - 35 MINUTES TO COMPLETE THE EVALUATION, DISCHARGE SUMMARY, MEDICATION RECONCILIATION AND FOLLOW UP CARE     Signed:  SUZANNE Aguilar CNP  06/3/3258  9:19 AM

## 2021-10-04 NOTE — CARE COORDINATION
In order to ensure appropriate transition and discharge planning is in place, the following documents have been transmitted to [address] [place] [address], as the new outpatient provider:     The d/c diagnosis was transmitted to the next care provider   The reason for hospitalization was transmitted to the next care provider   The d/c medications (dosage and indication) were transmitted to the next care provider    The continuing care plan was transmitted to the next care provider

## 2021-10-04 NOTE — PLAN OF CARE
Problem: Mood - Altered:  Goal: Mood stable  Description: Mood stable  Outcome: Ongoing     Problem: Falls - Risk of:  Goal: Will remain free from falls  Description: Will remain free from falls  Outcome: Ongoing     Problem: Falls - Risk of:  Goal: Absence of physical injury  Description: Absence of physical injury  Outcome: Ongoing     Patient denies suicidal ideation, homicidal ideations and AVH. Depression -8 and Anxiety -7. Presents calm and cooperative during assessment. Patient is lying in bed appears disheveled out on the unit and is social with peers. Medications taken without issue. No complaints or concerns verbalized at this time. No unit problems reported. Will continue to observe and support.

## 2021-10-04 NOTE — PROGRESS NOTES
Attended morning community meeting. Updated on staffing and daily expectations. Shared goal for the day as to try to stay warm.

## 2021-10-09 ENCOUNTER — HOSPITAL ENCOUNTER (OUTPATIENT)
Age: 51
Discharge: HOME | End: 2021-10-09
Payer: COMMERCIAL

## 2021-10-09 LAB — VALPROIC ACID LEVEL: 28 MCG/ML (ref 50–100)

## 2021-10-09 PROCEDURE — 36415 COLL VENOUS BLD VENIPUNCTURE: CPT

## 2021-10-09 PROCEDURE — 80164 ASSAY DIPROPYLACETIC ACD TOT: CPT

## 2021-10-20 ENCOUNTER — APPOINTMENT (OUTPATIENT)
Dept: GENERAL RADIOLOGY | Age: 51
End: 2021-10-20
Payer: COMMERCIAL

## 2021-10-20 ENCOUNTER — HOSPITAL ENCOUNTER (EMERGENCY)
Age: 51
Discharge: HOME | End: 2021-10-20
Attending: EMERGENCY MEDICINE
Payer: COMMERCIAL

## 2021-10-20 VITALS
RESPIRATION RATE: 16 BRPM | SYSTOLIC BLOOD PRESSURE: 101 MMHG | HEIGHT: 62 IN | TEMPERATURE: 98.8 F | WEIGHT: 220 LBS | BODY MASS INDEX: 40.48 KG/M2 | OXYGEN SATURATION: 99 % | DIASTOLIC BLOOD PRESSURE: 58 MMHG | HEART RATE: 86 BPM

## 2021-10-20 DIAGNOSIS — E87.6 HYPOKALEMIA: ICD-10-CM

## 2021-10-20 DIAGNOSIS — Z20.822 SUSPECTED COVID-19 VIRUS INFECTION: Primary | ICD-10-CM

## 2021-10-20 LAB
ALBUMIN SERPL-MCNC: 3.5 G/DL (ref 3.5–5.2)
ALP BLD-CCNC: 86 U/L (ref 35–104)
ALT SERPL-CCNC: 32 U/L (ref 0–32)
ANION GAP SERPL CALCULATED.3IONS-SCNC: 11 MMOL/L (ref 7–16)
ANISOCYTOSIS: ABNORMAL
AST SERPL-CCNC: 30 U/L (ref 0–31)
BASOPHILS ABSOLUTE: 0.04 E9/L (ref 0–0.2)
BASOPHILS RELATIVE PERCENT: 0.3 % (ref 0–2)
BILIRUB SERPL-MCNC: 0.7 MG/DL (ref 0–1.2)
BUN BLDV-MCNC: 13 MG/DL (ref 6–20)
CALCIUM SERPL-MCNC: 8.9 MG/DL (ref 8.6–10.2)
CHLORIDE BLD-SCNC: 101 MMOL/L (ref 98–107)
CO2: 21 MMOL/L (ref 22–29)
CREAT SERPL-MCNC: 1 MG/DL (ref 0.5–1)
EOSINOPHILS ABSOLUTE: 0 E9/L (ref 0.05–0.5)
EOSINOPHILS RELATIVE PERCENT: 0 % (ref 0–6)
GFR AFRICAN AMERICAN: >60
GFR NON-AFRICAN AMERICAN: 59 ML/MIN/1.73
GLUCOSE BLD-MCNC: 116 MG/DL (ref 74–99)
HCT VFR BLD CALC: 37.5 % (ref 34–48)
HEMOGLOBIN: 12.3 G/DL (ref 11.5–15.5)
IMMATURE GRANULOCYTES #: 0.05 E9/L
IMMATURE GRANULOCYTES %: 0.4 % (ref 0–5)
LYMPHOCYTES ABSOLUTE: 0.43 E9/L (ref 1.5–4)
LYMPHOCYTES RELATIVE PERCENT: 3.3 % (ref 20–42)
MAGNESIUM: 1.7 MG/DL (ref 1.6–2.6)
MCH RBC QN AUTO: 27.3 PG (ref 26–35)
MCHC RBC AUTO-ENTMCNC: 32.8 % (ref 32–34.5)
MCV RBC AUTO: 83.3 FL (ref 80–99.9)
MONOCYTES ABSOLUTE: 0.37 E9/L (ref 0.1–0.95)
MONOCYTES RELATIVE PERCENT: 2.8 % (ref 2–12)
NEUTROPHILS ABSOLUTE: 12.22 E9/L (ref 1.8–7.3)
NEUTROPHILS RELATIVE PERCENT: 93.2 % (ref 43–80)
PDW BLD-RTO: 15.3 FL (ref 11.5–15)
PLATELET # BLD: 155 E9/L (ref 130–450)
PMV BLD AUTO: 8.3 FL (ref 7–12)
POTASSIUM REFLEX MAGNESIUM: 3.4 MMOL/L (ref 3.5–5)
RBC # BLD: 4.5 E12/L (ref 3.5–5.5)
SODIUM BLD-SCNC: 133 MMOL/L (ref 132–146)
TOTAL PROTEIN: 7.1 G/DL (ref 6.4–8.3)
TROPONIN, HIGH SENSITIVITY: 10 NG/L (ref 0–9)
TROPONIN, HIGH SENSITIVITY: 11 NG/L (ref 0–9)
VACUOLATED NEUTROPHILS: ABNORMAL
WBC # BLD: 13.1 E9/L (ref 4.5–11.5)

## 2021-10-20 PROCEDURE — 36415 COLL VENOUS BLD VENIPUNCTURE: CPT

## 2021-10-20 PROCEDURE — U0003 INFECTIOUS AGENT DETECTION BY NUCLEIC ACID (DNA OR RNA); SEVERE ACUTE RESPIRATORY SYNDROME CORONAVIRUS 2 (SARS-COV-2) (CORONAVIRUS DISEASE [COVID-19]), AMPLIFIED PROBE TECHNIQUE, MAKING USE OF HIGH THROUGHPUT TECHNOLOGIES AS DESCRIBED BY CMS-2020-01-R: HCPCS

## 2021-10-20 PROCEDURE — 84484 ASSAY OF TROPONIN QUANT: CPT

## 2021-10-20 PROCEDURE — 71046 X-RAY EXAM CHEST 2 VIEWS: CPT

## 2021-10-20 PROCEDURE — 93005 ELECTROCARDIOGRAM TRACING: CPT | Performed by: PHYSICIAN ASSISTANT

## 2021-10-20 PROCEDURE — 99285 EMERGENCY DEPT VISIT HI MDM: CPT

## 2021-10-20 PROCEDURE — 83735 ASSAY OF MAGNESIUM: CPT

## 2021-10-20 PROCEDURE — 6370000000 HC RX 637 (ALT 250 FOR IP): Performed by: STUDENT IN AN ORGANIZED HEALTH CARE EDUCATION/TRAINING PROGRAM

## 2021-10-20 PROCEDURE — 80053 COMPREHEN METABOLIC PANEL: CPT

## 2021-10-20 PROCEDURE — 85025 COMPLETE CBC W/AUTO DIFF WBC: CPT

## 2021-10-20 PROCEDURE — U0005 INFEC AGEN DETEC AMPLI PROBE: HCPCS

## 2021-10-20 RX ORDER — POTASSIUM CHLORIDE 20 MEQ/1
40 TABLET, EXTENDED RELEASE ORAL ONCE
Status: COMPLETED | OUTPATIENT
Start: 2021-10-20 | End: 2021-10-20

## 2021-10-20 RX ORDER — ACETAMINOPHEN 325 MG/1
650 TABLET ORAL ONCE
Status: COMPLETED | OUTPATIENT
Start: 2021-10-20 | End: 2021-10-20

## 2021-10-20 RX ADMIN — ACETAMINOPHEN 650 MG: 325 TABLET ORAL at 23:09

## 2021-10-20 RX ADMIN — POTASSIUM CHLORIDE 40 MEQ: 1500 TABLET, EXTENDED RELEASE ORAL at 23:09

## 2021-10-20 ASSESSMENT — ENCOUNTER SYMPTOMS
DIARRHEA: 1
EYE REDNESS: 0
VOMITING: 0
WHEEZING: 0
EYE DISCHARGE: 0
NAUSEA: 1
SINUS PRESSURE: 0
SORE THROAT: 0
BACK PAIN: 0
COUGH: 1
ABDOMINAL DISTENTION: 0
SHORTNESS OF BREATH: 1
EYE PAIN: 0

## 2021-10-20 ASSESSMENT — PAIN SCALES - GENERAL: PAINLEVEL_OUTOF10: 7

## 2021-10-20 NOTE — ED PROVIDER NOTES
FIRST PROVIDER CONTACT ASSESSMENT NOTE                                                                                                Department of Emergency Medicine                                                      First Provider Note  10/20/21  3:02 PM EDT  NAME: Jasiel Cherry  : 1970  MRN: [id number]    Chief Complaint: Dizziness (covid exposure) and Fever      History of Present Illness:   Jasiel Cherry is a 52 y.o. female who presents to the ED for states she was exposed to family member with Covid. States she was vaccinated. Hx of asthma and Lupus. Now with SOB and CP. Fever this AM.   PCP sent her in today. Focused Physical Exam:  VS:    ED Triage Vitals [10/20/21 1441]   BP Temp Temp Source Pulse Resp SpO2 Height Weight   104/66 98.8 °F (37.1 °C) Oral 106 14 94 % 5' 2\" (1.575 m) 220 lb (99.8 kg)        General: Alert and in no apparent distress. Medical History:  has a past medical history of ARDS (adult respiratory distress syndrome) ([address] Arthritis, Asthma, Chest pain, Depression, Essential tremor, Gallstones, Generalized abdominal pain, Head trauma, Hx of blood clots, Hyperlipidemia, Hypertension, Infectious colitis, enteritis and gastroenteritis, Lupus (systemic lupus erythematosus) ([address] Obesity, Palpitations, Prolonged emergence from general anesthesia, SOB (shortness of breath), Systemic lupus erythematosus ([address] and Tachycardia. Surgical History:  has a past surgical history that includes Cholecystectomy, laparoscopic (2018); Retinal detachment surgery; and Diagnostic Cardiac Cath Lab Procedure. Social History:  reports that she quit smoking about 8 years ago. Her smoking use included cigarettes. She has never used smokeless tobacco. She reports that she does not drink alcohol and does not use drugs.     Family History: family history includes Arthritis in her mother; Celiac Disease in her sister; Crohn's Disease in her brother; Diabetes in

## 2021-10-20 NOTE — ED NOTES
Patient approached pivot desk stating she felt SOB, respirations easy and unlabored, SpO2 97% on room air.       Starr Miles RN  10/20/21 0424

## 2021-10-21 ENCOUNTER — CARE COORDINATION (OUTPATIENT)
Dept: CARE COORDINATION | Age: 51
End: 2021-10-21

## 2021-10-21 LAB
EKG ATRIAL RATE: 87 BPM
EKG P AXIS: 55 DEGREES
EKG P-R INTERVAL: 150 MS
EKG Q-T INTERVAL: 350 MS
EKG QRS DURATION: 76 MS
EKG QTC CALCULATION (BAZETT): 421 MS
EKG R AXIS: 52 DEGREES
EKG T AXIS: 13 DEGREES
EKG VENTRICULAR RATE: 87 BPM
SARS-COV-2, PCR: NOT DETECTED

## 2021-10-21 NOTE — ED NOTES
Pt ambulated approx 75 feet while on room air. Pt tolerated well. Pt's SpO2 remained 96-98% while on room air. No signs of distress. Will notify physician.       Cat Choi RN  10/20/21 4226

## 2021-10-21 NOTE — CARE COORDINATION
Date/Time:  10/21/2021 11:18 AM  Attempted to reach patient by telephone. Left HIPAA compliant message requesting a return call. Will attempt to reach patient again.

## 2021-10-21 NOTE — ED PROVIDER NOTES
Patient presents with flulike symptoms and concern for Covid. Her symptoms include fevers, lightheadedness, dental pain, myalgias, shortness of breath, cough, chest pain, nausea, and diarrhea. Patient states that the symptoms have been ongoing for 1 day. She states that her symptoms are severe. She has not done anything to make her symptoms better nor worse. Patient describes chest pain as a pressure over center of her chest.  It does not radiate anywhere. She states that she does have a covid exposure. Patient states that she was vaccinated prior to her illness. The history is provided by the patient. No  was used. Review of Systems   Constitutional: Positive for fever. Negative for chills. HENT: Negative for ear pain, sinus pressure and sore throat. Eyes: Negative for pain, discharge and redness. Respiratory: Positive for cough and shortness of breath. Negative for wheezing. Cardiovascular: Positive for chest pain. Gastrointestinal: Positive for diarrhea and nausea. Negative for abdominal distention and vomiting. Genitourinary: Negative for dysuria and frequency. Musculoskeletal: Negative for arthralgias and back pain. Skin: Negative for rash and wound. Neurological: Positive for light-headedness. Negative for weakness and headaches. Hematological: Negative for adenopathy. All other systems reviewed and are negative. Physical Exam  Vitals and nursing note reviewed. Constitutional:       General: She is not in acute distress. Appearance: She is well-developed. She is obese. HENT:      Head: Normocephalic and atraumatic. Eyes:      Conjunctiva/sclera: Conjunctivae normal.   Cardiovascular:      Rate and Rhythm: Normal rate and regular rhythm. Heart sounds: Normal heart sounds. No murmur heard. Pulmonary:      Effort: Pulmonary effort is normal. No respiratory distress. Breath sounds: Normal breath sounds.  No wheezing or rales.   Abdominal:      General: Bowel sounds are normal.      Palpations: Abdomen is soft. Tenderness: There is no abdominal tenderness. There is no right CVA tenderness, left CVA tenderness, guarding or rebound. Musculoskeletal:      Cervical back: Normal range of motion and neck supple. No rigidity or tenderness. Right lower leg: No edema. Left lower leg: No edema. Skin:     General: Skin is warm and dry. Neurological:      Mental Status: She is alert and oriented to person, place, and time. Cranial Nerves: No cranial nerve deficit. Coordination: Coordination normal.          Procedures     MDM  Number of Diagnoses or Management Options  Hypokalemia  Suspected COVID-19 virus infection  Diagnosis management comments: Patient presents with fever and lightheadedness. CBC was obtained and did show leukocytosis of 13.1. CMP showed a mild hypokalemia at 3.4. Magnesium was appropriate. Replacement was given. Initial troponin was 11. Repeat was 10. EKG was without signs of ischemia. Chest x-ray showed likely scarring in the lower lobes. At this point there does not appear to be any emergent processes ongoing. Patient will be discharged with instructions to follow with their PCP for further evaluation and care. In addition, they were instructed to isolate until their test comes back negative or for 10 days if the test is positive. Patient was also educated on obtaining a pulse ox and was given return precautions if her sats drop below 88%. Patient discharged home. Amount and/or Complexity of Data Reviewed  Clinical lab tests: reviewed  Tests in the radiology section of CPT®: reviewed  Tests in the medicine section of CPT®: reviewed  Decide to obtain previous medical records or to obtain history from someone other than the patient: yes         ED Course as of Oct 20 2243   Wed Oct 20, 2021   2241 EKG shows normal sinus rhythm with ventricular rate of 87 bpm.  Normal axis. There is no prolonged QT. There are no obvious ST elevations however there is a T wave inversion in lead III. Comparable to previous EKG on 9/21/21    [BB]      ED Course User Index  [BB] Etta Hickey DO        ED Course as of Oct 20 2243   Wed Oct 20, 2021   2241 EKG shows normal sinus rhythm with ventricular rate of 87 bpm.  Normal axis. There is no prolonged QT. There are no obvious ST elevations however there is a T wave inversion in lead III. Comparable to previous EKG on 9/21/21    [BB]      ED Course User Index  [BB] Etta Hickey DO       --------------------------------------------- PAST HISTORY ---------------------------------------------  Past Medical History:  has a past medical history of ARDS (adult respiratory distress syndrome) ([address] Arthritis, Asthma, Chest pain, Depression, Essential tremor, Gallstones, Generalized abdominal pain, Head trauma, Hx of blood clots, Hyperlipidemia, Hypertension, Infectious colitis, enteritis and gastroenteritis, Lupus (systemic lupus erythematosus) ([address] Obesity, Palpitations, Prolonged emergence from general anesthesia, SOB (shortness of breath), Systemic lupus erythematosus ([address] and Tachycardia. Past Surgical History:  has a past surgical history that includes Cholecystectomy, laparoscopic (03/2018); Retinal detachment surgery; and Diagnostic Cardiac Cath Lab Procedure. Social History:  reports that she quit smoking about 8 years ago. Her smoking use included cigarettes. She has never used smokeless tobacco. She reports that she does not drink alcohol and does not use drugs. Family History: family history includes Arthritis in her mother; Celiac Disease in her sister; Crohn's Disease in her brother; Diabetes in her father, mother, and sister; High Blood Pressure in her brother, father, mother, and sister; Stroke in her mother; Uterine Cancer in her maternal grandmother. The patients home medications have been reviewed.     Allergies: Cefdinir, Cephalosporins, Fish allergy, Fish-derived products, Nsaids, Ciprofloxacin, Codeine, Folic acid, Levaquin [levofloxacin in d5w], Methotrexate, Methotrexate derivatives, Metronidazole, Other, Prednisone, Wellbutrin [bupropion], and Pce [erythromycin]    -------------------------------------------------- RESULTS -------------------------------------------------  Labs:  Results for orders placed or performed during the hospital encounter of 10/20/21   CBC Auto Differential   Result Value Ref Range    WBC 13.1 (H) 4.5 - 11.5 E9/L    RBC 4.50 3.50 - 5.50 E12/L    Hemoglobin 12.3 11.5 - 15.5 g/dL    Hematocrit 37.5 34.0 - 48.0 %    MCV 83.3 80.0 - 99.9 fL    MCH 27.3 26.0 - 35.0 pg    MCHC 32.8 32.0 - 34.5 %    RDW 15.3 (H) 11.5 - 15.0 fL    Platelets 536 795 - 488 E9/L    MPV 8.3 7.0 - 12.0 fL    Neutrophils % 93.2 (H) 43.0 - 80.0 %    Immature Granulocytes % 0.4 0.0 - 5.0 %    Lymphocytes % 3.3 (L) 20.0 - 42.0 %    Monocytes % 2.8 2.0 - 12.0 %    Eosinophils % 0.0 0.0 - 6.0 %    Basophils % 0.3 0.0 - 2.0 %    Neutrophils Absolute 12.22 (H) 1.80 - 7.30 E9/L    Immature Granulocytes # 0.05 E9/L    Lymphocytes Absolute 0.43 (L) 1.50 - 4.00 E9/L    Monocytes Absolute 0.37 0.10 - 0.95 E9/L    Eosinophils Absolute 0.00 (L) 0.05 - 0.50 E9/L    Basophils Absolute 0.04 0.00 - 0.20 E9/L    Vacuolated Neutrophils 1+     Anisocytosis 1+    Comprehensive Metabolic Panel w/ Reflex to MG   Result Value Ref Range    Sodium 133 132 - 146 mmol/L    Potassium reflex Magnesium 3.4 (L) 3.5 - 5.0 mmol/L    Chloride 101 98 - 107 mmol/L    CO2 21 (L) 22 - 29 mmol/L    Anion Gap 11 7 - 16 mmol/L    Glucose 116 (H) 74 - 99 mg/dL    BUN 13 6 - 20 mg/dL    CREATININE 1.0 0.5 - 1.0 mg/dL    GFR Non-African American 59 >=60 mL/min/1.73    GFR African American >60     Calcium 8.9 8.6 - 10.2 mg/dL    Total Protein 7.1 6.4 - 8.3 g/dL    Albumin 3.5 3.5 - 5.2 g/dL    Total Bilirubin 0.7 0.0 - 1.2 mg/dL    Alkaline Phosphatase 86 35 - 104 U/L ALT 32 0 - 32 U/L    AST 30 0 - 31 U/L   Troponin   Result Value Ref Range    Troponin, High Sensitivity 11 (H) 0 - 9 ng/L   Magnesium   Result Value Ref Range    Magnesium 1.7 1.6 - 2.6 mg/dL   Troponin   Result Value Ref Range    Troponin, High Sensitivity 10 (H) 0 - 9 ng/L   EKG 12 Lead   Result Value Ref Range    Ventricular Rate 87 BPM    Atrial Rate 87 BPM    P-R Interval 150 ms    QRS Duration 76 ms    Q-T Interval 350 ms    QTc Calculation (Bazett) 421 ms    P Axis 55 degrees    R Axis 52 degrees    T Axis 13 degrees       Radiology:  XR CHEST (2 VW)   Final Result   Mild scarring/pleural reaction at the lung bases. Early infiltrates at the   lung bases cannot be excluded. ------------------------- NURSING NOTES AND VITALS REVIEWED ---------------------------  Date / Time Roomed:  10/20/2021  8:48 PM  ED Bed Assignment:  08/08    The nursing notes within the ED encounter and vital signs as below have been reviewed. BP (!) 101/58   Pulse 86   Temp 98.8 °F (37.1 °C) (Oral)   Resp 16   Ht 5' 2\" (1.575 m)   Wt 220 lb (99.8 kg)   SpO2 99%   BMI 40.24 kg/m²   Oxygen Saturation Interpretation: Normal      ------------------------------------------ PROGRESS NOTES ------------------------------------------  10:38 PM EDT  I have spoken with the patient and discussed todays results, in addition to providing specific details for the plan of care and counseling regarding the diagnosis and prognosis. Their questions are answered at this time and they are agreeable with the plan. I discussed at length with them reasons for immediate return here for re evaluation. They will followup with their primary care physician by calling their office tomorrow. --------------------------------- ADDITIONAL PROVIDER NOTES ---------------------------------  At this time the patient is without objective evidence of an acute process requiring hospitalization or inpatient management.   They have remained hemodynamically stable throughout their entire ED visit and are stable for discharge with outpatient follow-up. The plan has been discussed in detail and they are aware of the specific conditions for emergent return, as well as the importance of follow-up. New Prescriptions    No medications on file       Diagnosis:  1. Suspected COVID-19 virus infection    2. Hypokalemia        Disposition:  Patient's disposition: Discharge to home  Patient's condition is stable.     Patient was seen and evaluated by both myself and Patricia Taylor. Luz Maria White 64, DO  Resident  10/20/21 8775

## 2022-05-13 ENCOUNTER — HOSPITAL ENCOUNTER (EMERGENCY)
Age: 52
Discharge: HOME | End: 2022-05-13
Attending: STUDENT IN AN ORGANIZED HEALTH CARE EDUCATION/TRAINING PROGRAM
Payer: COMMERCIAL

## 2022-05-13 ENCOUNTER — APPOINTMENT (OUTPATIENT)
Dept: GENERAL RADIOLOGY | Age: 52
End: 2022-05-13
Payer: COMMERCIAL

## 2022-05-13 VITALS
RESPIRATION RATE: 16 BRPM | SYSTOLIC BLOOD PRESSURE: 138 MMHG | TEMPERATURE: 98 F | WEIGHT: 220 LBS | HEART RATE: 100 BPM | DIASTOLIC BLOOD PRESSURE: 78 MMHG | BODY MASS INDEX: 40.24 KG/M2 | OXYGEN SATURATION: 100 %

## 2022-05-13 DIAGNOSIS — T78.40XA ALLERGIC REACTION, INITIAL ENCOUNTER: Primary | ICD-10-CM

## 2022-05-13 LAB
ALBUMIN SERPL-MCNC: 4.1 G/DL (ref 3.5–5.2)
ALP BLD-CCNC: 82 U/L (ref 35–104)
ALT SERPL-CCNC: 12 U/L (ref 0–32)
ANION GAP SERPL CALCULATED.3IONS-SCNC: 11 MMOL/L (ref 7–16)
AST SERPL-CCNC: 10 U/L (ref 0–31)
BASOPHILS ABSOLUTE: 0.01 E9/L (ref 0–0.2)
BASOPHILS RELATIVE PERCENT: 0.1 % (ref 0–2)
BILIRUB SERPL-MCNC: 0.3 MG/DL (ref 0–1.2)
BUN BLDV-MCNC: 12 MG/DL (ref 6–20)
CALCIUM SERPL-MCNC: 9.5 MG/DL (ref 8.6–10.2)
CHLORIDE BLD-SCNC: 104 MMOL/L (ref 98–107)
CO2: 26 MMOL/L (ref 22–29)
CREAT SERPL-MCNC: 0.9 MG/DL (ref 0.5–1)
EOSINOPHILS ABSOLUTE: 0.02 E9/L (ref 0.05–0.5)
EOSINOPHILS RELATIVE PERCENT: 0.2 % (ref 0–6)
GFR AFRICAN AMERICAN: >60
GFR NON-AFRICAN AMERICAN: >60 ML/MIN/1.73
GLUCOSE BLD-MCNC: 116 MG/DL (ref 74–99)
HCG(URINE) PREGNANCY TEST: NEGATIVE
HCT VFR BLD CALC: 41.6 % (ref 34–48)
HEMOGLOBIN: 14.4 G/DL (ref 11.5–15.5)
IMMATURE GRANULOCYTES #: 0.13 E9/L
IMMATURE GRANULOCYTES %: 1.1 % (ref 0–5)
LYMPHOCYTES ABSOLUTE: 1.34 E9/L (ref 1.5–4)
LYMPHOCYTES RELATIVE PERCENT: 11.5 % (ref 20–42)
MCH RBC QN AUTO: 29 PG (ref 26–35)
MCHC RBC AUTO-ENTMCNC: 34.6 % (ref 32–34.5)
MCV RBC AUTO: 83.7 FL (ref 80–99.9)
MONOCYTES ABSOLUTE: 0.77 E9/L (ref 0.1–0.95)
MONOCYTES RELATIVE PERCENT: 6.6 % (ref 2–12)
NEUTROPHILS ABSOLUTE: 9.37 E9/L (ref 1.8–7.3)
NEUTROPHILS RELATIVE PERCENT: 80.5 % (ref 43–80)
PDW BLD-RTO: 13.9 FL (ref 11.5–15)
PLATELET # BLD: 199 E9/L (ref 130–450)
PMV BLD AUTO: 8.3 FL (ref 7–12)
POTASSIUM REFLEX MAGNESIUM: 3.7 MMOL/L (ref 3.5–5)
RBC # BLD: 4.97 E12/L (ref 3.5–5.5)
SODIUM BLD-SCNC: 141 MMOL/L (ref 132–146)
TOTAL PROTEIN: 7.1 G/DL (ref 6.4–8.3)
TROPONIN, HIGH SENSITIVITY: 8 NG/L (ref 0–9)
WBC # BLD: 11.6 E9/L (ref 4.5–11.5)

## 2022-05-13 PROCEDURE — A4216 STERILE WATER/SALINE, 10 ML: HCPCS | Performed by: STUDENT IN AN ORGANIZED HEALTH CARE EDUCATION/TRAINING PROGRAM

## 2022-05-13 PROCEDURE — 96374 THER/PROPH/DIAG INJ IV PUSH: CPT

## 2022-05-13 PROCEDURE — 99285 EMERGENCY DEPT VISIT HI MDM: CPT

## 2022-05-13 PROCEDURE — 80053 COMPREHEN METABOLIC PANEL: CPT

## 2022-05-13 PROCEDURE — 81025 URINE PREGNANCY TEST: CPT

## 2022-05-13 PROCEDURE — 85025 COMPLETE CBC W/AUTO DIFF WBC: CPT

## 2022-05-13 PROCEDURE — 96375 TX/PRO/DX INJ NEW DRUG ADDON: CPT

## 2022-05-13 PROCEDURE — 93005 ELECTROCARDIOGRAM TRACING: CPT | Performed by: STUDENT IN AN ORGANIZED HEALTH CARE EDUCATION/TRAINING PROGRAM

## 2022-05-13 PROCEDURE — 6360000002 HC RX W HCPCS: Performed by: STUDENT IN AN ORGANIZED HEALTH CARE EDUCATION/TRAINING PROGRAM

## 2022-05-13 PROCEDURE — 2580000003 HC RX 258: Performed by: STUDENT IN AN ORGANIZED HEALTH CARE EDUCATION/TRAINING PROGRAM

## 2022-05-13 PROCEDURE — 2500000003 HC RX 250 WO HCPCS: Performed by: STUDENT IN AN ORGANIZED HEALTH CARE EDUCATION/TRAINING PROGRAM

## 2022-05-13 PROCEDURE — 36415 COLL VENOUS BLD VENIPUNCTURE: CPT

## 2022-05-13 PROCEDURE — 71045 X-RAY EXAM CHEST 1 VIEW: CPT

## 2022-05-13 PROCEDURE — 84484 ASSAY OF TROPONIN QUANT: CPT

## 2022-05-13 PROCEDURE — 6370000000 HC RX 637 (ALT 250 FOR IP): Performed by: STUDENT IN AN ORGANIZED HEALTH CARE EDUCATION/TRAINING PROGRAM

## 2022-05-13 RX ORDER — DEXAMETHASONE 6 MG/1
6 TABLET ORAL
Qty: 5 TABLET | Refills: 0 | Status: SHIPPED | OUTPATIENT
Start: 2022-05-13 | End: 2022-05-18

## 2022-05-13 RX ORDER — METHYLPREDNISOLONE SODIUM SUCCINATE 125 MG/2ML
125 INJECTION, POWDER, LYOPHILIZED, FOR SOLUTION INTRAMUSCULAR; INTRAVENOUS ONCE
Status: COMPLETED | OUTPATIENT
Start: 2022-05-13 | End: 2022-05-13

## 2022-05-13 RX ORDER — DIPHENHYDRAMINE HCL 25 MG
25 CAPSULE ORAL EVERY 6 HOURS PRN
Qty: 20 CAPSULE | Refills: 0 | Status: SHIPPED | OUTPATIENT
Start: 2022-05-13 | End: 2022-05-23

## 2022-05-13 RX ORDER — IPRATROPIUM BROMIDE AND ALBUTEROL SULFATE 2.5; .5 MG/3ML; MG/3ML
1 SOLUTION RESPIRATORY (INHALATION) ONCE
Status: COMPLETED | OUTPATIENT
Start: 2022-05-13 | End: 2022-05-13

## 2022-05-13 RX ORDER — DIPHENHYDRAMINE HYDROCHLORIDE 50 MG/ML
25 INJECTION INTRAMUSCULAR; INTRAVENOUS ONCE
Status: COMPLETED | OUTPATIENT
Start: 2022-05-13 | End: 2022-05-13

## 2022-05-13 RX ORDER — FAMOTIDINE 20 MG/1
20 TABLET, FILM COATED ORAL 2 TIMES DAILY
Qty: 60 TABLET | Refills: 0 | Status: SHIPPED | OUTPATIENT
Start: 2022-05-13

## 2022-05-13 RX ORDER — ALBUTEROL SULFATE 90 UG/1
2 AEROSOL, METERED RESPIRATORY (INHALATION) EVERY 4 HOURS PRN
Qty: 18 G | Refills: 1 | Status: SHIPPED | OUTPATIENT
Start: 2022-05-13 | End: 2023-05-13

## 2022-05-13 RX ADMIN — FAMOTIDINE 20 MG: 10 INJECTION INTRAVENOUS at 22:11

## 2022-05-13 RX ADMIN — IPRATROPIUM BROMIDE AND ALBUTEROL SULFATE 1 AMPULE: .5; 2.5 SOLUTION RESPIRATORY (INHALATION) at 22:12

## 2022-05-13 RX ADMIN — METHYLPREDNISOLONE SODIUM SUCCINATE 125 MG: 125 INJECTION, POWDER, FOR SOLUTION INTRAMUSCULAR; INTRAVENOUS at 22:11

## 2022-05-13 RX ADMIN — DIPHENHYDRAMINE HYDROCHLORIDE 25 MG: 50 INJECTION, SOLUTION INTRAMUSCULAR; INTRAVENOUS at 22:12

## 2022-05-13 ASSESSMENT — PAIN DESCRIPTION - LOCATION
LOCATION: THROAT
LOCATION: THROAT

## 2022-05-13 ASSESSMENT — PAIN SCALES - GENERAL
PAINLEVEL_OUTOF10: 5
PAINLEVEL_OUTOF10: 5

## 2022-05-13 ASSESSMENT — PAIN DESCRIPTION - DESCRIPTORS
DESCRIPTORS: BURNING
DESCRIPTORS: TIGHTNESS

## 2022-05-13 ASSESSMENT — PAIN DESCRIPTION - ORIENTATION: ORIENTATION: MID

## 2022-05-13 ASSESSMENT — PAIN DESCRIPTION - PAIN TYPE: TYPE: ACUTE PAIN

## 2022-05-13 ASSESSMENT — PAIN - FUNCTIONAL ASSESSMENT
PAIN_FUNCTIONAL_ASSESSMENT: 0-10
PAIN_FUNCTIONAL_ASSESSMENT: 0-10

## 2022-05-14 NOTE — ED PROVIDER NOTES
Department of Emergency Medicine   ED  Provider Note  Admit Date/RoomTime: 5/13/2022  8:42 PM  ED Room: 06/06          History of Present Illness:  5/13/22, Time: 9:15 PM EDT  Chief Complaint   Patient presents with    Allergic Reaction     stomach cramps,throat tight,facial edema, hives all over body began @ 4pm       Rick Singh is a 54 y.o. female presenting to the ED for allergic reaction. The patient states that she began having abdominal cramps at 4 PM.  They are diffuse. Nothing worsens or improves them. She denies any associated nausea, vomiting or diarrhea. Having bowel movements. No dysuria or hematuria. An hour later the patient feels her face was starting to swell particularly around her left eye. She also felt like her throat was getting tight. She denies any tongue or mouth swelling. She has had this in the past with allergic reactions. Subsequently she developed some hives along her arms, legs and abdomen. She notes that she does have allergy to fish. Her mother and father were eating tuna and she believes she may have touched it. She did not ingest any of it however. She denies any associated chest pain but does feel somewhat short of breath. Nothing worsens or improves this. It does not feel like asthma. Patient denies any history of EpiPen requirement. She denies any new medication exposures or new detergents. Patient denies any double vision, numbness, tingling or focal weakness. Review of Systems:   Constitutional symptoms: [Denies fever]  Eyes: [Denies eye pain]. Positive for facial swelling  Ears, Nose, Mouth, Throat: [Denies ear pain]  Cardiovascular: [Denies chest pain]  Respiratory: Positive for shortness of breath  Gastrointestinal: [Denies blood per rectum].   Positive for abdominal pain  Genitourinary: [Denies hematuria]  Skin: Positive for rash  Neurological: [Denies headache]  Musculoskeletal: [Denies extremity pain]    --------------------------------------------- PAST HISTORY ---------------------------------------------  Past Medical History:  has a past medical history of ARDS (adult respiratory distress syndrome) ([facility] [address] Arthritis, Asthma, Chest pain, Depression, Essential tremor, Gallstones, Generalized abdominal pain, Head trauma, Hx of blood clots, Hyperlipidemia, Hypertension, Infectious colitis, enteritis and gastroenteritis, Lupus (systemic lupus erythematosus) ([facility] [address] Obesity, Palpitations, Prolonged emergence from general anesthesia, SOB (shortness of breath), Systemic lupus erythematosus ([facility] [address] and Tachycardia. Past Surgical History:  has a past surgical history that includes Cholecystectomy, laparoscopic (03/2018); Retinal detachment surgery; and Diagnostic Cardiac Cath Lab Procedure. Social History:  reports that she quit smoking about 9 years ago. Her smoking use included cigarettes. She has never used smokeless tobacco. She reports that she does not drink alcohol and does not use drugs. Family History: family history includes Arthritis in her mother; Celiac Disease in her sister; Crohn's Disease in her brother; Diabetes in her father, mother, and sister; High Blood Pressure in her brother, father, mother, and sister; Stroke in her mother; Uterine Cancer in her maternal grandmother. . Unless otherwise noted, family history is non contributory    The patients home medications have been reviewed.     Allergies: Cefdinir, Cephalosporins, Fish allergy, Fish-derived products, Nsaids, Ciprofloxacin, Codeine, Folic acid, Levaquin [levofloxacin in d5w], Methotrexate, Methotrexate derivatives, Metronidazole, Other, Prednisone, Wellbutrin [bupropion], and Pce [erythromycin]    I have reviewed the past medical history, past surgical history, social history, and family history    ---------------------------------------------------PHYSICAL EXAM--------------------------------------    General: The patient is conversational and lying comfortably in bed. Head: Normocephalic and atraumatic. Eyes: Pupils equal reactive to light. Extraocular eye movements intact. Minimal conjunctival injection of the left eye. No proptosis, hyphema or hypopyon. ENT: Nasal and oral membranes moist.  Uvula midline. No peritonsillar exudate or swelling. No intraoral vesicles or lesions. No swelling of the lips or tongue. No palpable lymphadenopathy. No stridor. No pooling of secretions. Neck: Trachea midline. No JVD  Respiratory: Lungs clear to auscultation bilaterally. Patient speaking in full sentences. Cardiovascular: Regular rate. No pedal edema. Gastrointestinal:  Abdomen is soft and nondistended. Bowel sounds present. There is no tenderness. There is no guarding or rebound. Musculoskeletal: No deformity  Skin: Skin warm and dry. Patient has sporadic intermittent urticaria noted on the left lower extremity, bilateral upper extremities and abdomen. None on the chest or back noted. Mild swelling of the periorbital left eye region. No vesicles, warmth, erythema or fluctuance. Neurologic: No gross motor deficits. No aphasia. Psychiatric: Not responding to internal stimuli    -------------------------------------------------- RESULTS -------------------------------------------------  I have personally reviewed all laboratory and imaging results for this patient. Results are listed below.      LABS: (Lab results interpreted by me)  Results for orders placed or performed during the hospital encounter of 05/13/22   CBC with Auto Differential   Result Value Ref Range    WBC 11.6 (H) 4.5 - 11.5 E9/L    RBC 4.97 3.50 - 5.50 E12/L    Hemoglobin 14.4 11.5 - 15.5 g/dL    Hematocrit 41.6 34.0 - 48.0 %    MCV 83.7 80.0 - 99.9 fL    MCH 29.0 26.0 - 35.0 pg    MCHC 34.6 (H) 32.0 - 34.5 %    RDW 13.9 11.5 - 15.0 fL    Platelets 251 015 - 858 E9/L    MPV 8.3 7.0 - 12.0 fL    Neutrophils % 80.5 (H) 43.0 - 80.0 %    Immature Granulocytes % 1.1 0.0 - 5.0 %    Lymphocytes % 11.5 (L) 20.0 - 42.0 %    Monocytes % 6.6 2.0 - 12.0 %    Eosinophils % 0.2 0.0 - 6.0 %    Basophils % 0.1 0.0 - 2.0 %    Neutrophils Absolute 9.37 (H) 1.80 - 7.30 E9/L    Immature Granulocytes # 0.13 E9/L    Lymphocytes Absolute 1.34 (L) 1.50 - 4.00 E9/L    Monocytes Absolute 0.77 0.10 - 0.95 E9/L    Eosinophils Absolute 0.02 (L) 0.05 - 0.50 E9/L    Basophils Absolute 0.01 0.00 - 0.20 E9/L   Comprehensive Metabolic Panel w/ Reflex to MG   Result Value Ref Range    Sodium 141 132 - 146 mmol/L    Potassium reflex Magnesium 3.7 3.5 - 5.0 mmol/L    Chloride 104 98 - 107 mmol/L    CO2 26 22 - 29 mmol/L    Anion Gap 11 7 - 16 mmol/L    Glucose 116 (H) 74 - 99 mg/dL    BUN 12 6 - 20 mg/dL    CREATININE 0.9 0.5 - 1.0 mg/dL    GFR Non-African American >60 >=60 mL/min/1.73    GFR African American >60     Calcium 9.5 8.6 - 10.2 mg/dL    Total Protein 7.1 6.4 - 8.3 g/dL    Albumin 4.1 3.5 - 5.2 g/dL    Total Bilirubin 0.3 0.0 - 1.2 mg/dL    Alkaline Phosphatase 82 35 - 104 U/L    ALT 12 0 - 32 U/L    AST 10 0 - 31 U/L   Troponin   Result Value Ref Range    Troponin, High Sensitivity 8 0 - 9 ng/L   Pregnancy, urine   Result Value Ref Range    HCG(Urine) Pregnancy Test NEGATIVE NEGATIVE   EKG 12 Lead   Result Value Ref Range    Ventricular Rate 75 BPM    Atrial Rate 75 BPM    P-R Interval 172 ms    QRS Duration 86 ms    Q-T Interval 392 ms    QTc Calculation (Bazett) 437 ms    P Axis 61 degrees    R Axis 53 degrees    T Axis 29 degrees   ,     RADIOLOGY:  Interpreted by Radiologist unless otherwise specified  XR CHEST PORTABLE   Final Result   No acute process.              ------------------------- NURSING NOTES AND VITALS REVIEWED ---------------------------   The nursing notes within the ED encounter and vital signs as below have been reviewed by myself  /78   Pulse 100   Temp 98 °F (36.7 °C) (Oral)   Resp 16   Wt 220 lb (99.8 kg)   SpO2 100%   BMI 40.24 kg/m² Oxygen Saturation Interpretation: Normal    The patients available past medical records and past encounters were reviewed. ------------------------------ ED COURSE/MEDICAL DECISION MAKING----------------------  Medications   methylPREDNISolone sodium (SOLU-MEDROL) injection 125 mg (125 mg IntraVENous Given 5/13/22 2211)   diphenhydrAMINE (BENADRYL) injection 25 mg (25 mg IntraVENous Given 5/13/22 2212)   famotidine (PEPCID) 20 mg in sodium chloride (PF) 10 mL injection (20 mg IntraVENous Given 5/13/22 2211)   ipratropium-albuterol (DUONEB) nebulizer solution 1 ampule (1 ampule Inhalation Given 5/13/22 2212)       Medical Decision Making: This is a 54 y.o. female presenting to the ED for allergic reaction. On initial presentation, the patient's vital signs are interpreted as normotensive, tachycardic, afebrile and saturating well on room air. Based on history and physical exam, we have a broad differential.  Patient's symptoms are likely consistent with an allergic reaction. She does not have any evidence of airway compromise at this time. No Bubba angina or peritonsillar abscess. No evidence of angioedema. Because she is starting to feel short of breath however we will assess for further intrathoracic process including pneumonia, ACS or arrhythmia. Patient is not having an acute asthma exacerbation but will be symptomatically treated with 1 DuoNeb treatment. She will be given famotidine, Benadryl and Solu-Medrol. Patient does have history of prednisone allergy however her reaction is a rash. At this time, we discussed risks and benefits and do feel the benefits outweigh the risk. Chest x-ray, EKG and laboratory studies obtained. A 12-lead EKG was performed and interpreted by myself. The rate is 75 with normal sinus rhythm and patient has small Q waves in lead III and aVF. Donya Lomax There is no ectopy. The WI interval is 172, QRS interval is 86, and QTC interval is 437. No acute ST elevation.   Rudy Pace wave inversion in V1. This is normal sinus rhythm with nonspecific abnormality. Laboratory studies show electrolytes within normal limits. Troponin negative. LFTs normal.  Patient does have a leukocytosis but no anemia. Pregnancy test negative. Chest x-ray shows no acute process. This is interpreted by radiology. Patient endorses significant improvement. Her facial swelling has decreased. She does not feel short of breath. Continues to deny any chest pain. Rash is also decreasing. We discussed the finding. Patient will be given prescription for Decadron as she has allergy to prednisone. She is also provided with a refill for her albuterol as requested. She is given prescription for Benadryl and famotidine. Strict return precautions. Should follow with primary care physician. Patient also instructed to avoid allergic triggers. Verbalized understanding and agreeable to the plan. This patient's ED course included: a personal history and physicial examination and re-evaluation prior to disposition    This patient has improved during their ED course. Consultations:  None    The cardiac monitor revealed sinus rhythm with a heart rate in the 100s as interpreted by me. The cardiac monitor was ordered secondary to the patient's shortness of breath and to monitor the patient for dysrhythmia. None  There is no ectopy. normal sinus rhythm   No aphasia. No gross motor deficits. No pedal edema. Regular rate. Patient speaking in full sentences. Lungs clear to auscultation bilaterally. No JVD Trachea midline Nasal and oral membranes moist Normocephalic and atraumatic. The patient is conversational and lying comfortably in bed. erythromycin bupropion levofloxacin in d5w Denies extremity pain Denies headache Denies hematuria Denies blood per rectum Denies chest pain Denies ear pain Denies eye pain Denies fever  CPT 75500    Oxygen Saturation Interpretation: 100 % on room air. Normal    Counseling:   I have spoken with the patient and discussed todays results, in addition to providing specific details for the plan of care and counseling regarding the diagnosis and prognosis. Questions are answered at this time and they are agreeable with the plan.     --------------------------------- IMPRESSION AND DISPOSITION ---------------------------------    IMPRESSION  1. Allergic reaction, initial encounter        DISPOSITION  Disposition: Discharge to home  Patient condition is fair    I, Dr. Nadir Aldana, am the primary provider of record    NOTE: This report was transcribed using voice recognition software.  Every effort was made to ensure accuracy; however, inadvertent computerized transcription errors may be present       Nadir Aldana MD  05/14/22 0013

## 2022-05-16 LAB
EKG ATRIAL RATE: 75 BPM
EKG P AXIS: 61 DEGREES
EKG P-R INTERVAL: 172 MS
EKG Q-T INTERVAL: 392 MS
EKG QRS DURATION: 86 MS
EKG QTC CALCULATION (BAZETT): 437 MS
EKG R AXIS: 53 DEGREES
EKG T AXIS: 29 DEGREES
EKG VENTRICULAR RATE: 75 BPM

## 2022-05-16 PROCEDURE — 93010 ELECTROCARDIOGRAM REPORT: CPT | Performed by: INTERNAL MEDICINE

## 2022-08-23 ENCOUNTER — APPOINTMENT (RX ONLY)
Dept: URBAN - METROPOLITAN AREA CLINIC 9 | Facility: CLINIC | Age: 52
Setting detail: DERMATOLOGY
End: 2022-08-23

## 2022-08-23 DIAGNOSIS — Z71.89 OTHER SPECIFIED COUNSELING: ICD-10-CM | Status: STABLE

## 2022-08-23 DIAGNOSIS — D22 MELANOCYTIC NEVI: ICD-10-CM | Status: STABLE

## 2022-08-23 DIAGNOSIS — L90.5 SCAR CONDITIONS AND FIBROSIS OF SKIN: ICD-10-CM | Status: STABLE

## 2022-08-23 DIAGNOSIS — L82.1 OTHER SEBORRHEIC KERATOSIS: ICD-10-CM | Status: STABLE

## 2022-08-23 DIAGNOSIS — L81.4 OTHER MELANIN HYPERPIGMENTATION: ICD-10-CM | Status: STABLE

## 2022-08-23 DIAGNOSIS — L57.8 OTHER SKIN CHANGES DUE TO CHRONIC EXPOSURE TO NONIONIZING RADIATION: ICD-10-CM | Status: STABLE

## 2022-08-23 PROBLEM — D22.5 MELANOCYTIC NEVI OF TRUNK: Status: ACTIVE | Noted: 2022-08-23

## 2022-08-23 PROBLEM — D22.4 MELANOCYTIC NEVI OF SCALP AND NECK: Status: ACTIVE | Noted: 2022-08-23

## 2022-08-23 PROCEDURE — ? COUNSELING

## 2022-08-23 PROCEDURE — 99203 OFFICE O/P NEW LOW 30 MIN: CPT

## 2022-08-23 ASSESSMENT — LOCATION ZONE DERM
LOCATION ZONE: TRUNK
LOCATION ZONE: SCALP
LOCATION ZONE: ARM

## 2022-08-23 ASSESSMENT — LOCATION SIMPLE DESCRIPTION DERM
LOCATION SIMPLE: CHEST
LOCATION SIMPLE: RIGHT BACK
LOCATION SIMPLE: SCALP
LOCATION SIMPLE: RIGHT SHOULDER
LOCATION SIMPLE: RIGHT UPPER BACK

## 2022-08-23 ASSESSMENT — LOCATION DETAILED DESCRIPTION DERM
LOCATION DETAILED: RIGHT MID-UPPER BACK
LOCATION DETAILED: LEFT CENTRAL PARIETAL SCALP
LOCATION DETAILED: RIGHT ANTERIOR SHOULDER
LOCATION DETAILED: LEFT LATERAL SUPERIOR CHEST
LOCATION DETAILED: RIGHT SUPERIOR LATERAL UPPER BACK

## 2023-03-09 ENCOUNTER — CLINICAL DOCUMENTATION (OUTPATIENT)
Dept: NUTRITION | Age: 53
End: 2023-03-09

## 2023-03-09 NOTE — PROGRESS NOTES
Initial Assessment      Date: 3/9/23   Time:  10:00 am   Patient Name: Lizeth Stephens   Referring Clinician: Barbara Levi DO   Fax: [phone]   Reason for Visit: weight reduction    Goals:  Would like to lose 100#  SMART goals  Will be mindful of portion sizes and read food labels.   Will eat a small snack instead of skipping meals  Will write in food journal at least 3 days a week    Current Eating Pattern:  24 hr recall:  Yesterday:  Breakfast - 1 cup strawberries, 1 chocolate bar  Lunch - salami sandwich  Dinner - [place] toast 1 slice, 2 chicken strips, 1/2 cup french fries  Snacks - fruit and grain bar, 2 fudge bar  Fluids - 1 diet coke, 1 cup coffee, 1 cup lemonade    This morning:  Breakfast - 3 sausage links, 2 eggs, 1 fruit/grain bar, 1/2 cup milk, 1 cup water     Typically will drink ~5-6 cups water/day    Eats lunch and dinner out at least 2-3 days/week (mostly subway), [employer], [employer], [employer], taco [employer]  Usually gets the turkey sub   Eats a lot of chocolate    Past Nutrition Hx:  Pradip Schmidt diet  Weight watchers    Allergies/Food Sensitivities:   fish    Dietary Limitations/Time/Prep Issues:  Hasn't tried meal planning in the past    Sleep Patterns:  7-8 hrs/night    Stress/Environment Issues that may be affecting po intake:  Family stress w/ health conditions, financial stress    Work:  unemployed    Weight Hx:  Over the past year has gained ~50# after starting risperdal    Family Support:  Yes, mother     Current Exercise Pattern:  Not really    Past Exercise Routine:  walking    Sex: female  Age: 50  Ht: 5'2\"  CBW: 249.2#  BMI: 45.6    Medical Hx:  Past Medical History:   Diagnosis Date    ARDS (adult respiratory distress syndrome) (HCC)     Arthritis     Asthma     Chest pain     Depression     Essential tremor     Gallstones     Generalized abdominal pain     Head trauma     multiple history of    Hx of blood clots     Hyperlipidemia     Hypertension Infectious colitis, enteritis and gastroenteritis     Lupus (systemic lupus erythematosus) (HCC)     Obesity     Palpitations     Prolonged emergence from general anesthesia     SOB (shortness of breath)     Systemic lupus erythematosus (HCC)     Tachycardia           Family Hx:  Family History   Problem Relation Age of Onset    High Blood Pressure Mother     Diabetes Mother     Stroke Mother     Arthritis Mother     Diabetes Father     High Blood Pressure Father     High Blood Pressure Sister     Diabetes Sister     Celiac Disease Sister     High Blood Pressure Brother     Crohn's Disease Brother     Uterine Cancer Maternal Grandmother         Medications:  Current Outpatient Medications on File Prior to Visit   Medication Sig Dispense Refill    albuterol sulfate HFA (PROVENTIL HFA) 108 (90 Base) MCG/ACT inhaler Inhale 2 puffs into the lungs every 4 hours as needed for Wheezing For pharmacies use only: May substitute Pro-Air HFA Inhaler if not covered by patient insurance with same administration instructions.  18 g 1    famotidine (PEPCID) 20 MG tablet Take 1 tablet by mouth 2 times daily 60 tablet 0    divalproex (DEPAKOTE) 250 MG DR tablet Take 1 tablet by mouth every 12 hours 60 tablet 0    risperiDONE (RISPERDAL M-TABS) 1 MG disintegrating tablet Take 1 tablet by mouth 2 times daily (Patient taking differently: Take 2 mg by mouth 2 times daily ) 60 tablet 3    azaTHIOprine (IMURAN) 50 MG tablet Take 50 mg by mouth daily One daily for 2 weeks, increase BID   filled aug 12,21 picked up   no refill      topiramate (TOPAMAX) 25 MG tablet Take 25 mg by mouth daily 1 tab Nightly for 14 days, then 2 tabs po nightly  Filled July 14, 21  Picked up      metoprolol tartrate (LOPRESSOR) 25 MG tablet Take 25 mg by mouth 2 times daily Take half tab (12.5 mg)  in morning and one tab at hs  Filled June 2021  Picked up      cetirizine (ZYRTEC) 10 MG tablet Take 10 mg by mouth daily as needed for Allergies      isosorbide dinitrate (ISORDIL) 5 MG tablet Take 5 mg by mouth every 8 hours      montelukast (SINGULAIR) 10 MG tablet Take 10 mg by mouth nightly      nitroGLYCERIN (NITROSTAT) 0.4 MG SL tablet Place 0.4 mg under the tongue every 5 minutes as needed for Chest pain up to max of 3 total doses. If no relief after 1 dose, call 911.      guaiFENesin (MUCINEX) 600 MG extended release tablet Take 1,200 mg by mouth as needed for Congestion      ipratropium-albuterol (DUONEB) 0.5-2.5 (3) MG/3ML SOLN nebulizer solution Inhale 1 vial into the lungs every 6 hours as needed for Shortness of Breath      Probiotic Product (PROBIOTIC-10) CAPS Take 1 capsule by mouth daily       Fluticasone Furoate-Vilanterol (BREO ELLIPTA) 200-25 MCG/INH AEPB Inhale 1 puff into the lungs daily as needed (SOB)        No current facility-administered medications on file prior to visit. Supplements/OTC:  Slim fast shakes at times    Notes:   Pt's mother accompanied patient today and states patient does a lot of mindless eating, however patient does not believe she does. She admits to eating out for lunch and dinner very frequently and does not wish to decrease eating out. We discussed choosing healthier options when eating out and looking at the nutrition facts of fast food restaurants to identify lower calorie foods. We reviewed portion sizes and how to read a food label. Encouraged patient to begin keeping a food journal to help her be more mindful of what she is eating/drinking. Patient states she will skip meals at times due to not feeling hungry. Encouraged patient to try to eat something small instead of skipping a meal to help increase her metabolism. All questions were answered and patient voiced understanding of information discussed.       Plan:   Patient is going to start working on watching her portion sizes and reading food labels as well as writing in food journal.     Motivation Level for nutrition counseling on a scale of 1 to 10: 7 or 8    Motivators? Wants to prevent getting diabetes/other health conditions, wants to be healthy/look good and weigh less than her sister    Barriers?   Eating out, emotional eating, chocolate addiction    Follow-Up Appointment:  April 5th at 10:00 am

## 2023-09-05 NOTE — DISCHARGE SUMMARY
S/W pt.  Pt asking for right hip injection.  Pain is a 5-6/10.  Pt stated the left side injection worked well but she still has some pain in other locations on the left side as well.  Please advise.  
dictated
stenosis or narrowing and the patient was discharged home from there. At home, she will be on a low-sodium diet, activity as tolerated. Continued on metoprolol, Mucinex, vitamin D, Cardizem, Ocean nasal  spray, Carafate, multivitamin, albuterol p.r.n., lactin cream daily,  Zyrtec. Continue her probiotic and aspirin daily, Xanax as directed,  Breo daily, and Singulair daily. Condition was stable and follow up in  the office in 1 to 2 weeks.         Amarilis Funes DO    D: 02/28/2021 10:10:45       T: 02/28/2021 10:13:35     [email or web address]  Job#: [id number]     Doc#: [id number]    CC:

## 2023-10-03 ENCOUNTER — EVALUATION (OUTPATIENT)
Dept: PHYSICAL THERAPY | Age: 53
End: 2023-10-03
Payer: COMMERCIAL

## 2023-10-03 DIAGNOSIS — M62.89 PELVIC FLOOR DYSFUNCTION: Primary | ICD-10-CM

## 2023-10-03 PROCEDURE — 97162 PT EVAL MOD COMPLEX 30 MIN: CPT | Performed by: PHYSICAL THERAPIST

## 2023-10-03 PROCEDURE — 97112 NEUROMUSCULAR REEDUCATION: CPT | Performed by: PHYSICAL THERAPIST

## 2023-10-03 NOTE — PROGRESS NOTES
PHYSICAL THERAPY INITIAL EVALUATION    Phone: [phone]  Fax: [phone]     Date:  10/3/2023  Initial Evaluation Date: 10/3/2023    Patient Name:  Stephanie Ewing    :  1970      Restrictions/Precautions:  ***  Diagnosis:  {No diagnosis found. (Refresh or delete this SmartLink)}       Insurance/Certification information:  ***  Referring Physician:  ***  Date of Surgery/Injury: ***    Visit# / total visits: *** / ***    Evaluating Therapist: Annel Mcnair, PT, DPT     [id number]    Past Medical History:   Past Medical History:   Diagnosis Date    ARDS (adult respiratory distress syndrome) ([address])     Arthritis     Asthma     Chest pain     Depression     Essential tremor     Gallstones     Generalized abdominal pain     Head trauma     multiple history of    Hx of blood clots     Hyperlipidemia     Hypertension     Infectious colitis, enteritis and gastroenteritis     Lupus (systemic lupus erythematosus) (HCC)     Obesity     Palpitations     Prolonged emergence from general anesthesia     SOB (shortness of breath)     Systemic lupus erythematosus (HCC)     Tachycardia      Past Surgical History:   Past Surgical History:   Procedure Laterality Date    CHOLECYSTECTOMY, LAPAROSCOPIC  2018    DIAGNOSTIC CARDIAC CATH LAB PROCEDURE      RETINAL DETACHMENT SURGERY      retina's reattached       Patient identified by name and birth date: Yes ***    SUBJECTIVE       Reason for Referral: ***  Patient is a *** year old *** with chief complaint of     Symptom Duration: ***  Previous Treatment/Testing/Surgeries: ***  Relevant Medical History/LBP: ***  Medications: ***  Work Status: ***      Pain:   Current pain level: ***/10. Recent Pelvic Floor Pain: ***  Pain is described as ***.   Pain location: ***  Worst pain level: ***  Best pain level: ***  Pain is worse: ***  Pain is better: ***    Pregnancy:   Number of pregnancies: *** Number of births: *** Type of delivery: ***  Pelvic trauma, tearing, pain
Pelvic Floor Pain: lower abdomen just superior to pubic bone  Pain is described as aching. Worst pain level: 5/10  Best pain level: 0/10  Pain is worse: nothing identified  Pain is better: stopping activity voiding. Pregnancy:   Number of pregnancies: 0 Number of births: 0 Type of delivery: N/A  Pelvic trauma, tearing, pain during/post pregnancy: N/A  Prolonged labor: N/A  Menopausal/Pre-menopausal: menopausal.  ?  Bladder:   Daytime voiding frequency: hourly  Nighttime voiding frequency: 2-3x/night  Urinary urgency/leaking with urge: sometimes urgency, yes leakage-- 25%  Triggers for leaking: none identified. Leaking with cough/sneeze/laugh/exercise: yes, 75%  Amount of incontinence: medium  Bladder emptying without warning: no  Pain/burning with urination: yes, 10% of voids   Pain with full bladder: yes, 30%  Difficulty starting stream/hesitancy: yes, 10%  Straining: yes  Sensation of incomplete bladder emptying: no-- does known PVR is an issue but does not have sensation of this. Hovering while urinating: no JIC urination: yes  Post void dribble: yes  Falling out sensation or bulging: yes  Pad usage: sometimes  How many pads/day: 3/week  History of UTIs: yes  Fluid intake: 1.5 L fluids (water, diet coke, pink lemonade, cranberry juice, simon D, coffee with occasional cream and sugar, green tea)  ? Bowel:   Frequency: 5 days/week  Type: Type 6  Fecal urgency/Leaking with urge: yes, no  Leaking with cough/sneeze/laugh/exercise: no  Able to control gas: yes  Leaking with passing gas: yes, 10-15%  Seepage after having bowel movement: yes  Pain or burning with defecation: no  Straining to start/finish emptying: yes, 25%   Sensation of incomplete bowel emptying: yes, 10%  Manual techniques for emptying: no  Falling out sensation or bulging: no  Supplemental fiber/probiotic use/laxatives/softeners/enema: probiotic  Time spent on toilet: 5-10 min  ?   Sexual Function:   Pain with penetration, pelvic examination,

## 2023-10-19 ENCOUNTER — TREATMENT (OUTPATIENT)
Dept: PHYSICAL THERAPY | Age: 53
End: 2023-10-19

## 2023-10-19 DIAGNOSIS — M62.89 PELVIC FLOOR DYSFUNCTION: Primary | ICD-10-CM

## 2023-10-19 NOTE — PROGRESS NOTES
PHYSICAL THERAPY PROGRESS NOTE    Date:  10/19/2023  Initial Evaluation Date: 10/3/2023    Patient Name:  Kiera Bueno   :  1970    Restrictions/Precautions:  none  Diagnosis:     Diagnosis Orders   1. Pelvic floor dysfunction                 [facility]  Referring Physician:  SUZANNE Sheikh-CNP  Plan of care signed: yes  Date of Surgery/Injury: N/A     Visit# / total visits:     Treating Therapist: Elton Walsh, PT, DPT  [id number]  Past Medical History:   Past Medical History:   Diagnosis Date    ARDS (adult respiratory distress syndrome) ([address])     Arthritis     Asthma     Chest pain     Depression     Essential tremor     Gallstones     Generalized abdominal pain     Head trauma     multiple history of    Hx of blood clots     Hyperlipidemia     Hypertension     Infectious colitis, enteritis and gastroenteritis     Lupus (systemic lupus erythematosus) ([address])     Obesity     Palpitations     Prolonged emergence from general anesthesia     SOB (shortness of breath)     Systemic lupus erythematosus ([address])     Tachycardia      Past Surgical History:   Past Surgical History:   Procedure Laterality Date    CHOLECYSTECTOMY, LAPAROSCOPIC  2018    DIAGNOSTIC CARDIAC CATH LAB PROCEDURE      RETINAL DETACHMENT SURGERY      retina's reattached       Patient identified by name and birth date: Yes     Pain:   Current pain level: 4-5/10. Recent Pelvic Floor Pain: anterior pelvic region superior to pubic bone  Pain is described as achey. Pain is worse: need to void  Pain is better: nothing    SUBJECTIVE    Pt notes leaking when getting ready to shower. She does not typically void much.      OBJECTIVE     External Connective Tissue/Muscle Assessment: ?  Abdominal Wall: decreased engagement of TRA with and without cueing    Informed consent for internal evaluation consent given : yes, informed consent signed in chart    External PF Observation:  Contraction:

## 2023-10-26 ENCOUNTER — TREATMENT (OUTPATIENT)
Dept: PHYSICAL THERAPY | Age: 53
End: 2023-10-26

## 2023-10-26 DIAGNOSIS — M62.89 PELVIC FLOOR DYSFUNCTION: Primary | ICD-10-CM

## 2023-10-26 NOTE — PROGRESS NOTES
PHYSICAL THERAPY PROGRESS NOTE    Date:  10/26/2023  Initial Evaluation Date: 10/3/2023    Patient Name:  Roena Fleischer   :  1970    Restrictions/Precautions:  none  Diagnosis:     Diagnosis Orders   1. Pelvic floor dysfunction                IInsurance/Certification information:  [facility]  Referring Physician:  SUZANNE Díaz-CNP  Plan of care signed: yes  Date of Surgery/Injury: N/A     Visit# / total visits: 3 / 12    Treating Therapist: Gianna Mayer, PT, DPT  [id number]  Past Medical History:   Past Medical History:   Diagnosis Date    ARDS (adult respiratory distress syndrome) ([address])     Arthritis     Asthma     Chest pain     Depression     Essential tremor     Gallstones     Generalized abdominal pain     Head trauma     multiple history of    Hx of blood clots     Hyperlipidemia     Hypertension     Infectious colitis, enteritis and gastroenteritis     Lupus (systemic lupus erythematosus) ([address])     Obesity     Palpitations     Prolonged emergence from general anesthesia     SOB (shortness of breath)     Systemic lupus erythematosus ([address])     Tachycardia      Past Surgical History:   Past Surgical History:   Procedure Laterality Date    CHOLECYSTECTOMY, LAPAROSCOPIC  2018    DIAGNOSTIC CARDIAC CATH LAB PROCEDURE      RETINAL DETACHMENT SURGERY      retina's reattached       Patient identified by name and birth date: Yes     Pain:   Current pain level: 2/10. Recent Pelvic Floor Pain: anterior pelvic region superior to pubic bone and medial to R ASIS  Pain is described as achey. Pain is worse: need to void  Pain is better: nothing    SUBJECTIVE    No new reports since last session. She notes HEP was difficult to complete d/t trouble with isolation of PF and timing. HEP completed every other day. Pt reports improved time between voids without leakage. Pt notes improved stool consistency by increasing \"reuffage\" intake.     OBJECTIVE     External Connective Tissue/Muscle Assessment:

## 2023-11-02 ENCOUNTER — TREATMENT (OUTPATIENT)
Dept: PHYSICAL THERAPY | Age: 53
End: 2023-11-02

## 2023-11-02 DIAGNOSIS — M62.89 PELVIC FLOOR DYSFUNCTION: Primary | ICD-10-CM

## 2023-11-02 NOTE — PROGRESS NOTES
PHYSICAL THERAPY PROGRESS NOTE    Date:  2023  Initial Evaluation Date: 10/3/2023    Patient Name:  Kiera Bueno   :  1970    Restrictions/Precautions:  none  Diagnosis:     Diagnosis Orders   1. Pelvic floor dysfunction                  [facility]  Referring Physician:  SUZANNE Sheikh-CNP  Plan of care signed: yes  Date of Surgery/Injury: N/A     Visit# / total visits:     Treating Therapist: Elton Walsh, PT, DPT  [id number]  Past Medical History:   Past Medical History:   Diagnosis Date    ARDS (adult respiratory distress syndrome) ([address])     Arthritis     Asthma     Chest pain     Depression     Essential tremor     Gallstones     Generalized abdominal pain     Head trauma     multiple history of    Hx of blood clots     Hyperlipidemia     Hypertension     Infectious colitis, enteritis and gastroenteritis     Lupus (systemic lupus erythematosus) ([address])     Obesity     Palpitations     Prolonged emergence from general anesthesia     SOB (shortness of breath)     Systemic lupus erythematosus ([address])     Tachycardia      Past Surgical History:   Past Surgical History:   Procedure Laterality Date    CHOLECYSTECTOMY, LAPAROSCOPIC  2018    DIAGNOSTIC CARDIAC CATH LAB PROCEDURE      RETINAL DETACHMENT SURGERY      retina's reattached       Patient identified by name and birth date: Yes     Pain:   Current pain level: 0/10. Recent Pelvic Floor Pain: --  Pain is described as achey. Pain is worse: need to void  Pain is better: nothing    SUBJECTIVE    Pt reports difficulty with isolation of PF without glute compensation for HEP. Pt has decreased instances of pain, 4/10 max pain level but described as \"occasionally\" with full bladder. Pt was able to sleep through the night last night without need to void. Pt continues to have hourly voiding during the day. No change in incontinence levels at this time.     OBJECTIVE     External Connective The procedural consent was signed. A history and physical note was completed in the chart.

## 2023-11-07 DIAGNOSIS — E78.5 HYPERLIPIDEMIA, UNSPECIFIED HYPERLIPIDEMIA TYPE: Primary | ICD-10-CM

## 2023-11-07 RX ORDER — ATORVASTATIN CALCIUM 20 MG/1
20 TABLET, FILM COATED ORAL DAILY
COMMUNITY
Start: 2022-10-27 | End: 2023-11-07 | Stop reason: SDUPTHER

## 2023-11-07 RX ORDER — ATORVASTATIN CALCIUM 20 MG/1
20 TABLET, FILM COATED ORAL DAILY
Qty: 90 TABLET | Refills: 0 | Status: SHIPPED | OUTPATIENT
Start: 2023-11-07 | End: 2024-01-16 | Stop reason: SDUPTHER

## 2023-11-16 ENCOUNTER — TREATMENT (OUTPATIENT)
Dept: PHYSICAL THERAPY | Age: 53
End: 2023-11-16

## 2023-11-16 DIAGNOSIS — M62.89 PELVIC FLOOR DYSFUNCTION: Primary | ICD-10-CM

## 2023-11-16 NOTE — PROGRESS NOTES
PHYSICAL THERAPY PROGRESS NOTE    Date:  2023  Initial Evaluation Date: 10/3/2023    Patient Name:  Adriel Dietrich   :  1970    Restrictions/Precautions:  none  Diagnosis:     Diagnosis Orders   1. Pelvic floor dysfunction                [facility]Insurance/Certification information:  [facility]  Referring Physician:  SUZANNE Dong-CNP  Plan of care signed: yes  Date of Surgery/Injury: N/A     Visit# / total visits:     Treating Therapist: Enoch Rodriguez, PT, DPT  [id number]  Past Medical History:   Past Medical History:   Diagnosis Date    ARDS (adult respiratory distress syndrome) ([address])     Arthritis     Asthma     Chest pain     Depression     Essential tremor     Gallstones     Generalized abdominal pain     Head trauma     multiple history of    Hx of blood clots     Hyperlipidemia     Hypertension     Infectious colitis, enteritis and gastroenteritis     Lupus (systemic lupus erythematosus) ([address])     Obesity     Palpitations     Prolonged emergence from general anesthesia     SOB (shortness of breath)     Systemic lupus erythematosus ([address])     Tachycardia      Past Surgical History:   Past Surgical History:   Procedure Laterality Date    CHOLECYSTECTOMY, LAPAROSCOPIC  2018    DIAGNOSTIC CARDIAC CATH LAB PROCEDURE      RETINAL DETACHMENT SURGERY      retina's reattached       Patient identified by name and birth date: Yes     Pain:   Current pain level: 0/10. Recent Pelvic Floor Pain: --  Pain is described as achey. Pain is worse: need to void  Pain is better: nothing    SUBJECTIVE      Pt reports HEP is hard. Pt notes decreased frequency of urination at times. Pt has other days with no change. She does endorse drinking Diet Coke again and that she is frequently distracted when completing HEP. No longer having pain with full bladder. On a \"good day\" pt is voiding every 2 hours approximately. On a \"bad day\" pt is still voiding hourly.  Pt is having urinary incontinence at night

## 2023-11-30 ENCOUNTER — TREATMENT (OUTPATIENT)
Dept: PHYSICAL THERAPY | Age: 53
End: 2023-11-30
Payer: COMMERCIAL

## 2023-11-30 DIAGNOSIS — M62.89 PELVIC FLOOR DYSFUNCTION: Primary | ICD-10-CM

## 2023-11-30 PROCEDURE — 97110 THERAPEUTIC EXERCISES: CPT | Performed by: PHYSICAL THERAPIST

## 2023-11-30 PROCEDURE — 97112 NEUROMUSCULAR REEDUCATION: CPT | Performed by: PHYSICAL THERAPIST

## 2023-11-30 NOTE — PROGRESS NOTES
been able to improve time between voids with urge suppression strategy off making a tight fist. She expresses frustration that she is unable to delay all urges; educated pt that not all urges are meant to be ignored and that voiding every 2-5 hours is normal and healthy. Pt is also going to complete an additional bladder diary to identify triggers for correlation of \"bad days\" symptoms. Pt demonstrates appropriate understanding of technique with TE's but notes decreased confidence when completing at home independently and having difficulty with finding time for compliance of TE's. Plan to review bladder diary next session and complete final 2 scheduled sessions. GOALS/PLAN     Goals for Episode of Care: created on 10/3/2023  To be achieved in 12 visits    Incontinence/Prolapse:? Pt will verbalize an understanding of the anatomy and physiology of the problem as well as possible causes of chief complaint related to the pt's presentation. --met  Pt will demonstrate appropriate isolation of PFM including appropriate contraction and relaxation. --Not met  Pt will be independent in and compliant with performance of a home exercise program provided by therapist and perform as instructed. -- partially met  Pt will report/demonstrate a reduction of incontinence/leakage episodes by at least 50%. --met  Pt will demonstrate consistent compliance with urgency suppression strategies. --met  Pt will demonstrate use of functional pelvic floor muscle contraction by performing a pre-contraction (KNACK) to reduce/eliminate stress incontinence during cough/sneeze/laugh/lifting/jumping, etc -- not met  Pt will report decreased urinary frequency/increased time between voids to every 2-5 hours. -- met  Pt will report/demonstrate a reduction of nocturia episodes to 0-1 times per night for improved quality of sleep.  -- met  Pt will demonstrate an increase in PFM strength by at least 1 muscle grade to improve pelvic stability and reduce

## 2023-12-07 ENCOUNTER — TREATMENT (OUTPATIENT)
Dept: PHYSICAL THERAPY | Age: 53
End: 2023-12-07
Payer: COMMERCIAL

## 2023-12-07 DIAGNOSIS — M62.89 PELVIC FLOOR DYSFUNCTION: Primary | ICD-10-CM

## 2023-12-07 PROCEDURE — 97140 MANUAL THERAPY 1/> REGIONS: CPT | Performed by: PHYSICAL THERAPIST

## 2023-12-07 PROCEDURE — 97110 THERAPEUTIC EXERCISES: CPT | Performed by: PHYSICAL THERAPIST

## 2023-12-07 NOTE — PROGRESS NOTES
PHYSICAL THERAPY PROGRESS NOTE / DISCHARGE SUMMARY    Date:  2023  Initial Evaluation Date: 10/3/2023    Patient Name:  Merna Degroot   :  1970    Restrictions/Precautions:  none  Diagnosis:     Diagnosis Orders   1. Pelvic floor dysfunction                IInsurance/Certification information:  [facility]  Referring Physician:  GLEN Naylor  Plan of care signed: yes  Date of Surgery/Injury: N/A     Visit# / total visits:     Treating Therapist: Smiley Grande, PT, DPT  [id number]  Past Medical History:   Past Medical History:   Diagnosis Date    ARDS (adult respiratory distress syndrome) ([address])     Arthritis     Asthma     Chest pain     Depression     Essential tremor     Gallstones     Generalized abdominal pain     Head trauma     multiple history of    Hx of blood clots     Hyperlipidemia     Hypertension     Infectious colitis, enteritis and gastroenteritis     Lupus (systemic lupus erythematosus) ([address])     Obesity     Palpitations     Prolonged emergence from general anesthesia     SOB (shortness of breath)     Systemic lupus erythematosus ([address])     Tachycardia      Past Surgical History:   Past Surgical History:   Procedure Laterality Date    CHOLECYSTECTOMY, LAPAROSCOPIC  2018    DIAGNOSTIC CARDIAC CATH LAB PROCEDURE      RETINAL DETACHMENT SURGERY      retina's reattached       Patient identified by name and birth date: Yes     Pain:   Current pain level: 0/10. Recent Pelvic Floor Pain: --  Pain is described as achey. Pain is worse: need to void  Pain is better: nothing    SUBJECTIVE      Pt reports ongoing leaking with coughing and sneezing. Pt notes dampness of underwear without knowledge of leakage. Pt did not complete bladder diary since last session. Referral made to follow up with Alysha CARROLL for further evaluation of incontinence without urge or awareness. Decreased sensation of burning and urge since starting therapy.  Started using squatty

## 2024-01-10 NOTE — PROGRESS NOTES
Chief Complaint/Reason for Visit:   Patient is coming in today as a 8 month Cardiovascular follow up.     History Of Present Illness:    Ms. Alcantar is coming in today as a 6-month cardiovascular follow-up.  We have followed this patient previously for Prinzmetal angina, atypical chest pain, and palpitations.  She also has a past medical history significant for systemic lupus, fibromyalgia, and Sjogren's disease.    Patient has had no recent hospitalizations or emergency room visits.  She continues to get atypical mild chest pain that occurs at random and can last up to 2 hours.  She does not have chest discomfort with activity.  She will get palpitations off and on which do not correlate with her chest symptoms.  She has not needed to take nitroglycerin in quite some time.  She does have some on hand if needed.      Past Medical History:  She has a past medical history of Syncope and collapse (08/12/2020) and Systemic lupus erythematosus, unspecified (CMS/[facility]).    Past Surgical History:  She has a past surgical history that includes Other surgical history (03/18/2021); Other surgical history (03/18/2021); and CT angio coronary art with heartflow if score >30% (9/30/2020).      Social History:  She reports that she has never smoked. She has never used smokeless tobacco. She reports that she does not currently use alcohol. She reports that she does not use drugs.    Family History:  Family History   Problem Relation Name Age of Onset    Transient ischemic attack Mother          Allergies:  Cefdinir, Ciprofloxacin, Folic acid, Wyjqolg-hrwtxmohx-qnkpecxjnkiu, Methotrexate, Metronidazole, and Prednisone    Medications:  Current Outpatient Medications   Medication Instructions    albuterol 90 mcg/actuation inhaler inhale 2 puffs by mouth and INTO THE LUNGS every 6 hours if needed for shortness of breath    aspirin 81 mg EC tablet 1 tablet, oral, Daily    atorvastatin (LIPITOR) 20 mg, oral, Daily    Ayr Saline 0.65 %  nasal spray     benztropine (Cogentin) 0.5 mg tablet take 1 tablet by mouth three times a day and 1 tablet at bedtime if needed    buPROPion XL (WELLBUTRIN XL) 300 mg, oral, Daily    busPIRone (Buspar) 5 mg tablet take 1/2 tablet by mouth three times a day    cetirizine (ZyrTEC) 10 mg capsule 1 capsule, oral, Daily    ipratropium-albuteroL (Duo-Neb) 0.5-2.5 mg/3 mL nebulizer solution inhale contents of 1 vial ( 3 milliliters ) in nebulizer by mouth and INTO THE LUNGS every 6 hours    montelukast (SINGULAIR) 10 mg, oral, Daily    Mucinex 600 mg, oral, Every 12 hours    multivitamin (Daily Multi-Vitamin) tablet 1 tablet, oral, Daily    nitroglycerin (Nitrostat) 0.4 mg SL tablet place 1 tablet under the tongue if needed every 5 minutes for mirtha...  (REFER TO PRESCRIPTION NOTES).    oxybutynin XL (DITROPAN-XL) 10 mg, oral, Daily    risperiDONE (RISPERDAL) 1 mg, oral, 2 times daily    Vitamin D3 50 mcg (2,000 unit) capsule oral, Daily       Review of Systems:  Constitutional: not feeling tired, no fever and no chills.   Eyes: no eyesight problems.   ENT: no hearing loss.   Cardiovascular:+ for CP that occurs at random and can last a couple of hours, has on hand NTG but has not used lately. + palpitations.  No significant edema.   Respiratory: shortness of breath during exertion and asthma, but no orthopnea and no postural nocturnal dyspnea. + VALLES  Gastrointestinal: no blood in stools.   Genitourinary: Has urinary retention and has gone through bladder retraining.    Musculoskeletal: myalgias and back pain, but no arthralgias . Fibromyalgia.   Neurological: dizziness, but no seizures . Lupus and Sjogren's.   Psychiatric: depression and anxiety, but no confusion and no memory lapses or loss.   Endocrine: no thyroid disorder and no diabetes mellitus.     Vitals  Visit Vitals  /88   Pulse 88   Wt 117 kg (258 lb)   BMI 47.19 kg/m²   Smoking Status Never   BSA 2.26 m²        Physical Exam:  Constitutional: alert and in no  acute distress.   Eyes: no erythema, swelling or discharge from the eye .   Ears, Nose, Mouth, and Throat: mask.   Neck: neck is supple, symmetric, trachea midline, no masses .   Pulmonary: no increased work of breathing or signs of respiratory distress  and lungs clear to auscultation.    Cardiovascular: carotid pulses 2+ bilaterally with no bruit , JVP was normal, heart rate was normal the rhythm was regular, normal S1, normal S2, no S3, no S4 no murmurs were heard., pedal pulses 2+ bilaterally , No LE edema  Abdomen: Soft, non tender, normal bowel sounds.   Musculoskeletal: normal gait.   Skin: skin warm and dry, normal skin turgor .   Neurologic: non-focal neurologic examination.   Psychiatric oriented to person, place and time  and normal mood and affect .     Last Labs:  None recently.    Last Cardiology Tests:    Echo:8-28-20  CONCLUSIONS:   1. The left ventricular systolic function is normal with a 60% estimated ejection fraction.      Stress Test:8-28-20   1. The pt achieved 89% MPHR, 5:19minutes, and 7 METS, indicating poor exercise capacity.   2. The pt experienced chest tightness, dyspnea, and dizziness during peak exercise.   3. No evidence arrhythmia with exercise.   4. No ECG changes from baseline.   5. No electrocardiographic evidence for ischemia at maximal workload.   6. The adequate level of stress was achieved.    No recent results to review    Assessment/Plan:  Atypical chest pain/Prinzmetal angina: Patient has had a heart catheterization showing no significant coronary artery disease.  She at one point was trialed on isosorbide dinitrate but it caused headaches and was discontinued.  She has on hand nitroglycerin that can be used if needed.  She has not used nitroglycerin in quite some time.    Palpitations: Prior workup has revealed no significant arrhythmias.  She has not seen any escalation in her symptoms.  Patient will continue to monitor.    Hyperlipidemia: Patient currently is on  atorvastatin 20 mg nightly.  She has not had any lab work done recently.  I ordered fasting lipid labs along with a CMP.  We can call the patient once results are available to discuss and make any additional recommendations.    Patient will keep her follow-up with Dr. Carter in May.  Patient instructed to call with any cardiovascular complaints. All questions were answered.       Dragon dictation was utilized to create this document. Quite often unanticipated grammatical, syntax,  and other interpretive errors are inadvertently transcribed by the computer software.  Please disregard these errors.  Please excuse any errors that have escaped final proofreading.        Corinne De Leon, APRN-CNP

## 2024-01-15 PROBLEM — M32.9 SLE (SYSTEMIC LUPUS ERYTHEMATOSUS) (MULTI): Status: ACTIVE | Noted: 2024-01-15

## 2024-01-15 PROBLEM — R55 SYNCOPE: Status: ACTIVE | Noted: 2024-01-15

## 2024-01-15 PROBLEM — M79.7 FIBROMYALGIA: Status: ACTIVE | Noted: 2024-01-15

## 2024-01-15 PROBLEM — R00.0 TACHYCARDIA: Status: ACTIVE | Noted: 2024-01-15

## 2024-01-15 PROBLEM — E78.5 HYPERLIPIDEMIA: Status: ACTIVE | Noted: 2024-01-15

## 2024-01-15 PROBLEM — I20.1 PRINZMETAL ANGINA (CMS-HCC): Status: ACTIVE | Noted: 2024-01-15

## 2024-01-15 PROBLEM — R07.9 CHEST PAIN: Status: ACTIVE | Noted: 2024-01-15

## 2024-01-15 PROBLEM — R00.2 PALPITATIONS: Status: ACTIVE | Noted: 2024-01-15

## 2024-01-15 RX ORDER — FLUTICASONE PROPIONATE 50 MCG
SPRAY, SUSPENSION (ML) NASAL
COMMUNITY
Start: 2023-06-27 | End: 2024-01-16 | Stop reason: WASHOUT

## 2024-01-15 RX ORDER — ALBUTEROL SULFATE 90 UG/1
AEROSOL, METERED RESPIRATORY (INHALATION)
COMMUNITY

## 2024-01-15 RX ORDER — BUSPIRONE HYDROCHLORIDE 5 MG/1
TABLET ORAL
COMMUNITY
Start: 2023-09-20

## 2024-01-15 RX ORDER — SULFAMETHOXAZOLE AND TRIMETHOPRIM 800; 160 MG/1; MG/1
1 TABLET ORAL 2 TIMES DAILY
COMMUNITY
Start: 2023-03-08 | End: 2024-01-16 | Stop reason: WASHOUT

## 2024-01-15 RX ORDER — CHLORHEXIDINE GLUCONATE ORAL RINSE 1.2 MG/ML
SOLUTION DENTAL
COMMUNITY
Start: 2023-05-01 | End: 2024-01-16 | Stop reason: WASHOUT

## 2024-01-15 RX ORDER — BUPROPION HYDROCHLORIDE 300 MG/1
300 TABLET ORAL DAILY
COMMUNITY

## 2024-01-15 RX ORDER — SODIUM CHLORIDE 0.65 %
AEROSOL, SPRAY (ML) NASAL
COMMUNITY
Start: 2023-11-02

## 2024-01-15 RX ORDER — NICOTINE 11MG/24HR
PATCH, TRANSDERMAL 24 HOURS TRANSDERMAL DAILY
COMMUNITY
Start: 2023-06-27

## 2024-01-15 RX ORDER — MULTIVITAMIN
1 TABLET ORAL DAILY
COMMUNITY

## 2024-01-15 RX ORDER — ASPIRIN 81 MG/1
1 TABLET ORAL DAILY
COMMUNITY
Start: 2020-08-12

## 2024-01-15 RX ORDER — FLUCONAZOLE 100 MG/1
100 TABLET ORAL DAILY
COMMUNITY
Start: 2023-10-02 | End: 2024-01-16 | Stop reason: WASHOUT

## 2024-01-15 RX ORDER — BENZTROPINE MESYLATE 0.5 MG/1
TABLET ORAL
COMMUNITY
Start: 2023-09-20

## 2024-01-15 RX ORDER — IPRATROPIUM BROMIDE AND ALBUTEROL SULFATE 2.5; .5 MG/3ML; MG/3ML
SOLUTION RESPIRATORY (INHALATION)
COMMUNITY

## 2024-01-15 RX ORDER — RISPERIDONE 1 MG/1
1 TABLET ORAL 2 TIMES DAILY
COMMUNITY
Start: 2023-10-14

## 2024-01-15 RX ORDER — CETIRIZINE HYDROCHLORIDE 10 MG/1
10 TABLET ORAL DAILY
COMMUNITY
Start: 2023-06-27 | End: 2024-01-16 | Stop reason: WASHOUT

## 2024-01-15 RX ORDER — MONTELUKAST SODIUM 10 MG/1
10 TABLET ORAL DAILY
COMMUNITY
Start: 2023-09-22

## 2024-01-15 RX ORDER — NITROGLYCERIN 0.4 MG/1
TABLET SUBLINGUAL
COMMUNITY

## 2024-01-15 RX ORDER — GUAIFENESIN 600 MG/1
600 TABLET, EXTENDED RELEASE ORAL EVERY 12 HOURS
COMMUNITY
Start: 2023-10-27

## 2024-01-15 RX ORDER — OXYBUTYNIN CHLORIDE 10 MG/1
10 TABLET, EXTENDED RELEASE ORAL DAILY
COMMUNITY
Start: 2023-09-05

## 2024-01-15 RX ORDER — PHENAZOPYRIDINE HYDROCHLORIDE 100 MG/1
100 TABLET, FILM COATED ORAL 3 TIMES DAILY PRN
COMMUNITY
Start: 2023-03-08 | End: 2024-01-16 | Stop reason: WASHOUT

## 2024-01-16 ENCOUNTER — OFFICE VISIT (OUTPATIENT)
Dept: CARDIOLOGY | Facility: CLINIC | Age: 54
End: 2024-01-16
Payer: COMMERCIAL

## 2024-01-16 VITALS
SYSTOLIC BLOOD PRESSURE: 118 MMHG | HEART RATE: 88 BPM | BODY MASS INDEX: 47.19 KG/M2 | DIASTOLIC BLOOD PRESSURE: 88 MMHG | WEIGHT: 258 LBS

## 2024-01-16 DIAGNOSIS — I20.1 PRINZMETAL ANGINA (CMS-HCC): Primary | ICD-10-CM

## 2024-01-16 DIAGNOSIS — E78.5 HYPERLIPIDEMIA, UNSPECIFIED HYPERLIPIDEMIA TYPE: ICD-10-CM

## 2024-01-16 DIAGNOSIS — R07.89 OTHER CHEST PAIN: ICD-10-CM

## 2024-01-16 DIAGNOSIS — R00.2 PALPITATIONS: ICD-10-CM

## 2024-01-16 PROBLEM — J30.2 SEASONAL ALLERGIES: Status: ACTIVE | Noted: 2018-09-03

## 2024-01-16 PROBLEM — R06.00 DYSPNEA, UNSPECIFIED: Status: ACTIVE | Noted: 2018-07-18

## 2024-01-16 PROBLEM — M32.9 SYSTEMIC LUPUS ERYTHEMATOSUS, UNSPECIFIED (MULTI): Status: ACTIVE | Noted: 2018-09-03

## 2024-01-16 PROBLEM — H33.103: Status: ACTIVE | Noted: 2019-08-25

## 2024-01-16 PROBLEM — R76.8 OTHER SPECIFIED ABNORMAL IMMUNOLOGICAL FINDINGS IN SERUM: Status: ACTIVE | Noted: 2018-09-03

## 2024-01-16 PROBLEM — J45.909 UNCOMPLICATED ASTHMA (HHS-HCC): Status: ACTIVE | Noted: 2018-09-03

## 2024-01-16 PROBLEM — S06.9X9A TRAUMATIC BRAIN INJURY WITH LOSS OF CONSCIOUSNESS (MULTI): Status: ACTIVE | Noted: 2018-08-28

## 2024-01-16 PROCEDURE — 99213 OFFICE O/P EST LOW 20 MIN: CPT | Performed by: NURSE PRACTITIONER

## 2024-01-16 PROCEDURE — 1036F TOBACCO NON-USER: CPT | Performed by: NURSE PRACTITIONER

## 2024-01-16 RX ORDER — ATORVASTATIN CALCIUM 20 MG/1
20 TABLET, FILM COATED ORAL DAILY
Qty: 90 TABLET | Refills: 3 | Status: SHIPPED | OUTPATIENT
Start: 2024-01-16

## 2024-01-16 NOTE — PATIENT INSTRUCTIONS
Continue on current meds  Heart healthy, low sodium diet  Mediterranean diet is recommended  Fasting labs due this month (Lipid panel and CMP)  Follow up with Dr Carter in May

## 2024-05-01 NOTE — CARE COORDINATION
Per treatment team pt's possible DC is 9/30. Tolerated the prep well.  Benign abdomen.  For lap LAR, possible ostomy creation.

## 2024-05-08 ENCOUNTER — OFFICE VISIT (OUTPATIENT)
Dept: CARDIOLOGY | Facility: CLINIC | Age: 54
End: 2024-05-08
Payer: COMMERCIAL

## 2024-05-08 VITALS
HEIGHT: 62 IN | BODY MASS INDEX: 48.21 KG/M2 | SYSTOLIC BLOOD PRESSURE: 132 MMHG | WEIGHT: 262 LBS | HEART RATE: 85 BPM | DIASTOLIC BLOOD PRESSURE: 90 MMHG

## 2024-05-08 DIAGNOSIS — R00.0 TACHYCARDIA: ICD-10-CM

## 2024-05-08 DIAGNOSIS — E78.5 HYPERLIPIDEMIA, UNSPECIFIED HYPERLIPIDEMIA TYPE: ICD-10-CM

## 2024-05-08 DIAGNOSIS — R00.2 PALPITATIONS: ICD-10-CM

## 2024-05-08 DIAGNOSIS — I10 PRIMARY HYPERTENSION: ICD-10-CM

## 2024-05-08 DIAGNOSIS — I20.1 PRINZMETAL ANGINA (CMS-HCC): Primary | ICD-10-CM

## 2024-05-08 PROCEDURE — 3080F DIAST BP >= 90 MM HG: CPT | Performed by: INTERNAL MEDICINE

## 2024-05-08 PROCEDURE — 1036F TOBACCO NON-USER: CPT | Performed by: INTERNAL MEDICINE

## 2024-05-08 PROCEDURE — 99214 OFFICE O/P EST MOD 30 MIN: CPT | Performed by: INTERNAL MEDICINE

## 2024-05-08 PROCEDURE — 3075F SYST BP GE 130 - 139MM HG: CPT | Performed by: INTERNAL MEDICINE

## 2024-05-08 PROCEDURE — 93000 ELECTROCARDIOGRAM COMPLETE: CPT | Performed by: INTERNAL MEDICINE

## 2024-05-08 RX ORDER — DILTIAZEM HYDROCHLORIDE 180 MG/1
180 CAPSULE, COATED, EXTENDED RELEASE ORAL DAILY
Qty: 30 CAPSULE | Refills: 11 | Status: SHIPPED | OUTPATIENT
Start: 2024-05-08 | End: 2025-05-08

## 2024-05-08 NOTE — PROGRESS NOTES
"Chief Complaint:   Follow-up (Annual- having a pounding and excelerated rate)     History Of Present Illness:    Kristin Alcantar is a 53 y.o. female presenting for annual follow-up.  She has history of systemic lupus, Sjogren's disease, fibromyalgia and conscious awareness of her heartbeat, has prinzmetal angina who presents for her regular follow-up. It appears that the patient presented to the emergency department with a prodrome of palpitations and chest pain that resulted in a left heart catheterization (2/2/21) not showing any evidence of obstructive coronary artery disease and she was told that she had coronary vasospasm. She had a coronary CT angiogram in 2020 showing normal coronaries and 0 calcium score. She has worn a monitor in the past that did not show any arrhythmia(2020). This was done for palpitations. She had an echocardiogram in 2020 as well that was quite unremarkable.     Currently her symptoms are occasional chest pain at rest that led last for few seconds to an hour, radiates down both arm and did respond to Imdur prescribed by Dr. Moraes, however she stopped this because she was afraid of interaction with Risperdal and also because this was causing headache. She is wondering if there is something she could use as needed. I prescribed as needed sublingual nitroglycerin had worked in the past but lately she has not been using it as she got concerned again about interactions with Risperdal.    . She tells me she also gets a different type of chest pressure when she exercises.  She just started exercising again recently and feels her heart pounding with even minor activities.    At night when she lies down in bed she gets cough and chest pain.     Her ECG shows sinus rhythm poor R wave progression low voltage complexes unchanged from prior. .     Last Recorded Vitals:  Vitals:    05/08/24 1307   BP: 132/90   BP Location: Left arm   Pulse: 85   Weight: 119 kg (262 lb)   Height: 1.575 m (5' 2\") "       Past Medical History:  She has a past medical history of Syncope and collapse (08/12/2020) and Systemic lupus erythematosus, unspecified ([facility]).    Past Surgical History:  She has a past surgical history that includes Other surgical history (03/18/2021); Other surgical history (03/18/2021); and CT angio coronary art with heartflow if score >30% (9/30/2020).      Social History:  She reports that she has never smoked. She has never used smokeless tobacco. She reports that she does not currently use alcohol. She reports that she does not use drugs.    Family History:  Family History   Problem Relation Name Age of Onset    Transient ischemic attack Mother          Allergies:  Fish containing products, Tolmetin, Cefdinir, Cephalosporins, Ciprofloxacin, Codeine, Folic acid, Tikujze-eaijnikwa-grmunmfpzmsf, Methotrexate, Metronidazole, Nsaids (non-steroidal anti-inflammatory drug), Prednisone, Quinolones, and Erythromycin    Outpatient Medications:  Current Outpatient Medications   Medication Instructions    albuterol 90 mcg/actuation inhaler inhale 2 puffs by mouth and INTO THE LUNGS every 6 hours if needed for shortness of breath    aspirin 81 mg EC tablet 1 tablet, oral, Daily    atorvastatin (LIPITOR) 20 mg, oral, Daily    Ayr Saline 0.65 % nasal spray     benztropine (Cogentin) 0.5 mg tablet take 1 tablet by mouth three times a day and 1 tablet at bedtime if needed    buPROPion XL (WELLBUTRIN XL) 300 mg, oral, Daily    busPIRone (Buspar) 5 mg tablet take 1/2 tablet by mouth three times a day    cetirizine (ZyrTEC) 10 mg capsule 1 capsule, oral, Daily    ipratropium-albuteroL (Duo-Neb) 0.5-2.5 mg/3 mL nebulizer solution inhale contents of 1 vial ( 3 milliliters ) in nebulizer by mouth and INTO THE LUNGS every 6 hours    montelukast (SINGULAIR) 10 mg, oral, Daily    Mucinex 600 mg, oral, Every 12 hours    multivitamin (Daily Multi-Vitamin) tablet 1 tablet, oral, Daily    nitroglycerin (Nitrostat) 0.4 mg SL tablet  "place 1 tablet under the tongue if needed every 5 minutes for mirtha...  (REFER TO PRESCRIPTION NOTES).    oxybutynin XL (DITROPAN-XL) 10 mg, oral, Daily    risperiDONE (RISPERDAL) 1 mg, oral, 2 times daily    Vitamin D3 50 mcg (2,000 unit) capsule oral, Daily       Physical Exam:  Physical Exam  Vitals reviewed.   Constitutional:       Appearance: Normal appearance.   Neck:      Vascular: No carotid bruit or JVD.   Cardiovascular:      Rate and Rhythm: Normal rate and regular rhythm.      Pulses: Normal pulses.      Heart sounds: Normal heart sounds, S1 normal and S2 normal.   Pulmonary:      Effort: Pulmonary effort is normal. No respiratory distress.      Breath sounds: No wheezing or rales.   Abdominal:      General: Abdomen is flat.      Palpations: Abdomen is soft.   Musculoskeletal:      Right lower leg: No edema.      Left lower leg: No edema.   Skin:     General: Skin is warm.   Neurological:      Mental Status: She is alert and oriented to person, place, and time. Mental status is at baseline.   Psychiatric:         Mood and Affect: Mood normal.         Behavior: Behavior normal.           Last Labs:  CBC -  No results found for: \"WBC\", \"HGB\", \"HCT\", \"MCV\", \"PLT\"    CMP -  No results found for: \"CALCIUM\", \"PHOS\", \"PROT\", \"ALBUMIN\", \"AST\", \"ALT\", \"ALKPHOS\", \"BILITOT\"    LIPID PANEL -   No results found for: \"CHOL\", \"TRIG\", \"HDL\", \"CHHDL\", \"LDLF\", \"VLDL\", \"NHDL\"    RENAL FUNCTION PANEL -   No results found for: \"GLUCOSE\", \"NA\", \"K\", \"CL\", \"CO2\", \"ANIONGAP\", \"BUN\", \"CREATININE\", \"GFRMALE\", \"CALCIUM\", \"PHOS\", \"ALBUMIN\"     Lab Results   Component Value Date    HGBA1C 5.7 (H) 06/03/2021       Last Cardiology Tests:  ECG:  No results found for this or any previous visit from the past 1095 days.      Echo:  No results found for this or any previous visit from the past 1095 days.      Ejection Fractions:  No results found for: \"EF\"    Cath:  No results found for this or any previous visit from the past 1095 " days.      Stress Test:  No results found for this or any previous visit from the past 1095 days.      Cardiac Imaging:  No results found for this or any previous visit from the past 1095 days.          Assessment/Plan     53 years old lady with history of palpitations and chest pain who presents to our clinic for 6-month follow-up visit.        Palpitations:  No arrhythmia seen on monitor. Reassured her advised to reach out if symptoms change.  Having some exertional palpitations with elevated blood pressure and Prinzmetal angina Cardizem should help advised to exercise regularly as well.  Cardizem prescribed.     Vasospastic angina:  Patient had a heart cath did not show any significant coronary disease at an outside hospital.  She did not tolerate isosorbide dinitrate due to headaches. I reassured that there is no significant interaction with Risperdal. She can use nitroglycerin as needed for now.  However, she is very reluctant to use this.  I gave her some Cardizem to try at this time.         She also has chest pain with exercise which could be exercise-induced asthma advised to use inhalers prior to exercise. Some symptoms at night could be acid reflux related and advised that she follows up with PCP to discuss that. We have not seen any obstructive coronary artery disease in our evaluation.     Yrn Carter MD

## 2024-09-23 ENCOUNTER — APPOINTMENT (OUTPATIENT)
Dept: GENERAL RADIOLOGY | Age: 54
End: 2024-09-23
Payer: COMMERCIAL

## 2024-09-23 ENCOUNTER — APPOINTMENT (OUTPATIENT)
Dept: CT IMAGING | Age: 54
End: 2024-09-23
Payer: COMMERCIAL

## 2024-09-23 ENCOUNTER — HOSPITAL ENCOUNTER (EMERGENCY)
Age: 54
Discharge: HOME | End: 2024-09-24
Attending: STUDENT IN AN ORGANIZED HEALTH CARE EDUCATION/TRAINING PROGRAM
Payer: COMMERCIAL

## 2024-09-23 DIAGNOSIS — R07.89 ATYPICAL CHEST PAIN: Primary | ICD-10-CM

## 2024-09-23 LAB
ALBUMIN SERPL-MCNC: 4 G/DL (ref 3.5–5.2)
ALP SERPL-CCNC: 134 U/L (ref 35–104)
ALT SERPL-CCNC: 30 U/L (ref 0–32)
ANION GAP SERPL CALCULATED.3IONS-SCNC: 11 MMOL/L (ref 7–16)
AST SERPL-CCNC: 20 U/L (ref 0–31)
BASOPHILS # BLD: 0.03 K/UL (ref 0–0.2)
BASOPHILS NFR BLD: 0 % (ref 0–2)
BILIRUB SERPL-MCNC: 0.3 MG/DL (ref 0–1.2)
BNP SERPL-MCNC: 191 PG/ML (ref 0–125)
BUN SERPL-MCNC: 9 MG/DL (ref 6–20)
CALCIUM SERPL-MCNC: 9.3 MG/DL (ref 8.6–10.2)
CHLORIDE SERPL-SCNC: 107 MMOL/L (ref 98–107)
CO2 SERPL-SCNC: 25 MMOL/L (ref 22–29)
CREAT SERPL-MCNC: 1 MG/DL (ref 0.5–1)
D-DIMER QUANTITATIVE: 267 NG/ML DDU (ref 0–230)
EOSINOPHIL # BLD: 0.26 K/UL (ref 0.05–0.5)
EOSINOPHILS RELATIVE PERCENT: 4 % (ref 0–6)
ERYTHROCYTE [DISTWIDTH] IN BLOOD BY AUTOMATED COUNT: 13.7 % (ref 11.5–15)
GFR, ESTIMATED: 68 ML/MIN/1.73M2
GLUCOSE SERPL-MCNC: 110 MG/DL (ref 74–99)
HCT VFR BLD AUTO: 40.2 % (ref 34–48)
HGB BLD-MCNC: 13.2 G/DL (ref 11.5–15.5)
IMM GRANULOCYTES # BLD AUTO: 0.04 K/UL (ref 0–0.58)
IMM GRANULOCYTES NFR BLD: 1 % (ref 0–5)
LYMPHOCYTES NFR BLD: 1.41 K/UL (ref 1.5–4)
LYMPHOCYTES RELATIVE PERCENT: 20 % (ref 20–42)
MCH RBC QN AUTO: 28.9 PG (ref 26–35)
MCHC RBC AUTO-ENTMCNC: 32.8 G/DL (ref 32–34.5)
MCV RBC AUTO: 88.2 FL (ref 80–99.9)
MONOCYTES NFR BLD: 0.54 K/UL (ref 0.1–0.95)
MONOCYTES NFR BLD: 8 % (ref 2–12)
NEUTROPHILS NFR BLD: 68 % (ref 43–80)
NEUTS SEG NFR BLD: 4.89 K/UL (ref 1.8–7.3)
PLATELET # BLD AUTO: 201 K/UL (ref 130–450)
PMV BLD AUTO: 8.6 FL (ref 7–12)
POTASSIUM SERPL-SCNC: 4.4 MMOL/L (ref 3.5–5)
PROT SERPL-MCNC: 6.7 G/DL (ref 6.4–8.3)
RBC # BLD AUTO: 4.56 M/UL (ref 3.5–5.5)
SODIUM SERPL-SCNC: 143 MMOL/L (ref 132–146)
TROPONIN I SERPL HS-MCNC: 7 NG/L (ref 0–9)
TROPONIN I SERPL HS-MCNC: 7 NG/L (ref 0–9)
WBC OTHER # BLD: 7.2 K/UL (ref 4.5–11.5)

## 2024-09-23 PROCEDURE — 99285 EMERGENCY DEPT VISIT HI MDM: CPT

## 2024-09-23 PROCEDURE — 6360000004 HC RX CONTRAST MEDICATION: Performed by: RADIOLOGY

## 2024-09-23 PROCEDURE — 84484 ASSAY OF TROPONIN QUANT: CPT

## 2024-09-23 PROCEDURE — 85379 FIBRIN DEGRADATION QUANT: CPT

## 2024-09-23 PROCEDURE — 93005 ELECTROCARDIOGRAM TRACING: CPT

## 2024-09-23 PROCEDURE — 6370000000 HC RX 637 (ALT 250 FOR IP): Performed by: STUDENT IN AN ORGANIZED HEALTH CARE EDUCATION/TRAINING PROGRAM

## 2024-09-23 PROCEDURE — 71045 X-RAY EXAM CHEST 1 VIEW: CPT

## 2024-09-23 PROCEDURE — 71275 CT ANGIOGRAPHY CHEST: CPT

## 2024-09-23 PROCEDURE — 85025 COMPLETE CBC W/AUTO DIFF WBC: CPT

## 2024-09-23 PROCEDURE — 83880 ASSAY OF NATRIURETIC PEPTIDE: CPT

## 2024-09-23 PROCEDURE — 80053 COMPREHEN METABOLIC PANEL: CPT

## 2024-09-23 RX ORDER — IOPAMIDOL 755 MG/ML
75 INJECTION, SOLUTION INTRAVASCULAR
Status: COMPLETED | OUTPATIENT
Start: 2024-09-23 | End: 2024-09-23

## 2024-09-23 RX ORDER — DOXYCYCLINE HYCLATE 100 MG
100 TABLET ORAL 2 TIMES DAILY
Qty: 14 TABLET | Refills: 0 | Status: SHIPPED | OUTPATIENT
Start: 2024-09-23 | End: 2024-09-30

## 2024-09-23 RX ORDER — ASPIRIN 81 MG/1
162 TABLET, CHEWABLE ORAL ONCE
Status: COMPLETED | OUTPATIENT
Start: 2024-09-23 | End: 2024-09-23

## 2024-09-23 RX ADMIN — ASPIRIN 81 MG CHEWABLE TABLET 162 MG: 81 TABLET CHEWABLE at 21:31

## 2024-09-23 RX ADMIN — IOPAMIDOL 75 ML: 755 INJECTION, SOLUTION INTRAVENOUS at 22:40

## 2024-09-23 ASSESSMENT — PAIN SCALES - GENERAL: PAINLEVEL_OUTOF10: 7

## 2024-09-23 ASSESSMENT — PAIN - FUNCTIONAL ASSESSMENT: PAIN_FUNCTIONAL_ASSESSMENT: 0-10

## 2024-09-23 ASSESSMENT — PAIN DESCRIPTION - LOCATION: LOCATION: CHEST;BACK

## 2024-09-24 VITALS
TEMPERATURE: 97.9 F | BODY MASS INDEX: 48.95 KG/M2 | SYSTOLIC BLOOD PRESSURE: 132 MMHG | HEIGHT: 62 IN | OXYGEN SATURATION: 96 % | HEART RATE: 65 BPM | DIASTOLIC BLOOD PRESSURE: 85 MMHG | RESPIRATION RATE: 12 BRPM | WEIGHT: 266 LBS

## 2024-09-24 LAB
EKG ATRIAL RATE: 69 BPM
EKG P AXIS: 52 DEGREES
EKG P-R INTERVAL: 164 MS
EKG Q-T INTERVAL: 402 MS
EKG QRS DURATION: 84 MS
EKG QTC CALCULATION (BAZETT): 430 MS
EKG R AXIS: 31 DEGREES
EKG T AXIS: 22 DEGREES
EKG VENTRICULAR RATE: 69 BPM

## 2024-09-24 PROCEDURE — 93010 ELECTROCARDIOGRAM REPORT: CPT | Performed by: INTERNAL MEDICINE

## 2024-09-24 ASSESSMENT — LIFESTYLE VARIABLES
HOW MANY STANDARD DRINKS CONTAINING ALCOHOL DO YOU HAVE ON A TYPICAL DAY: PATIENT DOES NOT DRINK
HOW OFTEN DO YOU HAVE A DRINK CONTAINING ALCOHOL: NEVER

## 2024-10-02 ENCOUNTER — APPOINTMENT (RX ONLY)
Dept: URBAN - METROPOLITAN AREA CLINIC 9 | Facility: CLINIC | Age: 54
Setting detail: DERMATOLOGY
End: 2024-10-02

## 2024-10-02 DIAGNOSIS — L57.0 ACTINIC KERATOSIS: ICD-10-CM | Status: INADEQUATELY CONTROLLED

## 2024-10-02 DIAGNOSIS — D18.0 HEMANGIOMA: ICD-10-CM | Status: STABLE

## 2024-10-02 DIAGNOSIS — D22 MELANOCYTIC NEVI: ICD-10-CM | Status: STABLE

## 2024-10-02 DIAGNOSIS — L57.8 OTHER SKIN CHANGES DUE TO CHRONIC EXPOSURE TO NONIONIZING RADIATION: ICD-10-CM | Status: STABLE

## 2024-10-02 DIAGNOSIS — L82.1 OTHER SEBORRHEIC KERATOSIS: ICD-10-CM | Status: STABLE

## 2024-10-02 PROBLEM — D18.01 HEMANGIOMA OF SKIN AND SUBCUTANEOUS TISSUE: Status: ACTIVE | Noted: 2024-10-02

## 2024-10-02 PROBLEM — D22.61 MELANOCYTIC NEVI OF RIGHT UPPER LIMB, INCLUDING SHOULDER: Status: ACTIVE | Noted: 2024-10-02

## 2024-10-02 PROCEDURE — 17000 DESTRUCT PREMALG LESION: CPT

## 2024-10-02 PROCEDURE — 99213 OFFICE O/P EST LOW 20 MIN: CPT | Mod: 25

## 2024-10-02 PROCEDURE — ? FULL BODY SKIN EXAM

## 2024-10-02 PROCEDURE — ? COUNSELING

## 2024-10-02 PROCEDURE — ? LIQUID NITROGEN

## 2024-10-02 ASSESSMENT — LOCATION ZONE DERM
LOCATION ZONE: SCALP
LOCATION ZONE: ARM

## 2024-10-02 ASSESSMENT — LOCATION DETAILED DESCRIPTION DERM
LOCATION DETAILED: RIGHT POSTERIOR SHOULDER
LOCATION DETAILED: RIGHT MEDIAL FRONTAL SCALP

## 2024-10-02 ASSESSMENT — LOCATION SIMPLE DESCRIPTION DERM
LOCATION SIMPLE: RIGHT SCALP
LOCATION SIMPLE: RIGHT SHOULDER

## 2024-11-06 NOTE — PROGRESS NOTES
Chief Complaint/Reason for Visit:   Patient is coming in today as a 8 month Cardiovascular follow up.     History Of Present Illness:    Ms. Alcantar is coming in today as a 6-month cardiovascular follow-up.  We have followed this patient previously for atypical chest pain/Prinzmetal angina and palpitations.  Her other medical history consists of fibromyalgia, Sjogren's disease, and systemic lupus.    Patient comes in today generally feeling well.  She tells me she was in the ED at [facility] 2 or 3 months ago for atypical chest pain and ruled out for an MI and had no major cardiac testing completed.  Since that time, she has not been having any significant chest pain, pressure, or edema.  She did have a COVID infection about 2 weeks ago and has had some mild shortness of breath recovering from her infection.  On occasion she has had some palpitations but nothing that last very long.  Patient had been started on diltiazem last May but took it only briefly as she was very concerned about potential interactions with Risperdal.  She self discontinued it shortly after starting the medication.      Past Medical History:  She has a past medical history of Syncope and collapse (08/12/2020) and Systemic lupus erythematosus, unspecified.    Past Surgical History:  She has a past surgical history that includes Other surgical history (03/18/2021); Other surgical history (03/18/2021); and CT angio coronary art with heartflow if score >30% (9/30/2020).      Social History:  She reports that she has never smoked. She has never used smokeless tobacco. She reports that she does not currently use alcohol. She reports that she does not use drugs.    Family History:  Family History   Problem Relation Name Age of Onset    Transient ischemic attack Mother          Allergies:  Fish containing products, Tolmetin, Cefdinir, Cephalosporins, Ciprofloxacin, Codeine, Folic acid, Lyaquhn-piwdjtbal-jrkhykmwqzzx, Methotrexate,  Metronidazole, Nsaids (non-steroidal anti-inflammatory drug), Prednisone, Quinolones, and Erythromycin    Medications:  Current Outpatient Medications   Medication Instructions    albuterol 90 mcg/actuation inhaler inhale 2 puffs by mouth and INTO THE LUNGS every 6 hours if needed for shortness of breath    aspirin 81 mg EC tablet 1 tablet, Daily    atorvastatin (LIPITOR) 20 mg, oral, Daily    benztropine (Cogentin) 0.5 mg tablet take 1 tablet by mouth three times a day and 1 tablet at bedtime if needed    buPROPion XL (WELLBUTRIN XL) 300 mg, Daily    busPIRone (Buspar) 5 mg tablet take 1/2 tablet by mouth three times a day    cetirizine (ZyrTEC) 10 mg capsule 1 capsule, Daily    ipratropium-albuteroL (Duo-Neb) 0.5-2.5 mg/3 mL nebulizer solution inhale contents of 1 vial ( 3 milliliters ) in nebulizer by mouth and INTO THE LUNGS every 6 hours    Mucinex 600 mg, Every 12 hours    multivitamin (Daily Multi-Vitamin) tablet 1 tablet, Daily    nitroglycerin (Nitrostat) 0.4 mg SL tablet place 1 tablet under the tongue if needed every 5 minutes for mirtha...  (REFER TO PRESCRIPTION NOTES).    risperiDONE (RISPERDAL) 1 mg, 2 times daily    vibegron (GEMTESA ORAL) Take by mouth.    Vitamin D3 50 mcg (2,000 unit) capsule Daily       Review of Systems:  Constitutional: not feeling tired, no fever and no chills.   Eyes: no eyesight problems.   ENT: no hearing loss.   Cardiovascular: No CP/pressure recently, rare palpitations.  Denies LE edema.   Respiratory: shortness of breath during exertion and asthma, but no orthopnea and no postural nocturnal dyspnea. + VALLES.  Had COVID 2 weeks ago:  doing better now.    Gastrointestinal: no blood in stools.   Genitourinary: Has urinary retention and has gone through bladder retraining.    Musculoskeletal: myalgias and back pain, but no arthralgias . Fibromyalgia.   Neurological: dizziness, but no seizures . Lupus and Sjogren's.   Psychiatric: depression and anxiety, but no confusion and no  "memory lapses or loss.   Endocrine: no thyroid disorder and no diabetes mellitus.     Vitals  Visit Vitals  /76 (BP Location: Left arm, Patient Position: Sitting, BP Cuff Size: Adult)   Pulse 84   Ht 1.575 m (5' 2\")   Wt 121 kg (267 lb)   SpO2 96%   BMI 48.83 kg/m²   Smoking Status Never   BSA 2.3 m²        Physical Exam:  Constitutional: alert and in no acute distress.   Eyes: no erythema, swelling or discharge from the eye .   Ears, Nose, Mouth, and Throat: WNL  Neck: neck is supple, symmetric, trachea midline, no masses .   Pulmonary: no increased work of breathing or signs of respiratory distress  and lungs clear to auscultation.    Cardiovascular: carotid pulses 2+ bilaterally with no bruit , JVP was normal, heart rate was normal the rhythm was regular, normal S1, normal S2, no S3, no S4 no murmurs were heard., pedal pulses 2+ bilaterally , No LE edema  Abdomen: Soft, non tender, normal bowel sounds.   Musculoskeletal: R foot in walking boot  Skin: skin warm and dry, normal skin turgor .   Neurologic: non-focal neurologic examination.   Psychiatric oriented to person, place and time  and normal mood and affect .     Last Labs:  February, 2024    Last Cardiology Tests:    Echo:8-28-20  CONCLUSIONS:   1. The left ventricular systolic function is normal with a 60% estimated ejection fraction.      Stress Test:8-28-20   1. The pt achieved 89% MPHR, 5:19minutes, and 7 METS, indicating poor exercise capacity.   2. The pt experienced chest tightness, dyspnea, and dizziness during peak exercise.   3. No evidence arrhythmia with exercise.   4. No ECG changes from baseline.   5. No electrocardiographic evidence for ischemia at maximal workload.   6. The adequate level of stress was achieved.    No recent results to review    Assessment/Plan:  Atypical chest pain/Prinzmetal angina: Patient had a heart catheterization showing no significant coronary artery disease.  She was treated with isosorbide dinitrate but " stopped it due to headaches.  She has not used on hand nitroglycerin for quite some time.  Patient was recently started on diltiazem by Dr. Carter but the patient was concerned with an interaction with Risperdal.  She took it for a brief time but self discontinued it.  She has had no chest pain or pressure for the last 2 months but was seen at [facility] 2 to 3 months ago with atypical chest pain.  She has had no other recent hospitalization.  Patient still is unwilling to retry diltiazem.    Palpitations: Patient reports she only has palpitations on a rare occasion and nothing that is lasted very long.  She is currently not on any rate slowing medication and does not wish to start anything new.    Dyslipidemia: Lipid labs from February 2024 showed reasonable control.  I renewed her atorvastatin and recommended lipid panel and CMP in February 2025.  Patient should also be on a heart healthy diet.        Corinne De Leon, APRN-CNP

## 2024-11-12 ENCOUNTER — APPOINTMENT (OUTPATIENT)
Dept: CARDIOLOGY | Facility: CLINIC | Age: 54
End: 2024-11-12
Payer: COMMERCIAL

## 2024-11-12 VITALS
SYSTOLIC BLOOD PRESSURE: 122 MMHG | DIASTOLIC BLOOD PRESSURE: 76 MMHG | OXYGEN SATURATION: 96 % | HEART RATE: 84 BPM | HEIGHT: 62 IN | BODY MASS INDEX: 49.13 KG/M2 | WEIGHT: 267 LBS

## 2024-11-12 DIAGNOSIS — E78.5 HYPERLIPIDEMIA, UNSPECIFIED HYPERLIPIDEMIA TYPE: ICD-10-CM

## 2024-11-12 DIAGNOSIS — I10 PRIMARY HYPERTENSION: Primary | ICD-10-CM

## 2024-11-12 DIAGNOSIS — I20.1 PRINZMETAL ANGINA (CMS-HCC): ICD-10-CM

## 2024-11-12 DIAGNOSIS — R07.89 ATYPICAL CHEST PAIN: ICD-10-CM

## 2024-11-12 DIAGNOSIS — R00.2 PALPITATIONS: ICD-10-CM

## 2024-11-12 PROCEDURE — 3008F BODY MASS INDEX DOCD: CPT | Performed by: NURSE PRACTITIONER

## 2024-11-12 PROCEDURE — 99214 OFFICE O/P EST MOD 30 MIN: CPT | Performed by: NURSE PRACTITIONER

## 2024-11-12 PROCEDURE — 3078F DIAST BP <80 MM HG: CPT | Performed by: NURSE PRACTITIONER

## 2024-11-12 PROCEDURE — 1036F TOBACCO NON-USER: CPT | Performed by: NURSE PRACTITIONER

## 2024-11-12 PROCEDURE — 3074F SYST BP LT 130 MM HG: CPT | Performed by: NURSE PRACTITIONER

## 2024-11-12 RX ORDER — ATORVASTATIN CALCIUM 20 MG/1
20 TABLET, FILM COATED ORAL DAILY
Qty: 90 TABLET | Refills: 3 | Status: SHIPPED | OUTPATIENT
Start: 2024-11-12

## 2024-11-12 NOTE — PATIENT INSTRUCTIONS
Continue on current meds  Heart healthy, low sodium diet  Mediterranean diet is recommended  Lipids and CMP in 3 months (fasting)   Follow up with Dr Carter in May

## 2025-01-23 LAB
MICROORGANISM SPEC CULT: ABNORMAL
SPECIMEN DESCRIPTION: ABNORMAL

## 2025-02-26 ENCOUNTER — OFFICE VISIT (OUTPATIENT)
Dept: OBGYN | Age: 55
End: 2025-02-26
Payer: COMMERCIAL

## 2025-02-26 VITALS
HEIGHT: 62 IN | HEART RATE: 85 BPM | RESPIRATION RATE: 20 BRPM | SYSTOLIC BLOOD PRESSURE: 111 MMHG | TEMPERATURE: 98 F | OXYGEN SATURATION: 94 % | DIASTOLIC BLOOD PRESSURE: 80 MMHG | WEIGHT: 273.56 LBS | BODY MASS INDEX: 50.34 KG/M2

## 2025-02-26 DIAGNOSIS — L29.2 VULVAR PRURITUS: ICD-10-CM

## 2025-02-26 DIAGNOSIS — R30.0 DYSURIA: ICD-10-CM

## 2025-02-26 DIAGNOSIS — N95.0 PMB (POSTMENOPAUSAL BLEEDING): ICD-10-CM

## 2025-02-26 DIAGNOSIS — Z12.31 ENCOUNTER FOR SCREENING MAMMOGRAM FOR MALIGNANT NEOPLASM OF BREAST: Primary | ICD-10-CM

## 2025-02-26 DIAGNOSIS — Z12.4 SCREENING FOR CERVICAL CANCER: ICD-10-CM

## 2025-02-26 LAB
BACTERIA: ABNORMAL
BILIRUBIN, URINE: NEGATIVE
COLOR, UA: YELLOW
GLUCOSE URINE: NEGATIVE MG/DL
KETONES, URINE: NEGATIVE MG/DL
LEUKOCYTE ESTERASE, URINE: ABNORMAL
NITRITE, URINE: NEGATIVE
PH, URINE: 6 (ref 5–8)
PROTEIN UA: ABNORMAL MG/DL
RBC UA: ABNORMAL /HPF
SPECIFIC GRAVITY UA: >1.03 (ref 1–1.03)
TURBIDITY: ABNORMAL
URINE HGB: ABNORMAL
UROBILINOGEN, URINE: 0.2 EU/DL (ref 0–1)
WBC UA: ABNORMAL /HPF

## 2025-02-26 PROCEDURE — 99203 OFFICE O/P NEW LOW 30 MIN: CPT | Performed by: OBSTETRICS & GYNECOLOGY

## 2025-02-26 RX ORDER — ATORVASTATIN CALCIUM 20 MG/1
20 TABLET, FILM COATED ORAL DAILY
COMMUNITY
Start: 2024-11-12

## 2025-02-26 RX ORDER — MULTIVITAMIN
1 TABLET ORAL DAILY
COMMUNITY

## 2025-02-26 RX ORDER — BUPROPION HYDROCHLORIDE 300 MG/1
300 TABLET ORAL DAILY
COMMUNITY

## 2025-02-26 RX ORDER — VIBEGRON 75 MG/1
75 TABLET, FILM COATED ORAL DAILY
COMMUNITY

## 2025-02-26 SDOH — ECONOMIC STABILITY: FOOD INSECURITY: WITHIN THE PAST 12 MONTHS, THE FOOD YOU BOUGHT JUST DIDN'T LAST AND YOU DIDN'T HAVE MONEY TO GET MORE.: NEVER TRUE

## 2025-02-26 SDOH — ECONOMIC STABILITY: FOOD INSECURITY: WITHIN THE PAST 12 MONTHS, YOU WORRIED THAT YOUR FOOD WOULD RUN OUT BEFORE YOU GOT MONEY TO BUY MORE.: NEVER TRUE

## 2025-02-26 ASSESSMENT — ENCOUNTER SYMPTOMS
DIARRHEA: 0
BLOOD IN STOOL: 0
COUGH: 0
CONSTIPATION: 0
VOMITING: 0
WHEEZING: 0
NAUSEA: 0
ABDOMINAL PAIN: 0
SHORTNESS OF BREATH: 0

## 2025-02-26 ASSESSMENT — PATIENT HEALTH QUESTIONNAIRE - PHQ9
3. TROUBLE FALLING OR STAYING ASLEEP: NOT AT ALL
SUM OF ALL RESPONSES TO PHQ QUESTIONS 1-9: 0
6. FEELING BAD ABOUT YOURSELF - OR THAT YOU ARE A FAILURE OR HAVE LET YOURSELF OR YOUR FAMILY DOWN: NOT AT ALL
7. TROUBLE CONCENTRATING ON THINGS, SUCH AS READING THE NEWSPAPER OR WATCHING TELEVISION: NOT AT ALL
1. LITTLE INTEREST OR PLEASURE IN DOING THINGS: NOT AT ALL
SUM OF ALL RESPONSES TO PHQ QUESTIONS 1-9: 0
SUM OF ALL RESPONSES TO PHQ9 QUESTIONS 1 & 2: 0
10. IF YOU CHECKED OFF ANY PROBLEMS, HOW DIFFICULT HAVE THESE PROBLEMS MADE IT FOR YOU TO DO YOUR WORK, TAKE CARE OF THINGS AT HOME, OR GET ALONG WITH OTHER PEOPLE: NOT DIFFICULT AT ALL
2. FEELING DOWN, DEPRESSED OR HOPELESS: NOT AT ALL
SUM OF ALL RESPONSES TO PHQ QUESTIONS 1-9: 0
4. FEELING TIRED OR HAVING LITTLE ENERGY: NOT AT ALL
5. POOR APPETITE OR OVEREATING: NOT AT ALL
9. THOUGHTS THAT YOU WOULD BE BETTER OFF DEAD, OR OF HURTING YOURSELF: NOT AT ALL
SUM OF ALL RESPONSES TO PHQ QUESTIONS 1-9: 0
8. MOVING OR SPEAKING SO SLOWLY THAT OTHER PEOPLE COULD HAVE NOTICED. OR THE OPPOSITE, BEING SO FIGETY OR RESTLESS THAT YOU HAVE BEEN MOVING AROUND A LOT MORE THAN USUAL: NOT AT ALL

## 2025-02-26 NOTE — PROGRESS NOTES
Patient present to the office today as a new patient/annual.  Patient states concerns with itching and little discharge denies odor, discharge started in January itching the last week.  Patient feels that she is in menopause.   States that she has slight bleeding on and off but not monthly or consistent.   Last pap- 2022  Last mammogram - [facility] 3/24  Last Gny-2 yrs Dr Garces

## 2025-02-26 NOTE — PROGRESS NOTES
Araseli East is a 54-year-old nulligravida female who believes her LMP was in March 2024.  Then she reports having spotting in August 2024 and nothing since.  Has seen a urologist reports being placed on medication she believes is Gemtesa.  May be helping somewhat.  Was also sent in the past for pelvic floor physical therapy with no change in her symptoms she is not currently sexually active.  She is not clear on all of her medical history especially whether or not she has had a DVT or a pulmonary embolism.  She does have anxiety and depression she is on medication it is not controlling it she has started counseling.  She denies self-harm or suicidal ideation.  She reports no past or present history of physical, sexual or verbal abuse  Date of last Cervical Cancer screen (HPV or PAP): 2/15/2018 believes it was no  Date of last Mammogram: 3/12/2020   Cherry Breast Cancer Risk: Cherry: No Score  Difficult to obtain clear PMH    GYN History  Abn pap no  STI no  LEEP/ cryo/cone no  Uterine surgery no  Ovary or tubal surgery no    Patient presents for dysuria, vaginal vulvar pruritus and screening Pap    Past Medical History:   Diagnosis Date    ARDS (adult respiratory distress syndrome) (HCC) 2018 2018???    Asthma     Depression     Essential tremor     Head trauma     multiple history of    Hx of blood clots     ???????????????????????    Hyperlipidemia     Migraines     with aura, ocular    Obesity     Prolonged emergence from general anesthesia     Systemic lupus erythematosus (HCC)     Tachycardia         Past Surgical History:   Procedure Laterality Date    CHOLECYSTECTOMY, LAPAROSCOPIC  03/2018    DIAGNOSTIC CARDIAC CATH LAB PROCEDURE      RETINAL DETACHMENT SURGERY      retina's reattached        Family History   Problem Relation Age of Onset    High Blood Pressure Mother     Diabetes Mother     Stroke Mother     Arthritis Mother     Diabetes Father     High Blood Pressure Father     High

## 2025-02-27 DIAGNOSIS — L29.2 VULVAR PRURITUS: ICD-10-CM

## 2025-02-27 LAB
CULTURE: NORMAL
CULTURE: NORMAL
HPV SAMPLE: NORMAL
HPV SOURCE: NORMAL
HPV, GENOTYPE 16: NORMAL
HPV, GENOTYPE 18: NORMAL
HPV, HIGH RISK OTHER: NORMAL
HPV, INTERPRETATION: NORMAL
SPECIMEN DESCRIPTION: NORMAL

## 2025-02-27 RX ORDER — CLINDAMYCIN PHOSPHATE 20 MG/G
CREAM VAGINAL
Qty: 1 EACH | Refills: 0 | Status: SHIPPED | OUTPATIENT
Start: 2025-02-27 | End: 2025-03-06

## 2025-02-27 NOTE — PROGRESS NOTES
Health tracks positive for BV allergy to metronidazole causes hives Will Rx Cleocin nightly for 5   (2) cough or sneeze

## 2025-02-28 ENCOUNTER — TELEPHONE (OUTPATIENT)
Dept: OBGYN | Age: 55
End: 2025-02-28

## 2025-02-28 NOTE — TELEPHONE ENCOUNTER
Patient called to set up a hospital f/u with Dr. Tiffanie Casper, she would like seen ASAP due to ever since she had the cardiac cath she is having pain and her legs are red and swollen, stated she has went back to the ER a couple of times and they have not done anything for her, she can be reached at [phone].
Patient notified of Dr. Carmen Mcclelland assessment/recommendations F/U scheduled for 2/24/21 at 8:20 a.m.
Yes

## 2025-02-28 NOTE — TELEPHONE ENCOUNTER
----- Message from Dr. Khadra Pennington, DO sent at 2/27/2025  1:43 PM EST -----  Please let her know that her vaginal culture returned positive for bacterial vaginosis which is an overgrowth of normal bacteria it can cause itching burning and discomfort on the vagina and vulva.  The typical treatment is metronidazole however she reports she is allergic to it so I am going to try Cleocin this may or may not be effective in resolving her symptoms.  That prescription was sent to her pharmacy she uses it nightly vaginally x 5 nights

## 2025-03-03 LAB
HPV SAMPLE: NORMAL
HPV SOURCE: NORMAL
HPV, GENOTYPE 16: NOT DETECTED
HPV, GENOTYPE 18: NOT DETECTED
HPV, HIGH RISK OTHER: NOT DETECTED
HPV, INTERPRETATION: NORMAL

## 2025-03-04 ENCOUNTER — TELEPHONE (OUTPATIENT)
Dept: OBGYN | Age: 55
End: 2025-03-04

## 2025-03-04 LAB — GYNECOLOGY CYTOLOGY REPORT: NORMAL

## 2025-03-04 NOTE — TELEPHONE ENCOUNTER
----- Message from Dr. Khadra Pennington, DO sent at 3/4/2025  8:57 AM EST -----  Please let her know her Pap and HPV are both normal and her urine culture is negative for infection

## 2025-03-12 ENCOUNTER — RESULTS FOLLOW-UP (OUTPATIENT)
Dept: OBGYN | Age: 55
End: 2025-03-12

## 2025-03-12 DIAGNOSIS — N95.0 PMB (POSTMENOPAUSAL BLEEDING): ICD-10-CM

## 2025-05-05 ENCOUNTER — DOCUMENTATION (OUTPATIENT)
Dept: CARDIOLOGY | Facility: HOSPITAL | Age: 55
End: 2025-05-05
Payer: COMMERCIAL

## 2025-05-05 NOTE — PROGRESS NOTES
Spoke with patient see if she could get the blood work martee ordered, she was in a meeting, but said she will try to have them done.

## 2025-05-08 LAB
CHOLEST SERPL-MCNC: 141 MG/DL
CHOLEST/HDLC SERPL: 4 (CALC)
HDLC SERPL-MCNC: 35 MG/DL
LDLC SERPL CALC-MCNC: 81 MG/DL (CALC)
NONHDLC SERPL-MCNC: 106 MG/DL (CALC)
TRIGL SERPL-MCNC: 158 MG/DL

## 2025-05-13 ENCOUNTER — APPOINTMENT (OUTPATIENT)
Dept: CARDIOLOGY | Facility: CLINIC | Age: 55
End: 2025-05-13
Payer: COMMERCIAL

## 2025-06-26 RX ORDER — CICLOPIROX 80 MG/ML
SOLUTION TOPICAL
COMMUNITY
Start: 2025-02-04

## 2025-07-31 ENCOUNTER — APPOINTMENT (OUTPATIENT)
Dept: CARDIOLOGY | Facility: HOSPITAL | Age: 55
End: 2025-07-31
Payer: COMMERCIAL

## 2025-07-31 VITALS
SYSTOLIC BLOOD PRESSURE: 118 MMHG | HEIGHT: 60 IN | WEIGHT: 275 LBS | DIASTOLIC BLOOD PRESSURE: 78 MMHG | HEART RATE: 81 BPM | BODY MASS INDEX: 53.99 KG/M2

## 2025-07-31 DIAGNOSIS — R06.02 SHORTNESS OF BREATH: ICD-10-CM

## 2025-07-31 DIAGNOSIS — I10 PRIMARY HYPERTENSION: ICD-10-CM

## 2025-07-31 DIAGNOSIS — E78.5 HYPERLIPIDEMIA, UNSPECIFIED HYPERLIPIDEMIA TYPE: ICD-10-CM

## 2025-07-31 DIAGNOSIS — R60.0 LOCALIZED EDEMA: ICD-10-CM

## 2025-07-31 DIAGNOSIS — I20.1 PRINZMETAL ANGINA: ICD-10-CM

## 2025-07-31 DIAGNOSIS — R07.89 ATYPICAL CHEST PAIN: ICD-10-CM

## 2025-07-31 DIAGNOSIS — R94.31 ABNORMAL EKG: Primary | ICD-10-CM

## 2025-07-31 DIAGNOSIS — R00.2 PALPITATIONS: ICD-10-CM

## 2025-07-31 DIAGNOSIS — R00.0 TACHYCARDIA: ICD-10-CM

## 2025-07-31 PROCEDURE — 93005 ELECTROCARDIOGRAM TRACING: CPT | Performed by: INTERNAL MEDICINE

## 2025-07-31 PROCEDURE — 99213 OFFICE O/P EST LOW 20 MIN: CPT | Mod: 25

## 2025-07-31 PROCEDURE — 93010 ELECTROCARDIOGRAM REPORT: CPT | Performed by: INTERNAL MEDICINE

## 2025-07-31 PROCEDURE — 1036F TOBACCO NON-USER: CPT | Performed by: INTERNAL MEDICINE

## 2025-07-31 PROCEDURE — 99214 OFFICE O/P EST MOD 30 MIN: CPT | Performed by: INTERNAL MEDICINE

## 2025-07-31 PROCEDURE — 3074F SYST BP LT 130 MM HG: CPT | Performed by: INTERNAL MEDICINE

## 2025-07-31 PROCEDURE — 3078F DIAST BP <80 MM HG: CPT | Performed by: INTERNAL MEDICINE

## 2025-07-31 PROCEDURE — 3008F BODY MASS INDEX DOCD: CPT | Performed by: INTERNAL MEDICINE

## 2025-07-31 RX ORDER — ATORVASTATIN CALCIUM 20 MG/1
20 TABLET, FILM COATED ORAL DAILY
Qty: 90 TABLET | Refills: 3 | Status: SHIPPED | OUTPATIENT
Start: 2025-07-31

## 2025-07-31 RX ORDER — DULAGLUTIDE 0.75 MG/.5ML
INJECTION, SOLUTION SUBCUTANEOUS
COMMUNITY
Start: 2025-07-10

## 2025-07-31 ASSESSMENT — ENCOUNTER SYMPTOMS
LOSS OF SENSATION IN FEET: 0
OCCASIONAL FEELINGS OF UNSTEADINESS: 0
DEPRESSION: 1

## 2025-07-31 NOTE — PROGRESS NOTES
Chief Complaint:   Follow-up (Annual, SOB)     History Of Present Illness:    Kristin Alcantar is a 54 y.o. female presenting for annual follow-up.  She has history of systemic lupus, Sjogren's disease, fibromyalgia and conscious awareness of her heartbeat, has prinzmetal angina who presents for her regular follow-up. It appears that the patient presented to the emergency department with a prodrome of palpitations and chest pain that resulted in a left heart catheterization (2/2/21) not showing any evidence of obstructive coronary artery disease and she was told that she had coronary vasospasm. She had a coronary CT angiogram in 2020 showing normal coronaries and 0 calcium score. She has worn a monitor in the past that did not show any arrhythmia(2020). This was done for palpitations. She had an echocardiogram in 2020 as well that was quite unremarkable.     Currently her symptoms are occasional chest pain at rest that led last for few seconds to an hour, radiates down both arm and did respond to Imdur prescribed by Dr. Moraes, however she stopped this because she was afraid of interaction with Risperdal and also because this was causing headache. She is wondering if there is something she could use as needed. I prescribed as needed sublingual nitroglycerin had worked in the past but lately she has not been using it as she got concerned again about interactions with Risperdal.  Last year she tells me her PCP stopped her Cardizem as well as aspirin because of interaction with other medications.     . She tells me she also gets a different type of chest pressure when she exercises.  She just started exercising again recently and feels her heart pounding with even minor activities.     At night when she lies down in bed she gets cough and chest pain.  She states she is concerned she is getting shortness of breath.  This is quite random not related to activities.  She does have some ankle swelling.     Her ECG shows  sinus rhythm poor R wave progression low voltage complexes unchanged from prior. .  Inferior Q waves also unchanged..     Last Recorded Vitals:  Vitals:    07/31/25 1452   BP: 118/78   BP Location: Left arm   Pulse: 81   Weight: 125 kg (275 lb)   Height: (!) 1.524 m (5')       Past Medical History:  She has a past medical history of Syncope and collapse (08/12/2020) and Systemic lupus erythematosus, unspecified.    Past Surgical History:  She has a past surgical history that includes Other surgical history (03/18/2021); Other surgical history (03/18/2021); and CT angio coronary art with heartflow if score >30% (9/30/2020).      Social History:  She reports that she has never smoked. She has never used smokeless tobacco. She reports that she does not currently use alcohol. She reports that she does not use drugs.    Family History:  Family History[1]     Allergies:  Fish containing products, Tolmetin, Cefdinir, Cephalosporins, Ciprofloxacin, Codeine, Fish derived, Folic acid, Qqgnmgs-[id number], Methotrexate, Metronidazole, Nsaids (non-steroidal anti-inflammatory drug), Prednisone, Quinolones, and Erythromycin    Outpatient Medications:  Current Outpatient Medications   Medication Instructions    albuterol 90 mcg/actuation inhaler inhale 2 puffs by mouth and INTO THE LUNGS every 6 hours if needed for shortness of breath    aspirin 81 mg EC tablet 1 tablet, Daily    atorvastatin (LIPITOR) 20 mg, oral, Daily    benztropine (Cogentin) 0.5 mg tablet take 1 tablet by mouth three times a day and 1 tablet at bedtime if needed    buPROPion XL (WELLBUTRIN XL) 300 mg, Daily    busPIRone (Buspar) 5 mg tablet take 1/2 tablet by mouth three times a day    cetirizine (ZyrTEC) 10 mg tablet 1 tablet, Daily (0630)    cholecalciferol (Vitamin D-3) 125 mcg (5,000 units) capsule 1 capsule, Daily (0630)    ciclopirox (Penlac) 8 % solution     fluticasone-umeclidin-vilanter (TRELEGY-ELLIPTA) 100-62.5-25 mcg blister with device  "1 puff, Daily    Gemtesa 75 mg tablet 1 tablet, Daily    ipratropium-albuteroL (Duo-Neb) 0.5-2.5 mg/3 mL nebulizer solution inhale contents of 1 vial ( 3 milliliters ) in nebulizer by mouth and INTO THE LUNGS every 6 hours    Mucinex 600 mg, Every 12 hours    multivitamin (Daily Multi-Vitamin) tablet 1 tablet, Daily    nitroglycerin (Nitrostat) 0.4 mg SL tablet place 1 tablet under the tongue if needed every 5 minutes for mirtha...  (REFER TO PRESCRIPTION NOTES).    risperiDONE (RISPERDAL) 1 mg, 2 times daily    Trulicity 0.75 mg/0.5 mL pen injector ADMINISTER 0.75 MG UNDER THE SKIN EVERY WEEK       Physical Exam:  Physical Exam  Vitals reviewed.   Constitutional:       Appearance: Normal appearance.   Neck:      Vascular: No carotid bruit or JVD.     Cardiovascular:      Rate and Rhythm: Normal rate and regular rhythm.      Pulses: Normal pulses.      Heart sounds: Normal heart sounds, S1 normal and S2 normal.   Pulmonary:      Effort: Pulmonary effort is normal. No respiratory distress.      Breath sounds: No wheezing or rales.   Abdominal:      General: Abdomen is flat.      Palpations: Abdomen is soft.     Musculoskeletal:      Right lower le+ Pitting Edema present.      Left lower le+ Pitting Edema present.     Skin:     General: Skin is warm.     Neurological:      Mental Status: She is alert and oriented to person, place, and time. Mental status is at baseline.     Psychiatric:         Mood and Affect: Mood normal.         Behavior: Behavior normal.           Last Labs:  CBC -  No results found for: \"WBC\", \"HGB\", \"HCT\", \"MCV\", \"PLT\"    CMP -  No results found for: \"CALCIUM\", \"PHOS\", \"PROT\", \"ALBUMIN\", \"AST\", \"ALT\", \"ALKPHOS\", \"BILITOT\"    LIPID PANEL -   Lab Results   Component Value Date    CHOL 141 2025    TRIG 158 (H) 2025    HDL 35 (L) 2025    CHHDL 4.0 2025    NHDL 106 2025       RENAL FUNCTION PANEL -   No results found for: \"GLUCOSE\", \"NA\", \"K\", \"CL\", \"CO2\", " "\"ANIONGAP\", \"BUN\", \"CREATININE\", \"GFRMALE\", \"CALCIUM\", \"PHOS\", \"ALBUMIN\"     Lab Results   Component Value Date    HGBA1C 5.7 (H) 06/03/2021       Last Cardiology Tests:  ECG:  ECG 12 Lead 05/08/2024      Echo:  No results found for this or any previous visit from the past 1095 days.      Ejection Fractions:  No results found for: \"EF\"    Cath:  No results found for this or any previous visit from the past 1095 days.      Stress Test:  No results found for this or any previous visit from the past 1095 days.      Cardiac Imaging:  No results found for this or any previous visit from the past 1095 days.          Assessment/Plan   54 years old lady with history of palpitations and chest pain who presents to our clinic for 6-month follow-up visit.        Palpitations:  No arrhythmia seen on monitor. Reassured her advised to reach out if symptoms change.  Having some exertional palpitations with elevated blood pressure and Prinzmetal angina Cardizem should help advised to exercise regularly as well.  Cardizem prescribed.     Vasospastic angina:  Patient had a heart cath did not show any significant coronary disease at an outside hospital.  She did not tolerate isosorbide dinitrate due to headaches. I reassured that there is no significant interaction with Risperdal. She can use nitroglycerin as needed for now.  However, she is very reluctant to use this.  I gave her some Cardizem to try at this time.           She also has chest pain with exercise which could be exercise-induced asthma advised to use inhalers prior to exercise. Some symptoms at night could be acid reflux related and advised that she follows up with PCP to discuss that. We have not seen any obstructive coronary artery disease in our evaluation.     Shortness of breath-associated with ankle swelling and abnormal looking EKG although ECG is really unchanged.  Check echocardiogram.  Patient may have some diastolic dysfunction but I believe the shortness of " breath could be multifactorial.  Check BNP CBC and Chem-7 as well.  Follow-up in 6-8 weeks with [facility] to discuss results.  Yrn Carter MD         [1]   Family History  Problem Relation Name Age of Onset    Transient ischemic attack Mother

## 2025-08-01 ENCOUNTER — TELEPHONE (OUTPATIENT)
Dept: CARDIOLOGY | Facility: HOSPITAL | Age: 55
End: 2025-08-01
Payer: COMMERCIAL

## 2025-08-01 LAB
ATRIAL RATE: 81 BPM
BNP SERPL-MCNC: 18 PG/ML
BUN SERPL-MCNC: 13 MG/DL (ref 7–25)
CHOLEST SERPL-MCNC: 148 MG/DL
CHOLEST/HDLC SERPL: 4.4 (CALC)
CREAT SERPL-MCNC: 0.94 MG/DL (ref 0.5–1.03)
EGFRCR SERPLBLD CKD-EPI 2021: 73 ML/MIN/1.73M2
ERYTHROCYTE [DISTWIDTH] IN BLOOD BY AUTOMATED COUNT: 14.1 % (ref 11–15)
HCT VFR BLD AUTO: 42.1 % (ref 35–45)
HDLC SERPL-MCNC: 34 MG/DL
HGB BLD-MCNC: 13.6 G/DL (ref 11.7–15.5)
LDLC SERPL CALC-MCNC: 81 MG/DL (CALC)
MCH RBC QN AUTO: 28.5 PG (ref 27–33)
MCHC RBC AUTO-ENTMCNC: 32.3 G/DL (ref 32–36)
MCV RBC AUTO: 88.3 FL (ref 80–100)
NONHDLC SERPL-MCNC: 114 MG/DL (CALC)
P AXIS: 48 DEGREES
P OFFSET: 186 MS
P ONSET: 136 MS
PLATELET # BLD AUTO: 141 THOUSAND/UL (ref 140–400)
PMV BLD REES-ECKER: 9.4 FL (ref 7.5–12.5)
POTASSIUM SERPL-SCNC: 4.2 MMOL/L (ref 3.5–5.3)
PR INTERVAL: 164 MS
Q ONSET: 218 MS
QRS COUNT: 13 BEATS
QRS DURATION: 78 MS
QT INTERVAL: 368 MS
QTC CALCULATION(BAZETT): 427 MS
QTC FREDERICIA: 406 MS
R AXIS: 15 DEGREES
RBC # BLD AUTO: 4.77 MILLION/UL (ref 3.8–5.1)
SODIUM SERPL-SCNC: 140 MMOL/L (ref 135–146)
T AXIS: 7 DEGREES
T OFFSET: 402 MS
TRIGL SERPL-MCNC: 250 MG/DL
VENTRICULAR RATE: 81 BPM
WBC # BLD AUTO: 7.1 THOUSAND/UL (ref 3.8–10.8)

## 2025-08-01 NOTE — TELEPHONE ENCOUNTER
Called to let her know Dr. Carter had reviewed her recent lab results and suggested she follow up with PCP regarding elevated Triglyceride levels. Lifestyle changes were discussed at last appointment however it may be beneficial to get a referral for a dietician from PCP. Patient did not answer so staff left a message stating the previous.

## 2025-08-28 ENCOUNTER — HOSPITAL ENCOUNTER (OUTPATIENT)
Dept: CARDIOLOGY | Facility: HOSPITAL | Age: 55
Discharge: HOME | End: 2025-08-28
Payer: COMMERCIAL

## 2025-08-28 DIAGNOSIS — R06.02 SHORTNESS OF BREATH: ICD-10-CM

## 2025-08-28 DIAGNOSIS — I20.1 PRINZMETAL ANGINA: ICD-10-CM

## 2025-08-28 DIAGNOSIS — R60.0 LOCALIZED EDEMA: ICD-10-CM

## 2025-08-28 DIAGNOSIS — R94.31 ABNORMAL EKG: ICD-10-CM

## 2025-08-28 DIAGNOSIS — E78.5 HYPERLIPIDEMIA, UNSPECIFIED HYPERLIPIDEMIA TYPE: ICD-10-CM

## 2025-08-28 DIAGNOSIS — R00.0 TACHYCARDIA: ICD-10-CM

## 2025-08-28 DIAGNOSIS — R00.2 PALPITATIONS: ICD-10-CM

## 2025-08-28 DIAGNOSIS — I10 PRIMARY HYPERTENSION: ICD-10-CM

## 2025-08-28 DIAGNOSIS — R07.89 ATYPICAL CHEST PAIN: ICD-10-CM

## 2025-08-28 LAB
AORTIC VALVE MEAN GRADIENT: 4 MMHG
AORTIC VALVE PEAK VELOCITY: 1.21 M/S
AV PEAK GRADIENT: 6 MMHG
AVA (PEAK VEL): 2.53 CM2
AVA (VTI): 2.7 CM2
EJECTION FRACTION APICAL 4 CHAMBER: 57.8
EJECTION FRACTION: 61 %
LEFT ATRIUM VOLUME AREA LENGTH INDEX BSA: 10.2 ML/M2
LEFT VENTRICLE INTERNAL DIMENSION DIASTOLE: 3.71 CM (ref 3.5–6)
LEFT VENTRICULAR OUTFLOW TRACT DIAMETER: 2.21 CM
LV EJECTION FRACTION BIPLANE: 61 %
MITRAL VALVE E/A RATIO: 0.71
MITRAL VALVE E/E' RATIO: 7.17
RIGHT VENTRICLE FREE WALL PEAK S': 16.23 CM/S
RIGHT VENTRICLE PEAK SYSTOLIC PRESSURE: 25 MMHG
TRICUSPID ANNULAR PLANE SYSTOLIC EXCURSION: 2 CM

## 2025-08-28 PROCEDURE — 93306 TTE W/DOPPLER COMPLETE: CPT

## 2025-08-28 PROCEDURE — 93306 TTE W/DOPPLER COMPLETE: CPT | Performed by: INTERNAL MEDICINE
